# Patient Record
Sex: MALE | Race: WHITE | NOT HISPANIC OR LATINO | Employment: OTHER | URBAN - METROPOLITAN AREA
[De-identification: names, ages, dates, MRNs, and addresses within clinical notes are randomized per-mention and may not be internally consistent; named-entity substitution may affect disease eponyms.]

---

## 2017-05-19 ENCOUNTER — TELEPHONE (OUTPATIENT)
Dept: INTERNAL MEDICINE | Facility: CLINIC | Age: 62
End: 2017-05-19

## 2017-05-19 DIAGNOSIS — Z00.00 ROUTINE GENERAL MEDICAL EXAMINATION AT A HEALTH CARE FACILITY: Primary | ICD-10-CM

## 2017-05-19 NOTE — TELEPHONE ENCOUNTER
----- Message from Gisel Frey sent at 5/18/2017 12:46 PM CDT -----  Pt has labwork scheduled for 5/23 and need orders attached please

## 2017-05-23 ENCOUNTER — OFFICE VISIT (OUTPATIENT)
Dept: INTERNAL MEDICINE | Facility: CLINIC | Age: 62
End: 2017-05-23
Payer: COMMERCIAL

## 2017-05-23 ENCOUNTER — LAB VISIT (OUTPATIENT)
Dept: LAB | Facility: HOSPITAL | Age: 62
End: 2017-05-23
Attending: INTERNAL MEDICINE
Payer: COMMERCIAL

## 2017-05-23 ENCOUNTER — TELEPHONE (OUTPATIENT)
Dept: INTERNAL MEDICINE | Facility: CLINIC | Age: 62
End: 2017-05-23

## 2017-05-23 VITALS
SYSTOLIC BLOOD PRESSURE: 126 MMHG | TEMPERATURE: 98 F | BODY MASS INDEX: 28.73 KG/M2 | HEART RATE: 78 BPM | DIASTOLIC BLOOD PRESSURE: 88 MMHG | HEIGHT: 69 IN | WEIGHT: 194 LBS

## 2017-05-23 DIAGNOSIS — Z00.00 ANNUAL PHYSICAL EXAM: Primary | ICD-10-CM

## 2017-05-23 DIAGNOSIS — Z00.00 ROUTINE GENERAL MEDICAL EXAMINATION AT A HEALTH CARE FACILITY: ICD-10-CM

## 2017-05-23 DIAGNOSIS — K21.9 GASTROESOPHAGEAL REFLUX DISEASE, ESOPHAGITIS PRESENCE NOT SPECIFIED: Chronic | ICD-10-CM

## 2017-05-23 DIAGNOSIS — R07.9 CHEST PAIN, UNSPECIFIED TYPE: ICD-10-CM

## 2017-05-23 DIAGNOSIS — M54.2 CERVICAL PAIN (NECK): ICD-10-CM

## 2017-05-23 DIAGNOSIS — Z12.11 SCREEN FOR COLON CANCER: ICD-10-CM

## 2017-05-23 DIAGNOSIS — E78.5 HYPERLIPIDEMIA, UNSPECIFIED HYPERLIPIDEMIA TYPE: Chronic | ICD-10-CM

## 2017-05-23 DIAGNOSIS — Z00.00 ROUTINE GENERAL MEDICAL EXAMINATION AT A HEALTH CARE FACILITY: Primary | ICD-10-CM

## 2017-05-23 LAB
ALBUMIN SERPL BCP-MCNC: 3.9 G/DL
ALP SERPL-CCNC: 77 U/L
ALT SERPL W/O P-5'-P-CCNC: 30 U/L
ANION GAP SERPL CALC-SCNC: 9 MMOL/L
AST SERPL-CCNC: 23 U/L
BASOPHILS # BLD AUTO: 0.04 K/UL
BASOPHILS NFR BLD: 0.6 %
BILIRUB SERPL-MCNC: 0.6 MG/DL
BUN SERPL-MCNC: 15 MG/DL
CALCIUM SERPL-MCNC: 9.8 MG/DL
CHLORIDE SERPL-SCNC: 105 MMOL/L
CHOLEST/HDLC SERPL: 5.5 {RATIO}
CO2 SERPL-SCNC: 25 MMOL/L
COMPLEXED PSA SERPL-MCNC: 0.62 NG/ML
CREAT SERPL-MCNC: 1.1 MG/DL
DIFFERENTIAL METHOD: ABNORMAL
EOSINOPHIL # BLD AUTO: 0.6 K/UL
EOSINOPHIL NFR BLD: 8 %
ERYTHROCYTE [DISTWIDTH] IN BLOOD BY AUTOMATED COUNT: 12.3 %
EST. GFR  (AFRICAN AMERICAN): >60 ML/MIN/1.73 M^2
EST. GFR  (NON AFRICAN AMERICAN): >60 ML/MIN/1.73 M^2
GLUCOSE SERPL-MCNC: 106 MG/DL
HCT VFR BLD AUTO: 48.9 %
HDL/CHOLESTEROL RATIO: 18.2 %
HDLC SERPL-MCNC: 187 MG/DL
HDLC SERPL-MCNC: 34 MG/DL
HGB BLD-MCNC: 16.6 G/DL
LDLC SERPL CALC-MCNC: 120.4 MG/DL
LYMPHOCYTES # BLD AUTO: 2.9 K/UL
LYMPHOCYTES NFR BLD: 40.3 %
MCH RBC QN AUTO: 32.8 PG
MCHC RBC AUTO-ENTMCNC: 33.9 %
MCV RBC AUTO: 97 FL
MONOCYTES # BLD AUTO: 0.6 K/UL
MONOCYTES NFR BLD: 8.3 %
NEUTROPHILS # BLD AUTO: 3 K/UL
NEUTROPHILS NFR BLD: 42.8 %
NONHDLC SERPL-MCNC: 153 MG/DL
PLATELET # BLD AUTO: 263 K/UL
PMV BLD AUTO: 10.3 FL
POTASSIUM SERPL-SCNC: 5 MMOL/L
PROT SERPL-MCNC: 7.2 G/DL
RBC # BLD AUTO: 5.06 M/UL
SODIUM SERPL-SCNC: 139 MMOL/L
TRIGL SERPL-MCNC: 163 MG/DL
TSH SERPL DL<=0.005 MIU/L-ACNC: 3.25 UIU/ML
WBC # BLD AUTO: 7.12 K/UL

## 2017-05-23 PROCEDURE — 93010 ELECTROCARDIOGRAM REPORT: CPT | Mod: S$GLB,,, | Performed by: INTERNAL MEDICINE

## 2017-05-23 PROCEDURE — 84153 ASSAY OF PSA TOTAL: CPT

## 2017-05-23 PROCEDURE — 93005 ELECTROCARDIOGRAM TRACING: CPT | Mod: S$GLB,,, | Performed by: INTERNAL MEDICINE

## 2017-05-23 PROCEDURE — 84443 ASSAY THYROID STIM HORMONE: CPT

## 2017-05-23 PROCEDURE — 83036 HEMOGLOBIN GLYCOSYLATED A1C: CPT

## 2017-05-23 PROCEDURE — 80061 LIPID PANEL: CPT

## 2017-05-23 PROCEDURE — 85025 COMPLETE CBC W/AUTO DIFF WBC: CPT

## 2017-05-23 PROCEDURE — 99396 PREV VISIT EST AGE 40-64: CPT | Mod: S$GLB,,, | Performed by: INTERNAL MEDICINE

## 2017-05-23 PROCEDURE — 80053 COMPREHEN METABOLIC PANEL: CPT

## 2017-05-23 PROCEDURE — 99999 PR PBB SHADOW E&M-EST. PATIENT-LVL III: CPT | Mod: PBBFAC,,, | Performed by: INTERNAL MEDICINE

## 2017-05-23 PROCEDURE — 36415 COLL VENOUS BLD VENIPUNCTURE: CPT | Mod: PO

## 2017-05-23 RX ORDER — LOVASTATIN 40 MG/1
40 TABLET ORAL NIGHTLY
Qty: 90 TABLET | Refills: 3 | Status: SHIPPED | OUTPATIENT
Start: 2017-05-23 | End: 2018-05-04 | Stop reason: SDUPTHER

## 2017-05-23 RX ORDER — OMEPRAZOLE 40 MG/1
40 CAPSULE, DELAYED RELEASE ORAL DAILY
Qty: 90 CAPSULE | Refills: 3 | Status: SHIPPED | OUTPATIENT
Start: 2017-05-23 | End: 2018-05-24 | Stop reason: SDUPTHER

## 2017-05-23 RX ORDER — TAMSULOSIN HYDROCHLORIDE 0.4 MG/1
0.4 CAPSULE ORAL DAILY
Qty: 90 CAPSULE | Refills: 3 | Status: SHIPPED | OUTPATIENT
Start: 2017-05-23 | End: 2018-05-24 | Stop reason: SDUPTHER

## 2017-05-23 RX ORDER — NAPROXEN 500 MG/1
500 TABLET ORAL 2 TIMES DAILY
Qty: 60 TABLET | Refills: 1 | Status: SHIPPED | OUTPATIENT
Start: 2017-05-23 | End: 2019-01-08

## 2017-05-23 NOTE — PROGRESS NOTES
Subjective:       Patient ID: Casper Osborne is a 61 y.o. male.    Chief Complaint: Annual Exam    HPI     61 y.o. male here for annual exam.     Cholesterol: pending  Vaccines: Influenza - not done; Tetanus - 2015; Prevnar; Zoster - needs  Sexual Screening:   STD screening: no concern  Eye exam: done last more than 2 years ago.  Prostate: pending  Colonoscopy: never done  A1c: pending    Exercise: stopped exercising, used to ride a bike.  Has not ridden since last summer.  His arms are bothersome - he has a torn labrum in one shoulder and rotator cuff in the other shoulder.  He cannot run since he crushed his foot.  He mows 4 lawns a week.  Diet:  Combination home cooked and junk food.  Never fast food.  Drinks tea and water and diet soda.    Concerned about having metabolic syndrome.    He has had neck pain the last couple of days.  He hauls the  back and forth. He has worked on the  the last two days fixing the wheels.    Past Medical History:   Diagnosis Date    GERD (gastroesophageal reflux disease)     Hyperlipidemia      Past Surgical History:   Procedure Laterality Date    FRACTURE SURGERY      left heel at 41yo    HEEL SPUR SURGERY      left heel - fell off a ladder and had to have this reconstructed    SPINE SURGERY      c7 fx - prior to this, he was getting dizzy spells - none since    TONSILLECTOMY      VASECTOMY       Social History     Social History    Marital status:      Spouse name: Nithya    Number of children: 1    Years of education: N/A     Occupational History    Retried      Social History Main Topics    Smoking status: Never Smoker    Smokeless tobacco: Never Used    Alcohol use Yes      Comment: less than once a month    Drug use: No    Sexual activity: Yes     Partners: Female      Comment:      Other Topics Concern    Not on file     Social History Narrative    No narrative on file     Review of patient's allergies indicates:  No  Known Allergies  Mr. Osborne had no medications administered during this visit.    Review of Systems   Constitutional: Negative for chills, fever and unexpected weight change.   HENT: Negative for congestion, postnasal drip and sore throat.    Eyes: Negative for redness and visual disturbance.   Respiratory: Negative for cough and shortness of breath.    Cardiovascular: Positive for chest pain (when he mows the lawn if he overdoes it. This has been going on a couple weeks. He had assocaited SOB. No nausea. No radiation. ). Negative for palpitations.   Gastrointestinal: Negative for abdominal pain, constipation, diarrhea, nausea and vomiting.   Genitourinary: Negative for dysuria, frequency and hematuria.   Musculoskeletal: Positive for arthralgias. Negative for myalgias.   Skin: Negative for color change and rash.   Neurological: Negative for dizziness and headaches.       Objective:      Physical Exam   Constitutional: He is oriented to person, place, and time. He appears well-developed and well-nourished.   HENT:   Head: Normocephalic and atraumatic.   Mouth/Throat: No oropharyngeal exudate.   Eyes: EOM are normal. Pupils are equal, round, and reactive to light. Right eye exhibits no discharge. Left eye exhibits no discharge. No scleral icterus.   Neck: Normal range of motion. Neck supple. No tracheal deviation present. No thyromegaly present.   Cardiovascular: Normal rate, regular rhythm and normal heart sounds.  Exam reveals no gallop and no friction rub.    No murmur heard.  Pulmonary/Chest: Effort normal and breath sounds normal. No respiratory distress. He has no wheezes. He has no rales. He exhibits no tenderness.   Abdominal: Soft. Bowel sounds are normal. He exhibits no distension and no mass. There is no tenderness. There is no rebound and no guarding.   Musculoskeletal: Normal range of motion. He exhibits no edema or tenderness.   Neurological: He is alert and oriented to person, place, and time.   Skin:  Skin is warm and dry. No rash noted. No erythema. No pallor.   Psychiatric: He has a normal mood and affect. His behavior is normal.   Vitals reviewed.      Assessment:       1. Annual physical exam    2. Hyperlipidemia, unspecified hyperlipidemia type    3. Gastroesophageal reflux disease, esophagitis presence not specified    4. Chest pain, unspecified type    5. Cervical pain (neck)    6. Screen for colon cancer        Plan:       1.  Awaiting lab results.  Up-to-date on tetanus vaccine.  Recommend shingles vaccine.  Recommend flu vaccine next season.  Discussed exercise with patient.  2.  Continue Mevacor 40 mrem daily.  3.  Continue Prilosec 40 mrem daily.  Next line 4.  Check EKG and exercise stress echo.  EKG in clinic shows normal sinus rhythm.  5.  Likely from poor sleep.  Recommend NSAIDs.  6.  Colonoscopy ordered.    Patient is concerned about having metabolic syndrome.  Would like to know if he should start on metformin because of this.  Told patient was going to await lab work before making a decision.

## 2017-05-24 ENCOUNTER — TELEPHONE (OUTPATIENT)
Dept: INTERNAL MEDICINE | Facility: CLINIC | Age: 62
End: 2017-05-24

## 2017-05-24 LAB
ESTIMATED AVG GLUCOSE: 117 MG/DL
HBA1C MFR BLD HPLC: 5.7 %

## 2017-05-24 RX ORDER — METFORMIN HYDROCHLORIDE 500 MG/1
500 TABLET ORAL 2 TIMES DAILY WITH MEALS
Qty: 60 TABLET | Refills: 11 | Status: SHIPPED | OUTPATIENT
Start: 2017-05-24 | End: 2018-05-24 | Stop reason: SDUPTHER

## 2017-05-30 ENCOUNTER — CLINICAL SUPPORT (OUTPATIENT)
Dept: CARDIOLOGY | Facility: CLINIC | Age: 62
End: 2017-05-30
Payer: COMMERCIAL

## 2017-05-30 DIAGNOSIS — R07.9 CHEST PAIN, UNSPECIFIED TYPE: ICD-10-CM

## 2017-05-30 LAB
DIASTOLIC DYSFUNCTION: NO
ESTIMATED PA SYSTOLIC PRESSURE: 20.98
RETIRED EF AND QEF - SEE NOTES: 55 (ref 55–65)
TRICUSPID VALVE REGURGITATION: NORMAL

## 2017-05-30 PROCEDURE — 93320 DOPPLER ECHO COMPLETE: CPT | Mod: S$GLB,,, | Performed by: INTERNAL MEDICINE

## 2017-05-30 PROCEDURE — 93351 STRESS TTE COMPLETE: CPT | Mod: S$GLB,,, | Performed by: INTERNAL MEDICINE

## 2017-05-30 PROCEDURE — 93325 DOPPLER ECHO COLOR FLOW MAPG: CPT | Mod: S$GLB,,, | Performed by: INTERNAL MEDICINE

## 2017-10-12 RX ORDER — FLUTICASONE PROPIONATE 50 MCG
SPRAY, SUSPENSION (ML) NASAL
Qty: 16 G | Refills: 3 | Status: SHIPPED | OUTPATIENT
Start: 2017-10-12 | End: 2019-12-18 | Stop reason: SDUPTHER

## 2018-03-28 ENCOUNTER — TELEPHONE (OUTPATIENT)
Dept: INTERNAL MEDICINE | Facility: CLINIC | Age: 63
End: 2018-03-28

## 2018-03-28 DIAGNOSIS — Z12.5 PROSTATE CANCER SCREENING: ICD-10-CM

## 2018-03-28 DIAGNOSIS — Z00.00 ANNUAL PHYSICAL EXAM: Primary | ICD-10-CM

## 2018-03-28 NOTE — TELEPHONE ENCOUNTER
----- Message from Margot Mcnamara sent at 3/28/2018  2:50 PM CDT -----  Contact: Pt can be reached at [phone]  Pt is calling to request all blood labs orders to be put in for appt. Pt says will only come in one time for blood draw    Please contact pt     Thank you!

## 2018-03-28 NOTE — TELEPHONE ENCOUNTER
Please enter orders and urine if applicable for yearly annual labs and alert staff so they can be linked to appt. Thank you

## 2018-05-04 DIAGNOSIS — E78.5 HYPERLIPIDEMIA, UNSPECIFIED HYPERLIPIDEMIA TYPE: Chronic | ICD-10-CM

## 2018-05-04 RX ORDER — LOVASTATIN 40 MG/1
TABLET ORAL
Qty: 90 TABLET | Refills: 0 | Status: SHIPPED | OUTPATIENT
Start: 2018-05-04 | End: 2018-05-24 | Stop reason: SDUPTHER

## 2018-05-09 ENCOUNTER — PATIENT MESSAGE (OUTPATIENT)
Dept: INTERNAL MEDICINE | Facility: CLINIC | Age: 63
End: 2018-05-09

## 2018-05-24 ENCOUNTER — OFFICE VISIT (OUTPATIENT)
Dept: INTERNAL MEDICINE | Facility: CLINIC | Age: 63
End: 2018-05-24
Payer: COMMERCIAL

## 2018-05-24 ENCOUNTER — LAB VISIT (OUTPATIENT)
Dept: LAB | Facility: HOSPITAL | Age: 63
End: 2018-05-24
Attending: INTERNAL MEDICINE
Payer: COMMERCIAL

## 2018-05-24 VITALS
HEART RATE: 84 BPM | TEMPERATURE: 98 F | BODY MASS INDEX: 27.04 KG/M2 | HEIGHT: 71 IN | SYSTOLIC BLOOD PRESSURE: 126 MMHG | DIASTOLIC BLOOD PRESSURE: 84 MMHG | WEIGHT: 193.13 LBS

## 2018-05-24 DIAGNOSIS — M79.672 LEFT FOOT PAIN: ICD-10-CM

## 2018-05-24 DIAGNOSIS — K21.9 GASTROESOPHAGEAL REFLUX DISEASE, ESOPHAGITIS PRESENCE NOT SPECIFIED: Chronic | ICD-10-CM

## 2018-05-24 DIAGNOSIS — M47.816 LUMBAR FACET ARTHROPATHY: Chronic | ICD-10-CM

## 2018-05-24 DIAGNOSIS — E78.5 HYPERLIPIDEMIA, UNSPECIFIED HYPERLIPIDEMIA TYPE: Chronic | ICD-10-CM

## 2018-05-24 DIAGNOSIS — Z12.11 SCREEN FOR COLON CANCER: ICD-10-CM

## 2018-05-24 DIAGNOSIS — Z00.00 ANNUAL PHYSICAL EXAM: ICD-10-CM

## 2018-05-24 DIAGNOSIS — Z00.00 ANNUAL PHYSICAL EXAM: Primary | ICD-10-CM

## 2018-05-24 DIAGNOSIS — Z12.5 PROSTATE CANCER SCREENING: ICD-10-CM

## 2018-05-24 LAB
ALBUMIN SERPL BCP-MCNC: 4 G/DL
ALP SERPL-CCNC: 76 U/L
ALT SERPL W/O P-5'-P-CCNC: 25 U/L
ANION GAP SERPL CALC-SCNC: 4 MMOL/L
AST SERPL-CCNC: 24 U/L
BASOPHILS # BLD AUTO: 0.06 K/UL
BASOPHILS NFR BLD: 0.9 %
BILIRUB SERPL-MCNC: 1.5 MG/DL
BUN SERPL-MCNC: 17 MG/DL
CALCIUM SERPL-MCNC: 9.8 MG/DL
CHLORIDE SERPL-SCNC: 104 MMOL/L
CHOLEST SERPL-MCNC: 168 MG/DL
CHOLEST/HDLC SERPL: 5.3 {RATIO}
CO2 SERPL-SCNC: 31 MMOL/L
COMPLEXED PSA SERPL-MCNC: 0.62 NG/ML
CREAT SERPL-MCNC: 1.2 MG/DL
DIFFERENTIAL METHOD: ABNORMAL
EOSINOPHIL # BLD AUTO: 0.3 K/UL
EOSINOPHIL NFR BLD: 5.3 %
ERYTHROCYTE [DISTWIDTH] IN BLOOD BY AUTOMATED COUNT: 11.8 %
EST. GFR  (AFRICAN AMERICAN): >60 ML/MIN/1.73 M^2
EST. GFR  (NON AFRICAN AMERICAN): >60 ML/MIN/1.73 M^2
ESTIMATED AVG GLUCOSE: 114 MG/DL
GLUCOSE SERPL-MCNC: 101 MG/DL
HBA1C MFR BLD HPLC: 5.6 %
HCT VFR BLD AUTO: 47.4 %
HDLC SERPL-MCNC: 32 MG/DL
HDLC SERPL: 19 %
HGB BLD-MCNC: 15.6 G/DL
IMM GRANULOCYTES # BLD AUTO: 0.01 K/UL
IMM GRANULOCYTES NFR BLD AUTO: 0.2 %
LDLC SERPL CALC-MCNC: 109.8 MG/DL
LYMPHOCYTES # BLD AUTO: 2.6 K/UL
LYMPHOCYTES NFR BLD: 40.5 %
MCH RBC QN AUTO: 32.3 PG
MCHC RBC AUTO-ENTMCNC: 32.9 G/DL
MCV RBC AUTO: 98 FL
MONOCYTES # BLD AUTO: 0.6 K/UL
MONOCYTES NFR BLD: 9.6 %
NEUTROPHILS # BLD AUTO: 2.8 K/UL
NEUTROPHILS NFR BLD: 43.5 %
NONHDLC SERPL-MCNC: 136 MG/DL
NRBC BLD-RTO: 0 /100 WBC
PLATELET # BLD AUTO: 246 K/UL
PMV BLD AUTO: 10.5 FL
POTASSIUM SERPL-SCNC: 4.8 MMOL/L
PROT SERPL-MCNC: 6.9 G/DL
RBC # BLD AUTO: 4.83 M/UL
SODIUM SERPL-SCNC: 139 MMOL/L
TRIGL SERPL-MCNC: 131 MG/DL
TSH SERPL DL<=0.005 MIU/L-ACNC: 1.8 UIU/ML
WBC # BLD AUTO: 6.45 K/UL

## 2018-05-24 PROCEDURE — 80053 COMPREHEN METABOLIC PANEL: CPT

## 2018-05-24 PROCEDURE — 84153 ASSAY OF PSA TOTAL: CPT

## 2018-05-24 PROCEDURE — 99999 PR PBB SHADOW E&M-EST. PATIENT-LVL IV: CPT | Mod: PBBFAC,,, | Performed by: INTERNAL MEDICINE

## 2018-05-24 PROCEDURE — 99396 PREV VISIT EST AGE 40-64: CPT | Mod: S$GLB,,, | Performed by: INTERNAL MEDICINE

## 2018-05-24 PROCEDURE — 80061 LIPID PANEL: CPT

## 2018-05-24 PROCEDURE — 36415 COLL VENOUS BLD VENIPUNCTURE: CPT | Mod: PO

## 2018-05-24 PROCEDURE — 84443 ASSAY THYROID STIM HORMONE: CPT

## 2018-05-24 PROCEDURE — 85025 COMPLETE CBC W/AUTO DIFF WBC: CPT

## 2018-05-24 PROCEDURE — 83036 HEMOGLOBIN GLYCOSYLATED A1C: CPT

## 2018-05-24 RX ORDER — METFORMIN HYDROCHLORIDE 500 MG/1
500 TABLET ORAL 2 TIMES DAILY WITH MEALS
Qty: 180 TABLET | Refills: 3 | Status: SHIPPED | OUTPATIENT
Start: 2018-05-24 | End: 2019-01-08

## 2018-05-24 RX ORDER — LOVASTATIN 40 MG/1
40 TABLET ORAL NIGHTLY
Qty: 90 TABLET | Refills: 3 | Status: SHIPPED | OUTPATIENT
Start: 2018-05-24 | End: 2019-07-08 | Stop reason: SDUPTHER

## 2018-05-24 RX ORDER — OMEPRAZOLE 40 MG/1
40 CAPSULE, DELAYED RELEASE ORAL DAILY
Qty: 90 CAPSULE | Refills: 3 | Status: SHIPPED | OUTPATIENT
Start: 2018-05-24 | End: 2019-12-18 | Stop reason: SDUPTHER

## 2018-05-24 RX ORDER — TAMSULOSIN HYDROCHLORIDE 0.4 MG/1
0.4 CAPSULE ORAL DAILY
Qty: 90 CAPSULE | Refills: 3 | Status: SHIPPED | OUTPATIENT
Start: 2018-05-24 | End: 2019-01-08 | Stop reason: SDUPTHER

## 2018-05-24 NOTE — PROGRESS NOTES
Subjective:       Patient ID: Casper Osborne is a 62 y.o. male.    Chief Complaint: Annual Exam    HPI     62 y.o. male here for annual exam.     Cholesterol: in process  Vaccines: Influenza - not done; Tetanus - 2015; Zoster - needs  Sexual Screening:   STD screening: no concern  Eye exam:  Done last more than 2 yrs ago  Prostate: in process  Colonoscopy: never had one.    A1c: in process    Exercise: no regular exercise.  Stopped riding his bike two years ago and never got back to it.  He tries walking, but does not keep up with this either (crush injury 22 yrs ago).  He has a lot of pain in his foot.  He has not been to ortho in the last 8 yrs.    Diet:  Mostly fast food - prepared food from [employer].  Drinks coffee and diet soda.  Water mostly.    Past Medical History:   Diagnosis Date    GERD (gastroesophageal reflux disease)     Hyperlipidemia      Past Surgical History:   Procedure Laterality Date    FRACTURE SURGERY      left heel at 41yo    HEEL SPUR SURGERY      left heel - fell off a ladder and had to have this reconstructed    SPINE SURGERY      c7 fx - prior to this, he was getting dizzy spells - none since    TONSILLECTOMY      VASECTOMY       Social History     Social History    Marital status:      Spouse name: Nithya    Number of children: 1    Years of education: N/A     Occupational History    Retried      Social History Main Topics    Smoking status: Never Smoker    Smokeless tobacco: Never Used    Alcohol use Yes      Comment: less than once a month    Drug use: No    Sexual activity: Yes     Partners: Female      Comment:      Other Topics Concern    Not on file     Social History Narrative    No narrative on file     Review of patient's allergies indicates:  No Known Allergies  Mr. Osborne had no medications administered during this visit.    Review of Systems    Objective:      Physical Exam   Constitutional: He is oriented to person, place, and time. He  appears well-developed and well-nourished.   HENT:   Head: Normocephalic and atraumatic.   Mouth/Throat: No oropharyngeal exudate.   Eyes: EOM are normal. Pupils are equal, round, and reactive to light. Right eye exhibits no discharge. Left eye exhibits no discharge. No scleral icterus.   Neck: Normal range of motion. Neck supple. No tracheal deviation present. No thyromegaly present.   Cardiovascular: Normal rate, regular rhythm and normal heart sounds.  Exam reveals no gallop and no friction rub.    No murmur heard.  Pulmonary/Chest: Effort normal and breath sounds normal. No respiratory distress. He has no wheezes. He has no rales. He exhibits no tenderness.   Abdominal: Soft. Bowel sounds are normal. He exhibits no distension and no mass. There is no tenderness. There is no rebound and no guarding.   Musculoskeletal: Normal range of motion. He exhibits no edema or tenderness.   Neurological: He is alert and oriented to person, place, and time.   Skin: Skin is warm and dry. No rash noted. No erythema. No pallor.   Psychiatric: He has a normal mood and affect. His behavior is normal.   Vitals reviewed.      Assessment:       1. Annual physical exam    2. Hyperlipidemia, unspecified hyperlipidemia type    3. Gastroesophageal reflux disease, esophagitis presence not specified    4. Lumbar facet arthropathy    5. Screen for colon cancer    6. Left foot pain        Plan:       1.  Lab work pending.  Counseled on getting flu vaccine October.  Recommend shingles vaccine.  Up-to-date on tetanus vaccine.  2.  Continue Mevacor 40 mg daily.  3.  Continue Prilosec 40 mg daily.  4.  Continue naproxen as needed.  5.  Fit test ordered.  6.  Refer to Podiatry.

## 2018-05-31 ENCOUNTER — LAB VISIT (OUTPATIENT)
Dept: LAB | Facility: HOSPITAL | Age: 63
End: 2018-05-31
Attending: INTERNAL MEDICINE
Payer: COMMERCIAL

## 2018-05-31 DIAGNOSIS — Z12.11 SCREEN FOR COLON CANCER: ICD-10-CM

## 2018-05-31 PROCEDURE — 82274 ASSAY TEST FOR BLOOD FECAL: CPT

## 2018-06-01 LAB — HEMOCCULT STL QL IA: NEGATIVE

## 2018-06-14 ENCOUNTER — HOSPITAL ENCOUNTER (OUTPATIENT)
Dept: RADIOLOGY | Facility: HOSPITAL | Age: 63
Discharge: HOME | End: 2018-06-14
Attending: PODIATRIST
Payer: COMMERCIAL

## 2018-06-14 ENCOUNTER — OFFICE VISIT (OUTPATIENT)
Dept: PODIATRY | Facility: CLINIC | Age: 63
End: 2018-06-14
Payer: COMMERCIAL

## 2018-06-14 VITALS
SYSTOLIC BLOOD PRESSURE: 158 MMHG | HEART RATE: 79 BPM | HEIGHT: 71 IN | DIASTOLIC BLOOD PRESSURE: 96 MMHG | BODY MASS INDEX: 27.02 KG/M2 | WEIGHT: 193 LBS

## 2018-06-14 DIAGNOSIS — G89.29 CHRONIC PAIN OF LEFT ANKLE: ICD-10-CM

## 2018-06-14 DIAGNOSIS — M79.674 CHRONIC PAIN OF TOE OF RIGHT FOOT: ICD-10-CM

## 2018-06-14 DIAGNOSIS — M25.572 CHRONIC PAIN OF LEFT ANKLE: ICD-10-CM

## 2018-06-14 DIAGNOSIS — M79.674 GREAT TOE PAIN, RIGHT: ICD-10-CM

## 2018-06-14 DIAGNOSIS — M79.672 LEFT FOOT PAIN: ICD-10-CM

## 2018-06-14 DIAGNOSIS — M79.672 LEFT FOOT PAIN: Primary | ICD-10-CM

## 2018-06-14 DIAGNOSIS — G89.29 CHRONIC PAIN OF TOE OF RIGHT FOOT: ICD-10-CM

## 2018-06-14 PROCEDURE — 99999 PR PBB SHADOW E&M-EST. PATIENT-LVL III: CPT | Mod: PBBFAC,,, | Performed by: PODIATRIST

## 2018-06-14 PROCEDURE — 73630 X-RAY EXAM OF FOOT: CPT | Mod: 26,50,, | Performed by: RADIOLOGY

## 2018-06-14 PROCEDURE — 73610 X-RAY EXAM OF ANKLE: CPT | Mod: TC,PO,LT

## 2018-06-14 PROCEDURE — 73630 X-RAY EXAM OF FOOT: CPT | Mod: 50,TC,PO

## 2018-06-14 PROCEDURE — 99203 OFFICE O/P NEW LOW 30 MIN: CPT | Mod: S$GLB,,, | Performed by: PODIATRIST

## 2018-06-14 PROCEDURE — 3008F BODY MASS INDEX DOCD: CPT | Mod: CPTII,S$GLB,, | Performed by: PODIATRIST

## 2018-06-14 PROCEDURE — 73610 X-RAY EXAM OF ANKLE: CPT | Mod: 26,LT,, | Performed by: RADIOLOGY

## 2018-06-14 NOTE — PROGRESS NOTES
Chief Complaint   Patient presents with    Foot Pain     5/24/18 dr gary ire[place]     Ankle Pain     left            HPI:   Casper Osborne is a 62 y.o. male with complaints of :  1) left foot and ankle pain for about 22 years.  He reports history of calcaneal fracture treated by Dr. Melendez.  He is having more pain to the area recently.  Using a cane to help get up after rest, which is when pain is worst.    2) right great toe pain near the interphalangeal joint.  He states that it feels like he fractured it and he also complains of right knee pain.  He states he uses his right leg more... No recent trauma reported to the toe.         Past Medical History:   Diagnosis Date    GERD (gastroesophageal reflux disease)     Hyperlipidemia          Current Outpatient Prescriptions on File Prior to Visit   Medication Sig Dispense Refill    aspirin (ECOTRIN) 81 MG EC tablet Take 81 mg by mouth once daily.      fluticasone (FLONASE) 50 mcg/actuation nasal spray instill 1 spray into each nostril once daily 16 g 3    lovastatin (MEVACOR) 40 MG tablet Take 1 tablet (40 mg total) by mouth nightly. 90 tablet 3    metFORMIN (GLUCOPHAGE) 500 MG tablet Take 1 tablet (500 mg total) by mouth 2 (two) times daily with meals. 180 tablet 3    naproxen (NAPROSYN) 500 MG tablet Take 1 tablet (500 mg total) by mouth 2 (two) times daily. 60 tablet 1    omeprazole (PRILOSEC) 40 MG capsule Take 1 capsule (40 mg total) by mouth once daily. 90 capsule 3    tamsulosin (FLOMAX) 0.4 mg Cp24 Take 1 capsule (0.4 mg total) by mouth once daily. 90 capsule 3     No current facility-administered medications on file prior to visit.            Review of patient's allergies indicates:  No Known Allergies        Social History     Social History    Marital status:      Spouse name: Nithya    Number of children: 1    Years of education: N/A     Occupational History    Retried      Social History Main Topics    Smoking status: Never Smoker     "Smokeless tobacco: Never Used    Alcohol use Yes      Comment: less than once a month    Drug use: No    Sexual activity: Yes     Partners: Female      Comment:      Other Topics Concern    Not on file     Social History Narrative    No narrative on file             ROS:   General ROS: negative for - chills, fever or night sweats  Respiratory ROS: no cough, shortness of breath, or wheezing  Cardiovascular ROS: no chest pain or dyspnea on exertion  Musculoskeletal ROS: positive for - pain in foot - bilateral  Neurological ROS: no TIA or stroke symptoms  Dermatological ROS: negative      EXAM:     Vitals:    18 1049   BP: (!) 158/96   Pulse: 79   Weight: 87.5 kg (193 lb)   Height: 5' 11" (1.803 m)        General:  Alert, oriented, no acute distress      Bilateral  Lower extremity exam:    Vascular:   Dorsalis Pedis:  present   Posterior Tibial:  present  capillary refill time:  3 seconds  Temperature of toes cool to touch  Hair on toes:  present    none Edema to affected area.        Neurological:     sharp dull - B/L normal and light touch - B/L normal  No Tinels  No Mulders      Dermatological:   There is intact skin tone, turgor, and temperature.    Wounds: non  Erythema:  none  No suspicious lesions      Musculoskeletal:   Metatarsophalangeal, subtalar, and ankle range of motion are 5/5, decreased ROM, adequate strength  Right foot: flat foot  Left foot: flat foot          Imagin2018  CLINICAL HISTORY:  foot and ankle pain x 22 years;  Pain in left foot  FINDINGS:  Left: There is postoperative change of the calcaneus.  There is DJD and a flatfoot deformity.  No fracture dislocation bone destruction seen.  Right: There is DJD and hallux valgus deformity.  No fracture dislocation bone destruction seen.  There are spurs on the calcaneus.              ASSESSMENT/PLAN:        I counseled the patient on the patient's conditions, their implications and medical management.        Left foot " pain  -     X-Ray Ankle Complete Left; Future; Expected date: 06/14/2018    Chronic pain of left ankle  -     X-Ray Ankle Complete Left; Future; Expected date: 06/14/2018    Great toe pain, right      · Xrays reviewed with the patient.    · treatment includes injection, orthotics, pain management, vs surgical intervention.  He has tried these and will follow up with Dr. Melendez to see if there are surgical options available.  · He also requests referral to see a doctor about his right knee pain.

## 2018-06-14 NOTE — LETTER
June 14, 2018      Leon Chaparro MD  [address] [facility]  [place] [address]           [place] [facility]  [address] [facility] [address]  Phone: [phone]          Patient: Casper Osborne   MR Number: [id number]   YOB: 1955   Date of Visit: 6/14/2018       Dear Dr. Leon Chaparro:    Thank you for referring Casper Osborne to me for evaluation. Attached you will find relevant portions of my assessment and plan of care.    If you have questions, please do not hesitate to call me. I look forward to following Casper Osborne along with you.    Sincerely,    Lexii Gabriel DPM    Enclosure  CC:  No Recipients    If you would like to receive this communication electronically, please contact [email or web address] or [phone] to request more information on [facility] Link access.    For providers and/or their staff who would like to refer a patient to [facility], please contact us through our one-stop-shop provider referral line, Nader Cheng, at [phone].    If you feel you have received this communication in error or would no longer like to receive these types of communications, please e-mail [email or web address]

## 2018-07-02 ENCOUNTER — PATIENT MESSAGE (OUTPATIENT)
Dept: ORTHOPEDICS | Facility: CLINIC | Age: 63
End: 2018-07-02

## 2018-07-15 ENCOUNTER — PATIENT MESSAGE (OUTPATIENT)
Dept: ORTHOPEDICS | Facility: CLINIC | Age: 63
End: 2018-07-15

## 2018-12-15 ENCOUNTER — PATIENT MESSAGE (OUTPATIENT)
Dept: ORTHOPEDICS | Facility: CLINIC | Age: 63
End: 2018-12-15

## 2018-12-15 ENCOUNTER — PATIENT MESSAGE (OUTPATIENT)
Dept: INTERNAL MEDICINE | Facility: CLINIC | Age: 63
End: 2018-12-15

## 2018-12-15 DIAGNOSIS — S93.409A SPRAIN OF ANKLE, UNSPECIFIED LATERALITY, UNSPECIFIED LIGAMENT, INITIAL ENCOUNTER: Primary | ICD-10-CM

## 2018-12-17 ENCOUNTER — TELEPHONE (OUTPATIENT)
Dept: INTERNAL MEDICINE | Facility: CLINIC | Age: 63
End: 2018-12-17

## 2018-12-17 ENCOUNTER — TELEPHONE (OUTPATIENT)
Dept: ORTHOPEDICS | Facility: CLINIC | Age: 63
End: 2018-12-17

## 2018-12-17 DIAGNOSIS — S99.912A INJURY OF LEFT ANKLE, INITIAL ENCOUNTER: Primary | ICD-10-CM

## 2018-12-17 NOTE — TELEPHONE ENCOUNTER
----- Message from Lisa Echevarria RN sent at 12/17/2018  3:05 PM CST -----  Contact: Self/ [phone]      ----- Message -----  From: Mirna Smith  Sent: 12/17/2018   3:02 PM  To: Nora LEON Staff    Patient Returning Call from [facility]    Who Left Message for Patient: Zeynep Greenwood MA.    Communication Preference: [phone].

## 2018-12-17 NOTE — TELEPHONE ENCOUNTER
Spoke with female and pt.  Offered pt an appointment this afternoon for ankle pain.  Pt on his way to a .  Pt will call back later to schedule an appointment

## 2018-12-17 NOTE — TELEPHONE ENCOUNTER
Spoke with pt.   Scheduled with KAMILLA Donato for ankle sprain as requested.   Advised pt that xrays can be done at the time of his visit or he may go to have PCP ordered x-rays completed.

## 2018-12-17 NOTE — TELEPHONE ENCOUNTER
----- Message from Tegan Greenwood sent at 12/17/2018 11:58 AM CST -----      ----- Message -----  From: Connie Nathan  Sent: 12/17/2018   9:28 AM  To: Shankar Quintero Staff    Patient is needing an xray of his left ankle prior to his appointment with Dr Melendez if Dr Chaparro is agreeable.  Please enter orders and message me back to schedule.    Sprain of ankle, unspecified laterality, unspecified ligament, initial encounter [S92.510A]    Thanks, Prema

## 2018-12-18 NOTE — TELEPHONE ENCOUNTER
----- Message from Connie Nathan sent at 12/17/2018  9:28 AM CST -----  Patient is needing an xray of his left ankle prior to his appointment with Dr Melendez if Dr Chaparro is agreeable.  Please enter orders and message me back to schedule.    Sprain of ankle, unspecified laterality, unspecified ligament, initial encounter [E40.384A]    Thanks, Prema

## 2018-12-20 DIAGNOSIS — S99.912A INJURY OF LEFT ANKLE, INITIAL ENCOUNTER: Primary | ICD-10-CM

## 2018-12-28 ENCOUNTER — PATIENT MESSAGE (OUTPATIENT)
Dept: INTERNAL MEDICINE | Facility: CLINIC | Age: 63
End: 2018-12-28

## 2018-12-28 ENCOUNTER — PATIENT MESSAGE (OUTPATIENT)
Dept: ORTHOPEDICS | Facility: CLINIC | Age: 63
End: 2018-12-28

## 2018-12-31 ENCOUNTER — OFFICE VISIT (OUTPATIENT)
Dept: ORTHOPEDICS | Facility: CLINIC | Age: 63
End: 2018-12-31
Payer: COMMERCIAL

## 2018-12-31 ENCOUNTER — HOSPITAL ENCOUNTER (OUTPATIENT)
Dept: RADIOLOGY | Facility: HOSPITAL | Age: 63
Discharge: HOME | End: 2018-12-31
Attending: PHYSICIAN ASSISTANT
Payer: COMMERCIAL

## 2018-12-31 ENCOUNTER — TELEPHONE (OUTPATIENT)
Dept: ORTHOPEDICS | Facility: CLINIC | Age: 63
End: 2018-12-31

## 2018-12-31 VITALS — BODY MASS INDEX: 27.28 KG/M2 | HEIGHT: 71 IN | WEIGHT: 194.88 LBS

## 2018-12-31 DIAGNOSIS — R73.01 IMPAIRED FASTING BLOOD SUGAR: Primary | ICD-10-CM

## 2018-12-31 DIAGNOSIS — Z87.81 HISTORY OF FRACTURE: ICD-10-CM

## 2018-12-31 DIAGNOSIS — M25.572 LEFT ANKLE PAIN, UNSPECIFIED CHRONICITY: Primary | ICD-10-CM

## 2018-12-31 DIAGNOSIS — M19.072 ARTHRITIS OF LEFT SUBTALAR JOINT: ICD-10-CM

## 2018-12-31 DIAGNOSIS — T14.90XA TRAUMA: ICD-10-CM

## 2018-12-31 DIAGNOSIS — Z12.5 SCREENING FOR PROSTATE CANCER: ICD-10-CM

## 2018-12-31 DIAGNOSIS — E78.5 HYPERLIPIDEMIA, UNSPECIFIED HYPERLIPIDEMIA TYPE: ICD-10-CM

## 2018-12-31 DIAGNOSIS — Z00.00 ANNUAL PHYSICAL EXAM: ICD-10-CM

## 2018-12-31 PROCEDURE — 99999 PR PBB SHADOW E&M-EST. PATIENT-LVL III: CPT | Mod: PBBFAC,,, | Performed by: PHYSICIAN ASSISTANT

## 2018-12-31 PROCEDURE — 99203 OFFICE O/P NEW LOW 30 MIN: CPT | Mod: S$GLB,,, | Performed by: PHYSICIAN ASSISTANT

## 2018-12-31 PROCEDURE — 73610 X-RAY EXAM OF ANKLE: CPT | Mod: 26,LT,, | Performed by: RADIOLOGY

## 2018-12-31 PROCEDURE — 73630 XR FOOT COMPLETE 3 VIEW LEFT: ICD-10-PCS | Mod: 26,LT,, | Performed by: RADIOLOGY

## 2018-12-31 PROCEDURE — 73630 X-RAY EXAM OF FOOT: CPT | Mod: 26,LT,, | Performed by: RADIOLOGY

## 2018-12-31 PROCEDURE — 3008F BODY MASS INDEX DOCD: CPT | Mod: CPTII,S$GLB,, | Performed by: PHYSICIAN ASSISTANT

## 2018-12-31 PROCEDURE — 73610 XR ANKLE COMPLETE 3 VIEW LEFT: ICD-10-PCS | Mod: 26,LT,, | Performed by: RADIOLOGY

## 2018-12-31 PROCEDURE — 73630 X-RAY EXAM OF FOOT: CPT | Mod: TC,LT

## 2018-12-31 PROCEDURE — 73610 X-RAY EXAM OF ANKLE: CPT | Mod: TC,LT

## 2018-12-31 NOTE — TELEPHONE ENCOUNTER
----- Message from Jose G Falk sent at 12/28/2018  5:23 PM CST -----  Contact: Pt  Pt would like to be called back regarding appt that needs to be rescheduled on 12/31/2019 for 8:15am.    Pt can be reached at [phone].    Thank You.

## 2018-12-31 NOTE — PROGRESS NOTES
Subjective:      Patient ID: Casper Osborne is a 63 y.o. male.    Chief Complaint: Follow-up (left ankle)    HPI    Patient is a 63 year old male with PMHX of DM ( A1C 5.6 on 5/24/2018), who presents to clinic with chief complaint of acute on chroinc left ankle pain since twisting his ankle 12/05/2018.  Patient has been using a cane as needed since that time. His pain is increased with first getting up to walk and then will decrease as he warms up. He has tried rest ice and oral medication without complete relief. He denied any new numbness or tingling.   He does have a history of left calcaneous fracture in May of 1996 treated with ORIF by .  He stated that once he started increasing his PT his screws began to back out and his hardware was removed manny 2 stage process that same year. He stated that it will bother him on and off. He has tried injection (last done in 2004 for subtalar arthritis), orthotics and oral medication. He is ready to discuss any surgical options if needed.     Review of Systems   Constitution: Negative for chills and fever.   Cardiovascular: Negative for chest pain.   Respiratory: Negative for cough and shortness of breath.    Skin: Negative for color change, dry skin, itching, nail changes, poor wound healing and rash.   Musculoskeletal:        Left foot/ ankle  pain   Neurological: Negative for dizziness.   Psychiatric/Behavioral: Negative for altered mental status. The patient is not nervous/anxious.    All other systems reviewed and are negative.        Objective:      General    Constitutional: He is oriented to person, place, and time. He appears well-developed and well-nourished. No distress.   HENT:   Head: Atraumatic.   Eyes: Conjunctivae are normal.   Cardiovascular: Normal rate.    Pulmonary/Chest: Effort normal.   Neurological: He is alert and oriented to person, place, and time.   Psychiatric: He has a normal mood and affect. His behavior is normal.         Left  Ankle/Foot Exam     Range of Motion   Ankle Joint  Dorsiflexion: abnormal   Plantar flexion: abnormal     Subtalar Joint   Inversion: abnormal   Eversion: abnormal     Other   Sensation: normal    Comments:  Subtalar joint           RADS:   ANKLE: There is a healing calcaneal fracture.  There is the decrease of Boehler's angle flatfoot deformity DJD and spurs on the calcaneus  FOOT: There is a calcaneal fracture with decreased Boehler's angle.  This remote.  There is DJD and spurs on the calcaneus.  No acute fracture dislocation bone destruction seen  Assessment:       Encounter Diagnoses   Name Primary?    Left ankle pain, unspecified chronicity Yes    History of fracture     Arthritis of left subtalar joint           Plan:       Discussed plan with patient. At this time he would like to discuss surgical intervention at this time. He has appointment with .

## 2019-01-02 ENCOUNTER — OFFICE VISIT (OUTPATIENT)
Dept: ORTHOPEDICS | Facility: CLINIC | Age: 64
End: 2019-01-02
Attending: ORTHOPAEDIC SURGERY
Payer: COMMERCIAL

## 2019-01-02 VITALS — HEIGHT: 71 IN | WEIGHT: 188 LBS | BODY MASS INDEX: 26.32 KG/M2

## 2019-01-02 DIAGNOSIS — M77.02 MEDIAL EPICONDYLITIS OF LEFT ELBOW: ICD-10-CM

## 2019-01-02 DIAGNOSIS — M25.531 RIGHT WRIST PAIN: ICD-10-CM

## 2019-01-02 DIAGNOSIS — M25.522 LEFT ELBOW PAIN: Primary | ICD-10-CM

## 2019-01-02 PROCEDURE — 3008F PR BODY MASS INDEX (BMI) DOCUMENTED: ICD-10-PCS | Mod: CPTII,S$GLB,, | Performed by: ORTHOPAEDIC SURGERY

## 2019-01-02 PROCEDURE — 99999 PR PBB SHADOW E&M-EST. PATIENT-LVL III: ICD-10-PCS | Mod: PBBFAC,,, | Performed by: ORTHOPAEDIC SURGERY

## 2019-01-02 PROCEDURE — 99213 PR OFFICE/OUTPT VISIT, EST, LEVL III, 20-29 MIN: ICD-10-PCS | Mod: S$GLB,,, | Performed by: ORTHOPAEDIC SURGERY

## 2019-01-02 PROCEDURE — 3008F BODY MASS INDEX DOCD: CPT | Mod: CPTII,S$GLB,, | Performed by: ORTHOPAEDIC SURGERY

## 2019-01-02 PROCEDURE — 99999 PR PBB SHADOW E&M-EST. PATIENT-LVL III: CPT | Mod: PBBFAC,,, | Performed by: ORTHOPAEDIC SURGERY

## 2019-01-02 PROCEDURE — 99213 OFFICE O/P EST LOW 20 MIN: CPT | Mod: S$GLB,,, | Performed by: ORTHOPAEDIC SURGERY

## 2019-01-02 NOTE — PROGRESS NOTES
HISTORY OF PRESENT ILLNESS:  Mr. Osborne seen previously for right arm symptoms   including triggering of the right middle finger.  He is doing well with the   right arm, but recently started having increasing pain in his left elbow.  He   thinks it may have come on after he was doing some lifting.  He is not quite   sure, but he is concerned that he may have torn something in his elbow.  He does   describe feeling a little bit of a pop sensation.  Since then, he has had   medial-sided left elbow pain, which radiates down the arm and is occasionally   associated with some numbness in the forearm, but not really in the fingers.    PHYSICAL EXAMINATION:  LEFT ELBOW:  There is some tenderness over the medial epicondylar area.  No   bruising, no swelling.  Range of motion full.  No instability, but he does have   some pain on ulnar stress to the medial collateral ligament.  Tinel sign mildly   positive.  Range of motion in wrist and fingers full.  Sensation is intact in   left hand.    IMPRESSION:  1.  Medial epicondylitis, left elbow.  2.  Possible tear of ulnar collateral ligament.    PLAN:  The patient is anxious to get an MRI, so I will order this today for the   left elbow.  We can check ligament and the tendon as well.  In the meantime, I   would like to start him on anti-inflammatory medication.  Prescription and   samples of Duexis given.  Avoid heavy lifting.  Follow up after the MRI is   complete.      DARYL  dd: 01/02/2019 15:23:12 (CST)  td: 01/03/2019 05:40:35 (CST)  Doc ID   #[id number]  Job ID #[id number]    CC:

## 2019-01-04 ENCOUNTER — LAB VISIT (OUTPATIENT)
Dept: LAB | Facility: HOSPITAL | Age: 64
End: 2019-01-04
Attending: INTERNAL MEDICINE
Payer: COMMERCIAL

## 2019-01-04 DIAGNOSIS — Z00.00 ANNUAL PHYSICAL EXAM: ICD-10-CM

## 2019-01-04 DIAGNOSIS — E78.5 HYPERLIPIDEMIA, UNSPECIFIED HYPERLIPIDEMIA TYPE: ICD-10-CM

## 2019-01-04 DIAGNOSIS — Z12.5 SCREENING FOR PROSTATE CANCER: ICD-10-CM

## 2019-01-04 DIAGNOSIS — R73.01 IMPAIRED FASTING BLOOD SUGAR: ICD-10-CM

## 2019-01-04 LAB
ALBUMIN SERPL BCP-MCNC: 3.9 G/DL
ALP SERPL-CCNC: 74 U/L
ALT SERPL W/O P-5'-P-CCNC: 28 U/L
ANION GAP SERPL CALC-SCNC: 7 MMOL/L
AST SERPL-CCNC: 21 U/L
BASOPHILS # BLD AUTO: 0.05 K/UL
BASOPHILS NFR BLD: 0.7 %
BILIRUB SERPL-MCNC: 1.3 MG/DL
BUN SERPL-MCNC: 15 MG/DL
CALCIUM SERPL-MCNC: 9.5 MG/DL
CHLORIDE SERPL-SCNC: 104 MMOL/L
CHOLEST SERPL-MCNC: 193 MG/DL
CHOLEST/HDLC SERPL: 4.9 {RATIO}
CO2 SERPL-SCNC: 27 MMOL/L
COMPLEXED PSA SERPL-MCNC: 0.49 NG/ML
CREAT SERPL-MCNC: 1 MG/DL
DIFFERENTIAL METHOD: ABNORMAL
EOSINOPHIL # BLD AUTO: 0.3 K/UL
EOSINOPHIL NFR BLD: 4.1 %
ERYTHROCYTE [DISTWIDTH] IN BLOOD BY AUTOMATED COUNT: 11.8 %
EST. GFR  (AFRICAN AMERICAN): >60 ML/MIN/1.73 M^2
EST. GFR  (NON AFRICAN AMERICAN): >60 ML/MIN/1.73 M^2
ESTIMATED AVG GLUCOSE: 117 MG/DL
GLUCOSE SERPL-MCNC: 104 MG/DL
HBA1C MFR BLD HPLC: 5.7 %
HCT VFR BLD AUTO: 49 %
HDLC SERPL-MCNC: 39 MG/DL
HDLC SERPL: 20.2 %
HGB BLD-MCNC: 16.2 G/DL
IMM GRANULOCYTES # BLD AUTO: 0.02 K/UL
IMM GRANULOCYTES NFR BLD AUTO: 0.3 %
LDLC SERPL CALC-MCNC: 121.6 MG/DL
LYMPHOCYTES # BLD AUTO: 2.9 K/UL
LYMPHOCYTES NFR BLD: 38.5 %
MCH RBC QN AUTO: 32.1 PG
MCHC RBC AUTO-ENTMCNC: 33.1 G/DL
MCV RBC AUTO: 97 FL
MONOCYTES # BLD AUTO: 0.7 K/UL
MONOCYTES NFR BLD: 9.3 %
NEUTROPHILS # BLD AUTO: 3.6 K/UL
NEUTROPHILS NFR BLD: 47.1 %
NONHDLC SERPL-MCNC: 154 MG/DL
NRBC BLD-RTO: 0 /100 WBC
PLATELET # BLD AUTO: 315 K/UL
PMV BLD AUTO: 10 FL
POTASSIUM SERPL-SCNC: 4.7 MMOL/L
PROT SERPL-MCNC: 7.1 G/DL
RBC # BLD AUTO: 5.05 M/UL
SODIUM SERPL-SCNC: 138 MMOL/L
TRIGL SERPL-MCNC: 162 MG/DL
TSH SERPL DL<=0.005 MIU/L-ACNC: 2.46 UIU/ML
WBC # BLD AUTO: 7.63 K/UL

## 2019-01-04 PROCEDURE — 84153 ASSAY OF PSA TOTAL: CPT

## 2019-01-04 PROCEDURE — 83036 HEMOGLOBIN GLYCOSYLATED A1C: CPT

## 2019-01-04 PROCEDURE — 84443 ASSAY THYROID STIM HORMONE: CPT

## 2019-01-04 PROCEDURE — 36415 COLL VENOUS BLD VENIPUNCTURE: CPT | Mod: PO

## 2019-01-04 PROCEDURE — 85025 COMPLETE CBC W/AUTO DIFF WBC: CPT

## 2019-01-04 PROCEDURE — 80053 COMPREHEN METABOLIC PANEL: CPT

## 2019-01-04 PROCEDURE — 80061 LIPID PANEL: CPT

## 2019-01-05 ENCOUNTER — HOSPITAL ENCOUNTER (OUTPATIENT)
Dept: RADIOLOGY | Facility: HOSPITAL | Age: 64
Discharge: HOME | End: 2019-01-05
Attending: ORTHOPAEDIC SURGERY
Payer: COMMERCIAL

## 2019-01-05 DIAGNOSIS — M25.522 LEFT ELBOW PAIN: ICD-10-CM

## 2019-01-07 ENCOUNTER — TELEPHONE (OUTPATIENT)
Dept: INTERNAL MEDICINE | Facility: CLINIC | Age: 64
End: 2019-01-07

## 2019-01-07 ENCOUNTER — TELEPHONE (OUTPATIENT)
Dept: ORTHOPEDICS | Facility: CLINIC | Age: 64
End: 2019-01-07

## 2019-01-07 ENCOUNTER — PATIENT MESSAGE (OUTPATIENT)
Dept: ORTHOPEDICS | Facility: CLINIC | Age: 64
End: 2019-01-07

## 2019-01-07 RX ORDER — IBUPROFEN 800 MG/1
800 TABLET ORAL
Qty: 90 TABLET | Refills: 1 | Status: SHIPPED | OUTPATIENT
Start: 2019-01-07 | End: 2019-02-06

## 2019-01-07 NOTE — TELEPHONE ENCOUNTER
----- Message from Annamaria Finch sent at 1/7/2019  8:42 AM CST -----  Contact: [phone]/self  Patient called in returning your call. Please advise.

## 2019-01-07 NOTE — TELEPHONE ENCOUNTER
----- Message from Anca Phan sent at 1/7/2019 11:22 AM CST -----  Contact: Pt Mobile/Home [phone]   Patient is calling in regards to wanting to stop taking the medication for Duexis. He said that he would rather take the ibprofen 800 mg please.          Patient's pharmacy: [facility] [address] [place] [address] AT [facility]  Phone# [phone],Fax# [phone]

## 2019-01-07 NOTE — TELEPHONE ENCOUNTER
"----- Message from Cari Butler sent at 1/4/2019  2:50 PM CST -----  Contact: [phone]/self  Pt would like a callback concerning "personal". Please call and advise.  "

## 2019-01-08 ENCOUNTER — OFFICE VISIT (OUTPATIENT)
Dept: INTERNAL MEDICINE | Facility: CLINIC | Age: 64
End: 2019-01-08
Payer: COMMERCIAL

## 2019-01-08 VITALS
SYSTOLIC BLOOD PRESSURE: 120 MMHG | WEIGHT: 194 LBS | BODY MASS INDEX: 27.16 KG/M2 | RESPIRATION RATE: 18 BRPM | DIASTOLIC BLOOD PRESSURE: 82 MMHG | OXYGEN SATURATION: 70 % | TEMPERATURE: 98 F | HEIGHT: 71 IN | HEART RATE: 70 BPM

## 2019-01-08 DIAGNOSIS — J32.9 SINUSITIS, UNSPECIFIED CHRONICITY, UNSPECIFIED LOCATION: ICD-10-CM

## 2019-01-08 DIAGNOSIS — M54.50 CHRONIC MIDLINE LOW BACK PAIN WITHOUT SCIATICA: ICD-10-CM

## 2019-01-08 DIAGNOSIS — G89.29 CHRONIC MIDLINE LOW BACK PAIN WITHOUT SCIATICA: ICD-10-CM

## 2019-01-08 DIAGNOSIS — E78.5 HYPERLIPIDEMIA, UNSPECIFIED HYPERLIPIDEMIA TYPE: Primary | Chronic | ICD-10-CM

## 2019-01-08 DIAGNOSIS — N39.43 BENIGN PROSTATIC HYPERPLASIA WITH POST-VOID DRIBBLING: Chronic | ICD-10-CM

## 2019-01-08 DIAGNOSIS — R73.01 ELEVATED FASTING GLUCOSE: ICD-10-CM

## 2019-01-08 DIAGNOSIS — N40.1 BENIGN PROSTATIC HYPERPLASIA WITH POST-VOID DRIBBLING: Chronic | ICD-10-CM

## 2019-01-08 DIAGNOSIS — I65.29 CAROTID ATHEROSCLEROSIS, UNSPECIFIED LATERALITY: ICD-10-CM

## 2019-01-08 PROCEDURE — 3008F BODY MASS INDEX DOCD: CPT | Mod: CPTII,S$GLB,, | Performed by: INTERNAL MEDICINE

## 2019-01-08 PROCEDURE — 99214 OFFICE O/P EST MOD 30 MIN: CPT | Mod: S$GLB,,, | Performed by: INTERNAL MEDICINE

## 2019-01-08 PROCEDURE — 3008F PR BODY MASS INDEX (BMI) DOCUMENTED: ICD-10-PCS | Mod: CPTII,S$GLB,, | Performed by: INTERNAL MEDICINE

## 2019-01-08 PROCEDURE — 99214 PR OFFICE/OUTPT VISIT, EST, LEVL IV, 30-39 MIN: ICD-10-PCS | Mod: S$GLB,,, | Performed by: INTERNAL MEDICINE

## 2019-01-08 PROCEDURE — 99999 PR PBB SHADOW E&M-EST. PATIENT-LVL III: ICD-10-PCS | Mod: PBBFAC,,, | Performed by: INTERNAL MEDICINE

## 2019-01-08 PROCEDURE — 99999 PR PBB SHADOW E&M-EST. PATIENT-LVL III: CPT | Mod: PBBFAC,,, | Performed by: INTERNAL MEDICINE

## 2019-01-08 RX ORDER — TAMSULOSIN HYDROCHLORIDE 0.4 MG/1
0.4 CAPSULE ORAL DAILY
Qty: 90 CAPSULE | Refills: 3 | Status: SHIPPED | OUTPATIENT
Start: 2019-01-08 | End: 2019-02-11
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
## 2019-01-09 ENCOUNTER — CLINICAL SUPPORT (OUTPATIENT)
Dept: CARDIOLOGY | Facility: CLINIC | Age: 64
End: 2019-01-09
Attending: INTERNAL MEDICINE
Payer: COMMERCIAL

## 2019-01-09 DIAGNOSIS — I65.29 CAROTID ATHEROSCLEROSIS, UNSPECIFIED LATERALITY: ICD-10-CM

## 2019-01-09 LAB
LEFT ARM DIASTOLIC BLOOD PRESSURE: 80 MMHG
LEFT ARM SYSTOLIC BLOOD PRESSURE: 130 MMHG
LEFT CBA DIAS: 19 CM/S
LEFT CBA SYS: 57 CM/S
LEFT CCA DIST DIAS: 22 CM/S
LEFT CCA DIST SYS: 66 CM/S
LEFT CCA MID DIAS: 26 CM/S
LEFT CCA MID SYS: 51 CM/S
LEFT CCA PROX DIAS: 13 CM/S
LEFT CCA PROX SYS: 65 CM/S
LEFT ECA DIAS: 20 CM/S
LEFT ECA SYS: 90 CM/S
LEFT ICA DIST DIAS: 28 CM/S
LEFT ICA DIST SYS: 61 CM/S
LEFT ICA MID DIAS: 20 CM/S
LEFT ICA MID SYS: 45 CM/S
LEFT ICA PROX DIAS: 17 CM/S
LEFT ICA PROX SYS: 56 CM/S
LEFT VERTEBRAL DIAS: 14 CM/S
LEFT VERTEBRAL SYS: 48 CM/S
OHS CV CAROTID RIGHT ICA EDV HIGHEST: 26
OHS CV CAROTID ULTRASOUND LEFT ICA/CCA RATIO: 0.92
OHS CV CAROTID ULTRASOUND RIGHT ICA/CCA RATIO: 0.75
OHS CV PV CAROTID LEFT HIGHEST CCA: 66
OHS CV PV CAROTID LEFT HIGHEST ICA: 61
OHS CV PV CAROTID RIGHT HIGHEST CCA: 79
OHS CV PV CAROTID RIGHT HIGHEST ICA: 59
OHS CV US CAROTID LEFT HIGHEST EDV: 28
RIGHT ARM DIASTOLIC BLOOD PRESSURE: 80 MMHG
RIGHT ARM SYSTOLIC BLOOD PRESSURE: 130 MMHG
RIGHT CBA DIAS: 19 CM/S
RIGHT CBA SYS: 48 CM/S
RIGHT CCA DIST DIAS: 24 CM/S
RIGHT CCA DIST SYS: 65 CM/S
RIGHT CCA MID DIAS: 22 CM/S
RIGHT CCA MID SYS: 66 CM/S
RIGHT CCA PROX DIAS: 20 CM/S
RIGHT CCA PROX SYS: 79 CM/S
RIGHT ECA DIAS: 39 CM/S
RIGHT ECA SYS: 159 CM/S
RIGHT ICA DIST DIAS: 23 CM/S
RIGHT ICA DIST SYS: 59 CM/S
RIGHT ICA MID DIAS: 26 CM/S
RIGHT ICA MID SYS: 54 CM/S
RIGHT ICA PROX DIAS: 24 CM/S
RIGHT ICA PROX SYS: 54 CM/S
RIGHT VERTEBRAL DIAS: 15 CM/S
RIGHT VERTEBRAL SYS: 38 CM/S

## 2019-01-09 PROCEDURE — 93880 EXTRACRANIAL BILAT STUDY: CPT | Mod: S$GLB,,, | Performed by: INTERNAL MEDICINE

## 2019-01-09 PROCEDURE — 93880 CV US DOPPLER CAROTID (CUPID ONLY): ICD-10-PCS | Mod: S$GLB,,, | Performed by: INTERNAL MEDICINE

## 2019-01-10 ENCOUNTER — OFFICE VISIT (OUTPATIENT)
Dept: ORTHOPEDICS | Facility: CLINIC | Age: 64
End: 2019-01-10
Payer: COMMERCIAL

## 2019-01-10 VITALS
DIASTOLIC BLOOD PRESSURE: 74 MMHG | SYSTOLIC BLOOD PRESSURE: 113 MMHG | BODY MASS INDEX: 26.74 KG/M2 | HEART RATE: 98 BPM | WEIGHT: 191 LBS | HEIGHT: 71 IN

## 2019-01-10 DIAGNOSIS — S92.065S CLOSED NONDISPLACED INTRA-ARTICULAR FRACTURE OF LEFT CALCANEUS, SEQUELA: ICD-10-CM

## 2019-01-10 DIAGNOSIS — M19.072 ARTHRITIS OF LEFT FOOT: Primary | ICD-10-CM

## 2019-01-10 PROCEDURE — 3008F PR BODY MASS INDEX (BMI) DOCUMENTED: ICD-10-PCS | Mod: CPTII,S$GLB,, | Performed by: ORTHOPAEDIC SURGERY

## 2019-01-10 PROCEDURE — 3008F BODY MASS INDEX DOCD: CPT | Mod: CPTII,S$GLB,, | Performed by: ORTHOPAEDIC SURGERY

## 2019-01-10 PROCEDURE — 99999 PR PBB SHADOW E&M-EST. PATIENT-LVL III: ICD-10-PCS | Mod: PBBFAC,,, | Performed by: ORTHOPAEDIC SURGERY

## 2019-01-10 PROCEDURE — 99214 OFFICE O/P EST MOD 30 MIN: CPT | Mod: S$GLB,,, | Performed by: ORTHOPAEDIC SURGERY

## 2019-01-10 PROCEDURE — 99999 PR PBB SHADOW E&M-EST. PATIENT-LVL III: CPT | Mod: PBBFAC,,, | Performed by: ORTHOPAEDIC SURGERY

## 2019-01-10 PROCEDURE — 99214 PR OFFICE/OUTPT VISIT, EST, LEVL IV, 30-39 MIN: ICD-10-PCS | Mod: S$GLB,,, | Performed by: ORTHOPAEDIC SURGERY

## 2019-01-10 NOTE — LETTER
January 11, 2019      Leon Chaparro MD  [address] [place] [address]           [facility] - Orthopedics  [address], [address]  [facility] [address]  Phone: [phone]          Patient: Casper Osborne   MR Number: [id number]   YOB: 1955   Date of Visit: 1/10/2019       Dear Dr. Leon Chaparro:    Thank you for referring Casper Osborne to me for evaluation. Attached you will find relevant portions of my assessment and plan of care.    If you have questions, please do not hesitate to call me. I look forward to following Casper Osborne along with you.    Sincerely,    Bay Melendez MD    Enclosure  CC:  No Recipients    If you would like to receive this communication electronically, please contact [email or web address] or [phone] to request more information on [employer] Link access.    For providers and/or their staff who would like to refer a patient to [facility], please contact us through our one-stop-shop provider referral line, [facility], at [phone].    If you feel you have received this communication in error or would no longer like to receive these types of communications, please e-mail [email or web address]

## 2019-01-11 NOTE — PROGRESS NOTES
Subjective:      Patient ID: Casper Osborne is a 63 y.o. male.    Chief Complaint: Pain of the Left Ankle    Pain   Pertinent negatives include no chest pain, chills, coughing, fever or rash.       Patient is a 63 year old male with PMHX of DM ( A1C 5.6 on 5/24/2018), who presents to clinic with chief complaint of acute on chroinc left ankle pain since twisting his ankle 12/05/2018.  Patient has been using a cane as needed since that time. His pain is increased with first getting up to walk and then will decrease as he warms up. He has tried rest ice and oral medication without complete relief. He denied any new numbness or tingling.   He does have a history of left calcaneous fracture in May of 1996 treated with ORIF by .  He stated that once he started increasing his PT his screws began to back out and his hardware was removed manny 2 stage process that same year. He stated that it will bother him on and off. He has tried injection (last done in 2004 for subtalar arthritis), orthotics and oral medication. He is ready to discuss any surgical options if needed.     The patient complains of constant pain all the time that is not relieved by anti inflammatories.  He also today is complaining of pain at his right great toe.      Review of Systems   Constitution: Negative for chills and fever.   Cardiovascular: Negative for chest pain.   Respiratory: Negative for cough and shortness of breath.    Skin: Negative for color change, dry skin, itching, nail changes, poor wound healing and rash.   Musculoskeletal:        Left foot/ ankle  pain   Neurological: Negative for dizziness.   Psychiatric/Behavioral: Negative for altered mental status. The patient is not nervous/anxious.    All other systems reviewed and are negative.        Objective:      General    Constitutional: He is oriented to person, place, and time. He appears well-developed and well-nourished. No distress.   HENT:   Head: Atraumatic.   Eyes:  Conjunctivae are normal.   Cardiovascular: Normal rate.    Pulmonary/Chest: Effort normal.   Neurological: He is alert and oriented to person, place, and time.   Psychiatric: He has a normal mood and affect. His behavior is normal.         Left Ankle/Foot Exam     Range of Motion   Ankle Joint  Dorsiflexion: abnormal   Plantar flexion: abnormal     Subtalar Joint   Inversion: abnormal   Eversion: abnormal     Other   Sensation: normal    Comments:  Subtalar joint       The incisions on his left foot is well healed.  He has minimal to zero subtalar joint motion and he has significant pain to palpation and to ROM of the subtalar, calcaneocuboid and talonavicular joints.  On the right foot he hs decreased ROM of the great toe with ttp over the 1st MTP joint.        RADS:   ANKLE: There is a healing calcaneal fracture.  There is the decrease of Boehler's angle flatfoot deformity DJD and spurs on the calcaneus  FOOT: There is a calcaneal fracture with decreased Boehler's angle.  This remote.  There is DJD and spurs on the calcaneus.  No acute fracture dislocation bone destruction seen    Assessment:       Encounter Diagnoses   Name Primary?    Arthritis of left foot, hindfoot joints Yes    Closed nondisplaced intra-articular fracture of left calcaneus, sequela           Plan:         Casper Osborne is a 63 y.o. male who is s/p L calcaneus fracture ORIF by Dr. Melendez in 1996 who has significant subtalar arthritis and pain.    -Discussed with the patient extensively about the different management options both conservative and surgical options.  Patient has had continued pain and we discussed triple arthrodesis many years ago and he states that at this time he is ready for a triple arthrodesis. He also would like to pursue surgical management of his right hallux rigidus but this would be after he does his left triple.  His wife unfortunately has been diagnosed with leukemia and so he is going to take care of her for  now and he will call us with the timing of his surgery. I have explained the risks, benefits, and alternatives of the procedure in detail.  The patient voices understanding and all questions have been answered.  The patient agrees to proceed as planned.     I have personally taken the history and examined this patient and agree with the residents note as stated above.

## 2019-01-29 ENCOUNTER — PATIENT MESSAGE (OUTPATIENT)
Dept: ORTHOPEDICS | Facility: CLINIC | Age: 64
End: 2019-01-29

## 2019-01-29 ENCOUNTER — PATIENT MESSAGE (OUTPATIENT)
Dept: INTERNAL MEDICINE | Facility: CLINIC | Age: 64
End: 2019-01-29

## 2019-01-31 ENCOUNTER — TELEPHONE (OUTPATIENT)
Dept: ORTHOPEDICS | Facility: CLINIC | Age: 64
End: 2019-01-31

## 2019-01-31 ENCOUNTER — PATIENT MESSAGE (OUTPATIENT)
Dept: ORTHOPEDICS | Facility: CLINIC | Age: 64
End: 2019-01-31

## 2019-01-31 DIAGNOSIS — M25.572 LEFT ANKLE PAIN, UNSPECIFIED CHRONICITY: Primary | ICD-10-CM

## 2019-01-31 DIAGNOSIS — M19.072 ARTHRITIS OF LEFT FOOT: ICD-10-CM

## 2019-01-31 NOTE — TELEPHONE ENCOUNTER
Spoke with pt who has spoken with Dr Melendez.   Dr Melendez ordered an MRI on left ankle.  Pt is already scheduled for MRI of the elbow at [place] on 2/5 at 7 pm for Dr Fay.  Pt will try to schedule MRI ankle at the same time.  Pt will call me back if he needs to schedule MRI ankle here at [facility]

## 2019-01-31 NOTE — TELEPHONE ENCOUNTER
----- Message from Galdino Greenwood MA sent at 1/31/2019 12:10 PM CST -----  Contact: Pt  Pt called and would like a call back  from the nurse regarding scheduling his   surgery.          Pt can be reached at  [phone].      Thanks

## 2019-02-05 ENCOUNTER — HOSPITAL ENCOUNTER (OUTPATIENT)
Dept: RADIOLOGY | Facility: HOSPITAL | Age: 64
Discharge: HOME | End: 2019-02-05
Attending: ORTHOPAEDIC SURGERY
Payer: COMMERCIAL

## 2019-02-05 DIAGNOSIS — M25.572 LEFT ANKLE PAIN, UNSPECIFIED CHRONICITY: ICD-10-CM

## 2019-02-05 DIAGNOSIS — M19.072 ARTHRITIS OF LEFT FOOT: ICD-10-CM

## 2019-02-05 PROCEDURE — 73721 MRI ANKLE WITHOUT CONTRAST LEFT: ICD-10-PCS | Mod: 26,LT,, | Performed by: RADIOLOGY

## 2019-02-05 PROCEDURE — 73721 MRI JNT OF LWR EXTRE W/O DYE: CPT | Mod: TC,LT

## 2019-02-05 PROCEDURE — 73721 MRI JNT OF LWR EXTRE W/O DYE: CPT | Mod: 26,LT,, | Performed by: RADIOLOGY

## 2019-02-07 ENCOUNTER — PATIENT MESSAGE (OUTPATIENT)
Dept: ORTHOPEDICS | Facility: CLINIC | Age: 64
End: 2019-02-07

## 2019-02-08 ENCOUNTER — OFFICE VISIT (OUTPATIENT)
Dept: INTERNAL MEDICINE | Facility: CLINIC | Age: 64
End: 2019-02-08
Payer: COMMERCIAL

## 2019-02-08 VITALS
WEIGHT: 198.19 LBS | HEART RATE: 84 BPM | HEIGHT: 70 IN | TEMPERATURE: 98 F | DIASTOLIC BLOOD PRESSURE: 78 MMHG | SYSTOLIC BLOOD PRESSURE: 114 MMHG | BODY MASS INDEX: 28.37 KG/M2

## 2019-02-08 DIAGNOSIS — N48.9 PENILE ABNORMALITY: ICD-10-CM

## 2019-02-08 DIAGNOSIS — N40.1 BENIGN PROSTATIC HYPERPLASIA WITH POST-VOID DRIBBLING: Chronic | ICD-10-CM

## 2019-02-08 DIAGNOSIS — E78.5 HYPERLIPIDEMIA, UNSPECIFIED HYPERLIPIDEMIA TYPE: Primary | Chronic | ICD-10-CM

## 2019-02-08 DIAGNOSIS — M47.816 LUMBAR FACET ARTHROPATHY: Chronic | ICD-10-CM

## 2019-02-08 DIAGNOSIS — M79.672 LEFT FOOT PAIN: ICD-10-CM

## 2019-02-08 DIAGNOSIS — N39.43 BENIGN PROSTATIC HYPERPLASIA WITH POST-VOID DRIBBLING: Chronic | ICD-10-CM

## 2019-02-08 PROCEDURE — 3008F PR BODY MASS INDEX (BMI) DOCUMENTED: ICD-10-PCS | Mod: CPTII,S$GLB,, | Performed by: INTERNAL MEDICINE

## 2019-02-08 PROCEDURE — 3008F BODY MASS INDEX DOCD: CPT | Mod: CPTII,S$GLB,, | Performed by: INTERNAL MEDICINE

## 2019-02-08 PROCEDURE — 99999 PR PBB SHADOW E&M-EST. PATIENT-LVL III: CPT | Mod: PBBFAC,,, | Performed by: INTERNAL MEDICINE

## 2019-02-08 PROCEDURE — 99214 PR OFFICE/OUTPT VISIT, EST, LEVL IV, 30-39 MIN: ICD-10-PCS | Mod: S$GLB,,, | Performed by: INTERNAL MEDICINE

## 2019-02-08 PROCEDURE — 99999 PR PBB SHADOW E&M-EST. PATIENT-LVL III: ICD-10-PCS | Mod: PBBFAC,,, | Performed by: INTERNAL MEDICINE

## 2019-02-08 PROCEDURE — 99214 OFFICE O/P EST MOD 30 MIN: CPT | Mod: S$GLB,,, | Performed by: INTERNAL MEDICINE

## 2019-02-08 RX ORDER — METFORMIN HYDROCHLORIDE 500 MG/1
500 TABLET ORAL 2 TIMES DAILY WITH MEALS
COMMUNITY
End: 2019-05-10 | Stop reason: SDUPTHER
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
## 2019-02-11 ENCOUNTER — OFFICE VISIT (OUTPATIENT)
Dept: UROLOGY | Facility: CLINIC | Age: 64
End: 2019-02-11
Payer: COMMERCIAL

## 2019-02-11 ENCOUNTER — TELEPHONE (OUTPATIENT)
Dept: ORTHOPEDICS | Facility: CLINIC | Age: 64
End: 2019-02-11

## 2019-02-11 VITALS
WEIGHT: 196.19 LBS | SYSTOLIC BLOOD PRESSURE: 120 MMHG | DIASTOLIC BLOOD PRESSURE: 74 MMHG | BODY MASS INDEX: 27.47 KG/M2 | HEART RATE: 75 BPM | HEIGHT: 71 IN

## 2019-02-11 DIAGNOSIS — N39.43 BENIGN PROSTATIC HYPERPLASIA WITH POST-VOID DRIBBLING: Primary | Chronic | ICD-10-CM

## 2019-02-11 DIAGNOSIS — N40.1 BENIGN PROSTATIC HYPERPLASIA WITH POST-VOID DRIBBLING: Primary | Chronic | ICD-10-CM

## 2019-02-11 DIAGNOSIS — R21 PENILE RASH: ICD-10-CM

## 2019-02-11 PROCEDURE — 99999 PR PBB SHADOW E&M-EST. PATIENT-LVL IV: ICD-10-PCS | Mod: PBBFAC,,, | Performed by: UROLOGY

## 2019-02-11 PROCEDURE — 99244 PR OFFICE CONSULTATION,LEVEL IV: ICD-10-PCS | Mod: S$GLB,,, | Performed by: UROLOGY

## 2019-02-11 PROCEDURE — 99244 OFF/OP CNSLTJ NEW/EST MOD 40: CPT | Mod: S$GLB,,, | Performed by: UROLOGY

## 2019-02-11 PROCEDURE — 99999 PR PBB SHADOW E&M-EST. PATIENT-LVL IV: CPT | Mod: PBBFAC,,, | Performed by: UROLOGY

## 2019-02-11 RX ORDER — ALFUZOSIN HYDROCHLORIDE 10 MG/1
10 TABLET, EXTENDED RELEASE ORAL
Qty: 30 TABLET | Refills: 12 | Status: SHIPPED | OUTPATIENT
Start: 2019-02-11 | End: 2019-07-08 | Stop reason: SDUPTHER

## 2019-02-11 NOTE — TELEPHONE ENCOUNTER
----- Message from Reshma Joy sent at 2/11/2019  4:19 PM CST -----  Contact: Pt  Pt called requesting an appointment with Dr. Melendez to discuss his MRI results. Soonest available isn't until April 2019      Call back [phone]

## 2019-02-11 NOTE — PROGRESS NOTES
CC: penile rash    Casper Osborne is a 63 y.o. man who is here for the evaluation of Other (rash on penis for about 3 mths)    A new pt referred by his PCP, Dr. Leon Chaparro.  C/o flaking skin from the head of the glands for the past 4 months.  Has had a problem urination, urine drips after finishing.  Thus he has been using toilet paper stuffing to the tip of the penis.  Since then he began noticed inflammation and discoloration of the penile skin at the glands.  Then noticed flaking and scaling off the irritated area of the glands of the penis.  Since he started Flomax, his urinary symptoms have improved and he no longer uses toilet papers to the penis.  Denies flank pain, dysuira, hematuria.       No STD reported.  He is  and is sexually active.  Has had a skin bridge at the corona ever since he was circumcised.    Past Medical History:   Diagnosis Date    GERD (gastroesophageal reflux disease)     Hyperlipidemia      Past Surgical History:   Procedure Laterality Date    FRACTURE SURGERY      left heel at 41yo    HEEL SPUR SURGERY      left heel - fell off a ladder and had to have this reconstructed    SPINE SURGERY      c7 fx - prior to this, he was getting dizzy spells - none since    TONSILLECTOMY      VASECTOMY       Social History     Tobacco Use    Smoking status: Never Smoker    Smokeless tobacco: Never Used   Substance Use Topics    Alcohol use: Yes     Comment: less than once a month    Drug use: No     Family History   Problem Relation Age of Onset    Diabetes Mother     Cancer Father         melanoma cancer with mets    Heart disease Daughter         enlarged heart    Hypothyroidism Daughter     Colon polyps Neg Hx     Prostate cancer Neg Hx     Colon cancer Neg Hx     Stroke Neg Hx     Hypertension Neg Hx     Hyperlipidemia Neg Hx      Allergy:  Review of patient's allergies indicates:  No Known Allergies  Outpatient Encounter Medications as of 2/11/2019   Medication Sig  Dispense Refill    aspirin (ECOTRIN) 81 MG EC tablet Take 81 mg by mouth once daily.      fluticasone (FLONASE) 50 mcg/actuation nasal spray instill 1 spray into each nostril once daily 16 g 3    lovastatin (MEVACOR) 40 MG tablet Take 1 tablet (40 mg total) by mouth nightly. 90 tablet 3    metFORMIN (GLUCOPHAGE) 500 MG tablet Take 500 mg by mouth 2 (two) times daily with meals.      omeprazole (PRILOSEC) 40 MG capsule Take 1 capsule (40 mg total) by mouth once daily. 90 capsule 3    [DISCONTINUED] tamsulosin (FLOMAX) 0.4 mg Cap Take 1 capsule (0.4 mg total) by mouth once daily. 90 capsule 3    alfuzosin (UROXATRAL) 10 mg Tb24 Take 1 tablet (10 mg total) by mouth daily with breakfast. 30 tablet 12     No facility-administered encounter medications on file as of 2/11/2019.      Review of Systems   ROS  Physical Exam     Vitals:    02/11/19 1401   BP: 120/74   Pulse: 75     Physical Exam   Constitutional: He is oriented to person, place, and time. He appears well-developed and well-nourished. No distress.   HENT:   Head: Normocephalic and atraumatic.   Right Ear: External ear normal.   Left Ear: External ear normal.   Nose: Nose normal.   Mouth/Throat: Oropharynx is clear and moist.   Eyes: Conjunctivae are normal. Pupils are equal, round, and reactive to light.   Neck: Normal range of motion. Neck supple. No JVD present. No tracheal deviation present. No thyromegaly present.   Cardiovascular: Normal rate, regular rhythm, normal heart sounds and intact distal pulses.  Exam reveals no gallop and no friction rub.    No murmur heard.  Pulmonary/Chest: Effort normal and breath sounds normal. No respiratory distress. He has no wheezes. He exhibits no tenderness.   Abdominal: Soft. Bowel sounds are normal. He exhibits no distension and no mass. There is no tenderness. There is no rebound and no guarding.   Genitourinary: Rectum normal and penis normal. No penile tenderness.   Genitourinary Comments: Prostate 40 grams  with negative nodule or negative tenderness     Musculoskeletal: Normal range of motion. He exhibits no edema, tenderness or deformity.   Lymphadenopathy:     He has no cervical adenopathy.   Neurological: He is alert and oriented to person, place, and time.   Skin: Skin is warm and dry. He is not diaphoretic.     Psychiatric: He has a normal mood and affect. His behavior is normal. Thought content normal.     Genitalia:  Scrotum: no rash or lesion  Normal symmetric epididymis without masses  Normal vas palpated  Normal size, symmetric testicles with no masses   Normal urethral meatus with no discharge  Normal circumcised penis with dry up skin lesion, slight red, no exudates or tenderness.  There is a skin bridge from the distal shaft to the corona of the penis on the right side ( unchanged).         LABS:  Lab Results   Component Value Date    PSA 0.49 01/04/2019    PSA 0.62 05/24/2018    PSA 0.62 05/23/2017    PSA 0.71 05/12/2016    PSA 0.87 03/13/2015    PSA 0.78 03/10/2014    PSA 0.61 02/06/2013    PSA 0.48 08/19/2011    PSA 0.56 08/17/2010    PSA 0.44 04/09/2009     No results found for this or any previous visit.  Lab Results   Component Value Date    CREATININE 1.0 01/04/2019    CREATININE 1.2 05/24/2018    CREATININE 1.1 05/23/2017     No results found for this or any previous visit.  No results found for: LABURIN  Radiology:    Assessment and Plan:  Casper was seen today for other.    Diagnoses and all orders for this visit:    Benign prostatic hyperplasia with post-void dribbling  -     alfuzosin (UROXATRAL) 10 mg Tb24; Take 1 tablet (10 mg total) by mouth daily with breakfast.    Penile rash  -     Ambulatory referral to Dermatology    although flomax helped his LUTS, he c/o ejaculation disorder since he has been on it.  He can try uroxatral instead. The instruction given.  Alternatively, he may consider Rezum Therapy.  Its website and video were shown to pt.    For his penile rash, it appears dry eczema  of the glands, most likely irritated by urine.  Try Eucerin eczema cream.  Recommend to see Dermatologist if not improving.    Follow-up:  Follow-up in about 3 months (around 5/11/2019), or if symptoms worsen or fail to improve.

## 2019-02-11 NOTE — LETTER
February 11, 2019      Leon Chaparro MD  [address]  [place] [address]           [place] - Urology  [address]  [place] [address]  Phone: [phone]          Patient: aCsper Osborne   MR Number: [id number]   YOB: 1955   Date of Visit: 2/11/2019       Dear Dr. Leon Chaparro:    Thank you for referring Casper Osborne to me for evaluation. Attached you will find relevant portions of my assessment and plan of care.    If you have questions, please do not hesitate to call me. I look forward to following Casper Osborne along with you.    Sincerely,    Torey Ireland MD    Enclosure  CC:  No Recipients    If you would like to receive this communication electronically, please contact [email or web address] or [phone] to request more information on "[employer] Link access.    For providers and/or their staff who would like to refer a patient to [facility], please contact us through our one-stop-shop provider referral line, [facility], at [phone].    If you feel you have received this communication in error or would no longer like to receive these types of communications, please e-mail [email or web address]

## 2019-02-12 ENCOUNTER — HOSPITAL ENCOUNTER (OUTPATIENT)
Dept: RADIOLOGY | Facility: HOSPITAL | Age: 64
Discharge: HOME | End: 2019-02-12
Attending: ORTHOPAEDIC SURGERY
Payer: COMMERCIAL

## 2019-02-12 PROCEDURE — 73221 MRI JOINT UPR EXTREM W/O DYE: CPT | Mod: 26,LT,, | Performed by: RADIOLOGY

## 2019-02-12 PROCEDURE — 73221 MRI JOINT UPR EXTREM W/O DYE: CPT | Mod: TC,LT

## 2019-02-12 PROCEDURE — 73221 MRI ELBOW WITHOUT CONTRAST LEFT: ICD-10-PCS | Mod: 26,LT,, | Performed by: RADIOLOGY

## 2019-02-13 ENCOUNTER — TELEPHONE (OUTPATIENT)
Dept: UROLOGY | Facility: CLINIC | Age: 64
End: 2019-02-13

## 2019-02-13 ENCOUNTER — PATIENT MESSAGE (OUTPATIENT)
Dept: UROLOGY | Facility: CLINIC | Age: 64
End: 2019-02-13

## 2019-02-13 NOTE — TELEPHONE ENCOUNTER
The Eucerine is working well almost over nite. The prescription is too expensive with no discounts to alter the cost, 30 days is $33.00 and I filled it but won't again. With my insurance, I would have been shocked if it cast $33. for 90 days in compare to Flow Max cost. Can you write this in a less expensive generic brand?  I've  read that there sre other brands that duplicate this med.      Options of getting a generic uroxatral, trying a different pharmacy for a better price, or considering Rezum Therapy discussed.  He will let me know later when he follows up with me.

## 2019-02-14 ENCOUNTER — OFFICE VISIT (OUTPATIENT)
Dept: ORTHOPEDICS | Facility: CLINIC | Age: 64
End: 2019-02-14
Payer: COMMERCIAL

## 2019-02-14 VITALS — BODY MASS INDEX: 27.44 KG/M2 | HEIGHT: 71 IN | WEIGHT: 196 LBS

## 2019-02-14 DIAGNOSIS — M19.072 ARTHRITIS OF ANKLE, LEFT: ICD-10-CM

## 2019-02-14 DIAGNOSIS — M77.02 MEDIAL EPICONDYLITIS OF LEFT ELBOW: Primary | ICD-10-CM

## 2019-02-14 DIAGNOSIS — M19.072 ARTHRITIS OF LEFT FOOT: Primary | ICD-10-CM

## 2019-02-14 PROCEDURE — 99213 PR OFFICE/OUTPT VISIT, EST, LEVL III, 20-29 MIN: ICD-10-PCS | Mod: S$GLB,,, | Performed by: ORTHOPAEDIC SURGERY

## 2019-02-14 PROCEDURE — 3008F BODY MASS INDEX DOCD: CPT | Mod: CPTII,S$GLB,, | Performed by: ORTHOPAEDIC SURGERY

## 2019-02-14 PROCEDURE — 20605 PR DRAIN/INJECT INTERMEDIATE JOINT/BURSA: ICD-10-PCS | Mod: LT,S$GLB,, | Performed by: ORTHOPAEDIC SURGERY

## 2019-02-14 PROCEDURE — 99213 OFFICE O/P EST LOW 20 MIN: CPT | Mod: S$GLB,,, | Performed by: ORTHOPAEDIC SURGERY

## 2019-02-14 PROCEDURE — 99999 PR PBB SHADOW E&M-EST. PATIENT-LVL I: CPT | Mod: PBBFAC,,, | Performed by: ORTHOPAEDIC SURGERY

## 2019-02-14 PROCEDURE — 99999 PR PBB SHADOW E&M-EST. PATIENT-LVL III: ICD-10-PCS | Mod: PBBFAC,,, | Performed by: ORTHOPAEDIC SURGERY

## 2019-02-14 PROCEDURE — 99213 PR OFFICE/OUTPT VISIT, EST, LEVL III, 20-29 MIN: ICD-10-PCS | Mod: 25,S$GLB,, | Performed by: ORTHOPAEDIC SURGERY

## 2019-02-14 PROCEDURE — 99213 OFFICE O/P EST LOW 20 MIN: CPT | Mod: 25,S$GLB,, | Performed by: ORTHOPAEDIC SURGERY

## 2019-02-14 PROCEDURE — 99999 PR PBB SHADOW E&M-EST. PATIENT-LVL III: CPT | Mod: PBBFAC,,, | Performed by: ORTHOPAEDIC SURGERY

## 2019-02-14 PROCEDURE — 99999 PR PBB SHADOW E&M-EST. PATIENT-LVL I: ICD-10-PCS | Mod: PBBFAC,,, | Performed by: ORTHOPAEDIC SURGERY

## 2019-02-14 PROCEDURE — 20605 DRAIN/INJ JOINT/BURSA W/O US: CPT | Mod: LT,S$GLB,, | Performed by: ORTHOPAEDIC SURGERY

## 2019-02-14 PROCEDURE — 3008F PR BODY MASS INDEX (BMI) DOCUMENTED: ICD-10-PCS | Mod: CPTII,S$GLB,, | Performed by: ORTHOPAEDIC SURGERY

## 2019-02-14 RX ORDER — DICLOFENAC SODIUM 10 MG/G
2 GEL TOPICAL 3 TIMES DAILY
Qty: 1 TUBE | Refills: 3 | Status: SHIPPED | OUTPATIENT
Start: 2019-02-14 | End: 2020-03-09

## 2019-02-14 RX ORDER — METHYLPREDNISOLONE ACETATE 80 MG/ML
80 INJECTION, SUSPENSION INTRA-ARTICULAR; INTRALESIONAL; INTRAMUSCULAR; SOFT TISSUE
Status: COMPLETED | OUTPATIENT
Start: 2019-02-14 | End: 2019-02-14

## 2019-02-14 RX ADMIN — METHYLPREDNISOLONE ACETATE 80 MG: 80 INJECTION, SUSPENSION INTRA-ARTICULAR; INTRALESIONAL; INTRAMUSCULAR; SOFT TISSUE at 08:02

## 2019-02-14 NOTE — PROGRESS NOTES
HISTORY OF PRESENT ILLNESS:  Mr. Osborne in followup of left elbow symptoms,   recently had an MRI scan of the left elbow.  The results of the MRI show that he   does have medial epicondylitis, but no evidence of complete tear or involvement   of the ulnar collateral ligament.  I went over these findings with the patient   today and gave him a copy.    PHYSICAL EXAMINATION:  MUSCULOSKELETAL:  Today, he is a little bit tender over the medial epicondyle.    No significant swelling.  Full range of motion.  No instability.    IMPRESSION:  Medial epicondylitis, left elbow.    PLAN:  We discussed options today including injection, but he really does not   want to do that today.  Instead, we will try him on some Voltaren gel topical   and ibuprofen by mouth.  I have also recommended that he get an elbow strap from   the drugstore for use during the day, left arm.  Follow up in 1 month for   recheck.  If symptoms persist, we may need to reconsider the injection.      DHRUV/IN  dd: 02/14/2019 13:38:11 (CST)  td: 02/15/2019 11:29:00 (CST)  Doc ID   #[id number]  Job ID #[id number]    CC:

## 2019-02-14 NOTE — PROGRESS NOTES
Casper Osborne  Returns today for follow-up.  This is a 63-year-old gentleman with longstanding posttraumatic arthritis secondary to a displaced intra-articular calcaneus fracture of his left foot many years ago.  He came in more recently with some increased ankle pain after a twisting injury of his ankle in December about 2 months ago.  In addition to his long-standing hindfoot arthritis complaints he had some increased anterolateral ankle pain. Plain x-rays revealed some anterior osteophyte formation which is likely due to his previous calcaneus fracture. There has been consideration the past about doing a triple arthrodesis but this ankle pain over the last couple months is relatively new.  I ordered a MRI Tara returns today for the results.    MRI results:  The MRI left ankle does not reveal any significant soft tissue abnormalities that would explain his new anterolateral ankle pain. It does show some advanced degenerative changes of the anterolateral ankle joint along with the arthritic changes of the subtalar joint.    Examination:  Left ankle reveals no significant swelling today. He has decreased dorsiflexion of left ankle with pain compared to the right.  He has significant decreased subtalar motion right that is chronic.  He has some tenderness in the anterolateral aspect of his ankle with mild crepitus on motion. He is neurovascularly intact.    Impression:  1.  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  Left ankle arthrosis with anterolateral impingement exacerbated by recent injury    Recommendation:  This gentleman has lived with this posttraumatic arthritis of his subtalar joint for more than 20 years.  He has had a recent exacerbation of ankle pain after an injury about 2 months ago.  My hope would be that this would ultimately recovered to where he was before the recent injury.  I suggested that we could do a diagnostic and therapeutic  injection of his ankle today. I also discussed with him the possibility of doing a arthroscopic debridement of his ankle if he gets temporary relief with the injection. He agreed to the injection, so after verbal consent and sterile prep I injected the left ankle joint with 3 cc lidocaine and 80 mg of Depo-Medrol.    Follow-up in 3 months

## 2019-03-04 ENCOUNTER — PATIENT MESSAGE (OUTPATIENT)
Dept: ORTHOPEDICS | Facility: CLINIC | Age: 64
End: 2019-03-04

## 2019-03-14 ENCOUNTER — OFFICE VISIT (OUTPATIENT)
Dept: ORTHOPEDICS | Facility: CLINIC | Age: 64
End: 2019-03-14
Payer: COMMERCIAL

## 2019-03-14 VITALS — HEIGHT: 71 IN | WEIGHT: 196 LBS | BODY MASS INDEX: 27.44 KG/M2

## 2019-03-14 DIAGNOSIS — M77.02 MEDIAL EPICONDYLITIS OF LEFT ELBOW: Primary | ICD-10-CM

## 2019-03-14 PROCEDURE — 99213 OFFICE O/P EST LOW 20 MIN: CPT | Mod: 25,S$GLB,, | Performed by: ORTHOPAEDIC SURGERY

## 2019-03-14 PROCEDURE — 3008F PR BODY MASS INDEX (BMI) DOCUMENTED: ICD-10-PCS | Mod: CPTII,S$GLB,, | Performed by: ORTHOPAEDIC SURGERY

## 2019-03-14 PROCEDURE — 20551 PR INJECT TENDON ORIGIN/INSERT: ICD-10-PCS | Mod: 51,RT,S$GLB, | Performed by: ORTHOPAEDIC SURGERY

## 2019-03-14 PROCEDURE — 3008F BODY MASS INDEX DOCD: CPT | Mod: CPTII,S$GLB,, | Performed by: ORTHOPAEDIC SURGERY

## 2019-03-14 PROCEDURE — 20551 NJX 1 TENDON ORIGIN/INSJ: CPT | Mod: LT,S$GLB,, | Performed by: ORTHOPAEDIC SURGERY

## 2019-03-14 PROCEDURE — 99999 PR PBB SHADOW E&M-EST. PATIENT-LVL III: ICD-10-PCS | Mod: PBBFAC,,, | Performed by: ORTHOPAEDIC SURGERY

## 2019-03-14 PROCEDURE — 99213 PR OFFICE/OUTPT VISIT, EST, LEVL III, 20-29 MIN: ICD-10-PCS | Mod: 25,S$GLB,, | Performed by: ORTHOPAEDIC SURGERY

## 2019-03-14 PROCEDURE — 99999 PR PBB SHADOW E&M-EST. PATIENT-LVL III: CPT | Mod: PBBFAC,,, | Performed by: ORTHOPAEDIC SURGERY

## 2019-03-14 RX ORDER — TRIAMCINOLONE ACETONIDE 40 MG/ML
40 INJECTION, SUSPENSION INTRA-ARTICULAR; INTRAMUSCULAR
Status: COMPLETED | OUTPATIENT
Start: 2019-03-14 | End: 2019-03-14

## 2019-03-14 RX ADMIN — TRIAMCINOLONE ACETONIDE 40 MG: 40 INJECTION, SUSPENSION INTRA-ARTICULAR; INTRAMUSCULAR at 01:03

## 2019-03-14 NOTE — PROGRESS NOTES
HISTORY OF PRESENT ILLNESS:  Mr. Osborne in followup of left elbow symptoms   related to medial epicondylitis, having ongoing symptoms in the left elbow, but   now also in the right elbow.  Symptoms are worse with use.  No numbness or   tingling is reported.    PHYSICAL EXAMINATION:  Examination of both elbows are tender about the medial   epicondylar area.  No swelling.  Range of motion full.  No instability either   side.  There is point tenderness.    IMPRESSION:  Bilateral elbow medial epicondylitis.    PLAN:  The patient would like try injections today.  After pause for timeout, he   identified each elbow, injected medially near the medial epicondylar area of   each elbow with combination of Kenalog 20 mg, 0.5 mL Xylocaine, sterile   technique, which he tolerated well without complication.    I have also recommended that we try some stretching and strengthening exercises   and Celebrex by mouth.  Follow up in 1 to 2 months.      DARYL  dd: 03/14/2019 13:52:26 (CDT)  td: 03/15/2019 09:00:37 (CDT)  Doc ID   #[id number]  Job ID #[id number]    CC:

## 2019-05-11 RX ORDER — METFORMIN HYDROCHLORIDE 500 MG/1
500 TABLET ORAL 2 TIMES DAILY WITH MEALS
Qty: 180 TABLET | Refills: 2 | Status: SHIPPED | OUTPATIENT
Start: 2019-05-11 | End: 2019-10-29

## 2019-05-23 ENCOUNTER — OFFICE VISIT (OUTPATIENT)
Dept: ORTHOPEDICS | Facility: CLINIC | Age: 64
End: 2019-05-23
Payer: COMMERCIAL

## 2019-05-23 VITALS
DIASTOLIC BLOOD PRESSURE: 85 MMHG | HEART RATE: 75 BPM | WEIGHT: 196 LBS | BODY MASS INDEX: 27.44 KG/M2 | HEIGHT: 71 IN | SYSTOLIC BLOOD PRESSURE: 135 MMHG

## 2019-05-23 DIAGNOSIS — M19.072 ARTHRITIS OF ANKLE, LEFT: ICD-10-CM

## 2019-05-23 DIAGNOSIS — M19.072 ARTHRITIS OF LEFT FOOT: Primary | ICD-10-CM

## 2019-05-23 PROCEDURE — 99213 OFFICE O/P EST LOW 20 MIN: CPT | Mod: 25,S$GLB,, | Performed by: ORTHOPAEDIC SURGERY

## 2019-05-23 PROCEDURE — 20605 PR DRAIN/INJECT INTERMEDIATE JOINT/BURSA: ICD-10-PCS | Mod: LT,S$GLB,, | Performed by: ORTHOPAEDIC SURGERY

## 2019-05-23 PROCEDURE — 99999 PR PBB SHADOW E&M-EST. PATIENT-LVL III: CPT | Mod: PBBFAC,,, | Performed by: ORTHOPAEDIC SURGERY

## 2019-05-23 PROCEDURE — 20605 DRAIN/INJ JOINT/BURSA W/O US: CPT | Mod: LT,S$GLB,, | Performed by: ORTHOPAEDIC SURGERY

## 2019-05-23 PROCEDURE — 3008F BODY MASS INDEX DOCD: CPT | Mod: CPTII,S$GLB,, | Performed by: ORTHOPAEDIC SURGERY

## 2019-05-23 PROCEDURE — 99213 PR OFFICE/OUTPT VISIT, EST, LEVL III, 20-29 MIN: ICD-10-PCS | Mod: 25,S$GLB,, | Performed by: ORTHOPAEDIC SURGERY

## 2019-05-23 PROCEDURE — 99999 PR PBB SHADOW E&M-EST. PATIENT-LVL III: ICD-10-PCS | Mod: PBBFAC,,, | Performed by: ORTHOPAEDIC SURGERY

## 2019-05-23 PROCEDURE — 3008F PR BODY MASS INDEX (BMI) DOCUMENTED: ICD-10-PCS | Mod: CPTII,S$GLB,, | Performed by: ORTHOPAEDIC SURGERY

## 2019-05-23 RX ORDER — METHYLPREDNISOLONE ACETATE 80 MG/ML
80 INJECTION, SUSPENSION INTRA-ARTICULAR; INTRALESIONAL; INTRAMUSCULAR; SOFT TISSUE
Status: COMPLETED | OUTPATIENT
Start: 2019-05-23 | End: 2019-05-23

## 2019-05-23 RX ADMIN — METHYLPREDNISOLONE ACETATE 80 MG: 80 INJECTION, SUSPENSION INTRA-ARTICULAR; INTRALESIONAL; INTRAMUSCULAR; SOFT TISSUE at 09:05

## 2019-05-23 NOTE — PROGRESS NOTES
Casper Osborne   Returns today for follow-up of his left posttraumatic subtalar arthritis as well as more recent development of left ankle arthrosis which was exacerbated by an injury in December.  I gave him an injection in his left ankle about 3.5 months ago.  He reported he had good relief for about 2.5 months.  He reports his pain is return to the level it was before the previous injection. We had discussion also about possible arthroscopic debridement if he did not get prolonged relief from the injection.    Examination:  Left ankle reveals continued decreased dorsiflexion as well as joint line tenderness. He has essentially no subtalar motion.    Impression:  1.  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  Left ankle arthrosis with anterolateral impingement    Recommendation:  He requested another injection today.  He is considering arthroscopic debridement.  After verbal consent sterile prep I injected his left ankle with 3 cc lidocaine and 80 mg of Depo-Medrol.    Follow-up in 3 months.  If he does not get good prolonged relief he may call to schedule arthroscopic debridement.

## 2019-06-11 ENCOUNTER — PATIENT MESSAGE (OUTPATIENT)
Dept: ADMINISTRATIVE | Facility: HOSPITAL | Age: 64
End: 2019-06-11

## 2019-06-14 DIAGNOSIS — Z12.11 COLON CANCER SCREENING: ICD-10-CM

## 2019-07-07 ENCOUNTER — PATIENT MESSAGE (OUTPATIENT)
Dept: UROLOGY | Facility: CLINIC | Age: 64
End: 2019-07-07

## 2019-07-08 DIAGNOSIS — E78.5 HYPERLIPIDEMIA, UNSPECIFIED HYPERLIPIDEMIA TYPE: Chronic | ICD-10-CM

## 2019-07-08 DIAGNOSIS — N39.43 BENIGN PROSTATIC HYPERPLASIA WITH POST-VOID DRIBBLING: Chronic | ICD-10-CM

## 2019-07-08 DIAGNOSIS — N40.1 BENIGN PROSTATIC HYPERPLASIA WITH POST-VOID DRIBBLING: Chronic | ICD-10-CM

## 2019-07-08 RX ORDER — ALFUZOSIN HYDROCHLORIDE 10 MG/1
10 TABLET, EXTENDED RELEASE ORAL
Qty: 90 TABLET | Refills: 3 | Status: SHIPPED | OUTPATIENT
Start: 2019-07-08 | End: 2020-06-23

## 2019-07-08 RX ORDER — LOVASTATIN 40 MG/1
40 TABLET ORAL NIGHTLY
Qty: 90 TABLET | Refills: 3 | Status: SHIPPED | OUTPATIENT
Start: 2019-07-08 | End: 2020-02-03 | Stop reason: SDUPTHER

## 2019-08-23 ENCOUNTER — OFFICE VISIT (OUTPATIENT)
Dept: ORTHOPEDICS | Facility: CLINIC | Age: 64
End: 2019-08-23
Payer: COMMERCIAL

## 2019-08-23 VITALS — SYSTOLIC BLOOD PRESSURE: 133 MMHG | HEART RATE: 62 BPM | DIASTOLIC BLOOD PRESSURE: 84 MMHG

## 2019-08-23 DIAGNOSIS — M19.072 ARTHRITIS OF ANKLE, LEFT: Primary | ICD-10-CM

## 2019-08-23 PROCEDURE — 20605 DRAIN/INJ JOINT/BURSA W/O US: CPT | Mod: LT,S$GLB,, | Performed by: ORTHOPAEDIC SURGERY

## 2019-08-23 PROCEDURE — 99213 PR OFFICE/OUTPT VISIT, EST, LEVL III, 20-29 MIN: ICD-10-PCS | Mod: 25,S$GLB,, | Performed by: ORTHOPAEDIC SURGERY

## 2019-08-23 PROCEDURE — 99999 PR PBB SHADOW E&M-EST. PATIENT-LVL II: CPT | Mod: PBBFAC,,, | Performed by: ORTHOPAEDIC SURGERY

## 2019-08-23 PROCEDURE — 99213 OFFICE O/P EST LOW 20 MIN: CPT | Mod: 25,S$GLB,, | Performed by: ORTHOPAEDIC SURGERY

## 2019-08-23 PROCEDURE — 20605 PR DRAIN/INJECT INTERMEDIATE JOINT/BURSA: ICD-10-PCS | Mod: LT,S$GLB,, | Performed by: ORTHOPAEDIC SURGERY

## 2019-08-23 PROCEDURE — 99999 PR PBB SHADOW E&M-EST. PATIENT-LVL II: ICD-10-PCS | Mod: PBBFAC,,, | Performed by: ORTHOPAEDIC SURGERY

## 2019-08-23 RX ORDER — METHYLPREDNISOLONE ACETATE 80 MG/ML
80 INJECTION, SUSPENSION INTRA-ARTICULAR; INTRALESIONAL; INTRAMUSCULAR; SOFT TISSUE
Status: COMPLETED | OUTPATIENT
Start: 2019-08-23 | End: 2019-08-23

## 2019-08-23 RX ADMIN — METHYLPREDNISOLONE ACETATE 80 MG: 80 INJECTION, SUSPENSION INTRA-ARTICULAR; INTRALESIONAL; INTRAMUSCULAR; SOFT TISSUE at 09:08

## 2019-08-23 NOTE — PROGRESS NOTES
Casper Osborne  Returns today for follow-up.  This is a 63-year-old male has left posttraumatic subtalar arthritis due to calcaneus fracture and has developed anterior impingement and mild ankle arthritis. I have injected his ankle joint a couple of times in his had good temporary relief.  We have discussed arthroscopic debridement of his ankle joint and his dorsal talar and distal tibial osteophytes. He thinks he may ultimately proceed with the operation but he does not want to do it right now.    Examination:  The left ankle reveals decreased dorsiflexion as compared to the right side with pain at the extreme of dorsiflexion.  He has no subtalar motion. He has mild tenderness on long the anterior joint line of his ankle.    Impression:  1.  Chronic posttraumatic left subtalar arthritis                       2.  Left ankle arthritis with anterior impingement    Recommendation:  He requested another injection today so after verbal consent sterile prep I injected the left ankle with 3 cc lidocaine and 80 mg of Depo-Medrol.    Follow-up in 3 months unless he decides to call and schedule arthroscopic debridement

## 2019-08-25 ENCOUNTER — OFFICE VISIT (OUTPATIENT)
Dept: URGENT CARE | Facility: CLINIC | Age: 64
End: 2019-08-25
Payer: COMMERCIAL

## 2019-08-25 VITALS
HEART RATE: 82 BPM | RESPIRATION RATE: 14 BRPM | SYSTOLIC BLOOD PRESSURE: 129 MMHG | WEIGHT: 196 LBS | BODY MASS INDEX: 27.44 KG/M2 | TEMPERATURE: 97 F | OXYGEN SATURATION: 97 % | DIASTOLIC BLOOD PRESSURE: 88 MMHG | HEIGHT: 71 IN

## 2019-08-25 DIAGNOSIS — H65.192 ACUTE EFFUSION OF LEFT EAR: Primary | ICD-10-CM

## 2019-08-25 DIAGNOSIS — J06.9 VIRAL URI WITH COUGH: ICD-10-CM

## 2019-08-25 PROCEDURE — 96372 PR INJECTION,THERAP/PROPH/DIAG2ST, IM OR SUBCUT: ICD-10-PCS | Mod: S$GLB,,, | Performed by: EMERGENCY MEDICINE

## 2019-08-25 PROCEDURE — 3008F BODY MASS INDEX DOCD: CPT | Mod: CPTII,S$GLB,, | Performed by: NURSE PRACTITIONER

## 2019-08-25 PROCEDURE — 96372 THER/PROPH/DIAG INJ SC/IM: CPT | Mod: S$GLB,,, | Performed by: EMERGENCY MEDICINE

## 2019-08-25 PROCEDURE — 99214 PR OFFICE/OUTPT VISIT, EST, LEVL IV, 30-39 MIN: ICD-10-PCS | Mod: 25,S$GLB,, | Performed by: NURSE PRACTITIONER

## 2019-08-25 PROCEDURE — 3008F PR BODY MASS INDEX (BMI) DOCUMENTED: ICD-10-PCS | Mod: CPTII,S$GLB,, | Performed by: NURSE PRACTITIONER

## 2019-08-25 PROCEDURE — 99214 OFFICE O/P EST MOD 30 MIN: CPT | Mod: 25,S$GLB,, | Performed by: NURSE PRACTITIONER

## 2019-08-25 RX ORDER — PROMETHAZINE HYDROCHLORIDE AND DEXTROMETHORPHAN HYDROBROMIDE 6.25; 15 MG/5ML; MG/5ML
5 SYRUP ORAL EVERY 6 HOURS PRN
Qty: 180 ML | Refills: 0 | Status: SHIPPED | OUTPATIENT
Start: 2019-08-25 | End: 2019-09-04

## 2019-08-25 RX ORDER — BETAMETHASONE SODIUM PHOSPHATE AND BETAMETHASONE ACETATE 3; 3 MG/ML; MG/ML
9 INJECTION, SUSPENSION INTRA-ARTICULAR; INTRALESIONAL; INTRAMUSCULAR; SOFT TISSUE
Status: COMPLETED | OUTPATIENT
Start: 2019-08-25 | End: 2019-08-25

## 2019-08-25 RX ORDER — IBUPROFEN 800 MG/1
TABLET ORAL
Refills: 0 | COMMUNITY
Start: 2019-08-15 | End: 2020-08-26 | Stop reason: SDUPTHER

## 2019-08-25 RX ADMIN — BETAMETHASONE SODIUM PHOSPHATE AND BETAMETHASONE ACETATE 9 MG: 3; 3 INJECTION, SUSPENSION INTRA-ARTICULAR; INTRALESIONAL; INTRAMUSCULAR; SOFT TISSUE at 02:08

## 2019-08-25 NOTE — PATIENT INSTRUCTIONS
Continue to use flonase and start CLARITIN D to help dry out sinus cavity  Cough syrup may cause you to drowsy, reserve for bedtime or at home    You must understand that you've received an Urgent Care treatment only and that you may be released before all your medical problems are known or treated. You, the patient, will arrange for follow up care as instructed.  If your condition worsens we recommend that you receive another evaluation at the emergency room immediately or contact your primary medical clinics after hours call service to discuss your concerns.  Please return here or go to the Emergency Department for any concerns or worsening of condition.      Earache, No Infection (Adult)  Earaches can happen without an infection. This occurs when air and fluid build up behind the eardrum causing a feeling of fullness and discomfort and reduced hearing. This is called otitis media with effusion (OME) or serous otitis media. It means there is fluid in the middle ear. It is not the same as acute otitis media, which is typically from infection.  OME can happen when you have a cold if congestion blocks the passage that drains the middle ear. This passage is called the eustachian tube. OME may also occur with nasal allergies or after a bacterial middle ear infection.    The pain or discomfort may come and go. You may hear clicking or popping sounds when you chew or swallow. You may feel that your balance is off. Or you may hear ringing in the ear.  It often takes from several weeks up to 3 months for the fluid to clear on its own. Oral pain relievers and ear drops help if there is pain. Decongestants and antihistamines sometimes help. Antibiotics don't help since there is no infection. Your doctor may prescribe a nasal spray to help reduce swelling in the nose and eustachian tube. This can allow the ear to drain.  If your OME doesn't improve after 3 months, surgery may be used to drain the fluid and insert a small tube  in the eardrum to allow continued drainage.  Because the middle ear fluid can become infected, it is important to watch for signs of an ear infection which may develop later. These signs include increased ear pain, fever, or drainage from the ear.  Home care  The following guidelines will help you care for yourself at home:  · You may use over-the-counter medicine as directed to control pain, unless another medicine was prescribed. If you have chronic liver or kidney disease or ever had a stomach ulcer or GI bleeding, talk with your doctor before using these medicines. Aspirin should never be used in anyone under 18 years of age who is ill with a fever. It may cause severe liver damage.  · You may use over-the-counter decongestants such as phenylephrine or pseudoephedrine. But they are not always helpful. Don't use nasal spray decongestants more than 3 days. Longer use can make congestion worse. Prescription nasal sprays from your doctor don't typically have those restrictions.  · Antihistamines may help if you are also having allergy symptoms.  · You may use medicines such as guaifenesin to thin mucus and promote drainage.  Follow-up care  Follow up with your healthcare provider or as advised if you are not feeling better after 3 days.  When to seek medical advice  Call your healthcare provider right away if any of the following occur:  · Your ear pain gets worse or does not start to improve   · Fever of 100.4°F (38°C) or higher, or as directed by your healthcare provider  · Fluid or blood draining from the ear  · Headache or sinus pain  · Stiff neck  · Unusual drowsiness or confusion  Date Last Reviewed: 10/1/2016  © 9661-4642 [employer]. [address] [place] [address], [place] [address]. All rights reserved. This information is not intended as a substitute for professional medical care. Always follow your healthcare professional's instructions.

## 2019-08-25 NOTE — PROGRESS NOTES
"Subjective:       Patient ID: Casper Osborne is a 63 y.o. male.    Vitals:  height is 5' 11" (1.803 m) and weight is 88.9 kg (196 lb). His oral temperature is 97.4 °F (36.3 °C). His blood pressure is 129/88 and his pulse is 82. His respiration is 14 and oxygen saturation is 97%.     Chief Complaint: Otalgia (left ear )    Pt c/o left sided sore throat, difficulty swallowing, left ear pain , nasal congestion, and productive cough, x 2 days    Otalgia    There is pain in the left ear. The current episode started in the past 7 days. The problem occurs constantly. The problem has been gradually worsening. There has been no fever. The pain is at a severity of 4/10. The pain is mild. Associated symptoms include coughing, rhinorrhea and a sore throat. Pertinent negatives include no rash or vomiting. He has tried acetaminophen and NSAIDs for the symptoms.   Sore Throat    This is a new problem. The current episode started in the past 7 days. The problem has been gradually worsening. The pain is worse on the left side. There has been no fever. The pain is at a severity of 3/10. The pain is mild. Associated symptoms include congestion (nasal congestion ), coughing, ear pain (left ear ), swollen glands and trouble swallowing. Pertinent negatives include no shortness of breath, stridor or vomiting. He has tried NSAIDs for the symptoms.       Constitution: Negative for chills, sweating, fatigue and fever.   HENT: Positive for ear pain (left ear ), congestion (nasal congestion ), postnasal drip, sore throat and trouble swallowing. Negative for sinus pain, sinus pressure and voice change.    Neck: Negative for painful lymph nodes.   Eyes: Negative for eye redness.   Respiratory: Positive for cough and sputum production. Negative for chest tightness, bloody sputum, COPD, shortness of breath, stridor, wheezing and asthma.    Gastrointestinal: Negative for nausea and vomiting.   Musculoskeletal: Negative for muscle ache.   Skin: " Negative for rash.   Allergic/Immunologic: Negative for seasonal allergies and asthma.   Hematologic/Lymphatic: Negative for swollen lymph nodes.       Objective:      Physical Exam   Constitutional: He is oriented to person, place, and time. He appears well-developed and well-nourished. He is cooperative.  Non-toxic appearance. He does not appear ill. No distress.   HENT:   Head: Normocephalic and atraumatic.   Right Ear: Hearing, tympanic membrane, external ear and ear canal normal.   Left Ear: Hearing, external ear and ear canal normal. A middle ear effusion is present.   Nose: Nose normal. No mucosal edema, rhinorrhea or nasal deformity. No epistaxis. Right sinus exhibits no maxillary sinus tenderness and no frontal sinus tenderness. Left sinus exhibits no maxillary sinus tenderness and no frontal sinus tenderness.   Mouth/Throat: Uvula is midline and mucous membranes are normal. No trismus in the jaw. Normal dentition. No uvula swelling. Posterior oropharyngeal erythema present. Tonsils are 0 on the right. Tonsils are 0 on the left.   Tonsils surgically absent   Eyes: Conjunctivae and lids are normal. No scleral icterus.   Sclera clear bilat   Neck: Trachea normal, full passive range of motion without pain and phonation normal. Neck supple.   Cardiovascular: Normal rate, regular rhythm, normal heart sounds, intact distal pulses and normal pulses.   Pulmonary/Chest: Effort normal and breath sounds normal. No respiratory distress.   Abdominal: Soft. Normal appearance and bowel sounds are normal. He exhibits no distension. There is no tenderness.   Musculoskeletal: Normal range of motion. He exhibits no edema or deformity.   Neurological: He is alert and oriented to person, place, and time. He exhibits normal muscle tone. Coordination normal.   Skin: Skin is warm, dry and intact. He is not diaphoretic. No pallor.   Psychiatric: He has a normal mood and affect. His speech is normal and behavior is normal. Judgment  and thought content normal. Cognition and memory are normal.   Nursing note and vitals reviewed.      Assessment:       1. Acute effusion of left ear    2. Viral URI with cough        Plan:         Acute effusion of left ear  -     betamethasone acetate-betamethasone sodium phosphate injection 9 mg    Viral URI with cough  -     promethazine-dextromethorphan (PROMETHAZINE-DM) 6.25-15 mg/5 mL Syrp; Take 5 mLs by mouth every 6 (six) hours as needed (cough).  Dispense: 180 mL; Refill: 0            Patient Instructions   Continue to use flonase and start CLARITIN D to help dry out sinus cavity  Cough syrup may cause you to drowsy, reserve for bedtime or at home    You must understand that you've received an Urgent Care treatment only and that you may be released before all your medical problems are known or treated. You, the patient, will arrange for follow up care as instructed.  If your condition worsens we recommend that you receive another evaluation at the emergency room immediately or contact your primary medical clinics after hours call service to discuss your concerns.  Please return here or go to the Emergency Department for any concerns or worsening of condition.      Earache, No Infection (Adult)  Earaches can happen without an infection. This occurs when air and fluid build up behind the eardrum causing a feeling of fullness and discomfort and reduced hearing. This is called otitis media with effusion (OME) or serous otitis media. It means there is fluid in the middle ear. It is not the same as acute otitis media, which is typically from infection.  OME can happen when you have a cold if congestion blocks the passage that drains the middle ear. This passage is called the eustachian tube. OME may also occur with nasal allergies or after a bacterial middle ear infection.    The pain or discomfort may come and go. You may hear clicking or popping sounds when you chew or swallow. You may feel that your balance  is off. Or you may hear ringing in the ear.  It often takes from several weeks up to 3 months for the fluid to clear on its own. Oral pain relievers and ear drops help if there is pain. Decongestants and antihistamines sometimes help. Antibiotics don't help since there is no infection. Your doctor may prescribe a nasal spray to help reduce swelling in the nose and eustachian tube. This can allow the ear to drain.  If your OME doesn't improve after 3 months, surgery may be used to drain the fluid and insert a small tube in the eardrum to allow continued drainage.  Because the middle ear fluid can become infected, it is important to watch for signs of an ear infection which may develop later. These signs include increased ear pain, fever, or drainage from the ear.  Home care  The following guidelines will help you care for yourself at home:  · You may use over-the-counter medicine as directed to control pain, unless another medicine was prescribed. If you have chronic liver or kidney disease or ever had a stomach ulcer or GI bleeding, talk with your doctor before using these medicines. Aspirin should never be used in anyone under 18 years of age who is ill with a fever. It may cause severe liver damage.  · You may use over-the-counter decongestants such as phenylephrine or pseudoephedrine. But they are not always helpful. Don't use nasal spray decongestants more than 3 days. Longer use can make congestion worse. Prescription nasal sprays from your doctor don't typically have those restrictions.  · Antihistamines may help if you are also having allergy symptoms.  · You may use medicines such as guaifenesin to thin mucus and promote drainage.  Follow-up care  Follow up with your healthcare provider or as advised if you are not feeling better after 3 days.  When to seek medical advice  Call your healthcare provider right away if any of the following occur:  · Your ear pain gets worse or does not start to  improve   · Fever of 100.4°F (38°C) or higher, or as directed by your healthcare provider  · Fluid or blood draining from the ear  · Headache or sinus pain  · Stiff neck  · Unusual drowsiness or confusion  Date Last Reviewed: 10/1/2016  © 3101-6300 [employer]. [address] [place] [address]. All rights reserved. This information is not intended as a substitute for professional medical care. Always follow your healthcare professional's instructions.

## 2019-08-27 ENCOUNTER — PATIENT OUTREACH (OUTPATIENT)
Dept: ADMINISTRATIVE | Facility: HOSPITAL | Age: 64
End: 2019-08-27

## 2019-08-28 ENCOUNTER — PATIENT MESSAGE (OUTPATIENT)
Dept: INTERNAL MEDICINE | Facility: CLINIC | Age: 64
End: 2019-08-28

## 2019-08-28 ENCOUNTER — OFFICE VISIT (OUTPATIENT)
Dept: INTERNAL MEDICINE | Facility: CLINIC | Age: 64
End: 2019-08-28
Payer: COMMERCIAL

## 2019-08-28 VITALS
HEIGHT: 71 IN | TEMPERATURE: 98 F | HEART RATE: 72 BPM | SYSTOLIC BLOOD PRESSURE: 114 MMHG | WEIGHT: 192.25 LBS | DIASTOLIC BLOOD PRESSURE: 80 MMHG | BODY MASS INDEX: 26.91 KG/M2

## 2019-08-28 DIAGNOSIS — H66.002 ACUTE SUPPURATIVE OTITIS MEDIA OF LEFT EAR WITHOUT SPONTANEOUS RUPTURE OF TYMPANIC MEMBRANE, RECURRENCE NOT SPECIFIED: Primary | ICD-10-CM

## 2019-08-28 PROCEDURE — 3008F PR BODY MASS INDEX (BMI) DOCUMENTED: ICD-10-PCS | Mod: CPTII,S$GLB,, | Performed by: FAMILY MEDICINE

## 2019-08-28 PROCEDURE — 99214 PR OFFICE/OUTPT VISIT, EST, LEVL IV, 30-39 MIN: ICD-10-PCS | Mod: 25,S$GLB,, | Performed by: FAMILY MEDICINE

## 2019-08-28 PROCEDURE — 96372 THER/PROPH/DIAG INJ SC/IM: CPT | Mod: S$GLB,,, | Performed by: FAMILY MEDICINE

## 2019-08-28 PROCEDURE — 99999 PR PBB SHADOW E&M-EST. PATIENT-LVL III: ICD-10-PCS | Mod: PBBFAC,,, | Performed by: FAMILY MEDICINE

## 2019-08-28 PROCEDURE — 96372 PR INJECTION,THERAP/PROPH/DIAG2ST, IM OR SUBCUT: ICD-10-PCS | Mod: S$GLB,,, | Performed by: FAMILY MEDICINE

## 2019-08-28 PROCEDURE — 99214 OFFICE O/P EST MOD 30 MIN: CPT | Mod: 25,S$GLB,, | Performed by: FAMILY MEDICINE

## 2019-08-28 PROCEDURE — 99999 PR PBB SHADOW E&M-EST. PATIENT-LVL III: CPT | Mod: PBBFAC,,, | Performed by: FAMILY MEDICINE

## 2019-08-28 PROCEDURE — 3008F BODY MASS INDEX DOCD: CPT | Mod: CPTII,S$GLB,, | Performed by: FAMILY MEDICINE

## 2019-08-28 RX ORDER — CEFTRIAXONE 500 MG/1
500 INJECTION, POWDER, FOR SOLUTION INTRAMUSCULAR; INTRAVENOUS ONCE
Status: COMPLETED | OUTPATIENT
Start: 2019-08-28 | End: 2019-08-28

## 2019-08-28 RX ORDER — AMOXICILLIN AND CLAVULANATE POTASSIUM 875; 125 MG/1; MG/1
1 TABLET, FILM COATED ORAL 2 TIMES DAILY
Qty: 20 TABLET | Refills: 0 | Status: SHIPPED | OUTPATIENT
Start: 2019-08-28 | End: 2019-09-07

## 2019-08-28 RX ADMIN — CEFTRIAXONE 500 MG: 500 INJECTION, POWDER, FOR SOLUTION INTRAMUSCULAR; INTRAVENOUS at 03:08

## 2019-08-28 NOTE — PROGRESS NOTES
Subjective:       Patient ID: Casper Osborne is a 63 y.o. male.    Chief Complaint: Otalgia    HPI a 63-year-old white male returns to clinic today secondary to complaints of continued worsening nasal congestion, pain with swallowing, ear pain, sinus pressure, sore throat, and headaches for the past week.  He presented to urgent care on Sunday and was diagnosed with a viral URI.  He was given a steroid shot and told to use Claritin D and prescription cough syrup which he has been using without relief of symptoms.  Review of Systems   Constitutional: Negative for appetite change, chills, fatigue and fever.   HENT: Positive for congestion, drooling, ear pain, sinus pressure and sore throat. Negative for hearing loss, postnasal drip, rhinorrhea and tinnitus.    Eyes: Negative for redness, itching and visual disturbance.   Respiratory: Negative for cough, chest tightness, shortness of breath and stridor.    Cardiovascular: Negative for chest pain and palpitations.   Gastrointestinal: Negative for abdominal pain, constipation, diarrhea, nausea and vomiting.   Genitourinary: Negative for decreased urine volume, difficulty urinating, dysuria, frequency, hematuria and urgency.   Musculoskeletal: Negative for back pain, myalgias, neck pain and neck stiffness.   Skin: Negative for rash.   Neurological: Positive for headaches. Negative for dizziness and light-headedness.   Psychiatric/Behavioral: Negative.        Objective:      Physical Exam   Constitutional: He is oriented to person, place, and time. He appears well-developed and well-nourished. No distress.   HENT:   Head: Normocephalic and atraumatic.   Right Ear: External ear normal. A middle ear effusion is present.   Left Ear: External ear normal. Tympanic membrane is erythematous and bulging.   Nose: Mucosal edema and rhinorrhea present.   Mouth/Throat: Oropharynx is clear and moist. No oropharyngeal exudate.   Eyes: Pupils are equal, round, and reactive to light.  Conjunctivae and EOM are normal. Right eye exhibits no discharge. Left eye exhibits no discharge. No scleral icterus.   Neck: Normal range of motion. Neck supple. No JVD present. No tracheal deviation present. No thyromegaly present.   Cardiovascular: Normal rate, regular rhythm, normal heart sounds and intact distal pulses. Exam reveals no gallop and no friction rub.   No murmur heard.  Pulmonary/Chest: Effort normal and breath sounds normal. No stridor. No respiratory distress. He has no wheezes. He has no rales.   Abdominal: Soft. Bowel sounds are normal. He exhibits no distension and no mass. There is no tenderness. There is no rebound and no guarding.   Musculoskeletal: Normal range of motion. He exhibits no edema or tenderness.   Lymphadenopathy:     He has no cervical adenopathy.   Neurological: He is alert and oriented to person, place, and time.   Skin: Skin is warm and dry. No rash noted. He is not diaphoretic. No erythema. No pallor.   Psychiatric: He has a normal mood and affect. His behavior is normal. Judgment and thought content normal.   Nursing note and vitals reviewed.      Assessment:       1. Acute suppurative otitis media of left ear without spontaneous rupture of tympanic membrane, recurrence not specified        Plan:       Acute suppurative otitis media of left ear without spontaneous rupture of tympanic membrane, recurrence not specified  -     cefTRIAXone injection 500 mg  -     amoxicillin-clavulanate 875-125mg (AUGMENTIN) 875-125 mg per tablet; Take 1 tablet by mouth 2 (two) times daily. for 10 days  Dispense: 20 tablet; Refill: 0      Continue Phenergan DM cough syrup as prescribed.  Flonase nasal spray 2 sprays per nostril daily.  Continue Claritin D as needed.  Saltwater or Listerine gargle as needed for sore throat.  Tylenol and ibuprofen as needed for fever or pain.  Return to clinic as needed if symptoms persist or worsen.

## 2019-10-04 ENCOUNTER — PATIENT MESSAGE (OUTPATIENT)
Dept: SURGERY | Facility: HOSPITAL | Age: 64
End: 2019-10-04

## 2019-10-08 ENCOUNTER — DOCUMENTATION ONLY (OUTPATIENT)
Dept: INTERNAL MEDICINE | Facility: CLINIC | Age: 64
End: 2019-10-08

## 2019-10-09 ENCOUNTER — PATIENT MESSAGE (OUTPATIENT)
Dept: INTERNAL MEDICINE | Facility: CLINIC | Age: 64
End: 2019-10-09

## 2019-10-11 ENCOUNTER — LAB VISIT (OUTPATIENT)
Dept: LAB | Facility: HOSPITAL | Age: 64
End: 2019-10-11
Attending: INTERNAL MEDICINE
Payer: COMMERCIAL

## 2019-10-11 DIAGNOSIS — Z12.11 COLON CANCER SCREENING: ICD-10-CM

## 2019-10-11 PROCEDURE — 82274 ASSAY TEST FOR BLOOD FECAL: CPT

## 2019-10-17 ENCOUNTER — CLINICAL SUPPORT (OUTPATIENT)
Dept: INTERNAL MEDICINE | Facility: CLINIC | Age: 64
End: 2019-10-17
Payer: COMMERCIAL

## 2019-10-17 DIAGNOSIS — Z23 NEED FOR VACCINATION: Primary | ICD-10-CM

## 2019-10-17 PROCEDURE — 90686 FLU VACCINE (QUAD) GREATER THAN OR EQUAL TO 3YO PRESERVATIVE FREE IM: ICD-10-PCS | Mod: S$GLB,,, | Performed by: INTERNAL MEDICINE

## 2019-10-17 PROCEDURE — 90472 IMMUNIZATION ADMIN EACH ADD: CPT | Mod: S$GLB,,, | Performed by: INTERNAL MEDICINE

## 2019-10-17 PROCEDURE — 90471 IMMUNIZATION ADMIN: CPT | Mod: S$GLB,,, | Performed by: INTERNAL MEDICINE

## 2019-10-17 PROCEDURE — 90715 TDAP VACCINE 7 YRS/> IM: CPT | Mod: S$GLB,,, | Performed by: INTERNAL MEDICINE

## 2019-10-17 PROCEDURE — 90686 IIV4 VACC NO PRSV 0.5 ML IM: CPT | Mod: S$GLB,,, | Performed by: INTERNAL MEDICINE

## 2019-10-17 PROCEDURE — 90472 FLU VACCINE (QUAD) GREATER THAN OR EQUAL TO 3YO PRESERVATIVE FREE IM: ICD-10-PCS | Mod: S$GLB,,, | Performed by: INTERNAL MEDICINE

## 2019-10-17 PROCEDURE — 90471 TDAP VACCINE GREATER THAN OR EQUAL TO 7YO IM: ICD-10-PCS | Mod: S$GLB,,, | Performed by: INTERNAL MEDICINE

## 2019-10-17 PROCEDURE — 90715 TDAP VACCINE GREATER THAN OR EQUAL TO 7YO IM: ICD-10-PCS | Mod: S$GLB,,, | Performed by: INTERNAL MEDICINE

## 2019-10-22 ENCOUNTER — PATIENT MESSAGE (OUTPATIENT)
Dept: SURGERY | Facility: HOSPITAL | Age: 64
End: 2019-10-22

## 2019-10-22 DIAGNOSIS — Z12.11 SCREENING FOR COLON CANCER: Primary | ICD-10-CM

## 2019-10-22 DIAGNOSIS — Z12.11 COLON CANCER SCREENING: ICD-10-CM

## 2019-10-22 LAB — HEMOCCULT STL QL IA: NEGATIVE

## 2019-10-24 ENCOUNTER — PATIENT MESSAGE (OUTPATIENT)
Dept: INTERNAL MEDICINE | Facility: CLINIC | Age: 64
End: 2019-10-24

## 2019-10-28 NOTE — PROGRESS NOTES
Subjective:       Patient ID: Casper Osborne is a 63 y.o. male.    Chief Complaint: Blood Pressure Check (Was at Dentist, /90)    HPI    63-year-old male here for evaluation of his blood pressure.  He was at the dentist and had a blood pressure of 140/90.  This happened about a week ago and a week before this and a month before this.  He checked his BP at home, which was normal.    He has not lost weight on the metformin.    HLD - Patient is currently on Mevacor 40 mg.  His last lipid panel was   Cholesterol   Date Value Ref Range Status   01/04/2019 193 120 - 199 mg/dL Final     Comment:     The National Cholesterol Education Program (NCEP) has set the  following guidelines (reference ranges) for Cholesterol:  Optimal.....................<200 mg/dL  Borderline High.............200-239 mg/dL  High........................> or = 240 mg/dL       Triglycerides   Date Value Ref Range Status   01/04/2019 162 (H) 30 - 150 mg/dL Final     Comment:     The National Cholesterol Education Program (NCEP) has set the  following guidelines (reference values) for triglycerides:  Normal......................<150 mg/dL  Borderline High.............150-199 mg/dL  High........................200-499 mg/dL       HDL   Date Value Ref Range Status   01/04/2019 39 (L) 40 - 75 mg/dL Final     Comment:     The National Cholesterol Education Program (NCEP) has set the  following guidelines (reference values) for HDL Cholesterol:  Low...............<40 mg/dL  Optimal...........>60 mg/dL       LDL Cholesterol   Date Value Ref Range Status   01/04/2019 121.6 63.0 - 159.0 mg/dL Final     Comment:     The National Cholesterol Education Program (NCEP) has set the  following guidelines (reference values) for LDL Cholesterol:  Optimal.......................<130 mg/dL  Borderline High...............130-159 mg/dL  High..........................160-189 mg/dL  Very High.....................>190 mg/dL     .  Side effects of the medication:  none.    Review of Systems    Objective:      Physical Exam   Constitutional: He is oriented to person, place, and time. He appears well-developed and well-nourished.   HENT:   Head: Normocephalic and atraumatic.   Mouth/Throat: No oropharyngeal exudate.   Eyes: Pupils are equal, round, and reactive to light. EOM are normal. Right eye exhibits no discharge. Left eye exhibits no discharge. No scleral icterus.   Neck: Normal range of motion. Neck supple. No tracheal deviation present. No thyromegaly present.   Cardiovascular: Normal rate, regular rhythm and normal heart sounds. Exam reveals no gallop and no friction rub.   No murmur heard.  Pulmonary/Chest: Effort normal and breath sounds normal. No respiratory distress. He has no wheezes. He has no rales. He exhibits no tenderness.   Abdominal: Soft. Bowel sounds are normal. He exhibits no distension and no mass. There is no tenderness. There is no rebound and no guarding.   Musculoskeletal: Normal range of motion. He exhibits no edema or tenderness.   Neurological: He is alert and oriented to person, place, and time.   Skin: Skin is warm and dry. No rash noted. No erythema. No pallor.   Psychiatric: He has a normal mood and affect. His behavior is normal.   Vitals reviewed.      Assessment:       1. Hyperlipidemia, unspecified hyperlipidemia type    2. Elevated blood pressure reading        Plan:       1.  Continue Mevacor 40 mg daily.  2.  Patient advised to watch salt intake and to increase fresh fruits and vegetables.  Think this was an isolated increase in not something that needs to be treated.

## 2019-10-29 ENCOUNTER — OFFICE VISIT (OUTPATIENT)
Dept: INTERNAL MEDICINE | Facility: CLINIC | Age: 64
End: 2019-10-29
Payer: COMMERCIAL

## 2019-10-29 VITALS
SYSTOLIC BLOOD PRESSURE: 114 MMHG | HEIGHT: 71 IN | WEIGHT: 193.56 LBS | BODY MASS INDEX: 27.1 KG/M2 | RESPIRATION RATE: 18 BRPM | DIASTOLIC BLOOD PRESSURE: 80 MMHG | TEMPERATURE: 98 F | HEART RATE: 64 BPM

## 2019-10-29 DIAGNOSIS — E78.5 HYPERLIPIDEMIA, UNSPECIFIED HYPERLIPIDEMIA TYPE: Primary | Chronic | ICD-10-CM

## 2019-10-29 DIAGNOSIS — R03.0 ELEVATED BLOOD PRESSURE READING: ICD-10-CM

## 2019-10-29 PROCEDURE — 3008F PR BODY MASS INDEX (BMI) DOCUMENTED: ICD-10-PCS | Mod: CPTII,S$GLB,, | Performed by: INTERNAL MEDICINE

## 2019-10-29 PROCEDURE — 99213 OFFICE O/P EST LOW 20 MIN: CPT | Mod: S$GLB,,, | Performed by: INTERNAL MEDICINE

## 2019-10-29 PROCEDURE — 99999 PR PBB SHADOW E&M-EST. PATIENT-LVL III: ICD-10-PCS | Mod: PBBFAC,,, | Performed by: INTERNAL MEDICINE

## 2019-10-29 PROCEDURE — 99213 PR OFFICE/OUTPT VISIT, EST, LEVL III, 20-29 MIN: ICD-10-PCS | Mod: S$GLB,,, | Performed by: INTERNAL MEDICINE

## 2019-10-29 PROCEDURE — 99999 PR PBB SHADOW E&M-EST. PATIENT-LVL III: CPT | Mod: PBBFAC,,, | Performed by: INTERNAL MEDICINE

## 2019-10-29 PROCEDURE — 3008F BODY MASS INDEX DOCD: CPT | Mod: CPTII,S$GLB,, | Performed by: INTERNAL MEDICINE

## 2019-11-26 ENCOUNTER — OFFICE VISIT (OUTPATIENT)
Dept: ORTHOPEDICS | Facility: CLINIC | Age: 64
End: 2019-11-26
Payer: COMMERCIAL

## 2019-11-26 DIAGNOSIS — M19.072 ARTHRITIS OF ANKLE, LEFT: Primary | ICD-10-CM

## 2019-11-26 DIAGNOSIS — M19.072 ARTHRITIS OF LEFT FOOT: ICD-10-CM

## 2019-11-26 PROCEDURE — 99213 OFFICE O/P EST LOW 20 MIN: CPT | Mod: S$GLB,,, | Performed by: ORTHOPAEDIC SURGERY

## 2019-11-26 PROCEDURE — 99999 PR PBB SHADOW E&M-EST. PATIENT-LVL I: ICD-10-PCS | Mod: PBBFAC,,, | Performed by: ORTHOPAEDIC SURGERY

## 2019-11-26 PROCEDURE — 99213 PR OFFICE/OUTPT VISIT, EST, LEVL III, 20-29 MIN: ICD-10-PCS | Mod: S$GLB,,, | Performed by: ORTHOPAEDIC SURGERY

## 2019-11-26 PROCEDURE — 99999 PR PBB SHADOW E&M-EST. PATIENT-LVL I: CPT | Mod: PBBFAC,,, | Performed by: ORTHOPAEDIC SURGERY

## 2019-11-27 NOTE — PROGRESS NOTES
Casper Osborne  Returns today for follow-up.  This is a 63-year-old gentleman with posttraumatic arthritis of his left subtalar joint secondary to an intra-articular calcaneus fracture who has developed anterior impingement and mild arthritis of his ankle. He returns today consider scheduling left arthroscopy had discussed previously.  Has had a couple previous intra-articular ankle injections which have given him mixed relief.  As mentioned, he has arthritis of his subtalar joint which she has been able to live with than managed conservatively.  We have discussed fusion surgery in the past but he has elected not to proceed with fusion surgery of his subtalar arthritis.    Examination:  The left ankle reveals decreased dorsiflexion compared to the right side with pain at the extreme of dorsiflexion.  He has essentially no subtalar motion with mild discomfort on attempted motion. He is tender in the sinus tarsi areas hindfoot.  He is also tender on the anterior aspect of his ankle.    Impression:  1.  Chronic posttraumatic left ankle arthritis                       2.  Left ankle arthritis with anterior impingement    Recommendation:  We discussed the arthroscopic debridement.  He understands this will not cure any arthritic changes might give him some relief of his anterior ankle pain. He would like this to have this done and we will schedule to be done as an outpatient.

## 2019-12-02 ENCOUNTER — PATIENT MESSAGE (OUTPATIENT)
Dept: ORTHOPEDICS | Facility: CLINIC | Age: 64
End: 2019-12-02

## 2019-12-02 ENCOUNTER — TELEPHONE (OUTPATIENT)
Dept: ORTHOPEDICS | Facility: CLINIC | Age: 64
End: 2019-12-02

## 2019-12-12 DIAGNOSIS — Z00.00 ANNUAL PHYSICAL EXAM: Primary | ICD-10-CM

## 2019-12-16 ENCOUNTER — OFFICE VISIT (OUTPATIENT)
Dept: ORTHOPEDICS | Facility: CLINIC | Age: 64
End: 2019-12-16
Payer: COMMERCIAL

## 2019-12-16 VITALS
HEIGHT: 71 IN | SYSTOLIC BLOOD PRESSURE: 124 MMHG | HEART RATE: 75 BPM | TEMPERATURE: 97 F | RESPIRATION RATE: 18 BRPM | DIASTOLIC BLOOD PRESSURE: 80 MMHG | WEIGHT: 195.13 LBS | BODY MASS INDEX: 27.32 KG/M2

## 2019-12-16 DIAGNOSIS — M19.072 ARTHRITIS OF ANKLE, LEFT: Primary | ICD-10-CM

## 2019-12-16 PROCEDURE — 99999 PR PBB SHADOW E&M-EST. PATIENT-LVL III: CPT | Mod: PBBFAC,,, | Performed by: PHYSICIAN ASSISTANT

## 2019-12-16 PROCEDURE — 99499 UNLISTED E&M SERVICE: CPT | Mod: S$GLB,,, | Performed by: PHYSICIAN ASSISTANT

## 2019-12-16 PROCEDURE — 99999 PR PBB SHADOW E&M-EST. PATIENT-LVL III: ICD-10-PCS | Mod: PBBFAC,,, | Performed by: PHYSICIAN ASSISTANT

## 2019-12-16 PROCEDURE — 99499 NO LOS: ICD-10-PCS | Mod: S$GLB,,, | Performed by: PHYSICIAN ASSISTANT

## 2019-12-16 NOTE — PROGRESS NOTES
Casper is a 63 y.o. male here today for a pre-operative visit in preparation for a   ARTHROSCOPY, ANKLE, WITH DEBRIDEMENT Left    ARTHROTOMY, ANKLE Left     to be performed by  on 12/19/2019.  he was last seen and treated in the clinic on 11/26/2019.  .  There has been no significant change in their past medical or orthopedic status since the previous visit.  No fever, chills, malaise or unexplained weight change.     Allergies, medications, past medical history and past surgical history were reviewed with the patient.     Physical examination was performed.     Consents were signed.   Patient has crutches and will bring with them the day of surgery. They have been counseled on proper use of the assistive device.     Pre, ngoc, and post operative procedure and expectations were discussed.  Questions were answered. The patient has been educated and is ready to proceed with surgery.  Approximately 30 minutes was spent discussing surgical outcomes, plans, procedures, pre, ngoc, and post operative expectations and care. The risks, benefits and alternatives to surgery were discussed with the patient at great length.  These include bleeding, infection, vessel/nerve damage, pain, numbness, tingling, complex regional pain syndrome, hardware/surgical failure, need for further surgery, malunion, nonunion, DVT, PE, arthritis and death. He also understands that the risks of surgery may be greater for some patients due to health or lifestyle issues, such as a current condition or a history of heart disease, obesity, clotting disorders, recurrent infections, smoking, sedentary lifestyle, or noncompliance with medications, therapy, or followup. The degree of the increased risk is hard to estimate with any degree of precision.  Patient states an understanding and wishes to proceed with surgery.   All questions were answered.  No guarantees were implied or stated.  Informed consent was obtained  The patient will contact us  if the have any questions, concerns, and changes in their medical condition prior to surgery.

## 2019-12-17 NOTE — H&P (VIEW-ONLY)
Subjective:      Patient ID: Casper Osborne is a 63 y.o. male.    Chief Complaint: Pre-op Exam (left foot)      Casper is a 63 y.o. male here today for a pre-operative visit in preparation for a   ARTHROSCOPY, ANKLE, WITH DEBRIDEMENT Left    ARTHROTOMY, ANKLE Left     to be performed by  on 12/19/2019.  he was last seen and treated in the clinic on 11/26/2019.  .  There has been no significant change in their past medical or orthopedic status since the previous visit.  No fever, chills, malaise or unexplained weight change.    Past Medical History:   Diagnosis Date    GERD (gastroesophageal reflux disease)     Hyperlipidemia      Past Surgical History:   Procedure Laterality Date    FRACTURE SURGERY      left heel at 41yo    HEEL SPUR SURGERY      left heel - fell off a ladder and had to have this reconstructed    SPINE SURGERY      c7 fx - prior to this, he was getting dizzy spells - none since    TONSILLECTOMY      VASECTOMY       Social History     Occupational History    Occupation: Retried   Tobacco Use    Smoking status: Never Smoker    Smokeless tobacco: Never Used   Substance and Sexual Activity    Alcohol use: Yes     Frequency: 2-3 times a week     Drinks per session: 1 or 2     Binge frequency: Never     Comment: less than once a month    Drug use: No    Sexual activity: Yes     Partners: Female     Comment:       ROS  Current Outpatient Medications on File Prior to Visit   Medication Sig Dispense Refill    alfuzosin (UROXATRAL) 10 mg Tb24 Take 1 tablet (10 mg total) by mouth daily with breakfast. 90 tablet 3    aspirin (ECOTRIN) 81 MG EC tablet Take 81 mg by mouth once daily.      fluticasone (FLONASE) 50 mcg/actuation nasal spray instill 1 spray into each nostril once daily 16 g 3    ibuprofen (ADVIL,MOTRIN) 800 MG tablet TK 1 T PO TID WITH MEALS  0    lovastatin (MEVACOR) 40 MG tablet Take 1 tablet (40 mg total) by mouth nightly. 90 tablet 3    omeprazole (PRILOSEC)  "40 MG capsule Take 1 capsule (40 mg total) by mouth once daily. 90 capsule 3    diclofenac sodium (VOLTAREN) 1 % Gel Apply 2 g topically 3 (three) times daily. for 10 days 1 Tube 3     No current facility-administered medications on file prior to visit.      Review of patient's allergies indicates:  No Known Allergies      Objective:      Vitals:    12/16/19 1335   BP: 124/80   Pulse: 75   Resp: 18   Temp: 97.2 °F (36.2 °C)   TempSrc: Oral   Weight: 88.5 kg (195 lb 1.7 oz)   Height: 5' 11" (1.803 m)     Ortho Exam     Gen: WD, WN in NAD   HEENT: NC/AT   Heart: RR   Resp: unlabored breathing   Skin: intact, no lesions pertinent to the surgery site    Assessment:       1. Arthritis of ankle, left          Plan:       Surgical intervention per .       "

## 2019-12-18 ENCOUNTER — ANESTHESIA EVENT (OUTPATIENT)
Dept: SURGERY | Facility: HOSPITAL | Age: 64
End: 2019-12-18
Payer: COMMERCIAL

## 2019-12-18 ENCOUNTER — TELEPHONE (OUTPATIENT)
Dept: ORTHOPEDICS | Facility: CLINIC | Age: 64
End: 2019-12-18

## 2019-12-18 RX ORDER — FLUTICASONE PROPIONATE 50 MCG
1 SPRAY, SUSPENSION (ML) NASAL DAILY
Qty: 16 G | Refills: 3 | Status: SHIPPED | OUTPATIENT
Start: 2019-12-18 | End: 2022-04-28

## 2019-12-18 RX ORDER — OMEPRAZOLE 40 MG/1
40 CAPSULE, DELAYED RELEASE ORAL DAILY
Qty: 90 CAPSULE | Refills: 3 | Status: SHIPPED | OUTPATIENT
Start: 2019-12-18

## 2019-12-18 NOTE — TELEPHONE ENCOUNTER
Spoke to patient, gave surgery arrival time of 6:30am and reminded him not to eat or drink after midnight.

## 2019-12-19 ENCOUNTER — HOSPITAL ENCOUNTER (OUTPATIENT)
Facility: HOSPITAL | Age: 64
Discharge: HOME | End: 2019-12-19
Attending: ORTHOPAEDIC SURGERY | Admitting: ORTHOPAEDIC SURGERY
Payer: COMMERCIAL

## 2019-12-19 ENCOUNTER — ANESTHESIA (OUTPATIENT)
Dept: SURGERY | Facility: HOSPITAL | Age: 64
End: 2019-12-19
Payer: COMMERCIAL

## 2019-12-19 VITALS
BODY MASS INDEX: 27.3 KG/M2 | RESPIRATION RATE: 15 BRPM | OXYGEN SATURATION: 97 % | TEMPERATURE: 98 F | HEART RATE: 76 BPM | HEIGHT: 71 IN | SYSTOLIC BLOOD PRESSURE: 120 MMHG | DIASTOLIC BLOOD PRESSURE: 82 MMHG | WEIGHT: 195 LBS

## 2019-12-19 DIAGNOSIS — M19.072 ARTHRITIS OF ANKLE, LEFT: Primary | ICD-10-CM

## 2019-12-19 PROCEDURE — 64450 NJX AA&/STRD OTHER PN/BRANCH: CPT | Mod: 59,LT,, | Performed by: ANESTHESIOLOGY

## 2019-12-19 PROCEDURE — 25000003 PHARM REV CODE 250: Performed by: ANESTHESIOLOGY

## 2019-12-19 PROCEDURE — 27201423 OPTIME MED/SURG SUP & DEVICES STERILE SUPPLY: Performed by: ORTHOPAEDIC SURGERY

## 2019-12-19 PROCEDURE — 76942 PR U/S GUIDANCE FOR NEEDLE GUIDANCE: ICD-10-PCS | Mod: 26,,, | Performed by: ANESTHESIOLOGY

## 2019-12-19 PROCEDURE — D9220A PRA ANESTHESIA: ICD-10-PCS | Mod: CRNA,,, | Performed by: NURSE ANESTHETIST, CERTIFIED REGISTERED

## 2019-12-19 PROCEDURE — 63600175 PHARM REV CODE 636 W HCPCS: Performed by: ORTHOPAEDIC SURGERY

## 2019-12-19 PROCEDURE — 76942 ECHO GUIDE FOR BIOPSY: CPT | Mod: 26,,, | Performed by: ANESTHESIOLOGY

## 2019-12-19 PROCEDURE — D9220A PRA ANESTHESIA: Mod: CRNA,,, | Performed by: NURSE ANESTHETIST, CERTIFIED REGISTERED

## 2019-12-19 PROCEDURE — 27200750 HC INSULATED NEEDLE/ STIMUPLEX: Performed by: ANESTHESIOLOGY

## 2019-12-19 PROCEDURE — D9220A PRA ANESTHESIA: Mod: ANES,,, | Performed by: ANESTHESIOLOGY

## 2019-12-19 PROCEDURE — 64447 NJX AA&/STRD FEMORAL NRV IMG: CPT | Mod: 59,LT,, | Performed by: ANESTHESIOLOGY

## 2019-12-19 PROCEDURE — 36000708 HC OR TIME LEV III 1ST 15 MIN: Performed by: ORTHOPAEDIC SURGERY

## 2019-12-19 PROCEDURE — 63600175 PHARM REV CODE 636 W HCPCS: Performed by: ANESTHESIOLOGY

## 2019-12-19 PROCEDURE — C1729 CATH, DRAINAGE: HCPCS | Performed by: ORTHOPAEDIC SURGERY

## 2019-12-19 PROCEDURE — 29898 ANKLE ARTHROSCOPY/SURGERY: CPT | Mod: LT,,, | Performed by: ORTHOPAEDIC SURGERY

## 2019-12-19 PROCEDURE — 94761 N-INVAS EAR/PLS OXIMETRY MLT: CPT

## 2019-12-19 PROCEDURE — 63600175 PHARM REV CODE 636 W HCPCS

## 2019-12-19 PROCEDURE — 99900035 HC TECH TIME PER 15 MIN (STAT)

## 2019-12-19 PROCEDURE — 37000009 HC ANESTHESIA EA ADD 15 MINS: Performed by: ORTHOPAEDIC SURGERY

## 2019-12-19 PROCEDURE — 63600175 PHARM REV CODE 636 W HCPCS: Performed by: NURSE ANESTHETIST, CERTIFIED REGISTERED

## 2019-12-19 PROCEDURE — 37000008 HC ANESTHESIA 1ST 15 MINUTES: Performed by: ORTHOPAEDIC SURGERY

## 2019-12-19 PROCEDURE — 64450 PR NERVE BLOCK INJ, ANES/STEROID, OTHER PERIPHERAL: ICD-10-PCS | Mod: 59,LT,, | Performed by: ANESTHESIOLOGY

## 2019-12-19 PROCEDURE — 94770 HC EXHALED C02 TEST: CPT | Mod: 59

## 2019-12-19 PROCEDURE — D9220A PRA ANESTHESIA: ICD-10-PCS | Mod: ANES,,, | Performed by: ANESTHESIOLOGY

## 2019-12-19 PROCEDURE — 71000015 HC POSTOP RECOV 1ST HR: Performed by: ORTHOPAEDIC SURGERY

## 2019-12-19 PROCEDURE — 64447 NJX AA&/STRD FEMORAL NRV IMG: CPT | Performed by: ANESTHESIOLOGY

## 2019-12-19 PROCEDURE — 64447 PR NERVE BLOCK INJ, ANES/STEROID, FEMORAL, INCL IMAG GUIDANCE: ICD-10-PCS | Mod: 59,LT,, | Performed by: ANESTHESIOLOGY

## 2019-12-19 PROCEDURE — 71000033 HC RECOVERY, INTIAL HOUR: Performed by: ORTHOPAEDIC SURGERY

## 2019-12-19 PROCEDURE — 29898 PR ANKLE SCOPE,EXTENS DEBRIDEMNT: ICD-10-PCS | Mod: LT,,, | Performed by: ORTHOPAEDIC SURGERY

## 2019-12-19 PROCEDURE — 36000709 HC OR TIME LEV III EA ADD 15 MIN: Performed by: ORTHOPAEDIC SURGERY

## 2019-12-19 PROCEDURE — 25000003 PHARM REV CODE 250: Performed by: NURSE ANESTHETIST, CERTIFIED REGISTERED

## 2019-12-19 RX ORDER — HYDROCODONE BITARTRATE AND ACETAMINOPHEN 5; 325 MG/1; MG/1
1 TABLET ORAL EVERY 4 HOURS PRN
Status: CANCELLED | OUTPATIENT
Start: 2019-12-19

## 2019-12-19 RX ORDER — KETAMINE HCL IN 0.9 % NACL 50 MG/5 ML
SYRINGE (ML) INTRAVENOUS
Status: DISCONTINUED | OUTPATIENT
Start: 2019-12-19 | End: 2019-12-19

## 2019-12-19 RX ORDER — SUCCINYLCHOLINE CHLORIDE 20 MG/ML
INJECTION INTRAMUSCULAR; INTRAVENOUS
Status: DISCONTINUED | OUTPATIENT
Start: 2019-12-19 | End: 2019-12-19

## 2019-12-19 RX ORDER — HYDROCODONE BITARTRATE AND ACETAMINOPHEN 5; 325 MG/1; MG/1
1 TABLET ORAL EVERY 4 HOURS PRN
Qty: 30 TABLET | Refills: 0 | Status: SHIPPED | OUTPATIENT
Start: 2019-12-19 | End: 2020-02-03

## 2019-12-19 RX ORDER — ONDANSETRON 2 MG/ML
4 INJECTION INTRAMUSCULAR; INTRAVENOUS DAILY PRN
Status: DISCONTINUED | OUTPATIENT
Start: 2019-12-19 | End: 2019-12-19 | Stop reason: HOSPADM

## 2019-12-19 RX ORDER — SODIUM CHLORIDE 0.9 % (FLUSH) 0.9 %
10 SYRINGE (ML) INJECTION
Status: DISCONTINUED | OUTPATIENT
Start: 2019-12-19 | End: 2019-12-19 | Stop reason: HOSPADM

## 2019-12-19 RX ORDER — ONDANSETRON 4 MG/1
8 TABLET, ORALLY DISINTEGRATING ORAL EVERY 8 HOURS PRN
Status: CANCELLED | OUTPATIENT
Start: 2019-12-19

## 2019-12-19 RX ORDER — MIDAZOLAM HYDROCHLORIDE 1 MG/ML
0.5 INJECTION INTRAMUSCULAR; INTRAVENOUS
Status: DISCONTINUED | OUTPATIENT
Start: 2019-12-19 | End: 2019-12-19 | Stop reason: HOSPADM

## 2019-12-19 RX ORDER — GLYCOPYRROLATE 0.2 MG/ML
INJECTION INTRAMUSCULAR; INTRAVENOUS
Status: DISCONTINUED | OUTPATIENT
Start: 2019-12-19 | End: 2019-12-19

## 2019-12-19 RX ORDER — CEFAZOLIN SODIUM 1 G/3ML
INJECTION, POWDER, FOR SOLUTION INTRAMUSCULAR; INTRAVENOUS
Status: DISCONTINUED | OUTPATIENT
Start: 2019-12-19 | End: 2019-12-19

## 2019-12-19 RX ORDER — ONDANSETRON 2 MG/ML
INJECTION INTRAMUSCULAR; INTRAVENOUS
Status: DISCONTINUED | OUTPATIENT
Start: 2019-12-19 | End: 2019-12-19

## 2019-12-19 RX ORDER — BUPIVACAINE HYDROCHLORIDE AND EPINEPHRINE 2.5; 5 MG/ML; UG/ML
INJECTION, SOLUTION EPIDURAL; INFILTRATION; INTRACAUDAL; PERINEURAL
Status: DISCONTINUED | OUTPATIENT
Start: 2019-12-19 | End: 2019-12-19

## 2019-12-19 RX ORDER — ACETAMINOPHEN 500 MG
1000 TABLET ORAL
Status: COMPLETED | OUTPATIENT
Start: 2019-12-19 | End: 2019-12-19

## 2019-12-19 RX ORDER — CEFAZOLIN SODIUM 1 G/3ML
2 INJECTION, POWDER, FOR SOLUTION INTRAMUSCULAR; INTRAVENOUS
Status: DISCONTINUED | OUTPATIENT
Start: 2019-12-19 | End: 2019-12-19 | Stop reason: HOSPADM

## 2019-12-19 RX ORDER — CELECOXIB 200 MG/1
400 CAPSULE ORAL
Status: COMPLETED | OUTPATIENT
Start: 2019-12-19 | End: 2019-12-19

## 2019-12-19 RX ORDER — DEXAMETHASONE SODIUM PHOSPHATE 4 MG/ML
INJECTION, SOLUTION INTRA-ARTICULAR; INTRALESIONAL; INTRAMUSCULAR; INTRAVENOUS; SOFT TISSUE
Status: DISCONTINUED | OUTPATIENT
Start: 2019-12-19 | End: 2019-12-19

## 2019-12-19 RX ORDER — LIDOCAINE HYDROCHLORIDE 10 MG/ML
INJECTION, SOLUTION INTRAVENOUS
Status: DISCONTINUED | OUTPATIENT
Start: 2019-12-19 | End: 2019-12-19

## 2019-12-19 RX ORDER — EPHEDRINE SULFATE 50 MG/ML
INJECTION, SOLUTION INTRAVENOUS
Status: DISCONTINUED | OUTPATIENT
Start: 2019-12-19 | End: 2019-12-19

## 2019-12-19 RX ORDER — SODIUM CHLORIDE 9 MG/ML
INJECTION, SOLUTION INTRAVENOUS CONTINUOUS
Status: DISCONTINUED | OUTPATIENT
Start: 2019-12-19 | End: 2019-12-19 | Stop reason: HOSPADM

## 2019-12-19 RX ORDER — PROPOFOL 10 MG/ML
VIAL (ML) INTRAVENOUS
Status: DISCONTINUED | OUTPATIENT
Start: 2019-12-19 | End: 2019-12-19

## 2019-12-19 RX ORDER — FENTANYL CITRATE 50 UG/ML
INJECTION, SOLUTION INTRAMUSCULAR; INTRAVENOUS
Status: COMPLETED
Start: 2019-12-19 | End: 2019-12-19

## 2019-12-19 RX ORDER — ROPIVACAINE HYDROCHLORIDE 5 MG/ML
INJECTION, SOLUTION EPIDURAL; INFILTRATION; PERINEURAL
Status: DISCONTINUED | OUTPATIENT
Start: 2019-12-19 | End: 2019-12-19

## 2019-12-19 RX ORDER — MIDAZOLAM HYDROCHLORIDE 1 MG/ML
INJECTION INTRAMUSCULAR; INTRAVENOUS
Status: COMPLETED
Start: 2019-12-19 | End: 2019-12-19

## 2019-12-19 RX ORDER — OXYCODONE HYDROCHLORIDE 5 MG/1
5 TABLET ORAL
Status: DISCONTINUED | OUTPATIENT
Start: 2019-12-19 | End: 2019-12-19 | Stop reason: HOSPADM

## 2019-12-19 RX ORDER — FENTANYL CITRATE 50 UG/ML
25 INJECTION, SOLUTION INTRAMUSCULAR; INTRAVENOUS EVERY 5 MIN PRN
Status: DISCONTINUED | OUTPATIENT
Start: 2019-12-19 | End: 2019-12-19 | Stop reason: HOSPADM

## 2019-12-19 RX ORDER — TRIAMCINOLONE ACETONIDE 40 MG/ML
INJECTION, SUSPENSION INTRA-ARTICULAR; INTRAMUSCULAR
Status: DISCONTINUED | OUTPATIENT
Start: 2019-12-19 | End: 2019-12-19 | Stop reason: HOSPADM

## 2019-12-19 RX ORDER — FENTANYL CITRATE 50 UG/ML
INJECTION, SOLUTION INTRAMUSCULAR; INTRAVENOUS
Status: DISCONTINUED | OUTPATIENT
Start: 2019-12-19 | End: 2019-12-19

## 2019-12-19 RX ORDER — ACETAMINOPHEN 500 MG
TABLET ORAL
Status: DISCONTINUED
Start: 2019-12-19 | End: 2019-12-19 | Stop reason: HOSPADM

## 2019-12-19 RX ORDER — SODIUM CHLORIDE 9 MG/ML
INJECTION, SOLUTION INTRAVENOUS CONTINUOUS PRN
Status: DISCONTINUED | OUTPATIENT
Start: 2019-12-19 | End: 2019-12-19

## 2019-12-19 RX ORDER — MIDAZOLAM HYDROCHLORIDE 1 MG/ML
INJECTION, SOLUTION INTRAMUSCULAR; INTRAVENOUS
Status: DISCONTINUED | OUTPATIENT
Start: 2019-12-19 | End: 2019-12-19

## 2019-12-19 RX ORDER — PHENYLEPHRINE HYDROCHLORIDE 10 MG/ML
INJECTION INTRAVENOUS
Status: DISCONTINUED | OUTPATIENT
Start: 2019-12-19 | End: 2019-12-19

## 2019-12-19 RX ADMIN — PHENYLEPHRINE HYDROCHLORIDE 200 MCG: 10 INJECTION INTRAVENOUS at 09:12

## 2019-12-19 RX ADMIN — EPHEDRINE SULFATE 10 MG: 50 INJECTION INTRAVENOUS at 09:12

## 2019-12-19 RX ADMIN — MIDAZOLAM 2 MG: 1 INJECTION INTRAMUSCULAR; INTRAVENOUS at 08:12

## 2019-12-19 RX ADMIN — BUPIVACAINE HYDROCHLORIDE AND EPINEPHRINE 20 ML: 2.5; 5 INJECTION, SOLUTION EPIDURAL; INFILTRATION; INTRACAUDAL; PERINEURAL at 10:12

## 2019-12-19 RX ADMIN — GLYCOPYRROLATE 0.2 MG: 0.2 INJECTION, SOLUTION INTRAMUSCULAR; INTRAVENOUS at 08:12

## 2019-12-19 RX ADMIN — PROPOFOL 200 MG: 10 INJECTION, EMULSION INTRAVENOUS at 08:12

## 2019-12-19 RX ADMIN — MIDAZOLAM HYDROCHLORIDE 2 MG: 1 INJECTION INTRAMUSCULAR; INTRAVENOUS at 07:12

## 2019-12-19 RX ADMIN — FENTANYL CITRATE 100 MCG: 50 INJECTION, SOLUTION INTRAMUSCULAR; INTRAVENOUS at 08:12

## 2019-12-19 RX ADMIN — SODIUM CHLORIDE: 0.9 INJECTION, SOLUTION INTRAVENOUS at 07:12

## 2019-12-19 RX ADMIN — LIDOCAINE HYDROCHLORIDE 100 MG: 10 INJECTION, SOLUTION INTRAVENOUS at 08:12

## 2019-12-19 RX ADMIN — Medication 30 MG: at 08:12

## 2019-12-19 RX ADMIN — PHENYLEPHRINE HYDROCHLORIDE 200 MCG: 10 INJECTION INTRAVENOUS at 08:12

## 2019-12-19 RX ADMIN — CELECOXIB 400 MG: 200 CAPSULE ORAL at 06:12

## 2019-12-19 RX ADMIN — FENTANYL CITRATE 50 MCG: 50 INJECTION, SOLUTION INTRAMUSCULAR; INTRAVENOUS at 08:12

## 2019-12-19 RX ADMIN — DEXAMETHASONE SODIUM PHOSPHATE 8 MG: 4 INJECTION, SOLUTION INTRAMUSCULAR; INTRAVENOUS at 08:12

## 2019-12-19 RX ADMIN — SUCCINYLCHOLINE CHLORIDE 140 MG: 20 INJECTION, SOLUTION INTRAMUSCULAR; INTRAVENOUS at 08:12

## 2019-12-19 RX ADMIN — FENTANYL CITRATE 50 MCG: 50 INJECTION INTRAMUSCULAR; INTRAVENOUS at 08:12

## 2019-12-19 RX ADMIN — ONDANSETRON 4 MG: 2 INJECTION INTRAMUSCULAR; INTRAVENOUS at 08:12

## 2019-12-19 RX ADMIN — MIDAZOLAM HYDROCHLORIDE 2 MG: 1 INJECTION, SOLUTION INTRAMUSCULAR; INTRAVENOUS at 07:12

## 2019-12-19 RX ADMIN — ROPIVACAINE HYDROCHLORIDE 20 ML: 5 INJECTION, SOLUTION EPIDURAL; INFILTRATION; PERINEURAL at 10:12

## 2019-12-19 RX ADMIN — SODIUM CHLORIDE: 0.9 INJECTION, SOLUTION INTRAVENOUS at 08:12

## 2019-12-19 RX ADMIN — SODIUM CHLORIDE, SODIUM GLUCONATE, SODIUM ACETATE, POTASSIUM CHLORIDE, MAGNESIUM CHLORIDE, SODIUM PHOSPHATE, DIBASIC, AND POTASSIUM PHOSPHATE: .53; .5; .37; .037; .03; .012; .00082 INJECTION, SOLUTION INTRAVENOUS at 08:12

## 2019-12-19 RX ADMIN — CEFAZOLIN 2 G: 330 INJECTION, POWDER, FOR SOLUTION INTRAMUSCULAR; INTRAVENOUS at 08:12

## 2019-12-19 RX ADMIN — ACETAMINOPHEN 1000 MG: 500 TABLET ORAL at 06:12

## 2019-12-19 NOTE — ANESTHESIA PROCEDURE NOTES
Peripheral Block    Patient location during procedure: pre-op   Block not for primary anesthetic.  Reason for block: at surgeon's request and post-op pain management   Post-op Pain Location: Left ankle  Start time: 12/19/2019 7:30 AM  Timeout: 12/19/2019 7:30 AM   End time: 12/19/2019 7:45 AM    Staffing  Authorizing Provider: Boris Fisher MD  Performing Provider: Boris Fisher MD    Preanesthetic Checklist  Completed: patient identified, site marked, surgical consent, pre-op evaluation, timeout performed, IV checked, risks and benefits discussed and monitors and equipment checked  Peripheral Block  Patient position: left lateral decubitus  Prep: ChloraPrep  Patient monitoring: heart rate, cardiac monitor, continuous pulse ox, continuous capnometry and frequent blood pressure checks  Block type: popliteal  Laterality: left  Injection technique: single shot  Needle  Needle type: Stimuplex   Needle gauge: 21 G  Needle length: 4 in  Needle localization: anatomical landmarks and ultrasound guidance   -ultrasound image captured on disc.  Assessment  Injection assessment: negative aspiration, negative parasthesia and local visualized surrounding nerve  Paresthesia pain: none  Heart rate change: no  Slow fractionated injection: yes  Additional Notes  VSS.  [facility] RN monitoring vitals throughout procedure.  Patient tolerated procedure well.  Additives:  Decadron 1 mg, Clonidine 20 mcg, Epi 1:200,000.

## 2019-12-19 NOTE — OP NOTE
DATE OF PROCEDURE:  12/19/2019    PREOPERATIVE DIAGNOSIS:  Left ankle arthritis with anterior impingement.    POSTOPERATIVE DIAGNOSIS:  Left ankle arthritis with anterior impingement.    PROCEDURE PERFORMED:  Left ankle arthroscopy with extensive debridement.    ANESTHESIA:  General.    SURGEON:  Bay Melendez M.D.    ASSISTANT:  Dr. Conde.    COMPLICATIONS:  There were no operative or anesthetic complications.    ESTIMATED BLOOD LOSS:  Zero.    SPECIMENS:  None.    INDICATIONS FOR PROCEDURE:  This is a 63-year-old gentleman with posttraumatic   arthritis of his left subtalar joint secondary to an intra-articular calcaneus   fracture many years ago who has subsequently developed symptoms related to his   ankle.  Radiographs reveal evidence of mild arthritis with possible anterior   impingement.  It was elected to bring him to the Operating Room at this time for   left ankle arthroscopy to evaluate his joint.    PROCEDURE IN DETAIL:  After anesthesia was administered, the patient's left leg   was prepped and draped in a sterile fashion with tourniquet about the left thigh   and the leg resting on a leg santoyo underneath the knee.  The noninvasive ankle   distractor was placed on the foot and the ankle was distracted using this   device.  An anteromedial portal was established using a hypodermic needle just   medial to the anterior tibial tendon.  A #11 blade was used to make a small skin   incision.  Hemostat was used to dissect bluntly down to the joint capsule.  The   blunt trocar and cannula were then introduced into the joint.  Visualization   was adequate from the outset and unfortunately there was noted to be severe   arthritis of the anterior portion of the ankle joint as well as the distal   tibia.  There were areas of bare bone and other areas of loose friable   cartilage.  I established a lateral portal under direct visualization and   introduced a shaver to remove the loose debris and the unstable  cartilage.  The   posterior portion of the joint was better maintained, but still with soft   friable cartilage.  I switched the camera to the lateral portal to get a better   look medially and again there was severe arthritis anteriorly with bare bone and   no cartilage.  The medial gutter was debrided using the shaver.  At this point,   I introduced a quinton to reduce some of the bone on the distal tibia anteriorly.    I let the traction off of the foot and under direct visualization, there was   not noted to be any significant impingement due to the distal tibia on the   talus.  Once the debridement was completed, the joint was lavaged with remaining   fluid.  The portal incisions were closed with 3-0 nylon suture.  I injected 80   mg of Kenalog mixed with sterile saline into the joint at the end of the   procedure.  A sterile compressive dressing was placed and the patient was   returned to the Recovery Room in stable condition.      DIEGO/IN  dd: 12/19/2019 13:31:29 (CST)  td: 12/19/2019 16:12:12 (CST)  Doc ID   #[id number]  Job ID #[id number]    CC:

## 2019-12-19 NOTE — BRIEF OP NOTE
[facility]  Brief Operative Note    Surgery Date: 12/19/2019     Surgeon(s) and Role:     * Bay Melendez MD - Primary    Assisting Surgeon: None    Pre-op Diagnosis:  Arthritis of ankle, left [M19.072]    Post-op Diagnosis:  Post-Op Diagnosis Codes:     * Arthritis of ankle, left [M19.072]    Procedure(s) (LRB):  ARTHROSCOPY, ANKLE, WITH DEBRIDEMENT (Left)    Anesthesia: Choice    Description of the findings of the procedure(s): See op note    Estimated Blood Loss: * No values recorded between 12/19/2019  8:44 AM and 12/19/2019  9:59 AM *         Specimens:   Specimen (12h ago, onward)    None            Discharge Note    OUTCOME: Patient tolerated treatment/procedure well without complication and is now ready for discharge.    DISPOSITION: Home or Self Care    FINAL DIAGNOSIS:  Arthritis of ankle, left    FOLLOWUP: In clinic

## 2019-12-19 NOTE — ANESTHESIA PREPROCEDURE EVALUATION
12/19/2019  Casper Osborne is a 63 y.o., male.    Anesthesia Evaluation    I have reviewed the Patient Summary Reports.    I have reviewed the Nursing Notes.      Review of Systems  Anesthesia Hx:  No problems with previous Anesthesia    Social:  Non-Smoker, No Alcohol Use    Hematology/Oncology:  Hematology Normal   Oncology Normal     EENT/Dental:EENT/Dental Normal   Cardiovascular:  Cardiovascular Normal     Pulmonary:  Pulmonary Normal    Renal/:  Renal/ Normal     Hepatic/GI:   Hepatitis (s/p treatment), B    Musculoskeletal:  Musculoskeletal Normal    Neurological:   Peripheral Neuropathy (Rt CTS)    Endocrine:  Endocrine Normal    Dermatological:  Skin Normal    Psych:  Psychiatric Normal           Physical Exam  General:  Well nourished    Airway/Jaw/Neck:  Airway Findings: Mouth Opening: Normal Tongue: Normal  General Airway Assessment: Adult  Mallampati: III  Improves to II with phonation.  TM Distance: Normal, at least 6 cm  Jaw/Neck Findings:     Neck ROM: Normal ROM      Dental:  Dental Findings: In tact   Chest/Lungs:  Chest/Lungs Findings: Clear to auscultation, Normal Respiratory Rate     Heart/Vascular:  Heart Findings: Rate: Normal  Rhythm: Regular Rhythm  Sounds: Normal        Mental Status:  Mental Status Findings:  Cooperative, Alert and Oriented         Anesthesia Plan  Type of Anesthesia, risks & benefits discussed:  Anesthesia Type:  general  Patient's Preference:   Intra-op Monitoring Plan: standard ASA monitors  Intra-op Monitoring Plan Comments:   Post Op Pain Control Plan: multimodal analgesia, per primary service following discharge from PACU and peripheral nerve block  Post Op Pain Control Plan Comments:   Induction:   IV  Beta Blocker:  Patient is not currently on a Beta-Blocker (No further documentation required).       Informed Consent: Patient understands risks and  agrees with Anesthesia plan.  Questions answered. Anesthesia consent signed with patient.  ASA Score: 3     Day of Surgery Review of History & Physical: I have interviewed and examined the patient. I have reviewed the patient's H&P dated:            Ready For Surgery From Anesthesia Perspective.

## 2019-12-19 NOTE — TRANSFER OF CARE
"Anesthesia Transfer of Care Note    Patient: Casper Osborne    Procedure(s) Performed: Procedure(s) (LRB):  ARTHROSCOPY, ANKLE, WITH DEBRIDEMENT (Left)    Patient location: PACU    Anesthesia Type: general    Transport from OR: Transported from OR on room air with adequate spontaneous ventilation. Transported from OR on 6-10 L/min O2 by face mask with adequate spontaneous ventilation    Post pain: adequate analgesia    Post assessment: no apparent anesthetic complications and tolerated procedure well    Post vital signs: stable    Level of consciousness: awake and alert    Nausea/Vomiting: no nausea/vomiting    Complications: none    Transfer of care protocol was followed      Last vitals:   Visit Vitals  /70 (BP Location: Right arm, Patient Position: Lying)   Pulse 78   Temp 36.4 °C (97.5 °F) (Temporal)   Resp 18   Ht 5' 11" (1.803 m)   Wt 88.5 kg (195 lb)   SpO2 99%   BMI 27.20 kg/m²     "

## 2019-12-19 NOTE — ANESTHESIA POSTPROCEDURE EVALUATION
Anesthesia Post Evaluation    Patient: Casper Osborne    Procedure(s) Performed: Procedure(s) (LRB):  ARTHROSCOPY, ANKLE, WITH DEBRIDEMENT (Left)    Final Anesthesia Type: general    Patient location during evaluation: PACU  Patient participation: Yes- Able to Participate  Level of consciousness: awake and alert and oriented  Post-procedure vital signs: reviewed and stable  Pain management: adequate  Airway patency: patent    PONV status at discharge: No PONV  Anesthetic complications: no      Cardiovascular status: blood pressure returned to baseline and hemodynamically stable  Respiratory status: unassisted, spontaneous ventilation and room air  Hydration status: euvolemic  Follow-up not needed.          Vitals Value Taken Time   /82 12/19/2019 10:46 AM   Temp 36.4 °C (97.5 °F) 12/19/2019 10:02 AM   Pulse 73 12/19/2019 10:54 AM   Resp 16 12/19/2019 10:54 AM   SpO2 97 % 12/19/2019 10:54 AM   Vitals shown include unvalidated device data.      Event Time     Out of Recovery 10:31:00          Pain/Wiliam Score: Pain Rating Prior to Med Admin: 6 (12/19/2019  8:00 AM)  Wiliam Score: 10 (12/19/2019 10:51 AM)

## 2019-12-19 NOTE — ANESTHESIA PROCEDURE NOTES
Peripheral Block    Patient location during procedure: pre-op   Block not for primary anesthetic.  Reason for block: at surgeon's request and post-op pain management   Post-op Pain Location: Left Ankle  Start time: 12/19/2019 7:30 AM  Timeout: 12/19/2019 7:30 AM   End time: 12/19/2019 7:45 AM    Staffing  Authorizing Provider: Boris Fisher MD  Performing Provider: Boris Fisher MD    Preanesthetic Checklist  Completed: patient identified, site marked, surgical consent, pre-op evaluation, timeout performed, IV checked, risks and benefits discussed and monitors and equipment checked  Peripheral Block  Patient position: supine  Prep: ChloraPrep  Patient monitoring: heart rate, cardiac monitor, continuous pulse ox, continuous capnometry and frequent blood pressure checks  Block type: adductor canal  Laterality: left  Injection technique: single shot  Needle  Needle type: Stimuplex   Needle gauge: 21 G  Needle length: 4 in  Needle localization: anatomical landmarks and ultrasound guidance   -ultrasound image captured on disc.  Assessment  Injection assessment: negative aspiration, negative parasthesia and local visualized surrounding nerve  Paresthesia pain: none  Heart rate change: no  Slow fractionated injection: yes  Additional Notes  VSS.  [facility] RN monitoring vitals throughout procedure.  Patient tolerated procedure well.  Additives:  Decadron 1 mg, Clonidine 20 mcg

## 2019-12-20 ENCOUNTER — TELEPHONE (OUTPATIENT)
Dept: ORTHOPEDICS | Facility: CLINIC | Age: 64
End: 2019-12-20

## 2019-12-20 NOTE — TELEPHONE ENCOUNTER
----- Message from Zoie Ramirez sent at 12/20/2019 10:35 AM CST -----  Contact: Pt  Pt missed a call from the Dr's Office and would like a return call from the nurse at [phone].

## 2019-12-26 ENCOUNTER — PATIENT MESSAGE (OUTPATIENT)
Dept: ORTHOPEDICS | Facility: CLINIC | Age: 64
End: 2019-12-26

## 2019-12-27 ENCOUNTER — TELEPHONE (OUTPATIENT)
Dept: ANESTHESIOLOGY | Facility: HOSPITAL | Age: 64
End: 2019-12-27

## 2019-12-27 ENCOUNTER — PATIENT MESSAGE (OUTPATIENT)
Dept: ADMINISTRATIVE | Facility: OTHER | Age: 64
End: 2019-12-27

## 2019-12-27 NOTE — TELEPHONE ENCOUNTER
"I called for follow up with a patient I cared for on 12/19/19 at the [facility].  Pt expressed concern that he has not had to use any pain medications since surgery and was wondering if this is normal.  During my care I placed single shot Popliteal and Adductor canal nerve blocks for post operative pain management.  Intraop he was given general anesthesia for the surgery.  The surgery did require a tourniquet for intraoperative hemostasis.  His only concern is that he has had continued "numb sensation" since surgery from behind his leg from 3 inches below knee to the ankle.  He did admit that this has improved with a lessoning of numbness and more sensation in the last week but did have concern of whether this was normal.  Upon further questioning, he reports a normal motor function to the involved extremity.  He is able to ambulate without difficulty.  He walks on the leg and can stand on his calf with normal strength.  This indicates to me that the nerve blocks have resolved.  A sustained sensory block without motor weakness in the same nerve would be otherwise not explainable.      Overall, he is in good spirits and very positive.  I did reassure him that this issue would likely resolve without explanation of its origin.  We have arranged to call him on Monday which is 3 days from now to follow up again.  "

## 2019-12-30 NOTE — PROGRESS NOTES
12/30/2019 1500    Called and spoke with patient to follow up with post operative leg numbness. Patient reports no change in condition since last speaking to Dr. Fisher this past Friday (12/27/19). He states that the entire right side of his operative calf remains numb, left side of calf slightly numb. Numbness begins at the knee extending down to his ankle. Denies any motor weakness to operative extremity, he is ambulating without difficulty. He also denies any pain stating that he has not needed to take any of his post op pain medications since his surgery on 12/19/20. Patient states that he has spoken with Dr. Gutierres's office regarding this matter- which has been forwarded to anesthesia as patient received a popliteal and saphenous single shot nerve block on the day of surgery. Informed patient that this information will be passed on to an anesthesiologist and someone should be calling him back today to follow up. Patient's condition reported to Dr. Medina FAULKNER to contact patient.

## 2020-01-03 ENCOUNTER — OFFICE VISIT (OUTPATIENT)
Dept: ORTHOPEDICS | Facility: CLINIC | Age: 65
End: 2020-01-03
Payer: COMMERCIAL

## 2020-01-03 VITALS
SYSTOLIC BLOOD PRESSURE: 126 MMHG | HEART RATE: 70 BPM | WEIGHT: 196.19 LBS | DIASTOLIC BLOOD PRESSURE: 83 MMHG | TEMPERATURE: 98 F | RESPIRATION RATE: 18 BRPM | BODY MASS INDEX: 27.47 KG/M2 | HEIGHT: 71 IN

## 2020-01-03 DIAGNOSIS — Z98.890 POST-OPERATIVE STATE: Primary | ICD-10-CM

## 2020-01-03 DIAGNOSIS — M19.072 ARTHRITIS OF ANKLE, LEFT: ICD-10-CM

## 2020-01-03 PROCEDURE — 99024 POSTOP FOLLOW-UP VISIT: CPT | Mod: S$GLB,,, | Performed by: PHYSICIAN ASSISTANT

## 2020-01-03 PROCEDURE — 99024 PR POST-OP FOLLOW-UP VISIT: ICD-10-PCS | Mod: S$GLB,,, | Performed by: PHYSICIAN ASSISTANT

## 2020-01-03 PROCEDURE — 99999 PR PBB SHADOW E&M-EST. PATIENT-LVL IV: CPT | Mod: PBBFAC,,, | Performed by: PHYSICIAN ASSISTANT

## 2020-01-03 PROCEDURE — 99999 PR PBB SHADOW E&M-EST. PATIENT-LVL IV: ICD-10-PCS | Mod: PBBFAC,,, | Performed by: PHYSICIAN ASSISTANT

## 2020-01-06 ENCOUNTER — TELEPHONE (OUTPATIENT)
Dept: ANESTHESIOLOGY | Facility: HOSPITAL | Age: 65
End: 2020-01-06

## 2020-01-06 NOTE — TELEPHONE ENCOUNTER
I attempted to reach Mr Osborne by phone as per our discussion for follow up after our discussion last week.  I will attempt to reach him again tomorrow.  Only an answering machine today and a message was left.

## 2020-01-06 NOTE — PROGRESS NOTES
Mr. Osborne is here today for a post-operative visit after a   Left ankle arthroscopy with extensive debridement  by  on 12/19/2019.    Interval History:  he reports that he is doingok   Pain is controlled.  he is taking pain medication.    he denies fever, chills, and sweats since the time of the surgery.     Physical exam:  Dressing taken down.  Incision is clean, dry and intact.  Sutures removed without difficulty.      RADS: All pertinent images were reviewed by myself:   none done today    Assessment:  Post-op visit ( 2 weeks)    Plan:  Current care, treatment plan, precautions, activity level/ modifications, limitations, rehabilitation exercises and proposed future treatment were discussed with the patient. We discussed the need to monitor for changes in symptoms and condition and report them to the physician.  Discussed importance of compliance with all appointments and follow up examinations.   - The patient was advised to keep the incision clean and dry for the next 24 hours after which he may wash the area with antibacterial soap in the shower. Will not submerge until the incision is completely healed  -Patient was advised to monitor wound closely and multiple times daily for any problems. Call clinic immediately or report to ED for immediate medical attention for any complications including reopening of wound, drainage, purulence, redness, streaking, odor, pain out of proportion, fever, chills, etc.   - weight bearing as tolerated, range of motion as tolerated  - pain medication: no refill needed,    Pain medication refill policy provided to patient for review.   - Patient is to return to clinic in 4 weeks with      If there are any questions prior to scheduled follow up, the patient was instructed to contact the office

## 2020-01-08 ENCOUNTER — TELEPHONE (OUTPATIENT)
Dept: ANESTHESIOLOGY | Facility: HOSPITAL | Age: 65
End: 2020-01-08

## 2020-01-08 NOTE — TELEPHONE ENCOUNTER
I spoke with Mr Osborne yesterday by phone.  I was following up on his concern about residual numbness since his surgery on 12/18/19.  At that time he was scheduled for estensive ankle surgery and received preop a single shot adductor canal and single shot popliteal nerve blocks for post operative pain.  At our last conversation he expressed concern that he was still experiencing numbness to part of his lower extremity where the surgery and nerve blocks were placed.  He stated that he has benefited from the residual numbness as he has had to use very little pain medication and has not experienced any disability from this issue.  His numbness has continued to improve and after our conversation it appears now to be in a saphenous nerve distribution from medial calf to plantar arch medially.  This is different than my last conversation in which he explained numbness which would have been in a popliteal nerve distribution.  We discussed all the possible causes for this continued loss of sensation including tourniquet, nerve trauma, and local anesthetic nerve reaction.  Nonetheless, he has continued to improve and increase in return to sensation in the aforementioned areas.  Most of the literature report 60-70 % of these patients recover in 4-6 weeks completely without any intervention.  He is in good spirits and appreciated our close follow up.  He was instructed to call our department and as for me personally for any other concerns.

## 2020-01-13 ENCOUNTER — PATIENT MESSAGE (OUTPATIENT)
Dept: ORTHOPEDICS | Facility: CLINIC | Age: 65
End: 2020-01-13

## 2020-01-13 ENCOUNTER — TELEPHONE (OUTPATIENT)
Dept: ORTHOPEDICS | Facility: CLINIC | Age: 65
End: 2020-01-13

## 2020-01-13 NOTE — TELEPHONE ENCOUNTER
Left message stating his message will be forwarded to Dr. Melendez nurse and she will call in the morning.

## 2020-01-14 ENCOUNTER — TELEPHONE (OUTPATIENT)
Dept: ORTHOPEDICS | Facility: CLINIC | Age: 65
End: 2020-01-14

## 2020-01-14 ENCOUNTER — NURSE TRIAGE (OUTPATIENT)
Dept: ADMINISTRATIVE | Facility: CLINIC | Age: 65
End: 2020-01-14

## 2020-01-14 ENCOUNTER — OFFICE VISIT (OUTPATIENT)
Dept: ORTHOPEDICS | Facility: CLINIC | Age: 65
End: 2020-01-14
Payer: COMMERCIAL

## 2020-01-14 DIAGNOSIS — Z09 FOLLOW-UP EXAMINATION AFTER ORTHOPEDIC SURGERY: Primary | ICD-10-CM

## 2020-01-14 PROCEDURE — 99999 PR PBB SHADOW E&M-EST. PATIENT-LVL I: ICD-10-PCS | Mod: PBBFAC,,, | Performed by: ORTHOPAEDIC SURGERY

## 2020-01-14 PROCEDURE — 99024 PR POST-OP FOLLOW-UP VISIT: ICD-10-PCS | Mod: S$GLB,,, | Performed by: ORTHOPAEDIC SURGERY

## 2020-01-14 PROCEDURE — 99999 PR PBB SHADOW E&M-EST. PATIENT-LVL I: CPT | Mod: PBBFAC,,, | Performed by: ORTHOPAEDIC SURGERY

## 2020-01-14 PROCEDURE — 99024 POSTOP FOLLOW-UP VISIT: CPT | Mod: S$GLB,,, | Performed by: ORTHOPAEDIC SURGERY

## 2020-01-14 NOTE — TELEPHONE ENCOUNTER
----- Message from John Hagan sent at 1/14/2020  9:56 AM CST -----  Contact: pt   Attn Marium    Please call pt at [phone]    Patient is willing to come in today    Thank you

## 2020-01-14 NOTE — TELEPHONE ENCOUNTER
"    Reason for Disposition   Ankle pain is main symptom   [1] Redness of the skin AND [2] no fever    Additional Information   Negative: Followed a foot injury     Years ago   Negative: Diabetes   Negative: Followed an ankle injury   Negative: Foot pain is main symptom   Negative: Thigh or calf pain is main symptom   Negative: Thigh or calf swelling is main symptom   Negative: Entire foot is cool or blue in comparison to other side   Negative: [1] Swollen joint AND [2] fever   Negative: [1] Red area or streak AND [2] fever   Negative: Patient sounds very sick or weak to the triager   Negative: [1] SEVERE pain (e.g., excruciating, unable to walk) AND [2] not improved after 2 hours of pain medicine   Negative: [1] Thigh or calf pain AND [2] only 1 side AND [3] present > 1 hour   Negative: [1] Calf swelling AND [2] only 1 side   Negative: [1] Looks infected (spreading redness, pus) AND [2] large red area (> 2 in. or 5 cm)   Negative: Looks like a boil, infected sore, or deep ulcer    Answer Assessment - Initial Assessment Questions  1. ONSET: "When did the pain start?"       yesterday  2. LOCATION: "Where is the pain located?"       Left foot  3. PAIN: "How bad is the pain?"    (Scale 1-10; or mild, moderate, severe)    -  MILD (1-3): doesn't interfere with normal activities     -  MODERATE (4-7): interferes with normal activities (e.g., work or school) or awakens from sleep, limping     -  SEVERE (8-10): excruciating pain, unable to do any normal activities, unable to walk      moderate  4. WORK OR EXERCISE: "Has there been any recent work or exercise that involved this part of the body?"       Increase walking up stairs  5. CAUSE: "What do you think is causing the foot pain?"      Post op unsure  6. OTHER SYMPTOMS: "Do you have any other symptoms?" (e.g., leg pain, rash, fever, numbness       Leg pain, numbness,reddness  7. PREGNANCY: "Is there any chance you are pregnant?" "When was your last " "menstrual period?"      n/a    Protocols used: ST FOOT PAIN-A-AH, ST ANKLE PAIN-A-AH      "

## 2020-01-14 NOTE — PROGRESS NOTES
Casper Osborne  Returns today. He is about 4 weeks postop from his left ankle arthroscopy with extensive debridement.  His scheduled appointment is in 2 weeks but 2 nights ago he was going up and down some stairs while helping to take care of some children and had a fairly sudden onset of pain and swelling and discoloration around the anterolateral portal from his go.  The pain ultimately subsided to where he was able to walk around but yesterday morning he woke up with reddish discoloration or anterolateral aspect of his ankle and foot. Today the discoloration is more purplish and his pain is controlled but he has some hypersensitivity around the anterolateral portal. He also reports he still having some numbness from the nerve block from his surgery. He does report any fevers.  He does report any drainage.    Examination:  He walks in with a slight antalgic gait. There is indeed significant ecchymosis around the anterolateral aspect of his ankle and dorsal lateral aspect of his foot. There is no significant swelling. There is no increased warmth about his ankle or the purplish ecchymosis.  He has good distal pulse.  He has active motion of the ankle with mild discomfort.  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    Impression:  Ecchymosis left ankle, 4 weeks postop left ankle arthroscopy.    Recommendation:  I am not sure of the etiology of this contusion 4 weeks post arthroscopy.  I do not feel that this is an infection.  At this point I would monitor this observantly    I am going to make a follow-up appointment in 1 week for re-evaluation.  If his symptoms worsen again I told him a call let us know

## 2020-01-22 ENCOUNTER — OFFICE VISIT (OUTPATIENT)
Dept: ORTHOPEDICS | Facility: CLINIC | Age: 65
End: 2020-01-22
Payer: COMMERCIAL

## 2020-01-22 DIAGNOSIS — M19.072 ARTHRITIS OF LEFT FOOT: ICD-10-CM

## 2020-01-22 DIAGNOSIS — Z09 FOLLOW-UP EXAMINATION AFTER ORTHOPEDIC SURGERY: Primary | ICD-10-CM

## 2020-01-22 DIAGNOSIS — S92.065S CLOSED NONDISPLACED INTRA-ARTICULAR FRACTURE OF LEFT CALCANEUS, SEQUELA: ICD-10-CM

## 2020-01-22 DIAGNOSIS — M19.072 ARTHRITIS OF ANKLE, LEFT: ICD-10-CM

## 2020-01-22 PROCEDURE — 99999 PR PBB SHADOW E&M-EST. PATIENT-LVL II: CPT | Mod: PBBFAC,,, | Performed by: ORTHOPAEDIC SURGERY

## 2020-01-22 PROCEDURE — 99999 PR PBB SHADOW E&M-EST. PATIENT-LVL II: ICD-10-PCS | Mod: PBBFAC,,, | Performed by: ORTHOPAEDIC SURGERY

## 2020-01-22 PROCEDURE — 99024 POSTOP FOLLOW-UP VISIT: CPT | Mod: S$GLB,,, | Performed by: ORTHOPAEDIC SURGERY

## 2020-01-22 PROCEDURE — 99024 PR POST-OP FOLLOW-UP VISIT: ICD-10-PCS | Mod: S$GLB,,, | Performed by: ORTHOPAEDIC SURGERY

## 2020-01-22 NOTE — PROGRESS NOTES
Casper Osborne  Returns today for follow-up.  He is about 5 weeks postop from his left ankle arthroscopy with extensive debridement.  He came in about 8 days ago because of the sudden onset of ecchymosis on the lateral aspect of his ankle 3 weeks post surgery. I did not have a good explanation for this contusion but I did not feel it represented a type of infection.  He returns today for his regular postop check.  The ecchymosis is resolving.  His main complaint is stiffness but he does feel that his ankle feels a bit better since the surgery.    Examination:  The incisions are well healed about his left ankle.  The ecchymosis is resolving.  There is no significant swelling. He does have decreased motion of ankle and subtalar joint which is chronic due to his previous calcaneus fracture.    Impression:  5 weeks postop left ankle arthroscopy    Recommendation:  At this point I would like him to start physical therapy.  Follow-up in 6 weeks

## 2020-01-27 ENCOUNTER — PATIENT MESSAGE (OUTPATIENT)
Dept: ORTHOPEDICS | Facility: CLINIC | Age: 65
End: 2020-01-27

## 2020-01-31 ENCOUNTER — PATIENT MESSAGE (OUTPATIENT)
Dept: ORTHOPEDICS | Facility: CLINIC | Age: 65
End: 2020-01-31

## 2020-02-03 ENCOUNTER — LAB VISIT (OUTPATIENT)
Dept: LAB | Facility: HOSPITAL | Age: 65
End: 2020-02-03
Attending: INTERNAL MEDICINE
Payer: COMMERCIAL

## 2020-02-03 ENCOUNTER — OFFICE VISIT (OUTPATIENT)
Dept: INTERNAL MEDICINE | Facility: CLINIC | Age: 65
End: 2020-02-03
Payer: COMMERCIAL

## 2020-02-03 VITALS
WEIGHT: 193.31 LBS | RESPIRATION RATE: 18 BRPM | BODY MASS INDEX: 27.06 KG/M2 | SYSTOLIC BLOOD PRESSURE: 130 MMHG | TEMPERATURE: 98 F | HEIGHT: 71 IN | DIASTOLIC BLOOD PRESSURE: 70 MMHG | HEART RATE: 75 BPM

## 2020-02-03 DIAGNOSIS — M79.673 CHRONIC HEEL PAIN, UNSPECIFIED LATERALITY: Chronic | ICD-10-CM

## 2020-02-03 DIAGNOSIS — Z00.00 ANNUAL PHYSICAL EXAM: ICD-10-CM

## 2020-02-03 DIAGNOSIS — E78.5 HYPERLIPIDEMIA, UNSPECIFIED HYPERLIPIDEMIA TYPE: Chronic | ICD-10-CM

## 2020-02-03 DIAGNOSIS — Z00.00 ANNUAL PHYSICAL EXAM: Primary | ICD-10-CM

## 2020-02-03 DIAGNOSIS — G89.29 CHRONIC HEEL PAIN, UNSPECIFIED LATERALITY: Chronic | ICD-10-CM

## 2020-02-03 DIAGNOSIS — K21.9 GASTROESOPHAGEAL REFLUX DISEASE, ESOPHAGITIS PRESENCE NOT SPECIFIED: Chronic | ICD-10-CM

## 2020-02-03 LAB
ALBUMIN SERPL BCP-MCNC: 3.9 G/DL (ref 3.5–5.2)
ALP SERPL-CCNC: 77 U/L (ref 55–135)
ALT SERPL W/O P-5'-P-CCNC: 32 U/L (ref 10–44)
ANION GAP SERPL CALC-SCNC: 7 MMOL/L (ref 8–16)
AST SERPL-CCNC: 22 U/L (ref 10–40)
BASOPHILS # BLD AUTO: 0.05 K/UL (ref 0–0.2)
BASOPHILS NFR BLD: 0.8 % (ref 0–1.9)
BILIRUB SERPL-MCNC: 1 MG/DL (ref 0.1–1)
BUN SERPL-MCNC: 20 MG/DL (ref 8–23)
CALCIUM SERPL-MCNC: 9.8 MG/DL (ref 8.7–10.5)
CHLORIDE SERPL-SCNC: 107 MMOL/L (ref 95–110)
CHOLEST SERPL-MCNC: 191 MG/DL (ref 120–199)
CHOLEST/HDLC SERPL: 5.6 {RATIO} (ref 2–5)
CO2 SERPL-SCNC: 30 MMOL/L (ref 23–29)
COMPLEXED PSA SERPL-MCNC: 0.79 NG/ML (ref 0–4)
CREAT SERPL-MCNC: 1.1 MG/DL (ref 0.5–1.4)
DIFFERENTIAL METHOD: ABNORMAL
EOSINOPHIL # BLD AUTO: 0.2 K/UL (ref 0–0.5)
EOSINOPHIL NFR BLD: 3 % (ref 0–8)
ERYTHROCYTE [DISTWIDTH] IN BLOOD BY AUTOMATED COUNT: 11.9 % (ref 11.5–14.5)
EST. GFR  (AFRICAN AMERICAN): >60 ML/MIN/1.73 M^2
EST. GFR  (NON AFRICAN AMERICAN): >60 ML/MIN/1.73 M^2
ESTIMATED AVG GLUCOSE: 120 MG/DL (ref 68–131)
GLUCOSE SERPL-MCNC: 99 MG/DL (ref 70–110)
HBA1C MFR BLD HPLC: 5.8 % (ref 4–5.6)
HCT VFR BLD AUTO: 51.1 % (ref 40–54)
HDLC SERPL-MCNC: 34 MG/DL (ref 40–75)
HDLC SERPL: 17.8 % (ref 20–50)
HGB BLD-MCNC: 16.8 G/DL (ref 14–18)
IMM GRANULOCYTES # BLD AUTO: 0.01 K/UL (ref 0–0.04)
IMM GRANULOCYTES NFR BLD AUTO: 0.2 % (ref 0–0.5)
LDLC SERPL CALC-MCNC: 123 MG/DL (ref 63–159)
LYMPHOCYTES # BLD AUTO: 2.8 K/UL (ref 1–4.8)
LYMPHOCYTES NFR BLD: 44 % (ref 18–48)
MCH RBC QN AUTO: 32.2 PG (ref 27–31)
MCHC RBC AUTO-ENTMCNC: 32.9 G/DL (ref 32–36)
MCV RBC AUTO: 98 FL (ref 82–98)
MONOCYTES # BLD AUTO: 0.6 K/UL (ref 0.3–1)
MONOCYTES NFR BLD: 9.2 % (ref 4–15)
NEUTROPHILS # BLD AUTO: 2.7 K/UL (ref 1.8–7.7)
NEUTROPHILS NFR BLD: 42.8 % (ref 38–73)
NONHDLC SERPL-MCNC: 157 MG/DL
NRBC BLD-RTO: 0 /100 WBC
PLATELET # BLD AUTO: 260 K/UL (ref 150–350)
PMV BLD AUTO: 10.2 FL (ref 9.2–12.9)
POTASSIUM SERPL-SCNC: 4.5 MMOL/L (ref 3.5–5.1)
PROT SERPL-MCNC: 7 G/DL (ref 6–8.4)
RBC # BLD AUTO: 5.21 M/UL (ref 4.6–6.2)
SODIUM SERPL-SCNC: 144 MMOL/L (ref 136–145)
TRIGL SERPL-MCNC: 170 MG/DL (ref 30–150)
TSH SERPL DL<=0.005 MIU/L-ACNC: 3 UIU/ML (ref 0.4–4)
WBC # BLD AUTO: 6.3 K/UL (ref 3.9–12.7)

## 2020-02-03 PROCEDURE — 85025 COMPLETE CBC W/AUTO DIFF WBC: CPT

## 2020-02-03 PROCEDURE — 99999 PR PBB SHADOW E&M-EST. PATIENT-LVL III: ICD-10-PCS | Mod: PBBFAC,,, | Performed by: INTERNAL MEDICINE

## 2020-02-03 PROCEDURE — 36415 COLL VENOUS BLD VENIPUNCTURE: CPT | Mod: PO

## 2020-02-03 PROCEDURE — 99396 PREV VISIT EST AGE 40-64: CPT | Mod: S$GLB,,, | Performed by: INTERNAL MEDICINE

## 2020-02-03 PROCEDURE — 80061 LIPID PANEL: CPT

## 2020-02-03 PROCEDURE — 84443 ASSAY THYROID STIM HORMONE: CPT

## 2020-02-03 PROCEDURE — 99999 PR PBB SHADOW E&M-EST. PATIENT-LVL III: CPT | Mod: PBBFAC,,, | Performed by: INTERNAL MEDICINE

## 2020-02-03 PROCEDURE — 83036 HEMOGLOBIN GLYCOSYLATED A1C: CPT

## 2020-02-03 PROCEDURE — 80053 COMPREHEN METABOLIC PANEL: CPT

## 2020-02-03 PROCEDURE — 84153 ASSAY OF PSA TOTAL: CPT

## 2020-02-03 PROCEDURE — 99396 PR PREVENTIVE VISIT,EST,40-64: ICD-10-PCS | Mod: S$GLB,,, | Performed by: INTERNAL MEDICINE

## 2020-02-03 RX ORDER — LOVASTATIN 40 MG/1
40 TABLET ORAL NIGHTLY
Qty: 90 TABLET | Refills: 3 | Status: SHIPPED | OUTPATIENT
Start: 2020-02-03 | End: 2020-06-23

## 2020-02-03 NOTE — PROGRESS NOTES
Subjective:       Patient ID: Casper Osborne is a 64 y.o. male.    Chief Complaint: Annual Exam (little congestion)    HPI     64 y.o. male here for annual exam.     Cholesterol: needs  Vaccines: Influenza - done; Tetanus - 2019; Shingles - done  Sexual Screening:   STD screening: no concern  Eye exam: done last a while ago (5 or so years).  Prostate: needs  Colonoscopy: FIT test negative 2019  A1c: needs    Exercise: He needs to start riding his bike.  Had surgery on his foot in December, but this has not gone well.  He has had multiple excuses.  He only rides at night.  Dr. Melendez knows that he has issues with his foot.  He has issues with numbness of his left leg since getting a nerve block for the surgery.  He was told possibly three months before this comes back.  He did not have a lot of pain from the surgery.  Diet: Mostly home cooked.  Fast food at home (sandwiches and cereal).    He has some congestion with his sinuses dripping into his throat.  He sprayed something into his throat.  This cleared it up.  He was exposed to a sick niece.      Past Medical History:   Diagnosis Date    GERD (gastroesophageal reflux disease)     Hyperlipidemia      Past Surgical History:   Procedure Laterality Date    ARTHROSCOPY OF ANKLE WITH DEBRIDEMENT Left 12/19/2019    Procedure: ARTHROSCOPY, ANKLE, WITH DEBRIDEMENT;  Surgeon: Bay Melendez MD;  [facility];  Service: Orthopedics;  Laterality: Left;    FRACTURE SURGERY      left heel at 39yo    HEEL SPUR SURGERY      left heel - fell off a ladder and had to have this reconstructed    SPINE SURGERY      c7 fx - prior to this, he was getting dizzy spells - none since    TONSILLECTOMY      VASECTOMY       Social History     Socioeconomic History    Marital status:      Spouse name: Nithya    Number of children: 1    Years of education: Not on file    Highest education level: Not on file   Occupational History    Occupation: Retried    Social Needs    Financial resource strain: Not hard at all    Food insecurity:     Worry: Never true     Inability: Never true    Transportation needs:     Medical: No     Non-medical: No   Tobacco Use    Smoking status: Never Smoker    Smokeless tobacco: Never Used   Substance and Sexual Activity    Alcohol use: Yes     Frequency: 2-3 times a week     Drinks per session: 1 or 2     Binge frequency: Never     Comment: less than once a month    Drug use: No    Sexual activity: Yes     Partners: Female     Comment:    Lifestyle    Physical activity:     Days per week: 3 days     Minutes per session: 150+ min    Stress: To some extent   Relationships    Social connections:     Talks on phone: Once a week     Gets together: Never     Attends Spiritism service: Not on file     Active member of club or organization: No     Attends meetings of clubs or organizations: Never     Relationship status:    Other Topics Concern    Not on file   Social History Narrative    Not on file     Review of patient's allergies indicates:  No Known Allergies  Casper Osborne had no medications administered during this visit.    Review of Systems    Objective:      Physical Exam   Constitutional: He is oriented to person, place, and time. He appears well-developed and well-nourished.   HENT:   Head: Normocephalic and atraumatic.   Mouth/Throat: No oropharyngeal exudate.   Eyes: Pupils are equal, round, and reactive to light. EOM are normal. Right eye exhibits no discharge. Left eye exhibits no discharge. No scleral icterus.   Neck: Normal range of motion. Neck supple. No tracheal deviation present. No thyromegaly present.   Cardiovascular: Normal rate, regular rhythm and normal heart sounds. Exam reveals no gallop and no friction rub.   No murmur heard.  Pulmonary/Chest: Effort normal and breath sounds normal. No respiratory distress. He has no wheezes. He has no rales. He exhibits no tenderness.   Abdominal:  Soft. Bowel sounds are normal. He exhibits no distension and no mass. There is no tenderness. There is no rebound and no guarding.   Musculoskeletal: Normal range of motion. He exhibits no edema or tenderness.   Neurological: He is alert and oriented to person, place, and time.   Skin: Skin is warm and dry. No rash noted. No erythema. No pallor.   Psychiatric: He has a normal mood and affect. His behavior is normal.   Vitals reviewed.      Assessment:       1. Annual physical exam    2. Hyperlipidemia, unspecified hyperlipidemia type    3. Gastroesophageal reflux disease, esophagitis presence not specified    4. Chronic heel pain, unspecified laterality        Plan:       1.  Blood work drawn this morning.  Discussed diet and exercise with patient.  Recommend:  American Heart Association Cookbook and [employer] 2 Diet Cookbook.  Up-to-date on vaccines.  2.  Continue lovastatin 40 mg daily.  3.  Continue Prilosec 40 mg daily.  4.  Continue to follow with Orthopedics.

## 2020-02-05 ENCOUNTER — TELEPHONE (OUTPATIENT)
Dept: ORTHOPEDICS | Facility: CLINIC | Age: 65
End: 2020-02-05

## 2020-02-05 NOTE — TELEPHONE ENCOUNTER
Sent email to Chandler Galeano with [facility] asking for help to get patient scheduled for Physical Therapy. Both me and the patient have called numerous times without return call. Patient is getting upset so I am reaching out to Chandler Galeano.

## 2020-02-06 ENCOUNTER — TELEPHONE (OUTPATIENT)
Dept: ORTHOPEDICS | Facility: CLINIC | Age: 65
End: 2020-02-06

## 2020-02-06 ENCOUNTER — PATIENT MESSAGE (OUTPATIENT)
Dept: ORTHOPEDICS | Facility: CLINIC | Age: 65
End: 2020-02-06

## 2020-02-06 NOTE — TELEPHONE ENCOUNTER
Spoke to patient, verified correct phone number (h)[phone].Patient could not remember the last 4 digits of his cell phone, I think the number in EPIC is incorrect because a woman answers and patient said a woman does not answer his cell phone. Gave confirmed # to Shelly Disla and she sent email to PT confirming the correct phone number. PT responded saying the patient would be contacted today.

## 2020-02-10 ENCOUNTER — CLINICAL SUPPORT (OUTPATIENT)
Dept: REHABILITATION | Facility: HOSPITAL | Age: 65
End: 2020-02-10
Payer: COMMERCIAL

## 2020-02-10 DIAGNOSIS — R26.89 ANTALGIC GAIT: ICD-10-CM

## 2020-02-10 DIAGNOSIS — M25.672 DECREASED RANGE OF MOTION OF LEFT ANKLE: ICD-10-CM

## 2020-02-10 DIAGNOSIS — M62.81 GENERALIZED MUSCLE WEAKNESS: ICD-10-CM

## 2020-02-10 DIAGNOSIS — R26.89 BALANCE PROBLEM: ICD-10-CM

## 2020-02-10 PROCEDURE — 97110 THERAPEUTIC EXERCISES: CPT | Mod: PN

## 2020-02-10 PROCEDURE — 97161 PT EVAL LOW COMPLEX 20 MIN: CPT | Mod: PN
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
## 2020-02-11 ENCOUNTER — PATIENT MESSAGE (OUTPATIENT)
Dept: INTERNAL MEDICINE | Facility: CLINIC | Age: 65
End: 2020-02-11

## 2020-02-11 ENCOUNTER — OFFICE VISIT (OUTPATIENT)
Dept: INTERNAL MEDICINE | Facility: CLINIC | Age: 65
End: 2020-02-11
Payer: COMMERCIAL

## 2020-02-11 VITALS
SYSTOLIC BLOOD PRESSURE: 124 MMHG | WEIGHT: 193.31 LBS | BODY MASS INDEX: 27.06 KG/M2 | DIASTOLIC BLOOD PRESSURE: 78 MMHG | HEIGHT: 71 IN | HEART RATE: 91 BPM | OXYGEN SATURATION: 98 %

## 2020-02-11 DIAGNOSIS — J06.9 UPPER RESPIRATORY TRACT INFECTION, UNSPECIFIED TYPE: Primary | ICD-10-CM

## 2020-02-11 PROCEDURE — 99213 PR OFFICE/OUTPT VISIT, EST, LEVL III, 20-29 MIN: ICD-10-PCS | Mod: S$GLB,,, | Performed by: INTERNAL MEDICINE

## 2020-02-11 PROCEDURE — 99213 OFFICE O/P EST LOW 20 MIN: CPT | Mod: S$GLB,,, | Performed by: INTERNAL MEDICINE

## 2020-02-11 PROCEDURE — 99999 PR PBB SHADOW E&M-EST. PATIENT-LVL III: ICD-10-PCS | Mod: PBBFAC,,, | Performed by: INTERNAL MEDICINE

## 2020-02-11 PROCEDURE — 3008F BODY MASS INDEX DOCD: CPT | Mod: CPTII,S$GLB,, | Performed by: INTERNAL MEDICINE

## 2020-02-11 PROCEDURE — 99999 PR PBB SHADOW E&M-EST. PATIENT-LVL III: CPT | Mod: PBBFAC,,, | Performed by: INTERNAL MEDICINE

## 2020-02-11 PROCEDURE — 3008F PR BODY MASS INDEX (BMI) DOCUMENTED: ICD-10-PCS | Mod: CPTII,S$GLB,, | Performed by: INTERNAL MEDICINE

## 2020-02-11 RX ORDER — METHYLPREDNISOLONE 4 MG/1
TABLET ORAL
Qty: 1 PACKAGE | Refills: 0 | Status: SHIPPED | OUTPATIENT
Start: 2020-02-11 | End: 2020-03-09

## 2020-02-11 RX ORDER — PROMETHAZINE HYDROCHLORIDE AND CODEINE PHOSPHATE 6.25; 1 MG/5ML; MG/5ML
5 SOLUTION ORAL EVERY 4 HOURS PRN
Qty: 120 ML | Refills: 0 | Status: SHIPPED | OUTPATIENT
Start: 2020-02-11 | End: 2020-02-21

## 2020-02-11 NOTE — TELEPHONE ENCOUNTER
vm left for patient to return call to confirm he can come in tomorrow at 1120am to evaluate symptoms

## 2020-02-18 ENCOUNTER — CLINICAL SUPPORT (OUTPATIENT)
Dept: REHABILITATION | Facility: HOSPITAL | Age: 65
End: 2020-02-18
Payer: COMMERCIAL

## 2020-02-18 ENCOUNTER — PATIENT MESSAGE (OUTPATIENT)
Dept: ORTHOPEDICS | Facility: CLINIC | Age: 65
End: 2020-02-18

## 2020-02-18 DIAGNOSIS — R26.89 BALANCE PROBLEM: ICD-10-CM

## 2020-02-18 DIAGNOSIS — M25.672 DECREASED RANGE OF MOTION OF LEFT ANKLE: ICD-10-CM

## 2020-02-18 DIAGNOSIS — R26.89 ANTALGIC GAIT: ICD-10-CM

## 2020-02-18 DIAGNOSIS — M62.81 GENERALIZED MUSCLE WEAKNESS: ICD-10-CM

## 2020-02-18 PROCEDURE — 97110 THERAPEUTIC EXERCISES: CPT | Mod: PN

## 2020-02-18 PROCEDURE — 97140 MANUAL THERAPY 1/> REGIONS: CPT | Mod: PN

## 2020-02-18 NOTE — PROGRESS NOTES
"  Physical Therapy Daily Treatment Note     Name: Casper Osborne  Clinic Number: [id number]    Therapy Diagnosis:   Encounter Diagnoses   Name Primary?    Generalized muscle weakness     Balance problem     Decreased range of motion of left ankle     Antalgic gait      Physician: Bay Melendez MD    Visit Date: 2/18/2020    Physician Orders: PT Eval and Treat  Medical Diagnosis: Follow-up examination after orthopedic surgery; Arthritis of ankle, left; Arthritis of left foot; Closed nondisplaced intra-articular fracture of left calcaneus, sequela  Evaluation Date: 02/10/20  Authorization Period Expiration: 12/31/20  Plan of Care Certification Period: 02/10/20 to 04/03/20  Visit #/Visits authorized: 2/ 50 (POC total visits 2/16)     Time In: 1400  Time Out: 1500  Total Billable Time: 55 minutes    Precautions: Standard    Subjective     Pt reports: Feels about the same since the IE.  He was compliant with home exercise program.  Response to previous treatment: 1st tx session  Functional change: 1st tx session    Pain: 6/10  Location: left ankles     Objective     Casper received therapeutic exercises to develop strength, endurance, ROM and flexibility for 30 minutes including:  Recumbent Bike 5' L1  Gastroc Stretch 3x30"  Soleus Stretch 3x30"  Ankle Pumps 3x10  Ankle Circles 3x10  Ankle ABC 2x (L only)  Seated heel/toe raises 3x10  Standing marches 3x10 - OOT  Toe Yoga 2'  Reverse Toe Yoga 2'   Towel Scrunches 30x     Casper received the following manual therapy techniques: Joint mobilizations and Soft tissue Mobilization were applied to the: L Ankle for 25 minutes, including:  STM to gastroc and Soleus  Instrument assisted tools to gastroc, achilles  Joint mobs to talocrural joint grade I/II   Calcaneal torsion to help improve inversion/eversion     Home Exercises Provided and Patient Education Provided     Education provided:   - HEP    Written Home Exercises Provided: Patient instructed to cont prior " HEP.  Exercises were reviewed and Casper was able to demonstrate them prior to the end of the session.  Casper demonstrated good  understanding of the education provided.     See EMR under Patient Instructions for exercises provided prior visit.    Assessment     Pt was able to tolerate all exercises during first tx session. PT spend most of the session due to 1 on 1 availability in order to help increase pt's L ankle ROM. While using instrument assisted tools, pt felt a relief as his gastroc and soleus were very tight as DF is limited, preventing good stretching. Pt progressed with towel scrunches and toe yoga/reverse toe yoga.   Casper is progressing well towards his goals.   Pt prognosis is Good.     Pt will continue to benefit from skilled outpatient physical therapy to address the deficits listed in the problem list box on initial evaluation, provide pt/family education and to maximize pt's level of independence in the home and community environment.     Pt's spiritual, cultural and educational needs considered and pt agreeable to plan of care and goals.     Anticipated barriers to physical therapy: scar tissue limitation of inversion and eversion of L ankle     Goals:   Short Term Goals (3 Weeks):   1. Pt will be independent with HEP to supplement PT in improving functional mobility  2. Pt will improve L DF ROM to 7 degrees in short sitting to improve gait mechanics.  3. Pt will perform SLS on right foot to greater than or equal to 30 seconds to improve strength of ankle intrinsics  4. Pt will walk greater than or equal to 500 feet on level ground using standard cane without an antalgic gait and without pain, to return to community ambulation safely.      Long Term Goals (6 Weeks):   1. Pt will be independent with updated HEP to supplement PT in improving functional mobility.  2. Pt will improve FOTO score to </= 45% limited to decrease perceived limitation with mobility  2. Pt will improve impaired LE strength  to >/= 5/5 to improve strength for functional tasks.  3. Pt will improve L DF ROM with knee extended to WFL deg to improve gait mechanics.  4. Pt will perform SLS on right foot to greater than or equal to 1 minute to improve strength of ankle intrinsics  5. Pt will walk greater than or equal to 1,000 feet without AD, on level ground, without pain, to return to community ambulation safely.     Plan     Pt will continue with POC as tolerated. Pt will progress with BAPS board next session.     Blaise Angeles, PT

## 2020-02-26 ENCOUNTER — CLINICAL SUPPORT (OUTPATIENT)
Dept: REHABILITATION | Facility: HOSPITAL | Age: 65
End: 2020-02-26
Payer: COMMERCIAL

## 2020-02-26 DIAGNOSIS — M25.672 DECREASED RANGE OF MOTION OF LEFT ANKLE: ICD-10-CM

## 2020-02-26 DIAGNOSIS — M62.81 GENERALIZED MUSCLE WEAKNESS: ICD-10-CM

## 2020-02-26 DIAGNOSIS — R26.89 BALANCE PROBLEM: ICD-10-CM

## 2020-02-26 DIAGNOSIS — R26.89 ANTALGIC GAIT: ICD-10-CM

## 2020-02-26 PROCEDURE — 97110 THERAPEUTIC EXERCISES: CPT | Mod: PN

## 2020-02-26 PROCEDURE — 97140 MANUAL THERAPY 1/> REGIONS: CPT | Mod: PN

## 2020-02-26 NOTE — PROGRESS NOTES
"  Physical Therapy Daily Treatment Note     Name: Casper Osborne  Clinic Number: [id number]    Therapy Diagnosis:   Encounter Diagnoses   Name Primary?    Generalized muscle weakness     Balance problem     Decreased range of motion of left ankle     Antalgic gait      Physician: Bay Melendez MD    Visit Date: 2/26/2020    Physician Orders: PT Eval and Treat  Medical Diagnosis: Follow-up examination after orthopedic surgery; Arthritis of ankle, left; Arthritis of left foot; Closed nondisplaced intra-articular fracture of left calcaneus, sequela  Evaluation Date: 02/10/20  Authorization Period Expiration: 12/31/20  Plan of Care Certification Period: 02/10/20 to 04/03/20  Visit #/Visits authorized: 3/ 50 (POC total visits 3/16)     Time In: 1500  Time Out: 1600  Total Billable Time: 60 minutes    Precautions: Standard    Subjective     Pt reports: Still having the numbness in the distal end of his LLE  He was compliant with home exercise program.  Response to previous treatment: No residual affects  Functional change: Still has very tight gastroc/soleus on L     Pain: 6/10  Location: left ankles     Objective     Casper received therapeutic exercises to develop strength, endurance, ROM and flexibility for 40 minutes including:  Recumbent Bike 5' L1  Gastroc Stretch 3x30"  Soleus Stretch 3x30"  Ankle Pumps 3x10  Ankle Circles 3x10  Ankle ABC 2x (L only)  Seated heel/toe raises 3x10  Standing marches 3x10 - OOT  Toe Yoga 2'  Reverse Toe Yoga 2'   Towel Scrunches 30x     Casper received the following manual therapy techniques: Joint mobilizations and Soft tissue Mobilization were applied to the: L Ankle for 20 minutes, including:  STM to gastroc and Soleus  Instrument assisted tools to gastroc, achilles - not today  Joint mobs to talocrural joint grade I/II   Calcaneal torsion to help improve inversion/eversion     Home Exercises Provided and Patient Education Provided     Education provided:   - HEP    Written " Home Exercises Provided: Patient instructed to cont prior HEP.  Exercises were reviewed and Casper was able to demonstrate them prior to the end of the session.  Casper demonstrated good  understanding of the education provided.     See EMR under Patient Instructions for exercises provided prior visit.    Assessment     Pt was able to tolerate all exercises during today's tx session without any c/o increased pain or discomfort. Pt still presents with nodule on medial side of talocrural joint wear incision was originally. Pt reports it does not bother him but has made his doctor aware of it. Pt is set to see doctor on March 4th. Pt has shown minor improvements into Left ankle inversion.   Casper is progressing well towards his goals.   Pt prognosis is Good.     Pt will continue to benefit from skilled outpatient physical therapy to address the deficits listed in the problem list box on initial evaluation, provide pt/family education and to maximize pt's level of independence in the home and community environment.     Pt's spiritual, cultural and educational needs considered and pt agreeable to plan of care and goals.     Anticipated barriers to physical therapy: scar tissue limitation of inversion and eversion of L ankle     Goals:   Short Term Goals (3 Weeks):   1. Pt will be independent with HEP to supplement PT in improving functional mobility  2. Pt will improve L DF ROM to 7 degrees in short sitting to improve gait mechanics.  3. Pt will perform SLS on right foot to greater than or equal to 30 seconds to improve strength of ankle intrinsics  4. Pt will walk greater than or equal to 500 feet on level ground using standard cane without an antalgic gait and without pain, to return to community ambulation safely.      Long Term Goals (6 Weeks):   1. Pt will be independent with updated HEP to supplement PT in improving functional mobility.  2. Pt will improve FOTO score to </= 45% limited to decrease perceived  limitation with mobility  2. Pt will improve impaired LE strength to >/= 5/5 to improve strength for functional tasks.  3. Pt will improve L DF ROM with knee extended to WFL deg to improve gait mechanics.  4. Pt will perform SLS on right foot to greater than or equal to 1 minute to improve strength of ankle intrinsics  5. Pt will walk greater than or equal to 1,000 feet without AD, on level ground, without pain, to return to community ambulation safely.     Plan     Pt will continue with POC as tolerated. Pt will progress with BAPS board next session.     Blaise Angeles, PT

## 2020-02-28 ENCOUNTER — CLINICAL SUPPORT (OUTPATIENT)
Dept: REHABILITATION | Facility: HOSPITAL | Age: 65
End: 2020-02-28
Payer: COMMERCIAL

## 2020-02-28 DIAGNOSIS — R26.89 ANTALGIC GAIT: ICD-10-CM

## 2020-02-28 DIAGNOSIS — M25.672 DECREASED RANGE OF MOTION OF LEFT ANKLE: ICD-10-CM

## 2020-02-28 DIAGNOSIS — M62.81 GENERALIZED MUSCLE WEAKNESS: ICD-10-CM

## 2020-02-28 DIAGNOSIS — R26.89 BALANCE PROBLEM: ICD-10-CM

## 2020-02-28 PROCEDURE — 97140 MANUAL THERAPY 1/> REGIONS: CPT | Mod: PN

## 2020-02-28 PROCEDURE — 97110 THERAPEUTIC EXERCISES: CPT | Mod: PN

## 2020-02-28 NOTE — PROGRESS NOTES
"  Physical Therapy Daily Treatment Note     Name: Casper Osborne  Clinic Number: [id number]    Therapy Diagnosis:   Encounter Diagnoses   Name Primary?    Generalized muscle weakness     Balance problem     Decreased range of motion of left ankle     Antalgic gait      Physician: Bay Melendez MD    Visit Date: 2/28/2020    Physician Orders: PT Eval and Treat  Medical Diagnosis: Follow-up examination after orthopedic surgery; Arthritis of ankle, left; Arthritis of left foot; Closed nondisplaced intra-articular fracture of left calcaneus, sequela  Evaluation Date: 02/10/20  Authorization Period Expiration: 12/31/20  Plan of Care Certification Period: 02/10/20 to 04/03/20  Visit #/Visits authorized: 4/ 50 (POC total visits 4/16)     Time In: 1100  Time Out: 1200  Total Billable Time: 60 minutes    Precautions: Standard    Subjective     Pt reports: Having some burning on the top of his foot this morning  He was compliant with home exercise program.  Response to previous treatment: No residual affects  Functional change: Still has very tight gastroc/soleus on L     Pain: 6/10  Location: left ankles     Objective     Casper received therapeutic exercises to develop strength, endurance, ROM and flexibility for 30 minutes including:  Recumbent Bike 5' L1  Gastroc Stretch 3x30"  Soleus Stretch 3x30"  Ankle Pumps 3x10  Ankle Circles 3x10  Ankle ABC 2x (L only)  Seated heel/toe raises 3x10  Standing marches 3x10 - OOT  Toe Yoga 2'  Reverse Toe Yoga 2'   Towel Scrunches 30x     Casper received the following manual therapy techniques: Joint mobilizations and Soft tissue Mobilization were applied to the: L Ankle for 30 minutes, including:  STM to gastroc and Soleus  Instrument assisted tools to gastroc, achilles -   Joint mobs to talocrural joint grade I/II   Calcaneal torsion to help improve inversion/eversion     Home Exercises Provided and Patient Education Provided     Education provided:   - HEP    Written Home " Exercises Provided: Patient instructed to cont prior HEP.  Exercises were reviewed and Casper was able to demonstrate them prior to the end of the session.  Casper demonstrated good  understanding of the education provided.     See EMR under Patient Instructions for exercises provided prior visit.    Assessment     Pt was able to tolerate all exercises during today's tx session without any c/o increased pain or discomfort. Pt still presents with nodule on medial side of talocrural joint wear incision was originally. PT to take measurements again next session to see if any manual therapy or therapeutic exercises are making any significant difference in ROM otherwise pt will be referred back to his doctor for discussion of other possible outcomes.   Casper is progressing well towards his goals.   Pt prognosis is Good.     Pt will continue to benefit from skilled outpatient physical therapy to address the deficits listed in the problem list box on initial evaluation, provide pt/family education and to maximize pt's level of independence in the home and community environment.     Pt's spiritual, cultural and educational needs considered and pt agreeable to plan of care and goals.     Anticipated barriers to physical therapy: scar tissue limitation of inversion and eversion of L ankle     Goals:   Short Term Goals (3 Weeks):   1. Pt will be independent with HEP to supplement PT in improving functional mobility  2. Pt will improve L DF ROM to 7 degrees in short sitting to improve gait mechanics.  3. Pt will perform SLS on right foot to greater than or equal to 30 seconds to improve strength of ankle intrinsics  4. Pt will walk greater than or equal to 500 feet on level ground using standard cane without an antalgic gait and without pain, to return to community ambulation safely.      Long Term Goals (6 Weeks):   1. Pt will be independent with updated HEP to supplement PT in improving functional mobility.  2. Pt will improve  FOTO score to </= 45% limited to decrease perceived limitation with mobility  2. Pt will improve impaired LE strength to >/= 5/5 to improve strength for functional tasks.  3. Pt will improve L DF ROM with knee extended to WFL deg to improve gait mechanics.  4. Pt will perform SLS on right foot to greater than or equal to 1 minute to improve strength of ankle intrinsics  5. Pt will walk greater than or equal to 1,000 feet without AD, on level ground, without pain, to return to community ambulation safely.     Plan     Pt will continue with POC as tolerated. Pt will progress with BAPS board next session.     Blaise Angeles, PT

## 2020-03-03 ENCOUNTER — CLINICAL SUPPORT (OUTPATIENT)
Dept: REHABILITATION | Facility: HOSPITAL | Age: 65
End: 2020-03-03
Payer: COMMERCIAL

## 2020-03-03 DIAGNOSIS — R26.89 BALANCE PROBLEM: ICD-10-CM

## 2020-03-03 DIAGNOSIS — M25.672 DECREASED RANGE OF MOTION OF LEFT ANKLE: ICD-10-CM

## 2020-03-03 DIAGNOSIS — M62.81 GENERALIZED MUSCLE WEAKNESS: ICD-10-CM

## 2020-03-03 DIAGNOSIS — R26.89 ANTALGIC GAIT: ICD-10-CM

## 2020-03-03 PROCEDURE — 97110 THERAPEUTIC EXERCISES: CPT | Mod: PN

## 2020-03-03 PROCEDURE — 97140 MANUAL THERAPY 1/> REGIONS: CPT | Mod: PN

## 2020-03-03 NOTE — PROGRESS NOTES
"  Physical Therapy Daily Treatment Note     Name: Casper Osborne  Clinic Number: [id number]    Therapy Diagnosis:   Encounter Diagnoses   Name Primary?    Generalized muscle weakness     Balance problem     Decreased range of motion of left ankle     Antalgic gait      Physician: Bay Melendez MD    Visit Date: 3/3/2020    Physician Orders: PT Eval and Treat  Medical Diagnosis: Follow-up examination after orthopedic surgery; Arthritis of ankle, left; Arthritis of left foot; Closed nondisplaced intra-articular fracture of left calcaneus, sequela  Evaluation Date: 02/10/20  Authorization Period Expiration: 12/31/20  Plan of Care Certification Period: 02/10/20 to 04/03/20  Visit #/Visits authorized: 5/50 (POC total visits 5/16)      Time In: 1104  Time Out: 1203  Total Billable Time: 59 minutes    Precautions: Standard    Subjective     Pt reports: pain is about the same  He was compliant with home exercise program.  Response to previous treatment: no change  Functional change: no change    Pain: 6/10  Location: L medial malleolus and anterior aspect of talocrural joint    Objective     Casper received therapeutic exercises to develop strength, endurance, ROM and flexibility for 49 minutes including:    Recumbent bike for talocrural joint mobility: 5'     Seated:  Ankle ABC: 1x  Toe Yoga: 2x10 R  Reverse Toe Yoga: x10 R  Towel Scrunches: 4x10 R; additional 2x10 R after marble pick-ups  Horatio pick-ups: 5' R  BAPs: L2, 6 way x20 R. Verbal and tactile cues for avoidance of hip and knee movement.  Arch lift: x20 R    Standing:   Arch lift: 5"x20 R  Ankle dorsiflexion mobilization on step stool: x10 L. Yellow sports cord 2x10 L  Heel raises: 2x10 double leg  Toe raises: 3x10 double leg    Casper received the following manual therapy techniques to the L LE for 10 minutes, including:  Grade II-IV posterior talocrural mobilizations  Manual gastrocnemius stretching    Home Exercises Provided and Patient Education " Provided:    Education provided:   - Continue HEP; given black theraband to add self dorsiflexion mobilization    Written Home Exercises Provided:   Exercises were reviewed and Casper was able to demonstrate them prior to the end of the session.  Casper demonstrated good  understanding of the education provided.     See EMR under Patient Instructions for exercises provided 2/10/2020.     Assessment     Improved perceived ankle mobility by end of session; unchanged pain. Quick fatigue with BAPs, arch raise and heel raise exercises. Cues required to isolate ankle and foot mobility from hip and knee on BAPs. Intrinsic foot flexor weakness noted on towel scrunches and marble pickups.    Casper is progressing well towards his goals.   Pt prognosis is Good.   Pt will continue to benefit from skilled outpatient physical therapy to address the deficits listed in the problem list box on initial evaluation, provide pt/family education and to maximize pt's level of independence in the home and community environment.     Pt's spiritual, cultural and educational needs considered and pt agreeable to plan of care and goals.  Anticipated barriers to physical therapy: scar tissue limitation of inversion and eversion of L ankle     Short Term Goals (3 Weeks):   1. Pt will be independent with HEP to supplement PT in improving functional mobility. PROGRESSING, NOT MET 3/3/2020   2. Pt will improve L DF ROM to 7 degrees in short sitting to improve gait mechanics. PROGRESSING, NOT MET 3/3/2020   3. Pt will perform SLS on right foot to greater than or equal to 30 seconds to improve strength of ankle intrinsics. PROGRESSING, NOT MET 3/3/2020   4. Pt will walk greater than or equal to 500 feet on level ground using standard cane without an antalgic gait and without pain, to return to community ambulation safely. PROGRESSING, NOT MET 3/3/2020   Long Term Goals (6 Weeks):   1. Pt will be independent with updated HEP to supplement PT in improving  functional mobility. PROGRESSING, NOT MET 3/3/2020   2. Pt will improve FOTO score to </= 45% limited to decrease perceived limitation with mobility. PROGRESSING, NOT MET 3/3/2020   2. Pt will improve impaired LE strength to >/= 5/5 to improve strength for functional tasks. PROGRESSING, NOT MET 3/3/2020   3. Pt will improve L DF ROM with knee extended to WFL deg to improve gait mechanics. PROGRESSING, NOT MET 3/3/2020   4. Pt will perform SLS on right foot to greater than or equal to 1 minute to improve strength of ankle intrinsics. PROGRESSING, NOT MET 3/3/2020   5. Pt will walk greater than or equal to 1,000 feet without AD, on level ground, without pain, to return to community ambulation safely. PROGRESSING, NOT MET 3/3/2020     Plan     Monitor pain. Take measurements next.     Nelly Gómez, PT

## 2020-03-04 ENCOUNTER — OFFICE VISIT (OUTPATIENT)
Dept: ORTHOPEDICS | Facility: CLINIC | Age: 65
End: 2020-03-04
Payer: COMMERCIAL

## 2020-03-04 DIAGNOSIS — Z09 FOLLOW-UP EXAMINATION AFTER ORTHOPEDIC SURGERY: Primary | ICD-10-CM

## 2020-03-04 DIAGNOSIS — M19.072 ARTHRITIS OF ANKLE, LEFT: ICD-10-CM

## 2020-03-04 PROCEDURE — 99024 POSTOP FOLLOW-UP VISIT: CPT | Mod: S$GLB,,, | Performed by: ORTHOPAEDIC SURGERY

## 2020-03-04 PROCEDURE — 99024 PR POST-OP FOLLOW-UP VISIT: ICD-10-PCS | Mod: S$GLB,,, | Performed by: ORTHOPAEDIC SURGERY

## 2020-03-04 PROCEDURE — 99999 PR PBB SHADOW E&M-EST. PATIENT-LVL I: CPT | Mod: PBBFAC,,, | Performed by: ORTHOPAEDIC SURGERY

## 2020-03-04 PROCEDURE — 99999 PR PBB SHADOW E&M-EST. PATIENT-LVL I: ICD-10-PCS | Mod: PBBFAC,,, | Performed by: ORTHOPAEDIC SURGERY

## 2020-03-04 NOTE — PROGRESS NOTES
Casper Osborne  Returns today for follow-up.  He is now almost 3 months postop from his left ankle arthroscopy with extensive debridement for ankle arthritis.  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  Unfortunately, now he also has ankle arthritis.  He has been going to therapy which he states helped somewhat but when the therapist attempts to mobilize his hindfoot he has significant pain.     Examination:  His portal incisions are healed.  The anteromedial portal has a bit of a cystic type lesion underneath the incision which is nontender.  There is no erythema or sign of infection.  He continues with decreased motion of his ankle.    Impression:  3 months postop left ankle arthroscopy for left ankle arthritis, minimal relief    Recommendation:  At this point he is going to finish his physical therapy.  I wrote a note for the therapist to only concentrate on ankle motion and avoid attempted subtalar motion.    Follow-up in 6 weeks if necessary

## 2020-03-05 ENCOUNTER — CLINICAL SUPPORT (OUTPATIENT)
Dept: REHABILITATION | Facility: HOSPITAL | Age: 65
End: 2020-03-05
Payer: COMMERCIAL

## 2020-03-05 DIAGNOSIS — R26.89 ANTALGIC GAIT: ICD-10-CM

## 2020-03-05 DIAGNOSIS — R26.89 BALANCE PROBLEM: ICD-10-CM

## 2020-03-05 DIAGNOSIS — M25.672 DECREASED RANGE OF MOTION OF LEFT ANKLE: ICD-10-CM

## 2020-03-05 DIAGNOSIS — M62.81 GENERALIZED MUSCLE WEAKNESS: ICD-10-CM

## 2020-03-05 PROCEDURE — 97110 THERAPEUTIC EXERCISES: CPT | Mod: PN,CQ

## 2020-03-05 PROCEDURE — 97140 MANUAL THERAPY 1/> REGIONS: CPT | Mod: PN,CQ

## 2020-03-05 NOTE — PROGRESS NOTES
"  Physical Therapy Daily Treatment Note     Name: Casper Osborne  Clinic Number: [id number]    Therapy Diagnosis:   Encounter Diagnoses   Name Primary?    Generalized muscle weakness     Balance problem     Decreased range of motion of left ankle     Antalgic gait      Physician: Bay Melendez MD    Visit Date: 3/5/2020    Physician Orders: PT Eval and Treat  Medical Diagnosis: Follow-up examination after orthopedic surgery; Arthritis of ankle, left; Arthritis of left foot; Closed nondisplaced intra-articular fracture of left calcaneus, sequela  Evaluation Date: 02/10/20  Authorization Period Expiration: 12/31/20  Plan of Care Certification Period: 02/10/20 to 04/03/20  Visit #/Visits authorized: 6/50 (POC total visits 6/16)      Time In: 1:00 PM   Time Out: 2:00 PM   Total Billable Time: 55 minutes 2MT, 2TE    Precautions: Standard    Subjective     Pt reports: no reports of increased pain   He was compliant with home exercise program.  Response to previous treatment: no change  Functional change: no change    Pain: 6/10  Location: L medial malleolus and anterior aspect of talocrural joint    Objective     Capser received therapeutic exercises to develop strength, endurance, ROM and flexibility for 30 minutes including:    Recumbent bike for talocrural joint mobility: 5'     Seated:  Ankle ABC: 1x  Toe Yoga: 2x10 R  Reverse Toe Yoga: x10 R  Towel Scrunches: 4x10 R; additional 2x10 R after marble pick-ups  [place] pick-ups: 5' R  BAPs: L2, 6 way x20 R. Verbal and tactile cues for avoidance of hip and knee movement.  Arch lift: x20 R    Standing: NOT PERFORMED  Arch lift: 5"x20 R  Ankle dorsiflexion mobilization on step stool: x10 L. Yellow sports cord 2x10 L  Heel raises: 2x10 double leg  Toe raises: 3x10 double leg    Casper received the following manual therapy techniques to the L LE for 25 minutes, including:  -IASTM ([place] ) medial/ lateral malleolus, plantar surface.  -Grade II-III posterior " talo-tibial mobilizations  -Manual gastrocnemius stretching  -STM to plantar  -STM Interosseus mm  -Gentle Calcaneal rocking  Home Exercises Provided and Patient Education Provided:    Education provided:   - Continue HEP; given black theraband to add self dorsiflexion mobilization    Written Home Exercises Provided:   Exercises were reviewed and Casper was able to demonstrate them prior to the end of the session.  Casper demonstrated good  understanding of the education provided.     See EMR under Patient Instructions for exercises provided 2/10/2020.     Assessment     Pt completed session with no reports of increased pain in left foot/ ankle. He was able to tolerate IASTM with no adverse reactions.   Casper is progressing well towards his goals.   Pt prognosis is Good.   Pt will continue to benefit from skilled outpatient physical therapy to address the deficits listed in the problem list box on initial evaluation, provide pt/family education and to maximize pt's level of independence in the home and community environment.     Pt's spiritual, cultural and educational needs considered and pt agreeable to plan of care and goals.  Anticipated barriers to physical therapy: scar tissue limitation of inversion and eversion of L ankle     Short Term Goals (3 Weeks):   1. Pt will be independent with HEP to supplement PT in improving functional mobility. PROGRESSING, NOT MET 3/5/2020   2. Pt will improve L DF ROM to 7 degrees in short sitting to improve gait mechanics. PROGRESSING, NOT MET 3/5/2020   3. Pt will perform SLS on right foot to greater than or equal to 30 seconds to improve strength of ankle intrinsics. PROGRESSING, NOT MET 3/5/2020   4. Pt will walk greater than or equal to 500 feet on level ground using standard cane without an antalgic gait and without pain, to return to community ambulation safely. PROGRESSING, NOT MET 3/5/2020   Long Term Goals (6 Weeks):   1. Pt will be independent with updated HEP to  supplement PT in improving functional mobility. PROGRESSING, NOT MET 3/5/2020   2. Pt will improve FOTO score to </= 45% limited to decrease perceived limitation with mobility. PROGRESSING, NOT MET 3/5/2020   2. Pt will improve impaired LE strength to >/= 5/5 to improve strength for functional tasks. PROGRESSING, NOT MET 3/5/2020   3. Pt will improve L DF ROM with knee extended to WFL deg to improve gait mechanics. PROGRESSING, NOT MET 3/5/2020   4. Pt will perform SLS on right foot to greater than or equal to 1 minute to improve strength of ankle intrinsics. PROGRESSING, NOT MET 3/5/2020   5. Pt will walk greater than or equal to 1,000 feet without AD, on level ground, without pain, to return to community ambulation safely. PROGRESSING, NOT MET 3/5/2020     Plan   Assess R ankle ROM next  Cont to advance PT as per POC  Kevin Billings, PTA

## 2020-03-09 ENCOUNTER — OFFICE VISIT (OUTPATIENT)
Dept: FAMILY MEDICINE | Facility: CLINIC | Age: 65
End: 2020-03-09
Payer: COMMERCIAL

## 2020-03-09 VITALS
SYSTOLIC BLOOD PRESSURE: 128 MMHG | DIASTOLIC BLOOD PRESSURE: 86 MMHG | HEIGHT: 71 IN | WEIGHT: 196.44 LBS | HEART RATE: 69 BPM | OXYGEN SATURATION: 98 % | BODY MASS INDEX: 27.5 KG/M2 | TEMPERATURE: 98 F

## 2020-03-09 DIAGNOSIS — B34.9 VIRAL ILLNESS: ICD-10-CM

## 2020-03-09 DIAGNOSIS — R05.9 COUGH: ICD-10-CM

## 2020-03-09 PROCEDURE — 3008F PR BODY MASS INDEX (BMI) DOCUMENTED: ICD-10-PCS | Mod: CPTII,S$GLB,, | Performed by: NURSE PRACTITIONER

## 2020-03-09 PROCEDURE — 99999 PR PBB SHADOW E&M-EST. PATIENT-LVL III: ICD-10-PCS | Mod: PBBFAC,,, | Performed by: NURSE PRACTITIONER

## 2020-03-09 PROCEDURE — 99212 PR OFFICE/OUTPT VISIT, EST, LEVL II, 10-19 MIN: ICD-10-PCS | Mod: S$GLB,,, | Performed by: NURSE PRACTITIONER

## 2020-03-09 PROCEDURE — 99212 OFFICE O/P EST SF 10 MIN: CPT | Mod: S$GLB,,, | Performed by: NURSE PRACTITIONER

## 2020-03-09 PROCEDURE — 99999 PR PBB SHADOW E&M-EST. PATIENT-LVL III: CPT | Mod: PBBFAC,,, | Performed by: NURSE PRACTITIONER

## 2020-03-09 PROCEDURE — 3008F BODY MASS INDEX DOCD: CPT | Mod: CPTII,S$GLB,, | Performed by: NURSE PRACTITIONER

## 2020-03-09 RX ORDER — AMOXICILLIN AND CLAVULANATE POTASSIUM 875; 125 MG/1; MG/1
1 TABLET, FILM COATED ORAL EVERY 12 HOURS
Qty: 14 TABLET | Refills: 0 | Status: SHIPPED | OUTPATIENT
Start: 2020-03-09 | End: 2020-03-16

## 2020-03-09 RX ORDER — IPRATROPIUM BROMIDE 21 UG/1
2 SPRAY, METERED NASAL 3 TIMES DAILY
Qty: 30 ML | Refills: 0 | Status: SHIPPED | OUTPATIENT
Start: 2020-03-09 | End: 2020-06-23

## 2020-03-09 NOTE — PROGRESS NOTES
Subjective:       Patient ID: Casper Osborne is a 64 y.o. male.    Chief Complaint: Sinusitis and Cough (x 4 weeks)    63yo male with history as stated on problem list who presents to after hours clinic with complaints of sinus congestion and cough for the past several weeks. He reports his symptoms started on SuperBowl Sunday. He reports his granddaughter was sick and approximately 3 days later started with symptoms of sore throat. He was tested for the flu and does not think he was positive. At that time, he was seen by his PCP and was given Flonase and Medrol Dose Ezra with no major relief. Today, he complains of cough with productive sputum, wheezing, rhinorrhea, ear congestion, sneezing but denies fever, chills, sinus pressure/congestion. He does report sinus congestion/pressure 2 days ago that spontaneously resolved. He took OTC medications with Mucinex with no major relief. He denies any chest pain, ANN/SOB, orthopnea and PND.     Review of Systems   HENT: Positive for congestion, postnasal drip, rhinorrhea, sinus pressure, sinus pain and sore throat.    Respiratory: Positive for cough and wheezing. Negative for chest tightness and shortness of breath.    Cardiovascular: Negative for chest pain, palpitations and leg swelling.   Gastrointestinal: Negative for abdominal distention, diarrhea, nausea and vomiting.   Musculoskeletal: Negative for back pain.   Neurological: Negative for dizziness.       Objective:      Physical Exam   Constitutional: He is oriented to person, place, and time. He appears well-developed and well-nourished. No distress.   Cardiovascular: Normal rate and regular rhythm. Exam reveals no friction rub.   No murmur heard.  Pulmonary/Chest: Effort normal and breath sounds normal. No stridor. No respiratory distress.   Abdominal: Soft. Bowel sounds are normal. He exhibits no distension. There is no tenderness.   Neurological: He is alert and oriented to person, place, and time.   Skin: Skin  is warm and dry.       Assessment:       1. Cough    2. Viral illness        Plan:         - Augmentin DS 1 tablet po BID x 7 days  - Ipratropium 2 sprays to each nostril TID  - Loratidine/Zyrtec daily prn  - Tylenol/NSAIDs prn

## 2020-03-10 ENCOUNTER — CLINICAL SUPPORT (OUTPATIENT)
Dept: REHABILITATION | Facility: HOSPITAL | Age: 65
End: 2020-03-10
Payer: COMMERCIAL

## 2020-03-10 DIAGNOSIS — R26.89 ANTALGIC GAIT: ICD-10-CM

## 2020-03-10 DIAGNOSIS — M62.81 GENERALIZED MUSCLE WEAKNESS: ICD-10-CM

## 2020-03-10 DIAGNOSIS — R26.89 BALANCE PROBLEM: ICD-10-CM

## 2020-03-10 DIAGNOSIS — M25.672 DECREASED RANGE OF MOTION OF LEFT ANKLE: ICD-10-CM

## 2020-03-10 PROCEDURE — 97140 MANUAL THERAPY 1/> REGIONS: CPT | Mod: PN

## 2020-03-10 PROCEDURE — 97110 THERAPEUTIC EXERCISES: CPT | Mod: PN

## 2020-03-10 NOTE — PROGRESS NOTES
"  Physical Therapy Daily Treatment Note     Name: Casper Osboren  Clinic Number: [id number]    Therapy Diagnosis:   Encounter Diagnoses   Name Primary?    Generalized muscle weakness     Balance problem     Decreased range of motion of left ankle     Antalgic gait      Physician: Bay Melendez MD    Visit Date: 3/10/2020    Physician Orders: PT Eval and Treat  Medical Diagnosis: Follow-up examination after orthopedic surgery; Arthritis of ankle, left; Arthritis of left foot; Closed nondisplaced intra-articular fracture of left calcaneus, sequela  Evaluation Date: 02/10/2020  Authorization Period Expiration: 12/31/2020  Plan of Care Certification Period: 02/10/20 to 04/03/2020  Visit #/Visits authorized: 7/50 (POC total visits 7/16)      Time In: 1202  Time Out: 1303  Total Billable Time: 61 minutes     Precautions: Standard    Subjective     Pt reports: visit with doctor last week; doctor gave patient a list of what not to be doing with ankle/foot pertaining to therapy. Patient reports subtalar exercises including inversion and eversion were included in addition to arch lift exercise. Patient reports pain after sessions however none during; patient thinks pain is related to these exercises.   He was compliant with home exercise program.  Response to previous treatment: no change  Functional change: no change    Pain: 6/10  Location: L medial malleolus and anterior aspect of talocrural joint    Objective     Casper received therapeutic exercises to develop strength, endurance, ROM and flexibility for 47 minutes including:    Seated:  Towel Scrunches: 3x20 R   [place] pick-ups: 5' R  BAPs plantar and dorsiflexion: Level 3, 5' R    Standing:   Heel raises: 3x10 double leg  Toe raises: 3x10 double leg  Lateral step to holds: x10 L  Single leg stance: 4-5"x5 L. Attempts <4" not counted. Verbal cues to fix gaze on single object ahead.  Lateral step downs: Next  Hip hike: 2x15 B  3-way hip: Next    Casper received the " following manual therapy techniques to the L LE for 14 minutes, including:  Grade III-IV anterior<>posterior talocrural mobilizations     Home Exercises Provided and Patient Education Provided:    Education provided:   - Continue HEP  - Education on purpose of arch lift exercise on intrinsic foot flexor strength; differentiation between this and subtalar mobility exercises.    Written Home Exercises Provided: Not today.  Exercises were reviewed and Casper was able to demonstrate them prior to the end of the session.  Casper demonstrated good  understanding of the education provided.     See EMR under Patient Instructions for exercises provided 2/10/2020.     Assessment     Talocrural joint mobility limited. Increased ROM of heel raises today vs 3/3/2020. Focusing more on hip strengthening and intrinsic ankle strengthening via balance. Poor single leg balance; fear a contributing factor.     Casper is progressing well towards his goals.   Pt prognosis is Good.   Pt will continue to benefit from skilled outpatient physical therapy to address the deficits listed in the problem list box on initial evaluation, provide pt/family education and to maximize pt's level of independence in the home and community environment.     Pt's spiritual, cultural and educational needs considered and pt agreeable to plan of care and goals.  Anticipated barriers to physical therapy: scar tissue limitation of inversion and eversion of L ankle     Short Term Goals (3 Weeks):   1. Pt will be independent with HEP to supplement PT in improving functional mobility. PROGRESSING, NOT MET 3/10/2020   2. Pt will improve L DF ROM to 7 degrees in short sitting to improve gait mechanics. PROGRESSING, NOT MET 3/10/2020   3. Pt will perform SLS on right foot to greater than or equal to 30 seconds to improve strength of ankle intrinsics. PROGRESSING, NOT MET 3/10/2020   4. Pt will walk greater than or equal to 500 feet on level ground using standard cane  without an antalgic gait and without pain, to return to community ambulation safely. PROGRESSING, NOT MET 3/10/2020   Long Term Goals (6 Weeks):   1. Pt will be independent with updated HEP to supplement PT in improving functional mobility. PROGRESSING, NOT MET 3/10/2020   2. Pt will improve FOTO score to </= 45% limited to decrease perceived limitation with mobility. PROGRESSING, NOT MET 3/10/2020   2. Pt will improve impaired LE strength to >/= 5/5 to improve strength for functional tasks. PROGRESSING, NOT MET 3/10/2020   3. Pt will improve L DF ROM with knee extended to WFL deg to improve gait mechanics. PROGRESSING, NOT MET 3/10/2020   4. Pt will perform SLS on right foot to greater than or equal to 1 minute to improve strength of ankle intrinsics. PROGRESSING, NOT MET 3/10/2020   5. Pt will walk greater than or equal to 1,000 feet without AD, on level ground, without pain, to return to community ambulation safely. PROGRESSING, NOT MET 3/10/2020     Plan     Increase single leg stance exercises/activities. Measure dorsiflexion AROM next.    Nelly Gómez, PT

## 2020-03-12 ENCOUNTER — CLINICAL SUPPORT (OUTPATIENT)
Dept: REHABILITATION | Facility: HOSPITAL | Age: 65
End: 2020-03-12
Payer: COMMERCIAL

## 2020-03-12 DIAGNOSIS — M25.672 DECREASED RANGE OF MOTION OF LEFT ANKLE: ICD-10-CM

## 2020-03-12 DIAGNOSIS — M62.81 GENERALIZED MUSCLE WEAKNESS: ICD-10-CM

## 2020-03-12 DIAGNOSIS — R26.89 BALANCE PROBLEM: ICD-10-CM

## 2020-03-12 DIAGNOSIS — R26.89 ANTALGIC GAIT: ICD-10-CM

## 2020-03-12 PROCEDURE — 97110 THERAPEUTIC EXERCISES: CPT | Mod: PN

## 2020-03-12 NOTE — PROGRESS NOTES
"  Physical Therapy Daily Treatment Note     Name: Casper Osborne  Clinic Number: [id number]    Therapy Diagnosis:   Encounter Diagnoses   Name Primary?    Generalized muscle weakness     Balance problem     Decreased range of motion of left ankle     Antalgic gait      Physician: Bay Melendez MD    Visit Date: 3/12/2020    Physician Orders: PT Eval and Treat  Medical Diagnosis: Follow-up examination after orthopedic surgery; Arthritis of ankle, left; Arthritis of left foot; Closed nondisplaced intra-articular fracture of left calcaneus, sequela  Evaluation Date: 02/10/2020  Authorization Period Expiration: 12/31/2020  Plan of Care Certification Period: 02/10/20 to 04/03/2020  Visit #/Visits authorized: 8/50 (POC total visits 8/16)      Time In: 1300  Time Out: 1400  Total Billable Time: 55 minutes     Precautions: Standard    Subjective     Pt reports: Pt woke up this morning and he had a lot of sharp pain in his L ankle and he took 800mg of ibuprofen   He was compliant with home exercise program.  Response to previous treatment: no change  Functional change: no change    Pain: 6/10  Location: L medial malleolus and anterior aspect of talocrural joint    Objective     Casper received therapeutic exercises to develop strength, endurance, ROM and flexibility for 55 minutes including:    Seated:  Towel Scrunches: 3x20 R   [place] pick-ups: 5' R  BAPs plantar and dorsiflexion: Level 3, 5' R    Standing:   Heel raises: 3x10 double leg  Toe raises: 3x10 double leg  Lateral step to holds: x10 L  Single leg stance: 4-5"x5 L. Attempts <4" not counted. Verbal cues to fix gaze on single object ahead.  Lateral step downs: Next  Hip hike: 2x15 B  3-way hip: 2x10, B    Casper received the following manual therapy techniques to the L LE for 0 minutes, including:  Grade III-IV anterior<>posterior talocrural mobilizations     Home Exercises Provided and Patient Education Provided:    Education provided:   - Continue HEP  - " Education on purpose of arch lift exercise on intrinsic foot flexor strength; differentiation between this and subtalar mobility exercises.    Written Home Exercises Provided: Not today.  Exercises were reviewed and Casper was able to demonstrate them prior to the end of the session.  Casper demonstrated good  understanding of the education provided.     See EMR under Patient Instructions for exercises provided 2/10/2020.     Assessment     Pt was able to tolerate all exercises during today's session without any c/o increased pain or discomfort. PT and pt discussed taking the referral from his doctor to consult with another surgeon who performs total ankle replacements. Pt verbalized understanding and benefits of speaking with the doctor as his pain has not decreased since beginning therapy, although visually range of motion for PF and DF has increased.     Casper is progressing well towards his goals.   Pt prognosis is Good.   Pt will continue to benefit from skilled outpatient physical therapy to address the deficits listed in the problem list box on initial evaluation, provide pt/family education and to maximize pt's level of independence in the home and community environment.     Pt's spiritual, cultural and educational needs considered and pt agreeable to plan of care and goals.  Anticipated barriers to physical therapy: scar tissue limitation of inversion and eversion of L ankle     Short Term Goals (3 Weeks):   1. Pt will be independent with HEP to supplement PT in improving functional mobility. PROGRESSING, NOT MET 3/12/2020   2. Pt will improve L DF ROM to 7 degrees in short sitting to improve gait mechanics. PROGRESSING, NOT MET 3/12/2020   3. Pt will perform SLS on right foot to greater than or equal to 30 seconds to improve strength of ankle intrinsics. PROGRESSING, NOT MET 3/12/2020   4. Pt will walk greater than or equal to 500 feet on level ground using standard cane without an antalgic gait and without  pain, to return to community ambulation safely. PROGRESSING, NOT MET 3/12/2020   Long Term Goals (6 Weeks):   1. Pt will be independent with updated HEP to supplement PT in improving functional mobility. PROGRESSING, NOT MET 3/12/2020   2. Pt will improve FOTO score to </= 45% limited to decrease perceived limitation with mobility. PROGRESSING, NOT MET 3/12/2020   2. Pt will improve impaired LE strength to >/= 5/5 to improve strength for functional tasks. PROGRESSING, NOT MET 3/12/2020   3. Pt will improve L DF ROM with knee extended to WFL deg to improve gait mechanics. PROGRESSING, NOT MET 3/12/2020   4. Pt will perform SLS on right foot to greater than or equal to 1 minute to improve strength of ankle intrinsics. PROGRESSING, NOT MET 3/12/2020   5. Pt will walk greater than or equal to 1,000 feet without AD, on level ground, without pain, to return to community ambulation safely. PROGRESSING, NOT MET 3/12/2020     Plan     Increase single leg stance exercises/activities. Measure dorsiflexion AROM next.    Blaise Angeles, PT

## 2020-03-16 ENCOUNTER — CLINICAL SUPPORT (OUTPATIENT)
Dept: REHABILITATION | Facility: HOSPITAL | Age: 65
End: 2020-03-16
Payer: COMMERCIAL

## 2020-03-16 DIAGNOSIS — R26.89 BALANCE PROBLEM: ICD-10-CM

## 2020-03-16 DIAGNOSIS — R26.89 ANTALGIC GAIT: ICD-10-CM

## 2020-03-16 DIAGNOSIS — M62.81 GENERALIZED MUSCLE WEAKNESS: ICD-10-CM

## 2020-03-16 DIAGNOSIS — M25.672 DECREASED RANGE OF MOTION OF LEFT ANKLE: ICD-10-CM

## 2020-03-16 PROCEDURE — 97110 THERAPEUTIC EXERCISES: CPT | Mod: PN

## 2020-03-16 NOTE — PROGRESS NOTES
"  Physical Therapy Daily Treatment Note     Name: Casper Osborne  Clinic Number: [id number]    Therapy Diagnosis:   Encounter Diagnoses   Name Primary?    Generalized muscle weakness     Balance problem     Decreased range of motion of left ankle     Antalgic gait      Physician: Bay Melendez MD    Visit Date: 3/16/2020    Physician Orders: PT Eval and Treat  Medical Diagnosis: Follow-up examination after orthopedic surgery; Arthritis of ankle, left; Arthritis of left foot; Closed nondisplaced intra-articular fracture of left calcaneus, sequela  Evaluation Date: 02/10/2020  Authorization Period Expiration: 12/31/2020  Plan of Care Certification Period: 02/10/20 to 04/03/2020  Visit #/Visits authorized: 9/50 (POC total visits 9/16)      Time In: 1200  Time Out: 1300  Total Billable Time: 55 minutes     Precautions: Standard    Subjective     Pt reports: L ankle has been flared up since the middle of last week and has not gone away. It is worse at night while laying in bed.   He was compliant with home exercise program.  Response to previous treatment: no change  Functional change: no change    Pain: 6/10  Location: L medial malleolus and anterior aspect of talocrural joint    Objective     Casper received therapeutic exercises to develop strength, endurance, ROM and flexibility for 55 minutes including:    Seated:  Towel Scrunches: 3x20 R   [place] pick-ups: 5' R  BAPs plantar and dorsiflexion: Level 3, 5' R    Standing:   Heel raises: 3x10 double leg  Toe raises: 3x10 double leg  Lateral step to holds: x10 L  Single leg stance: 4-5"x5 L. Attempts <4" not counted. Verbal cues to fix gaze on single object ahead.  Lateral step downs: Next  Hip hike: 2x15 B - OOT  3-way hip: 2x10, B - OOT    Casper received the following manual therapy techniques to the L LE for 0 minutes, including:  Grade III-IV anterior<>posterior talocrural mobilizations     Home Exercises Provided and Patient Education Provided:    Education " provided:   - Continue HEP  - Education on purpose of arch lift exercise on intrinsic foot flexor strength; differentiation between this and subtalar mobility exercises.    Written Home Exercises Provided: Not today.  Exercises were reviewed and Casper was able to demonstrate them prior to the end of the session.  Casper demonstrated good  understanding of the education provided.     See EMR under Patient Instructions for exercises provided 2/10/2020.     Assessment     Pt gets very involved in his stories and needs to mod verbal cuing to redirect towards exercise at hand. Pt able to tolerate all exercises with mod difficulty and B UE (2 fingers) use for SLS. Pt also needed min verbal and tactile cuing for BAPS board proper form.     Casper is progressing well towards his goals.   Pt prognosis is Good.   Pt will continue to benefit from skilled outpatient physical therapy to address the deficits listed in the problem list box on initial evaluation, provide pt/family education and to maximize pt's level of independence in the home and community environment.     Pt's spiritual, cultural and educational needs considered and pt agreeable to plan of care and goals.  Anticipated barriers to physical therapy: scar tissue limitation of inversion and eversion of L ankle     Short Term Goals (3 Weeks):   1. Pt will be independent with HEP to supplement PT in improving functional mobility. PROGRESSING, NOT MET 3/16/2020   2. Pt will improve L DF ROM to 7 degrees in short sitting to improve gait mechanics. PROGRESSING, NOT MET 3/16/2020   3. Pt will perform SLS on right foot to greater than or equal to 30 seconds to improve strength of ankle intrinsics. PROGRESSING, NOT MET 3/16/2020   4. Pt will walk greater than or equal to 500 feet on level ground using standard cane without an antalgic gait and without pain, to return to community ambulation safely. PROGRESSING, NOT MET 3/16/2020   Long Term Goals (6 Weeks):   1. Pt will be  independent with updated HEP to supplement PT in improving functional mobility. PROGRESSING, NOT MET 3/16/2020   2. Pt will improve FOTO score to </= 45% limited to decrease perceived limitation with mobility. PROGRESSING, NOT MET 3/16/2020   2. Pt will improve impaired LE strength to >/= 5/5 to improve strength for functional tasks. PROGRESSING, NOT MET 3/16/2020   3. Pt will improve L DF ROM with knee extended to WFL deg to improve gait mechanics. PROGRESSING, NOT MET 3/16/2020   4. Pt will perform SLS on right foot to greater than or equal to 1 minute to improve strength of ankle intrinsics. PROGRESSING, NOT MET 3/16/2020   5. Pt will walk greater than or equal to 1,000 feet without AD, on level ground, without pain, to return to community ambulation safely. PROGRESSING, NOT MET 3/16/2020     Plan     Increase single leg stance exercises/activities. Measure dorsiflexion AROM next.    Blaise Angeles, PT

## 2020-03-18 ENCOUNTER — TELEPHONE (OUTPATIENT)
Dept: ORTHOPEDICS | Facility: CLINIC | Age: 65
End: 2020-03-18

## 2020-03-18 NOTE — TELEPHONE ENCOUNTER
Spoke to patient, states he feels like he could do his PT at home. I told him  would probably agree but will confirm with him and send him an email. Also, he wanted to know about his upcoming appointment. I told him I would call him with any changes. Patient verbalized understanding.

## 2020-03-18 NOTE — TELEPHONE ENCOUNTER
----- Message from Kelin Victor sent at 3/18/2020 11:59 AM CDT -----  Contact: pt  Patient would like to receive a call back regarding his upcoming appointment. Patient states that his physical therapy is going south and he would like to receive some advice. Please contact the pt to advise.      Contact info

## 2020-03-19 ENCOUNTER — TELEPHONE (OUTPATIENT)
Dept: REHABILITATION | Facility: HOSPITAL | Age: 65
End: 2020-03-19

## 2020-03-19 ENCOUNTER — PATIENT MESSAGE (OUTPATIENT)
Dept: ORTHOPEDICS | Facility: CLINIC | Age: 65
End: 2020-03-19

## 2020-03-19 NOTE — TELEPHONE ENCOUNTER
Postponed Appointments    Patient: Casper Osborne  Date: 3/19/2020  Diagnosis: No diagnosis found.  MRN: [id number]    Spoke with patient due to therapy following updates regarding COVID-19 closely and taking every precaution to ensure the safety of our patients, staff and community.  In an abundance of caution and in an effort to help reduce risk and limit community spread, we have decided to temporarily postpone appointments for patients who may be at increased risk to attend in-person therapy or where therapy is not critically needed at this time. Plan of care and home exercise program were reviewed and patient has what they need to continue therapy at home. All patient questions were answered. Also stated to patient that we are exploring virtual methods of providing care and will be in touch over the next few weeks. Patient verbalized understanding to all.      Blaise Angeles, PT, DPT  3/19/2020

## 2020-04-01 ENCOUNTER — TELEPHONE (OUTPATIENT)
Dept: REHABILITATION | Facility: HOSPITAL | Age: 65
End: 2020-04-01

## 2020-04-01 NOTE — TELEPHONE ENCOUNTER
Postponed Appointments    Patient: Casper Osborne  Date: 4/1/2020  Diagnosis: No diagnosis found.  MRN: [id number]    Spoke with patient due to therapy following updates regarding COVID-19 closely and taking every precaution to ensure the safety of our patients, staff and community.  In an abundance of caution and in an effort to help reduce risk and limit community spread, we have decided to temporarily postpone appointments for the remainder of April. Plan of care and home exercise program were reviewed and patient has what they need to continue therapy at home. All patient questions were answered. Patient not interested in telehealth. Patient verbalized understanding to all.        4/1/2020  Blaise Angeles, PT

## 2020-04-13 ENCOUNTER — TELEPHONE (OUTPATIENT)
Dept: ORTHOPEDICS | Facility: CLINIC | Age: 65
End: 2020-04-13

## 2020-04-13 NOTE — TELEPHONE ENCOUNTER
Called pt in regards to having phone f/u or r/s in 6 weeks.Pt stated  told him he might have a cyst and if it is still there by his appt on 4/15 then he would possibly drain it and also his leg is still numb.  Informed pt I will let  know, pt verbalized understanding.

## 2020-04-14 ENCOUNTER — TELEPHONE (OUTPATIENT)
Dept: ORTHOPEDICS | Facility: CLINIC | Age: 65
End: 2020-04-14

## 2020-04-14 NOTE — TELEPHONE ENCOUNTER
----- Message from Bay Melendez MD sent at 4/14/2020 10:01 AM CDT -----  Regarding: Follow-up appointment  Cancel appointment tomorrow and reschedule in 6 weeks

## 2020-05-07 ENCOUNTER — TELEPHONE (OUTPATIENT)
Dept: REHABILITATION | Facility: HOSPITAL | Age: 65
End: 2020-05-07

## 2020-05-07 NOTE — TELEPHONE ENCOUNTER
Resume Appointments    Patient: Casper Osborne  Date: 5/7/2020  MRN: [id number]    Called pt on behalf of treating therapist.  Spoke with patient following updates regarding COVID-19.  Offered resuming in person therapy visits.  Pt declined at this time. Pt reported he needs to follow up with his MD first.     5/7/2020  Elida Mendenhall, NILDA-SLP

## 2020-06-02 ENCOUNTER — OFFICE VISIT (OUTPATIENT)
Dept: ORTHOPEDICS | Facility: CLINIC | Age: 65
End: 2020-06-02
Payer: COMMERCIAL

## 2020-06-02 DIAGNOSIS — M19.072 ARTHRITIS OF LEFT SUBTALAR JOINT: Primary | ICD-10-CM

## 2020-06-02 DIAGNOSIS — M19.072 ARTHRITIS OF ANKLE, LEFT: ICD-10-CM

## 2020-06-02 PROCEDURE — 20605 PR DRAIN/INJECT INTERMEDIATE JOINT/BURSA: ICD-10-PCS | Mod: LT,S$GLB,, | Performed by: ORTHOPAEDIC SURGERY

## 2020-06-02 PROCEDURE — 99999 PR PBB SHADOW E&M-EST. PATIENT-LVL II: ICD-10-PCS | Mod: PBBFAC,,, | Performed by: ORTHOPAEDIC SURGERY

## 2020-06-02 PROCEDURE — 20605 DRAIN/INJ JOINT/BURSA W/O US: CPT | Mod: LT,S$GLB,, | Performed by: ORTHOPAEDIC SURGERY

## 2020-06-02 PROCEDURE — 99999 PR PBB SHADOW E&M-EST. PATIENT-LVL II: CPT | Mod: PBBFAC,,, | Performed by: ORTHOPAEDIC SURGERY

## 2020-06-02 PROCEDURE — 99213 OFFICE O/P EST LOW 20 MIN: CPT | Mod: 25,S$GLB,, | Performed by: ORTHOPAEDIC SURGERY

## 2020-06-02 PROCEDURE — 99213 PR OFFICE/OUTPT VISIT, EST, LEVL III, 20-29 MIN: ICD-10-PCS | Mod: 25,S$GLB,, | Performed by: ORTHOPAEDIC SURGERY

## 2020-06-02 RX ORDER — METHYLPREDNISOLONE ACETATE 80 MG/ML
80 INJECTION, SUSPENSION INTRA-ARTICULAR; INTRALESIONAL; INTRAMUSCULAR; SOFT TISSUE
Status: COMPLETED | OUTPATIENT
Start: 2020-06-02 | End: 2020-06-02

## 2020-06-02 RX ADMIN — METHYLPREDNISOLONE ACETATE 80 MG: 80 INJECTION, SUSPENSION INTRA-ARTICULAR; INTRALESIONAL; INTRAMUSCULAR; SOFT TISSUE at 02:06

## 2020-06-02 NOTE — PROGRESS NOTES
Casper Osborne  Returns today for follow-up.  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  His left ankle arthritis has been giving him the most problems recently.  He is 6 months postop from a left ankle arthroscopy and extensive debridement which revealed eburnated bone in the ankle joint itself.  He reports that his pain is back to what it was prior to the arthroscopy.  He would like to try another injection at this point.      Examination:  The left ankle portal incisions are healed.  There appears to be a small cystic lesion underneath the medial portal but there is no evidence of any drainage.  He has no subtalar motion on the left and decreased ankle motion on the left with moderate pain.  He is neurovascularly intact.    Impression:  1.  Posttraumatic arthritis left subtalar joint                       2.  Left ankle arthritis    Recommendation:  Unfortunately has not had prolonged relief from his last arthroscopic debridement.  Further surgical options would include fusion or ankle replacement.  He states his pain is not bad enough to consider these operations.  He would like to try another injection.    After verbal consent and sterile prep I injected the left ankle with 3 cc lidocaine and 80 mg of Depo-Medrol.    Follow-up in 3 months if necessary

## 2020-07-17 ENCOUNTER — OFFICE VISIT (OUTPATIENT)
Dept: INTERNAL MEDICINE | Facility: CLINIC | Age: 65
End: 2020-07-17
Payer: COMMERCIAL

## 2020-07-17 VITALS
BODY MASS INDEX: 27.69 KG/M2 | HEART RATE: 60 BPM | HEIGHT: 71 IN | SYSTOLIC BLOOD PRESSURE: 140 MMHG | WEIGHT: 197.75 LBS | DIASTOLIC BLOOD PRESSURE: 88 MMHG | TEMPERATURE: 98 F

## 2020-07-17 DIAGNOSIS — K12.0 CANKER SORE: ICD-10-CM

## 2020-07-17 DIAGNOSIS — J01.90 ACUTE NON-RECURRENT SINUSITIS, UNSPECIFIED LOCATION: Primary | ICD-10-CM

## 2020-07-17 PROCEDURE — 99214 PR OFFICE/OUTPT VISIT, EST, LEVL IV, 30-39 MIN: ICD-10-PCS | Mod: S$GLB,,, | Performed by: INTERNAL MEDICINE

## 2020-07-17 PROCEDURE — 3008F BODY MASS INDEX DOCD: CPT | Mod: CPTII,S$GLB,, | Performed by: INTERNAL MEDICINE

## 2020-07-17 PROCEDURE — 99999 PR PBB SHADOW E&M-EST. PATIENT-LVL III: ICD-10-PCS | Mod: PBBFAC,,, | Performed by: INTERNAL MEDICINE

## 2020-07-17 PROCEDURE — 3008F PR BODY MASS INDEX (BMI) DOCUMENTED: ICD-10-PCS | Mod: CPTII,S$GLB,, | Performed by: INTERNAL MEDICINE

## 2020-07-17 PROCEDURE — 99999 PR PBB SHADOW E&M-EST. PATIENT-LVL III: CPT | Mod: PBBFAC,,, | Performed by: INTERNAL MEDICINE

## 2020-07-17 PROCEDURE — 99214 OFFICE O/P EST MOD 30 MIN: CPT | Mod: S$GLB,,, | Performed by: INTERNAL MEDICINE

## 2020-07-17 RX ORDER — AMOXICILLIN AND CLAVULANATE POTASSIUM 875; 125 MG/1; MG/1
1 TABLET, FILM COATED ORAL 2 TIMES DAILY
Qty: 20 TABLET | Refills: 0 | Status: SHIPPED | OUTPATIENT
Start: 2020-07-17 | End: 2020-07-27

## 2020-07-17 NOTE — PROGRESS NOTES
Subjective:       Patient ID: Casper Osborne is a 64 y.o. male.    Chief Complaint: Otalgia, Cough (mucus ), and Adenopathy (Pain under tongue )    HPI   Pt here for evaluation of 1 wk of slowly improving sinus congestion, right sided ear pain, post nasal drip, right sided tongue pain(had a crown put in on Monday) and a mild cough which is has almost resolved. No fever/chills, wheezing/SOB. Some relief with OTC cough medication.   Review of Systems   Constitutional: Negative for activity change, appetite change, chills, diaphoresis, fatigue, fever and unexpected weight change.   HENT: Positive for ear pain and sinus pressure/congestion. Negative for ear discharge, postnasal drip, rhinorrhea, sneezing, sore throat, trouble swallowing and voice change.    Respiratory: Positive for cough. Negative for shortness of breath and wheezing.    Cardiovascular: Negative for chest pain, palpitations and leg swelling.   Gastrointestinal: Negative for abdominal pain, blood in stool, constipation, diarrhea, nausea and vomiting.   Genitourinary: Negative for dysuria.   Musculoskeletal: Negative for arthralgias and myalgias.   Integumentary:  Negative for rash and wound.   Allergic/Immunologic: Negative for environmental allergies and food allergies.   Hematological: Negative for adenopathy. Does not bruise/bleed easily.         Objective:      Physical Exam  Constitutional:       General: He is not in acute distress.     Appearance: He is well-developed. He is not diaphoretic.   HENT:      Head: Normocephalic and atraumatic.      Right Ear: External ear normal.      Left Ear: External ear normal.      Nose: Nose normal.      Mouth/Throat:      Pharynx: No oropharyngeal exudate.     Eyes:      General: No scleral icterus.        Right eye: No discharge.         Left eye: No discharge.      Conjunctiva/sclera: Conjunctivae normal.      Pupils: Pupils are equal, round, and reactive to light.   Neck:      Musculoskeletal: Normal range  of motion and neck supple.      Vascular: No JVD.   Cardiovascular:      Rate and Rhythm: Normal rate and regular rhythm.      Heart sounds: Normal heart sounds. No murmur.   Pulmonary:      Effort: Pulmonary effort is normal. No respiratory distress.      Breath sounds: Normal breath sounds. No wheezing or rales.   Lymphadenopathy:      Cervical: No cervical adenopathy.   Skin:     General: Skin is warm and dry.      Coloration: Skin is not pale.      Findings: No rash.   Neurological:      Mental Status: He is alert and oriented to person, place, and time.         Assessment:       1. Acute non-recurrent sinusitis, unspecified location    2. Canker sore        Plan:    1. Rx Augmentin BID x 10 days    2. Pt advised to call if the sore does not resolve over the next 2 wks for ENT evaluation

## 2020-08-26 ENCOUNTER — OFFICE VISIT (OUTPATIENT)
Dept: ORTHOPEDICS | Facility: CLINIC | Age: 65
End: 2020-08-26
Payer: COMMERCIAL

## 2020-08-26 DIAGNOSIS — M19.072 ARTHRITIS OF LEFT SUBTALAR JOINT: Primary | ICD-10-CM

## 2020-08-26 DIAGNOSIS — M25.572 PAIN OF JOINT OF LEFT ANKLE AND FOOT: ICD-10-CM

## 2020-08-26 DIAGNOSIS — M19.072 ARTHRITIS OF ANKLE, LEFT: ICD-10-CM

## 2020-08-26 PROCEDURE — 99999 PR PBB SHADOW E&M-EST. PATIENT-LVL II: CPT | Mod: PBBFAC,,, | Performed by: ORTHOPAEDIC SURGERY

## 2020-08-26 PROCEDURE — 99213 PR OFFICE/OUTPT VISIT, EST, LEVL III, 20-29 MIN: ICD-10-PCS | Mod: S$GLB,,, | Performed by: ORTHOPAEDIC SURGERY

## 2020-08-26 PROCEDURE — 99213 OFFICE O/P EST LOW 20 MIN: CPT | Mod: S$GLB,,, | Performed by: ORTHOPAEDIC SURGERY

## 2020-08-26 PROCEDURE — 99999 PR PBB SHADOW E&M-EST. PATIENT-LVL II: ICD-10-PCS | Mod: PBBFAC,,, | Performed by: ORTHOPAEDIC SURGERY

## 2020-08-26 RX ORDER — IBUPROFEN 800 MG/1
800 TABLET ORAL 3 TIMES DAILY PRN
Qty: 90 TABLET | Refills: 0 | Status: SHIPPED | OUTPATIENT
Start: 2020-08-26 | End: 2020-12-23

## 2020-08-26 NOTE — PROGRESS NOTES
Casper sOborne  Returns today for follow-up.  This is a 64-year-old male who sustained a severe left subtalar joint in the 1990s which required open reduction internal fixation.  He subsequently had hardware removed and has had chronic posttraumatic subtalar arthritis.  He essentially has no motion of his subtalar joint.  Over the years he has developed symptoms in his ankle and he is currently about 9 months postop from a left ankle arthroscopy which has relieved some of his anterior ankle pain but he continues with lateral sided hindfoot pain.  I performed a steroid injection of his left ankle on the last visit but he states he only had about 1 week of relief.  He states that the majority of his pain is laterally about his hindfoot.    Examination:  Clinically he does not have any gross left subtalar motion.  He has decreased I ankle motion but without pain.  He is tender laterally at what would be the sinus tarsi area of his hindfoot.  He has some mild tenderness over the peroneal tendons posteriorly.  He is neurovascular intact.      Impression:   1.  Posttraumatic arthritis left subtalar joint  2.  Left ankle arthritis    Recommendation: While he was noted to have some degenerative changes of his left ankle at the time of his last arthroscopy, I believe the majority of his pain is probably coming from his subtalar joint.  I would like to obtain a CT scan to evaluate the subtalar joint.  We have had some discussion about possible ankle replacement versus ankle fusion surgery.  I think he would be a candidate for ankle replacement especially with a subtalar fusion.  I may refer him to Dr. Brennan for evaluation and opinion.

## 2020-09-11 ENCOUNTER — HOSPITAL ENCOUNTER (OUTPATIENT)
Dept: RADIOLOGY | Facility: HOSPITAL | Age: 65
Discharge: HOME | End: 2020-09-11
Attending: ORTHOPAEDIC SURGERY
Payer: COMMERCIAL

## 2020-09-11 DIAGNOSIS — M25.572 PAIN OF JOINT OF LEFT ANKLE AND FOOT: ICD-10-CM

## 2020-09-11 PROCEDURE — 73700 CT LOWER EXTREMITY W/O DYE: CPT | Mod: 26,LT,, | Performed by: RADIOLOGY

## 2020-09-11 PROCEDURE — 73700 CT ANKLE (INCLUDING HINDFOOT) WITHOUT CONTRAST LEFT: ICD-10-PCS | Mod: 26,LT,, | Performed by: RADIOLOGY

## 2020-09-11 PROCEDURE — 73700 CT LOWER EXTREMITY W/O DYE: CPT | Mod: TC,LT

## 2020-09-22 ENCOUNTER — PATIENT MESSAGE (OUTPATIENT)
Dept: ORTHOPEDICS | Facility: CLINIC | Age: 65
End: 2020-09-22

## 2020-10-02 DIAGNOSIS — E78.5 HYPERLIPIDEMIA, UNSPECIFIED HYPERLIPIDEMIA TYPE: Chronic | ICD-10-CM

## 2020-10-02 RX ORDER — LOVASTATIN 40 MG/1
TABLET ORAL
Qty: 90 TABLET | Refills: 1 | Status: SHIPPED | OUTPATIENT
Start: 2020-10-02 | End: 2021-04-03

## 2020-10-26 ENCOUNTER — PATIENT MESSAGE (OUTPATIENT)
Dept: ORTHOPEDICS | Facility: CLINIC | Age: 65
End: 2020-10-26

## 2020-10-27 ENCOUNTER — TELEPHONE (OUTPATIENT)
Dept: ORTHOPEDICS | Facility: CLINIC | Age: 65
End: 2020-10-27

## 2020-10-30 ENCOUNTER — PATIENT MESSAGE (OUTPATIENT)
Dept: ADMINISTRATIVE | Facility: HOSPITAL | Age: 65
End: 2020-10-30

## 2020-10-30 DIAGNOSIS — Z12.11 COLON CANCER SCREENING: ICD-10-CM

## 2020-11-10 ENCOUNTER — PATIENT MESSAGE (OUTPATIENT)
Dept: ADMINISTRATIVE | Facility: HOSPITAL | Age: 65
End: 2020-11-10

## 2020-11-11 ENCOUNTER — PATIENT OUTREACH (OUTPATIENT)
Dept: ADMINISTRATIVE | Facility: HOSPITAL | Age: 65
End: 2020-11-11

## 2020-11-17 ENCOUNTER — LAB VISIT (OUTPATIENT)
Dept: LAB | Facility: HOSPITAL | Age: 65
End: 2020-11-17
Attending: INTERNAL MEDICINE
Payer: COMMERCIAL

## 2020-11-17 DIAGNOSIS — Z12.11 COLON CANCER SCREENING: ICD-10-CM

## 2020-11-17 PROCEDURE — 82274 ASSAY TEST FOR BLOOD FECAL: CPT

## 2020-11-25 LAB — HEMOCCULT STL QL IA: NEGATIVE

## 2020-11-30 ENCOUNTER — PATIENT MESSAGE (OUTPATIENT)
Dept: INTERNAL MEDICINE | Facility: CLINIC | Age: 65
End: 2020-11-30

## 2020-11-30 DIAGNOSIS — N28.1 RENAL CYST: Primary | ICD-10-CM

## 2020-12-01 ENCOUNTER — PATIENT MESSAGE (OUTPATIENT)
Dept: INTERNAL MEDICINE | Facility: CLINIC | Age: 65
End: 2020-12-01

## 2020-12-01 DIAGNOSIS — M51.26 LUMBAR HERNIATED DISC: Primary | ICD-10-CM

## 2020-12-02 ENCOUNTER — PATIENT MESSAGE (OUTPATIENT)
Dept: INTERNAL MEDICINE | Facility: CLINIC | Age: 65
End: 2020-12-02

## 2020-12-02 NOTE — TELEPHONE ENCOUNTER
Please arrange neurosurgery appointment.  Please put in the appointment that patient has had a recent MRI.

## 2020-12-03 ENCOUNTER — PATIENT MESSAGE (OUTPATIENT)
Dept: INTERNAL MEDICINE | Facility: CLINIC | Age: 65
End: 2020-12-03

## 2020-12-03 ENCOUNTER — TELEPHONE (OUTPATIENT)
Dept: INTERNAL MEDICINE | Facility: CLINIC | Age: 65
End: 2020-12-03

## 2020-12-03 ENCOUNTER — HOSPITAL ENCOUNTER (OUTPATIENT)
Dept: RADIOLOGY | Facility: HOSPITAL | Age: 65
Discharge: HOME | End: 2020-12-03
Attending: INTERNAL MEDICINE
Payer: MEDICARE

## 2020-12-03 DIAGNOSIS — N28.1 RENAL CYST: ICD-10-CM

## 2020-12-03 DIAGNOSIS — N28.1 COMPLEX RENAL CYST: Primary | ICD-10-CM

## 2020-12-03 PROCEDURE — 76770 US EXAM ABDO BACK WALL COMP: CPT | Mod: TC

## 2020-12-03 PROCEDURE — 76770 US RETROPERITONEAL COMPLETE: ICD-10-PCS | Mod: 26,,, | Performed by: RADIOLOGY

## 2020-12-03 PROCEDURE — 76770 US EXAM ABDO BACK WALL COMP: CPT | Mod: 26,,, | Performed by: RADIOLOGY

## 2020-12-03 NOTE — TELEPHONE ENCOUNTER
You have a complex cyst of the right kidney.  This needs to be evaluated further by a nephrologist.  I will put the order in for you.

## 2020-12-04 ENCOUNTER — TELEPHONE (OUTPATIENT)
Dept: NEUROSURGERY | Facility: CLINIC | Age: 65
End: 2020-12-04

## 2020-12-04 DIAGNOSIS — M51.26 LUMBAR HERNIATED DISC: Primary | ICD-10-CM

## 2020-12-05 ENCOUNTER — PATIENT MESSAGE (OUTPATIENT)
Dept: NEPHROLOGY | Facility: CLINIC | Age: 65
End: 2020-12-05

## 2020-12-07 ENCOUNTER — TELEPHONE (OUTPATIENT)
Dept: NEPHROLOGY | Facility: CLINIC | Age: 65
End: 2020-12-07

## 2020-12-07 ENCOUNTER — PATIENT MESSAGE (OUTPATIENT)
Dept: NEUROSURGERY | Facility: CLINIC | Age: 65
End: 2020-12-07

## 2020-12-07 ENCOUNTER — LAB VISIT (OUTPATIENT)
Dept: LAB | Facility: HOSPITAL | Age: 65
End: 2020-12-07
Attending: INTERNAL MEDICINE
Payer: COMMERCIAL

## 2020-12-07 ENCOUNTER — PATIENT MESSAGE (OUTPATIENT)
Dept: INTERNAL MEDICINE | Facility: CLINIC | Age: 65
End: 2020-12-07

## 2020-12-07 ENCOUNTER — HOSPITAL ENCOUNTER (OUTPATIENT)
Dept: RADIOLOGY | Facility: HOSPITAL | Age: 65
Discharge: HOME | End: 2020-12-07
Attending: NEUROLOGICAL SURGERY
Payer: MEDICARE

## 2020-12-07 ENCOUNTER — OFFICE VISIT (OUTPATIENT)
Dept: NEUROSURGERY | Facility: CLINIC | Age: 65
End: 2020-12-07
Payer: MEDICARE

## 2020-12-07 VITALS
HEIGHT: 71 IN | HEART RATE: 66 BPM | DIASTOLIC BLOOD PRESSURE: 82 MMHG | SYSTOLIC BLOOD PRESSURE: 134 MMHG | BODY MASS INDEX: 27.58 KG/M2

## 2020-12-07 DIAGNOSIS — N28.1 RENAL CYST: Primary | ICD-10-CM

## 2020-12-07 DIAGNOSIS — N28.1 COMPLEX RENAL CYST: Primary | ICD-10-CM

## 2020-12-07 DIAGNOSIS — M51.36 DEGENERATIVE LUMBAR DISC: Primary | ICD-10-CM

## 2020-12-07 DIAGNOSIS — M51.26 LUMBAR HERNIATED DISC: ICD-10-CM

## 2020-12-07 DIAGNOSIS — N28.1 RENAL CYST: ICD-10-CM

## 2020-12-07 LAB
ANION GAP SERPL CALC-SCNC: 11 MMOL/L (ref 8–16)
BUN SERPL-MCNC: 16 MG/DL (ref 8–23)
CALCIUM SERPL-MCNC: 9.2 MG/DL (ref 8.7–10.5)
CHLORIDE SERPL-SCNC: 105 MMOL/L (ref 95–110)
CO2 SERPL-SCNC: 26 MMOL/L (ref 23–29)
CREAT SERPL-MCNC: 1.1 MG/DL (ref 0.5–1.4)
EST. GFR  (AFRICAN AMERICAN): >60 ML/MIN/1.73 M^2
EST. GFR  (NON AFRICAN AMERICAN): >60 ML/MIN/1.73 M^2
GLUCOSE SERPL-MCNC: 89 MG/DL (ref 70–110)
POTASSIUM SERPL-SCNC: 4 MMOL/L (ref 3.5–5.1)
SODIUM SERPL-SCNC: 142 MMOL/L (ref 136–145)

## 2020-12-07 PROCEDURE — 72120 XR LUMBAR SPINE AP AND LAT WITH FLEX/EXT: ICD-10-PCS | Mod: 26,,, | Performed by: RADIOLOGY

## 2020-12-07 PROCEDURE — 99999 PR PBB SHADOW E&M-EST. PATIENT-LVL III: ICD-10-PCS | Mod: PBBFAC,,, | Performed by: NEUROLOGICAL SURGERY

## 2020-12-07 PROCEDURE — 80048 BASIC METABOLIC PNL TOTAL CA: CPT

## 2020-12-07 PROCEDURE — 36415 COLL VENOUS BLD VENIPUNCTURE: CPT | Mod: PO

## 2020-12-07 PROCEDURE — 72100 X-RAY EXAM L-S SPINE 2/3 VWS: CPT | Mod: TC,PN

## 2020-12-07 PROCEDURE — 72120 X-RAY BEND ONLY L-S SPINE: CPT | Mod: 26,,, | Performed by: RADIOLOGY

## 2020-12-07 PROCEDURE — 72100 X-RAY EXAM L-S SPINE 2/3 VWS: CPT | Mod: 26,,, | Performed by: RADIOLOGY

## 2020-12-07 PROCEDURE — 99203 PR OFFICE/OUTPT VISIT, NEW, LEVL III, 30-44 MIN: ICD-10-PCS | Mod: S$GLB,,, | Performed by: NEUROLOGICAL SURGERY

## 2020-12-07 PROCEDURE — 99203 OFFICE O/P NEW LOW 30 MIN: CPT | Mod: S$GLB,,, | Performed by: NEUROLOGICAL SURGERY

## 2020-12-07 PROCEDURE — 99999 PR PBB SHADOW E&M-EST. PATIENT-LVL III: CPT | Mod: PBBFAC,,, | Performed by: NEUROLOGICAL SURGERY

## 2020-12-07 PROCEDURE — 72100 XR LUMBAR SPINE AP AND LAT WITH FLEX/EXT: ICD-10-PCS | Mod: 26,,, | Performed by: RADIOLOGY

## 2020-12-07 NOTE — LETTER
December 7, 2020      Leon Chaparro MD  [address] [facility]  [place] [address]           [employer] - Neurosurgery  [address]  [facility] [address]  Phone: [phone]          Patient: Casper Osborne   MR Number: [id number]   YOB: 1955   Date of Visit: 12/7/2020       Dear Dr. Leon Chaparro:    Thank you for referring Casper sOborne to me for evaluation. Attached you will find relevant portions of my assessment and plan of care.    If you have questions, please do not hesitate to call me. I look forward to following Casper Osborne along with you.    Sincerely,    Misael Gage MD    Enclosure  CC:  No Recipients    If you would like to receive this communication electronically, please contact [email or web address] or [phone] to request more information on [employer] Link access.    For providers and/or their staff who would like to refer a patient to [facility], please contact us through our one-stop-shop provider referral line, [facility], at [phone].    If you feel you have received this communication in error or would no longer like to receive these types of communications, please e-mail [email or web address]

## 2020-12-07 NOTE — PROGRESS NOTES
NEUROSURGICAL OUTPATIENT CONSULTATION NOTE    DATE OF SERVICE:  12/07/2020    ATTENDING PHYSICIAN:  Misael Gage MD    CONSULT REQUESTED BY:  Leon Chaparro     REASON FOR CONSULT:  Low back and left leg pain    HISTORY OF PRESENT ILLNESS:  This is a very pleasant 64 y.o. male who has low back and left leg pain.  The back pain has been chronic, and the left leg pain is involving the foot only and is related to a left heel injury in 1996 from falling off a ladder.  His current worsening of his symptoms is related to a peripheral nerve block that was performed prior to and ankle scope recently.  He reports that after his block he had anesthesia distal to the knee on the left side infecting all dermatomes below the knee.  He additionally had an EMG done at an outside facility which prompted evaluation by MRI scan.  He is referred for surgical evaluation of his MRI.    HPI     Low Back Pain Scale  R Low Back-Pain Score: 7  R Low Back-Pain Intensity: Pain killers give very little relief from pain  Personal Care : It is painful to look after myself and I am slow and careful.  Lifting: Pain prevents me from lifting heavy weights off the floor, but I can manage if they are conveniently positioned. (i.e. on a table)  Walking: Pain prevents me walking more than 1/4 mile.  Sitting: I can sit only in my favorite chair as long as I like.   Low Back-Standing: I cannot stand for longer than 30 mins without increasing pain   Low Back-Sleeping: Because of pain my normal nights sleep is reduced by less than three quarters  Social Life: Pain has no significant effect on my social life apart from limiting my energetic interests. (e.g. dancing, etc.).   Low Back-Traveling: I have extra pain while traveling which compels me to seek alternate forms of travel   Low Back-Changing Degree of Pain: My pain is gradually worsening         PAST MEDICAL HISTORY:  Active Ambulatory Problems     Diagnosis Date Noted    Hyperlipidemia 02/06/2013     Lumbar facet arthropathy 04/07/2014    Heel pain, chronic 04/07/2014    Right tennis elbow 10/23/2014    Wrist pain 10/23/2014    Labral tear of shoulder 11/06/2014    GERD (gastroesophageal reflux disease)     Right rotator cuff tear 05/05/2016    Pain of right hand 08/15/2016    Medial epicondylitis of left elbow 01/02/2019    Benign prostatic hyperplasia with post-void dribbling 01/08/2019    Penile rash 02/11/2019    Arthritis of ankle, left 12/19/2019    Generalized muscle weakness 02/10/2020    Balance problem 02/10/2020    Decreased range of motion of left ankle 02/10/2020    Antalgic gait 02/10/2020    Cough 03/09/2020    Viral illness 03/09/2020    Complex renal cyst 12/03/2020     Resolved Ambulatory Problems     Diagnosis Date Noted    Right shoulder pain 02/06/2013    Left shoulder pain 10/27/2015     No Additional Past Medical History       PAST SURGICAL HISTORY:  Past Surgical History:   Procedure Laterality Date    ARTHROSCOPY OF ANKLE WITH DEBRIDEMENT Left 12/19/2019    Procedure: ARTHROSCOPY, ANKLE, WITH DEBRIDEMENT;  Surgeon: Bay Melendez MD;  Location: [facility];  Service: Orthopedics;  Laterality: Left;    FRACTURE SURGERY      left heel at 41yo    HEEL SPUR SURGERY      left heel - fell off a ladder and had to have this reconstructed    SPINE SURGERY      c7 fx - prior to this, he was getting dizzy spells - none since    TONSILLECTOMY      VASECTOMY         SOCIAL HISTORY:   Social History     Socioeconomic History    Marital status:      Spouse name: Nithya    Number of children: 1    Years of education: Not on file    Highest education level: Not on file   Occupational History    Occupation: Retried   Social Needs    Financial resource strain: Not hard at all    Food insecurity     Worry: Never true     Inability: Never true    Transportation needs     Medical: No     Non-medical: No   Tobacco Use    Smoking status: Never Smoker    Smokeless  tobacco: Never Used   Substance and Sexual Activity    Alcohol use: Yes     Frequency: 2-3 times a week     Drinks per session: 1 or 2     Binge frequency: Never     Comment: less than once a month    Drug use: No    Sexual activity: Yes     Partners: Female     Comment:    Lifestyle    Physical activity     Days per week: 3 days     Minutes per session: 150+ min    Stress: To some extent   Relationships    Social connections     Talks on phone: Once a week     Gets together: Never     Attends [employer] service: Not on file     Active member of club or organization: No     Attends meetings of clubs or organizations: Never     Relationship status:    Other Topics Concern    Not on file   Social History Narrative    Not on file       FAMILY HISTORY:  Family History   Problem Relation Age of Onset    Diabetes Mother     Cancer Father         melanoma cancer with mets    Heart disease Daughter         enlarged heart    Hypothyroidism Daughter     Colon polyps Neg Hx     Prostate cancer Neg Hx     Colon cancer Neg Hx     Stroke Neg Hx     Hypertension Neg Hx     Hyperlipidemia Neg Hx        CURRENTS MEDICATIONS:  Current Outpatient Medications on File Prior to Visit   Medication Sig Dispense Refill    alfuzosin (UROXATRAL) 10 mg Tb24 TAKE 1 TABLET (10 MG TOTAL) BY MOUTH DAILY WITH BREAKFAST.  90 tablet 3    aspirin (ECOTRIN) 81 MG EC tablet Take 81 mg by mouth once daily.      fluticasone propionate (FLONASE) 50 mcg/actuation nasal spray 1 spray (50 mcg total) by Each Nostril route once daily. 16 g 3    ibuprofen (ADVIL,MOTRIN) 800 MG tablet Take 1 tablet (800 mg total) by mouth 3 (three) times daily as needed for Pain. 90 tablet 0    ipratropium (ATROVENT) 0.03 % nasal spray PLACE TWO SPRAYS IN EACH NOSTRIL THREE TIMES DAILY  30 mL 0    lovastatin (MEVACOR) 40 MG tablet TAKE ONE TABLET BY MOUTH AT BEDTIME  90 tablet 1    omeprazole (PRILOSEC) 40 MG capsule Take 1 capsule (40 mg  total) by mouth once daily. 90 capsule 3     No current facility-administered medications on file prior to visit.        ALLERGIES:  Review of patient's allergies indicates:  No Known Allergies    REVIEW OF SYSTEMS:  ROS    OBJECTIVE:    PHYSICAL EXAMINATION:   Vitals:    12/07/20 1349   BP: 134/82   Pulse: 66           Neurologic Exam     Mental Status   Oriented to person, place, and time.   Attention: normal.   Level of consciousness: alert    Motor Exam   Muscle bulk: normal    Strength   Strength 5/5 throughout.     Sensory Exam   Decreased perception to light touch modality distal to the left knee including L4-5 and S1 distributions.     Gait, Coordination, and Reflexes     Gait  Gait: normal    Reflexes   Right patellar: 1+  Left patellar: 1+  Right achilles: 1+  Left achilles: 1+  Right plantar: equivocal  Left plantar: equivocal  Right ankle clonus: absent  Left ankle clonus: absent        DIAGNOSTIC DATA:  I personally interpreted the following imaging:   MRI lumbar spine showed degenerative disc changes at L4-5 and L5-S1.  There is some reactive marrow changes adjacent to this as well.  There is a small disc bulge at L4-5 on the right side narrowing the subarticular space.  I do not see any compression of the spinal nerve roots affecting the left side.  X-rays show no listhesis or fracture.    ASSESMENT:  This is a 64 y.o. male with     Problem List Items Addressed This Visit     None      Visit Diagnoses     Degenerative lumbar disc    -  Primary    Relevant Orders    Procedure Order to Pain Management    Lumbar herniated disc              PLAN:  I do not identify a source for the left ankle pain on the MRI scan.  He has 2 level degenerative disc changes at L4-5 and L5-S1 which may be a source of his low back pain symptoms.  We discussed multiple modalities of conservative therapy including physical therapy and lumbar epidural steroid injections.  He is open to trying lumbar epidural steroid injections.   His report from his MRI scan was not available for review today, and I invited him to send this and if he wants me to review this as well.      Misael Gage MD, FAANS

## 2020-12-08 ENCOUNTER — OFFICE VISIT (OUTPATIENT)
Dept: NEPHROLOGY | Facility: CLINIC | Age: 65
End: 2020-12-08
Payer: MEDICARE

## 2020-12-08 ENCOUNTER — PATIENT MESSAGE (OUTPATIENT)
Dept: INTERNAL MEDICINE | Facility: CLINIC | Age: 65
End: 2020-12-08

## 2020-12-08 VITALS
HEIGHT: 71 IN | SYSTOLIC BLOOD PRESSURE: 130 MMHG | DIASTOLIC BLOOD PRESSURE: 88 MMHG | BODY MASS INDEX: 27.1 KG/M2 | WEIGHT: 193.56 LBS | HEART RATE: 65 BPM | OXYGEN SATURATION: 99 %

## 2020-12-08 DIAGNOSIS — N28.1 COMPLEX RENAL CYST: ICD-10-CM

## 2020-12-08 PROCEDURE — 99499 UNLISTED E&M SERVICE: CPT | Mod: S$GLB,,, | Performed by: INTERNAL MEDICINE

## 2020-12-08 PROCEDURE — 3008F BODY MASS INDEX DOCD: CPT | Mod: CPTII,S$GLB,, | Performed by: INTERNAL MEDICINE

## 2020-12-08 PROCEDURE — 99499 NO LOS: ICD-10-PCS | Mod: S$GLB,,, | Performed by: INTERNAL MEDICINE

## 2020-12-08 PROCEDURE — 99999 PR PBB SHADOW E&M-EST. PATIENT-LVL III: ICD-10-PCS | Mod: PBBFAC,,, | Performed by: INTERNAL MEDICINE

## 2020-12-08 PROCEDURE — 3008F PR BODY MASS INDEX (BMI) DOCUMENTED: ICD-10-PCS | Mod: CPTII,S$GLB,, | Performed by: INTERNAL MEDICINE

## 2020-12-08 PROCEDURE — 99999 PR PBB SHADOW E&M-EST. PATIENT-LVL III: CPT | Mod: PBBFAC,,, | Performed by: INTERNAL MEDICINE

## 2020-12-08 RX ORDER — GABAPENTIN 100 MG/1
100 CAPSULE ORAL 3 TIMES DAILY
COMMUNITY
End: 2020-12-10 | Stop reason: SDUPTHER

## 2020-12-08 NOTE — LETTER
December 8, 2020      Leon Chaparro MD  [address] [facility]  [place] [address]           [facility] - Nephrology 5th Fl  [address]  [facility] [address]  Phone: [phone]  Fax: [phone]          Patient: Casper Osborne   MR Number: [id number]   YOB: 1955   Date of Visit: 12/8/2020       Dear Dr. Leon Chaparro:    Thank you for referring Casper Osborne to me for evaluation. Attached you will find relevant portions of my assessment and plan of care.    If you have questions, please do not hesitate to call me. I look forward to following Casper Osborne along with you.    Sincerely,    Mi Frank MD    Enclosure  CC:  No Recipients    If you would like to receive this communication electronically, please contact [email or web address] or [phone] to request more information on web care [employer] Link access.    For providers and/or their staff who would like to refer a patient to [facility], please contact us through our one-stop-shop provider referral line, [facility], at [phone].    If you feel you have received this communication in error or would no longer like to receive these types of communications, please e-mail [email or web address]

## 2020-12-09 ENCOUNTER — TELEPHONE (OUTPATIENT)
Dept: PAIN MEDICINE | Facility: CLINIC | Age: 65
End: 2020-12-09

## 2020-12-09 ENCOUNTER — PATIENT MESSAGE (OUTPATIENT)
Dept: INTERNAL MEDICINE | Facility: CLINIC | Age: 65
End: 2020-12-09

## 2020-12-09 NOTE — TELEPHONE ENCOUNTER
"Pt's wife states she will have pt call back to schedule this patient for LESI @ L4-5 with IV sedation.      (Also, please make him a f/u with me 2 weeks post-injection and put "consider Intracept procedure for discogenic pain" in the visit notes with Dr. aCli.)  "

## 2020-12-10 ENCOUNTER — OFFICE VISIT (OUTPATIENT)
Dept: INTERNAL MEDICINE | Facility: CLINIC | Age: 65
End: 2020-12-10
Payer: MEDICARE

## 2020-12-10 ENCOUNTER — OFFICE VISIT (OUTPATIENT)
Dept: UROLOGY | Facility: CLINIC | Age: 65
End: 2020-12-10
Payer: MEDICARE

## 2020-12-10 ENCOUNTER — TELEPHONE (OUTPATIENT)
Dept: PAIN MEDICINE | Facility: CLINIC | Age: 65
End: 2020-12-10

## 2020-12-10 VITALS
HEIGHT: 71 IN | SYSTOLIC BLOOD PRESSURE: 133 MMHG | DIASTOLIC BLOOD PRESSURE: 76 MMHG | WEIGHT: 193.56 LBS | HEART RATE: 75 BPM | BODY MASS INDEX: 27.1 KG/M2

## 2020-12-10 VITALS
HEART RATE: 67 BPM | SYSTOLIC BLOOD PRESSURE: 134 MMHG | HEIGHT: 71 IN | WEIGHT: 193.31 LBS | RESPIRATION RATE: 18 BRPM | TEMPERATURE: 98 F | BODY MASS INDEX: 27.06 KG/M2 | OXYGEN SATURATION: 97 % | DIASTOLIC BLOOD PRESSURE: 80 MMHG

## 2020-12-10 DIAGNOSIS — M54.16 LUMBAR RADICULOPATHY: Primary | ICD-10-CM

## 2020-12-10 DIAGNOSIS — N28.1 COMPLEX RENAL CYST: ICD-10-CM

## 2020-12-10 DIAGNOSIS — M79.89 SWELLING OF LEFT LOWER EXTREMITY: ICD-10-CM

## 2020-12-10 PROCEDURE — 99999 PR PBB SHADOW E&M-EST. PATIENT-LVL III: CPT | Mod: PBBFAC,,, | Performed by: UROLOGY

## 2020-12-10 PROCEDURE — 99214 PR OFFICE/OUTPT VISIT, EST, LEVL IV, 30-39 MIN: ICD-10-PCS | Mod: S$GLB,,, | Performed by: UROLOGY

## 2020-12-10 PROCEDURE — 99999 PR PBB SHADOW E&M-EST. PATIENT-LVL III: ICD-10-PCS | Mod: PBBFAC,,, | Performed by: UROLOGY

## 2020-12-10 PROCEDURE — 1126F AMNT PAIN NOTED NONE PRSNT: CPT | Mod: S$GLB,,, | Performed by: UROLOGY

## 2020-12-10 PROCEDURE — 99214 OFFICE O/P EST MOD 30 MIN: CPT | Mod: S$GLB,,, | Performed by: UROLOGY

## 2020-12-10 PROCEDURE — 99214 PR OFFICE/OUTPT VISIT, EST, LEVL IV, 30-39 MIN: ICD-10-PCS | Mod: S$GLB,,, | Performed by: INTERNAL MEDICINE

## 2020-12-10 PROCEDURE — 99214 OFFICE O/P EST MOD 30 MIN: CPT | Mod: S$GLB,,, | Performed by: INTERNAL MEDICINE

## 2020-12-10 PROCEDURE — 1126F PR PAIN SEVERITY QUANTIFIED, NO PAIN PRESENT: ICD-10-PCS | Mod: S$GLB,,, | Performed by: INTERNAL MEDICINE

## 2020-12-10 PROCEDURE — 99999 PR PBB SHADOW E&M-EST. PATIENT-LVL IV: ICD-10-PCS | Mod: PBBFAC,,, | Performed by: INTERNAL MEDICINE

## 2020-12-10 PROCEDURE — 1126F PR PAIN SEVERITY QUANTIFIED, NO PAIN PRESENT: ICD-10-PCS | Mod: S$GLB,,, | Performed by: UROLOGY

## 2020-12-10 PROCEDURE — 1126F AMNT PAIN NOTED NONE PRSNT: CPT | Mod: S$GLB,,, | Performed by: INTERNAL MEDICINE

## 2020-12-10 PROCEDURE — 99999 PR PBB SHADOW E&M-EST. PATIENT-LVL IV: CPT | Mod: PBBFAC,,, | Performed by: INTERNAL MEDICINE

## 2020-12-10 RX ORDER — GABAPENTIN 300 MG/1
300 CAPSULE ORAL NIGHTLY
Qty: 30 CAPSULE | Refills: 3 | Status: SHIPPED | OUTPATIENT
Start: 2020-12-10 | End: 2021-01-11

## 2020-12-10 NOTE — TELEPHONE ENCOUNTER
Voicemail box was full and could not leave message. Tried contacting other number in chart and left voice message.

## 2020-12-10 NOTE — TELEPHONE ENCOUNTER
----- Message from Jaz Hernandez MA sent at 12/10/2020  9:01 AM CST -----  Contact: [phone] @ Patient    ----- Message -----  From: Alecia Feldman  Sent: 12/10/2020   8:16 AM CST  To: Viraj Rehman [facility] Staff    Patient is returning a phone call.  Who left a message for the patient: José Miguel Plaza LPN   Does patient know what this is regarding:  schedule procedure  Comments:

## 2020-12-10 NOTE — LETTER
December 10, 2020      Leon Chaparro MD  [address] [facility]  [place] [address]           [facility] - Urology Atrium 4th Fl  [address]  [place] [address]  Phone: [phone]          Patient: Casper Osborne   MR Number: [id number]   YOB: 1955   Date of Visit: 12/10/2020       Dear Dr. Leon Chaparro:    Thank you for referring Casper Osborne to me for evaluation. Attached you will find relevant portions of my assessment and plan of care.    If you have questions, please do not hesitate to call me. I look forward to following Casper Osborne along with you.    Sincerely,    Philip Moon MD    Enclosure  CC:  No Recipients    If you would like to receive this communication electronically, please contact [email or web address] or [phone] to request more information on [employer] Link access.    For providers and/or their staff who would like to refer a patient to [facility], please contact us through our one-stop-shop provider referral line, [facility], at [phone].    If you feel you have received this communication in error or would no longer like to receive these types of communications, please e-mail [email or web address]

## 2020-12-10 NOTE — PROGRESS NOTES
[facility] Department of Urology      Renal Mass Note    12/10/2020    Referred by:  Leon Chaparro MD    HPI: Casper Osborne is a very pleasant 64 y.o. male who is a new patient to our department referred for evaluation of a right renal mass. He feels very frustrated this morning. He was initially referred to nephrology, who let him know at the time of his visit that they were not the appropriate service to evaluate the renal mass.     He initially had an MRI to evaluate lower leg pain, though to be of neurologic origin. I do not have that report, unfortunately, and only a few images are loaded into the system for review of these outside films. However, I suspect that a suspicious renal mass was noted that prompted a renal U/S. I was able to review the renal U/S images and report in detail today. This demonstrates simple cysts but also a right lower pole renal cyst with what may be a thickened wall, or (suggested on report) could be adjacent vessels. Regardless, this does not appear strictly simple.     He has no history of hypertension, diabetes or known renal disease. His recent creatinine is 1.1.     A review of 10+ systems was conducted with pertinent positive and negative findings documented in HPI with all other systems reviewed and negative.    Past medical, family, surgical and social history reviewed as documented in chart with pertinent positive medical, family, surgical and social history detailed in HPI.    Exam Findings:    Const: no acute distress, conversant and alert  Eyes: anicteric, extraocular muscles intact  ENMT: normocephalic, Nl oral membranes  Cardio: no cyanosis, nl cap refill  Pulm: no tachypnea; no resp distress  Abd: soft, no tenderness  Musc: no laceration, no tenderness  Neuro: alert; oriented x 3  Skin: warm, dry; no petichiae  Psych: no anxiety; normal speech     Assessment/Plan:    Right Renal Cyst (new, addt'l workup): While I did let him know that renal pathology is not an area that I  frequently treat, I also spent quite a lot of time reviewing the nature of renal cysts and what factors make them more or less suspicious for malignancy. We discussed the finding on his recent renal U/S and my suggestion that a CT with contrast may be useful, particularly to evaluate if any aspect of this complex cyst enhances. I discussed surgical management, at his request, to a more limited degree given my experience. He seemed appreciative of having a definitive plan to move forward. I would like for him to meet with Dr. De Jesus on return visit to review the results of the CT and discuss surgical management if there is a component to this worrisome for malignancy.             Review of prior external note(s): outside records reviewed today and summarized in HPI   Review of test results: BMP   Tests Ordered: CT abdomen with contrast   Radiology independently reviewed: renal U/S (date December 2020) MRI (date November 2020)   Discussion with external physician/health care professional: No discussion with external provider

## 2020-12-10 NOTE — PROGRESS NOTES
Subjective:       Patient ID: Casper Osborne is a 64 y.o. male.    Chief Complaint: leg Numbness (Had MRI last year still have concern  -Left Leg )    HPI     This is a 64-year-old male here to discuss concerns.  He reports that his left leg is numb from his knee to his instep.  He had a nerve block and MRI in December in 2019.  He was told that this would get better.  He went to [facility] to see a neurologist for a second opinion.  About 2 hours after Dr. Soto called him, he was told that he would get a scan and a neurosurgery appointment for his lower back.  He got a call from [employer] that stopped neurosurgery at [facility].  He was told by Dr. Gage to see a pain management physician.  He was told that he should see pain management.  He has pain in his lower back.  He had an EMG with Dr. Soto - she found that the left leg has leg has less feeling than the right one, but is in the normal range.  He has no sensation of pain going down his leg at all.  Dr. Soto thinks this is related to his sciatic nerve.  He has tried ibuprofen 800 mg as needed.  This has not helped.  He has taken gabapentin 300 mg in the am for this.    His ankle has been swollen for the last 4 days.  He reports That he did not hurt his foot.  It swells intermittently since 1996.  He still sees Dr. Melendez.  He broke his foot falling off a ladder.    Review of Systems      Objective:      Physical Exam  Vitals signs reviewed.   Constitutional:       Appearance: He is well-developed.   HENT:      Head: Normocephalic and atraumatic.      Mouth/Throat:      Pharynx: No oropharyngeal exudate.   Eyes:      General: No scleral icterus.        Right eye: No discharge.         Left eye: No discharge.      Pupils: Pupils are equal, round, and reactive to light.   Neck:      Musculoskeletal: Normal range of motion and neck supple.      Thyroid: No thyromegaly.      Trachea: No tracheal deviation.   Cardiovascular:      Rate and Rhythm: Normal rate and  regular rhythm.      Heart sounds: Normal heart sounds. No murmur. No friction rub. No gallop.    Pulmonary:      Effort: Pulmonary effort is normal. No respiratory distress.      Breath sounds: Normal breath sounds. No wheezing or rales.   Chest:      Chest wall: No tenderness.   Abdominal:      General: Bowel sounds are normal. There is no distension.      Palpations: Abdomen is soft. There is no mass.      Tenderness: There is no abdominal tenderness. There is no guarding or rebound.   Musculoskeletal: Normal range of motion.         General: No tenderness.   Skin:     General: Skin is warm and dry.      Coloration: Skin is not pale.      Findings: No erythema or rash.   Neurological:      Mental Status: He is alert and oriented to person, place, and time.   Psychiatric:         Behavior: Behavior normal.         Assessment:       1. Lumbar radiculopathy    2. Swelling of left lower extremity        Plan:       1.  Refer to pain management.  Continue gabapentin 300 mg nightly.  If no improvement in pain 3 Monday, increase to 300 mg b.i.d..  2.  Think this is related to patient's surgery from 1996.  This is not a departure from his usual swelling.

## 2020-12-14 ENCOUNTER — HOSPITAL ENCOUNTER (OUTPATIENT)
Dept: RADIOLOGY | Facility: HOSPITAL | Age: 65
Discharge: HOME | End: 2020-12-14
Attending: UROLOGY
Payer: MEDICARE

## 2020-12-14 DIAGNOSIS — N28.1 COMPLEX RENAL CYST: ICD-10-CM

## 2020-12-14 PROCEDURE — 74178 CT ABD&PLV WO CNTR FLWD CNTR: CPT | Mod: TC

## 2020-12-14 PROCEDURE — 74178 CT ABD&PLV WO CNTR FLWD CNTR: CPT | Mod: 26,,, | Performed by: RADIOLOGY

## 2020-12-14 PROCEDURE — 25500020 PHARM REV CODE 255: Performed by: UROLOGY

## 2020-12-14 PROCEDURE — 74178 CT ABDOMEN PELVIS W WO CONTRAST: ICD-10-PCS | Mod: 26,,, | Performed by: RADIOLOGY

## 2020-12-14 RX ADMIN — IOHEXOL 100 ML: 350 INJECTION, SOLUTION INTRAVENOUS at 08:12

## 2020-12-15 ENCOUNTER — PATIENT MESSAGE (OUTPATIENT)
Dept: UROLOGY | Facility: CLINIC | Age: 65
End: 2020-12-15

## 2020-12-15 ENCOUNTER — PATIENT MESSAGE (OUTPATIENT)
Dept: INTERNAL MEDICINE | Facility: CLINIC | Age: 65
End: 2020-12-15

## 2020-12-15 NOTE — PROGRESS NOTES
[facility] New Patient Evaluation    Referred by: Dr Leon Chaparro  Reason for referral: Back     CC:   Chief Complaint   Patient presents with    Low-back Pain     Last 3 PDI Scores 4/7/2014   Pain Disability Index (PDI) 30       HPI:   Casper Osborne is a 64 y.o. male with history of diffuse musculoskeletal pain due to various injuries and degenerative conditions who presents today complaining of nonradiating chronic low back pain.  He was evaluated by 2 separate neurosurgeons, 1 here at [facility] and the other at an outside facility, and both recommended against surgical intervention.  He reports dealing with this pain since 1998, though it has worsened over the past year.  In addition to low back pain, he also reports chronic left foot, ankle pain related to a crush injury in 1996.  He will pain is especially bothersome when he is at rest, but improves with standing or walking.  He has tried ibuprofen and gabapentin for the low back without improvement.    Location: low back   Onset: 1998  Current Pain Score: 7/10  Daily Pain of Range: 6-8/10  Quality: Grabbing, Deep and Sharp  Radiation: left leg  Worsened by: nothing in particular  Improved by: medications    Previous Therapies:  PT/OT: Previously  HEP: Yes  Interventions: Denies back injections  Surgery: Denies back surgery  Medications:   - NSAIDS:   - MSK Relaxants:   - TCAs:   - SNRIs:   - Topicals:   - Anticonvulsants:  - Opioids:     Current Pain Medications:  1. Gabapentin  2. Ibuprofen  3. Tylenol     Review of Systems:  Review of Systems   Constitutional: Negative for chills and fever.   HENT: Negative for nosebleeds.    Eyes: Negative for blurred vision and pain.   Respiratory: Negative for hemoptysis.    Cardiovascular: Negative for chest pain and palpitations.   Gastrointestinal: Negative for heartburn, nausea and vomiting.   Genitourinary: Negative for dysuria and hematuria.   Musculoskeletal: Negative for myalgias.   Skin: Negative for  rash.   Neurological: Negative for seizures and loss of consciousness.   Endo/Heme/Allergies: Does not bruise/bleed easily.   Psychiatric/Behavioral: Negative for hallucinations.       History:    Current Outpatient Medications:     alfuzosin (UROXATRAL) 10 mg Tb24, TAKE 1 TABLET (10 MG TOTAL) BY MOUTH DAILY WITH BREAKFAST. , Disp: 90 tablet, Rfl: 3    aspirin (ECOTRIN) 81 MG EC tablet, Take 81 mg by mouth once daily., Disp: , Rfl:     fluticasone propionate (FLONASE) 50 mcg/actuation nasal spray, 1 spray (50 mcg total) by Each Nostril route once daily. (Patient not taking: Reported on 12/10/2020), Disp: 16 g, Rfl: 3    gabapentin (NEURONTIN) 300 MG capsule, Take 1 capsule (300 mg total) by mouth every evening. Unknown route/dosage, Disp: 30 capsule, Rfl: 3    ibuprofen (ADVIL,MOTRIN) 800 MG tablet, Take 1 tablet (800 mg total) by mouth 3 (three) times daily as needed for Pain., Disp: 90 tablet, Rfl: 0    ipratropium (ATROVENT) 0.03 % nasal spray, PLACE TWO SPRAYS IN EACH NOSTRIL THREE TIMES DAILY , Disp: 30 mL, Rfl: 0    lovastatin (MEVACOR) 40 MG tablet, TAKE ONE TABLET BY MOUTH AT BEDTIME , Disp: 90 tablet, Rfl: 1    omeprazole (PRILOSEC) 40 MG capsule, Take 1 capsule (40 mg total) by mouth once daily., Disp: 90 capsule, Rfl: 3  No current facility-administered medications for this visit.     Past Medical History:   Diagnosis Date    GERD (gastroesophageal reflux disease)     Hyperlipidemia        Past Surgical History:   Procedure Laterality Date    ARTHROSCOPY OF ANKLE WITH DEBRIDEMENT Left 12/19/2019    Procedure: ARTHROSCOPY, ANKLE, WITH DEBRIDEMENT;  Surgeon: Bay Melendez MD;  [facility];  Service: Orthopedics;  Laterality: Left;    FRACTURE SURGERY      left heel at 41yo    HEEL SPUR SURGERY      left heel - fell off a ladder and had to have this reconstructed    SPINE SURGERY      c7 fx - prior to this, he was getting dizzy spells - none since    TONSILLECTOMY      VASECTOMY          Family History   Problem Relation Age of Onset    Diabetes Mother     Cancer Father         melanoma cancer with mets    Heart disease Daughter         enlarged heart    Hypothyroidism Daughter     Colon polyps Neg Hx     Prostate cancer Neg Hx     Colon cancer Neg Hx     Stroke Neg Hx     Hypertension Neg Hx     Hyperlipidemia Neg Hx        Social History     Socioeconomic History    Marital status:      Spouse name: Nithya    Number of children: 1    Years of education: Not on file    Highest education level: Not on file   Occupational History    Occupation: Retried   Social Needs    Financial resource strain: Not hard at all    Food insecurity     Worry: Never true     Inability: Never true    Transportation needs     Medical: No     Non-medical: No   Tobacco Use    Smoking status: Never Smoker    Smokeless tobacco: Never Used   Substance and Sexual Activity    Alcohol use: Yes     Frequency: 2-3 times a week     Drinks per session: 1 or 2     Binge frequency: Never     Comment: less than once a month    Drug use: No    Sexual activity: Yes     Partners: Female     Comment:    Lifestyle    Physical activity     Days per week: 3 days     Minutes per session: 150+ min    Stress: To some extent   Relationships    Social connections     Talks on phone: Once a week     Gets together: Never     Attends Confucianist service: Not on file     Active member of club or organization: No     Attends meetings of clubs or organizations: Never     Relationship status:    Other Topics Concern    Not on file   Social History Narrative    Not on file       Review of patient's allergies indicates:  No Known Allergies    Physical Exam:  There were no vitals filed for this visit.  General    Nursing note and vitals reviewed.  Constitutional: He is oriented to person, place, and time. He appears well-developed and well-nourished. No distress.   HENT:   Head: Normocephalic and atraumatic.    Nose: Nose normal.   Eyes: Conjunctivae and EOM are normal. Pupils are equal, round, and reactive to light. Right eye exhibits no discharge. Left eye exhibits no discharge. No scleral icterus.   Neck: No JVD present.   Cardiovascular: Intact distal pulses.    Pulmonary/Chest: Effort normal. No respiratory distress.   Abdominal: He exhibits no distension.   Neurological: He is alert and oriented to person, place, and time. Coordination normal.   Psychiatric: He has a normal mood and affect. His behavior is normal. Judgment and thought content normal.     General Musculoskeletal Exam   Gait: normal     Back (L-Spine & T-Spine) / Neck (C-Spine) Exam     Tenderness Right paramedian tenderness of the Lower L-Spine. Left paramedian tenderness of the Lower L-Spine.     Back (L-Spine & T-Spine) Range of Motion   Back extension: facet loading is positive and exacerabtes/reproduces the patient's typical low back pain    Back flexion: pain present with disc loading.     Spinal Sensation   Right Side Sensation  L-Spine Level: normal  Left Side Sensation  L-Spine Level: normal    Other He has no scoliosis .      Muscle Strength   Right Lower Extremity   Hip Flexion: 5/5   Hip Extensors: 5/5  Quadriceps:  5/5   Hamstrin/5   Gastrocsoleus:  5/5   Left Lower Extremity   Hip Flexion: 5/5   Hip Extensors: 5/5  Quadriceps:  5/5   Hamstrin/5   Gastrocsoleus:  5/5     Reflexes     Left Side  Achilles:  2+  Quadriceps:  2+    Right Side   Achilles:  2+  Quadriceps:  2+      Imaging:  External MRI lumbar spine 2020  My review of the images is significant for substantial degenerative disc disease at L4-5 and L5-S1 with associated vertebral endplate changes and Modic changes in L4, L5, and S1.  While there is neural foraminal narrowing, there does not appear to be substantial effacement of the exiting nerve roots.  There is no appreciable central canal stenosis either.      X-Ray Lumbar Spine Ap Lateral w/Flex Ext  12/07/2020  Narrative & Impression  EXAMINATION:  XR LUMBAR SPINE AP AND LAT WITH FLEX/EXT  CLINICAL HISTORY:  Other intervertebral disc displacement, lumbar region  TECHNIQUE:  AP and lateral views as well as lateral flexion and extension images are performed through the lumbar spine.  COMPARISON:  April 7, 2014  FINDINGS:  Less than 5 degree thoracolumbar dextrocurvature.  No acute fractures, preserved vertebral body heights and pedicles.  Scattered vertebral body anterolateral end plate osteophytes.  Mild to moderate disc narrowing L4-L5 level, the degree of disc narrowing appearing mildly progressed to earlier exam.  Moderate to severe disc narrowing at L5-S1 level, the degree of disc narrowing appearing similar to earlier exam.  No spondylolysis or spondylolisthesis.  Abdominal aortic wall calcification.  Intact right and left SI joints and visualized hip joint spaces.  Impression:  No acute fracture, degenerative spondylosis changes as described.      Labs:  BMP  Lab Results   Component Value Date     12/07/2020    K 4.0 12/07/2020     12/07/2020    CO2 26 12/07/2020    BUN 16 12/07/2020    CREATININE 1.1 12/07/2020    CALCIUM 9.2 12/07/2020    ANIONGAP 11 12/07/2020    ESTGFRAFRICA >60.0 12/07/2020    EGFRNONAA >60.0 12/07/2020     Lab Results   Component Value Date    ALT 32 02/03/2020    AST 22 02/03/2020    ALKPHOS 77 02/03/2020    BILITOT 1.0 02/03/2020       Assessment:  Problem List Items Addressed This Visit     DDD (degenerative disc disease), lumbar - Primary    Chronic midline low back pain without sciatica    Lumbar discogenic pain syndrome          12/16/20 - Casper BROWN Dylon is a 64 y.o. male with progressive, nonradiating chronic low back pain exacerbated by both lumbar extension and flexion.  MRI of the lumbar spine shows substantial degenerative disc disease at L4-5 and L5-S1 with Modic changes in the vertebra at L4, L5, and S1.  I believe he is experiencing discogenic and vertebra  Adri pain based on examination and imaging.  I believe he would be an excellent candidate for basivertebral nerve RFA via with Intercept procedure; however, in the meantime I have recommended a trial of lumbar epidural steroid injections for palliation.  He has failed over-the-counter medications and states that desire to avoid opioid medications.  I did not detect any symptoms strongly correlated with facet arthropathy, which is why I have not recommended a trial of diagnostic medial branch blocks at this time though I may consider them in the future as part of the workup prior to the Intercept procedure.    : Reviewed and consistent with medication use as prescribed.    Treatment Plan:   Procedures:    - s/f LESI   - start auth process for Intercept procedure  PT/OT/HEP: Patient completed without relief  Medications: No changes recommended at this time.  Labs: reviewed and medications are appropriately dosed for current hepatorenal function.  Imaging: No additional recommended at this time.    Follow Up: RTC 1 week for LESI then f/u in clinic    Ori Cali Jr, MD  Interventional Pain Medicine / Anesthesiology    Disclaimer: This note was partly generated using dictation software which may occasionally result in transcription errors.

## 2020-12-16 ENCOUNTER — OFFICE VISIT (OUTPATIENT)
Dept: PAIN MEDICINE | Facility: CLINIC | Age: 65
End: 2020-12-16
Payer: MEDICARE

## 2020-12-16 VITALS — SYSTOLIC BLOOD PRESSURE: 144 MMHG | DIASTOLIC BLOOD PRESSURE: 96 MMHG | HEART RATE: 67 BPM

## 2020-12-16 DIAGNOSIS — M54.50 CHRONIC MIDLINE LOW BACK PAIN WITHOUT SCIATICA: ICD-10-CM

## 2020-12-16 DIAGNOSIS — M51.36 DDD (DEGENERATIVE DISC DISEASE), LUMBAR: Primary | ICD-10-CM

## 2020-12-16 DIAGNOSIS — M51.26 LUMBAR DISCOGENIC PAIN SYNDROME: ICD-10-CM

## 2020-12-16 DIAGNOSIS — G89.29 CHRONIC MIDLINE LOW BACK PAIN WITHOUT SCIATICA: ICD-10-CM

## 2020-12-16 PROBLEM — M51.360 LUMBAR DISCOGENIC PAIN SYNDROME: Status: ACTIVE | Noted: 2020-12-16

## 2020-12-16 PROBLEM — M51.369 DDD (DEGENERATIVE DISC DISEASE), LUMBAR: Status: ACTIVE | Noted: 2020-12-16

## 2020-12-16 PROCEDURE — 99204 PR OFFICE/OUTPT VISIT, NEW, LEVL IV, 45-59 MIN: ICD-10-PCS | Mod: S$PBB,,, | Performed by: PAIN MEDICINE

## 2020-12-16 PROCEDURE — 99999 PR PBB SHADOW E&M-EST. PATIENT-LVL III: CPT | Mod: PBBFAC,,, | Performed by: PAIN MEDICINE

## 2020-12-16 PROCEDURE — 99999 PR PBB SHADOW E&M-EST. PATIENT-LVL III: ICD-10-PCS | Mod: PBBFAC,,, | Performed by: PAIN MEDICINE

## 2020-12-16 PROCEDURE — 99213 OFFICE O/P EST LOW 20 MIN: CPT | Mod: PBBFAC,PO | Performed by: PAIN MEDICINE

## 2020-12-16 PROCEDURE — 99204 OFFICE O/P NEW MOD 45 MIN: CPT | Mod: S$PBB,,, | Performed by: PAIN MEDICINE

## 2020-12-16 NOTE — LETTER
December 16, 2020      Leon Chaparro MD  [address]  [place] [address]           [facility]  [address]  [place] [address]  Phone: [phone]          Patient: Casper Osborne   MR Number: [id number]   YOB: 1955   Date of Visit: 12/16/2020       Dear Dr. Leon Chaparro:    Thank you for referring Casper Osborne to me for evaluation. Attached you will find relevant portions of my assessment and plan of care.    If you have questions, please do not hesitate to call me. I look forward to following Casper Osborne along with you.    Sincerely,    Ori Cali Jr., MD    Enclosure  CC:  No Recipients    If you would like to receive this communication electronically, please contact [email or web address] or [phone] to request more information on [facility] access.    For providers and/or their staff who would like to refer a patient to [facility], please contact us through our one-stop-shop provider referral line, Nader Cheng, at [phone].    If you feel you have received this communication in error or would no longer like to receive these types of communications, please e-mail [email or web address]

## 2020-12-17 ENCOUNTER — TELEPHONE (OUTPATIENT)
Dept: PAIN MEDICINE | Facility: CLINIC | Age: 65
End: 2020-12-17

## 2020-12-17 DIAGNOSIS — M54.16 LUMBAR RADICULOPATHY: Primary | ICD-10-CM

## 2020-12-17 NOTE — TELEPHONE ENCOUNTER
Pt scheduled for 12/24/20 at 1115 am for LESI. Pt aware to check in at registration desk on the first floor of the hospital for 1015 am. Pt denied taking blood thinners and is not diabetic. ASA listed in chart. Pt states he is not taking it. Pt denied having a pacemaker/ICD.

## 2020-12-18 ENCOUNTER — PATIENT MESSAGE (OUTPATIENT)
Dept: PAIN MEDICINE | Facility: HOSPITAL | Age: 65
End: 2020-12-18

## 2020-12-23 ENCOUNTER — TELEPHONE (OUTPATIENT)
Dept: PAIN MEDICINE | Facility: CLINIC | Age: 65
End: 2020-12-23

## 2020-12-23 ENCOUNTER — PATIENT MESSAGE (OUTPATIENT)
Dept: INTERNAL MEDICINE | Facility: CLINIC | Age: 65
End: 2020-12-23

## 2020-12-23 DIAGNOSIS — M54.16 LUMBAR RADICULOPATHY: Primary | ICD-10-CM

## 2020-12-23 NOTE — TELEPHONE ENCOUNTER
Contacted pt to move procedure time up due cancellations. Pt states he did not want to come in any earlier. Pt aware that due to cancellations that we have to move all procedures up on the schedule. Pt requested to cancel procedure. Offered pt next Tuesday in the afternoon for procedure. Pt declined and hung up the phone while I was still talking.

## 2020-12-24 PROBLEM — G89.29 CHRONIC PAIN: Status: ACTIVE | Noted: 2020-12-24

## 2020-12-29 ENCOUNTER — PATIENT MESSAGE (OUTPATIENT)
Dept: UROLOGY | Facility: CLINIC | Age: 65
End: 2020-12-29

## 2020-12-29 ENCOUNTER — PATIENT MESSAGE (OUTPATIENT)
Dept: PAIN MEDICINE | Facility: HOSPITAL | Age: 65
End: 2020-12-29

## 2020-12-29 ENCOUNTER — PATIENT OUTREACH (OUTPATIENT)
Dept: ADMINISTRATIVE | Facility: OTHER | Age: 65
End: 2020-12-29

## 2020-12-29 NOTE — PROGRESS NOTES
LINKS immunization registry not responding  Care Everywhere updated  Health Maintenance updated  Chart reviewed for overdue Proactive [facility] (KAREN) health maintenance testing (CRS, Breast Ca, Diabetic Eye Exam)   Orders entered:N/A

## 2021-01-04 ENCOUNTER — OFFICE VISIT (OUTPATIENT)
Dept: UROLOGY | Facility: CLINIC | Age: 66
End: 2021-01-04
Payer: MEDICARE

## 2021-01-04 ENCOUNTER — TELEPHONE (OUTPATIENT)
Dept: INTERNAL MEDICINE | Facility: CLINIC | Age: 66
End: 2021-01-04

## 2021-01-04 VITALS
WEIGHT: 194.25 LBS | DIASTOLIC BLOOD PRESSURE: 98 MMHG | RESPIRATION RATE: 20 BRPM | HEART RATE: 66 BPM | TEMPERATURE: 98 F | HEIGHT: 71 IN | SYSTOLIC BLOOD PRESSURE: 159 MMHG | BODY MASS INDEX: 27.19 KG/M2

## 2021-01-04 DIAGNOSIS — N28.1 COMPLEX RENAL CYST: Primary | ICD-10-CM

## 2021-01-04 DIAGNOSIS — N39.43 BENIGN PROSTATIC HYPERPLASIA WITH POST-VOID DRIBBLING: ICD-10-CM

## 2021-01-04 DIAGNOSIS — N40.1 BENIGN PROSTATIC HYPERPLASIA WITH POST-VOID DRIBBLING: ICD-10-CM

## 2021-01-04 PROCEDURE — 99999 PR PBB SHADOW E&M-EST. PATIENT-LVL III: ICD-10-PCS | Mod: PBBFAC,,, | Performed by: UROLOGY

## 2021-01-04 PROCEDURE — 99213 OFFICE O/P EST LOW 20 MIN: CPT | Mod: PBBFAC | Performed by: UROLOGY

## 2021-01-04 PROCEDURE — 99214 OFFICE O/P EST MOD 30 MIN: CPT | Mod: S$PBB,,, | Performed by: UROLOGY

## 2021-01-04 PROCEDURE — 99214 PR OFFICE/OUTPT VISIT, EST, LEVL IV, 30-39 MIN: ICD-10-PCS | Mod: S$PBB,,, | Performed by: UROLOGY

## 2021-01-04 PROCEDURE — 99999 PR PBB SHADOW E&M-EST. PATIENT-LVL III: CPT | Mod: PBBFAC,,, | Performed by: UROLOGY

## 2021-01-06 ENCOUNTER — PATIENT MESSAGE (OUTPATIENT)
Dept: INTERNAL MEDICINE | Facility: CLINIC | Age: 66
End: 2021-01-06

## 2021-01-06 ENCOUNTER — PATIENT MESSAGE (OUTPATIENT)
Dept: UROLOGY | Facility: CLINIC | Age: 66
End: 2021-01-06

## 2021-01-06 DIAGNOSIS — M47.816 LUMBAR FACET ARTHROPATHY: Primary | Chronic | ICD-10-CM

## 2021-01-07 ENCOUNTER — TELEPHONE (OUTPATIENT)
Dept: UROLOGY | Facility: CLINIC | Age: 66
End: 2021-01-07

## 2021-01-07 ENCOUNTER — PATIENT MESSAGE (OUTPATIENT)
Dept: UROLOGY | Facility: CLINIC | Age: 66
End: 2021-01-07

## 2021-01-08 ENCOUNTER — TELEPHONE (OUTPATIENT)
Dept: PAIN MEDICINE | Facility: CLINIC | Age: 66
End: 2021-01-08

## 2021-01-11 ENCOUNTER — TELEPHONE (OUTPATIENT)
Dept: ORTHOPEDICS | Facility: CLINIC | Age: 66
End: 2021-01-11

## 2021-01-11 ENCOUNTER — OFFICE VISIT (OUTPATIENT)
Dept: PAIN MEDICINE | Facility: CLINIC | Age: 66
End: 2021-01-11
Attending: INTERNAL MEDICINE
Payer: MEDICARE

## 2021-01-11 VITALS
WEIGHT: 199.06 LBS | HEIGHT: 71 IN | SYSTOLIC BLOOD PRESSURE: 143 MMHG | HEART RATE: 60 BPM | BODY MASS INDEX: 27.87 KG/M2 | DIASTOLIC BLOOD PRESSURE: 89 MMHG | OXYGEN SATURATION: 100 % | RESPIRATION RATE: 18 BRPM

## 2021-01-11 DIAGNOSIS — M47.816 LUMBAR FACET ARTHROPATHY: Chronic | ICD-10-CM

## 2021-01-11 PROCEDURE — 99214 OFFICE O/P EST MOD 30 MIN: CPT | Mod: PBBFAC | Performed by: NURSE PRACTITIONER

## 2021-01-11 PROCEDURE — 99999 PR PBB SHADOW E&M-EST. PATIENT-LVL IV: ICD-10-PCS | Mod: PBBFAC,,, | Performed by: NURSE PRACTITIONER

## 2021-01-11 PROCEDURE — 99213 PR OFFICE/OUTPT VISIT, EST, LEVL III, 20-29 MIN: ICD-10-PCS | Mod: S$PBB,,, | Performed by: NURSE PRACTITIONER

## 2021-01-11 PROCEDURE — 99213 OFFICE O/P EST LOW 20 MIN: CPT | Mod: S$PBB,,, | Performed by: NURSE PRACTITIONER

## 2021-01-11 PROCEDURE — 99999 PR PBB SHADOW E&M-EST. PATIENT-LVL IV: CPT | Mod: PBBFAC,,, | Performed by: NURSE PRACTITIONER

## 2021-01-11 RX ORDER — GABAPENTIN 300 MG/1
300 CAPSULE ORAL 3 TIMES DAILY
Qty: 90 CAPSULE | Refills: 1 | Status: SHIPPED | OUTPATIENT
Start: 2021-01-11 | End: 2021-03-18 | Stop reason: SDUPTHER

## 2021-01-15 ENCOUNTER — OFFICE VISIT (OUTPATIENT)
Dept: UROLOGY | Facility: CLINIC | Age: 66
End: 2021-01-15
Payer: MEDICARE

## 2021-01-15 ENCOUNTER — PATIENT MESSAGE (OUTPATIENT)
Dept: ORTHOPEDICS | Facility: CLINIC | Age: 66
End: 2021-01-15

## 2021-01-15 VITALS
BODY MASS INDEX: 26.79 KG/M2 | SYSTOLIC BLOOD PRESSURE: 148 MMHG | HEART RATE: 65 BPM | DIASTOLIC BLOOD PRESSURE: 81 MMHG | HEIGHT: 71 IN | WEIGHT: 191.38 LBS

## 2021-01-15 DIAGNOSIS — R35.0 URINARY FREQUENCY: Primary | ICD-10-CM

## 2021-01-15 PROCEDURE — 99213 OFFICE O/P EST LOW 20 MIN: CPT | Mod: S$PBB,,, | Performed by: UROLOGY

## 2021-01-15 PROCEDURE — 99213 OFFICE O/P EST LOW 20 MIN: CPT | Mod: PBBFAC | Performed by: UROLOGY

## 2021-01-15 PROCEDURE — 99213 PR OFFICE/OUTPT VISIT, EST, LEVL III, 20-29 MIN: ICD-10-PCS | Mod: S$PBB,,, | Performed by: UROLOGY

## 2021-01-15 PROCEDURE — 99999 PR PBB SHADOW E&M-EST. PATIENT-LVL III: CPT | Mod: PBBFAC,,, | Performed by: UROLOGY

## 2021-01-15 PROCEDURE — 99999 PR PBB SHADOW E&M-EST. PATIENT-LVL III: ICD-10-PCS | Mod: PBBFAC,,, | Performed by: UROLOGY

## 2021-01-15 RX ORDER — SOLIFENACIN SUCCINATE 5 MG/1
5 TABLET, FILM COATED ORAL DAILY
Qty: 30 TABLET | Refills: 11 | Status: SHIPPED | OUTPATIENT
Start: 2021-01-15 | End: 2021-11-29

## 2021-01-20 ENCOUNTER — OFFICE VISIT (OUTPATIENT)
Dept: ORTHOPEDICS | Facility: CLINIC | Age: 66
End: 2021-01-20
Payer: MEDICARE

## 2021-01-20 DIAGNOSIS — M19.072 ARTHRITIS OF LEFT SUBTALAR JOINT: Primary | ICD-10-CM

## 2021-01-20 DIAGNOSIS — M25.572 PAIN OF JOINT OF LEFT ANKLE AND FOOT: ICD-10-CM

## 2021-01-20 DIAGNOSIS — S92.063S: ICD-10-CM

## 2021-01-20 PROCEDURE — 20605 DRAIN/INJ JOINT/BURSA W/O US: CPT | Mod: PBBFAC | Performed by: ORTHOPAEDIC SURGERY

## 2021-01-20 PROCEDURE — 99999 PR PBB SHADOW E&M-EST. PATIENT-LVL I: CPT | Mod: PBBFAC,,, | Performed by: ORTHOPAEDIC SURGERY

## 2021-01-20 PROCEDURE — 99211 OFF/OP EST MAY X REQ PHY/QHP: CPT | Mod: PBBFAC,25 | Performed by: ORTHOPAEDIC SURGERY

## 2021-01-20 PROCEDURE — 99213 OFFICE O/P EST LOW 20 MIN: CPT | Mod: 25,S$PBB,, | Performed by: ORTHOPAEDIC SURGERY

## 2021-01-20 PROCEDURE — 20605 PR DRAIN/INJECT INTERMEDIATE JOINT/BURSA: ICD-10-PCS | Mod: S$PBB,LT,, | Performed by: ORTHOPAEDIC SURGERY

## 2021-01-20 PROCEDURE — 99999 PR PBB SHADOW E&M-EST. PATIENT-LVL I: ICD-10-PCS | Mod: PBBFAC,,, | Performed by: ORTHOPAEDIC SURGERY

## 2021-01-20 PROCEDURE — 20605 DRAIN/INJ JOINT/BURSA W/O US: CPT | Mod: S$PBB,LT,, | Performed by: ORTHOPAEDIC SURGERY

## 2021-01-20 PROCEDURE — 99213 PR OFFICE/OUTPT VISIT, EST, LEVL III, 20-29 MIN: ICD-10-PCS | Mod: 25,S$PBB,, | Performed by: ORTHOPAEDIC SURGERY

## 2021-01-20 RX ORDER — METHYLPREDNISOLONE ACETATE 80 MG/ML
80 INJECTION, SUSPENSION INTRA-ARTICULAR; INTRALESIONAL; INTRAMUSCULAR; SOFT TISSUE
Status: COMPLETED | OUTPATIENT
Start: 2021-01-20 | End: 2021-01-20

## 2021-01-20 RX ADMIN — METHYLPREDNISOLONE ACETATE 80 MG: 80 INJECTION, SUSPENSION INTRA-ARTICULAR; INTRALESIONAL; INTRAMUSCULAR; SOFT TISSUE at 03:01

## 2021-01-23 ENCOUNTER — PATIENT MESSAGE (OUTPATIENT)
Dept: ORTHOPEDICS | Facility: CLINIC | Age: 66
End: 2021-01-23

## 2021-01-28 ENCOUNTER — PATIENT MESSAGE (OUTPATIENT)
Dept: ORTHOPEDICS | Facility: CLINIC | Age: 66
End: 2021-01-28

## 2021-01-29 ENCOUNTER — HOSPITAL ENCOUNTER (OUTPATIENT)
Facility: OTHER | Age: 66
Discharge: HOME | End: 2021-01-29
Attending: ANESTHESIOLOGY | Admitting: ANESTHESIOLOGY
Payer: MEDICARE

## 2021-01-29 VITALS
HEART RATE: 63 BPM | WEIGHT: 195 LBS | SYSTOLIC BLOOD PRESSURE: 119 MMHG | OXYGEN SATURATION: 96 % | BODY MASS INDEX: 27.3 KG/M2 | RESPIRATION RATE: 16 BRPM | TEMPERATURE: 99 F | DIASTOLIC BLOOD PRESSURE: 80 MMHG | HEIGHT: 71 IN

## 2021-01-29 DIAGNOSIS — M54.16 LUMBAR RADICULOPATHY: ICD-10-CM

## 2021-01-29 DIAGNOSIS — G89.29 CHRONIC PAIN: ICD-10-CM

## 2021-01-29 DIAGNOSIS — M51.26 LUMBAR DISCOGENIC PAIN SYNDROME: Primary | ICD-10-CM

## 2021-01-29 PROCEDURE — 62323 NJX INTERLAMINAR LMBR/SAC: CPT | Performed by: ANESTHESIOLOGY

## 2021-01-29 PROCEDURE — 25000003 PHARM REV CODE 250: Performed by: ANESTHESIOLOGY

## 2021-01-29 PROCEDURE — 62323 NJX INTERLAMINAR LMBR/SAC: CPT | Mod: ,,, | Performed by: ANESTHESIOLOGY

## 2021-01-29 PROCEDURE — 62323 PR INJ LUMBAR/SACRAL, W/IMAGING GUIDANCE: ICD-10-PCS | Mod: ,,, | Performed by: ANESTHESIOLOGY

## 2021-01-29 PROCEDURE — 63600175 PHARM REV CODE 636 W HCPCS: Performed by: ANESTHESIOLOGY

## 2021-01-29 PROCEDURE — 25500020 PHARM REV CODE 255: Performed by: ANESTHESIOLOGY

## 2021-01-29 RX ORDER — SODIUM CHLORIDE 9 MG/ML
INJECTION, SOLUTION INTRAVENOUS CONTINUOUS
Status: DISCONTINUED | OUTPATIENT
Start: 2021-01-29 | End: 2021-01-29 | Stop reason: HOSPADM

## 2021-01-29 RX ORDER — FENTANYL CITRATE 50 UG/ML
INJECTION, SOLUTION INTRAMUSCULAR; INTRAVENOUS
Status: DISCONTINUED | OUTPATIENT
Start: 2021-01-29 | End: 2021-01-29 | Stop reason: HOSPADM

## 2021-01-29 RX ORDER — LIDOCAINE HYDROCHLORIDE 10 MG/ML
INJECTION INFILTRATION; PERINEURAL
Status: DISCONTINUED | OUTPATIENT
Start: 2021-01-29 | End: 2021-01-29 | Stop reason: HOSPADM

## 2021-01-29 RX ORDER — LIDOCAINE HYDROCHLORIDE 5 MG/ML
INJECTION, SOLUTION INFILTRATION; INTRAVENOUS
Status: DISCONTINUED | OUTPATIENT
Start: 2021-01-29 | End: 2021-01-29 | Stop reason: HOSPADM

## 2021-01-29 RX ORDER — DEXAMETHASONE SODIUM PHOSPHATE 4 MG/ML
INJECTION, SOLUTION INTRA-ARTICULAR; INTRALESIONAL; INTRAMUSCULAR; INTRAVENOUS; SOFT TISSUE
Status: DISCONTINUED | OUTPATIENT
Start: 2021-01-29 | End: 2021-01-29 | Stop reason: HOSPADM

## 2021-01-29 RX ORDER — MIDAZOLAM HYDROCHLORIDE 1 MG/ML
INJECTION INTRAMUSCULAR; INTRAVENOUS
Status: DISCONTINUED | OUTPATIENT
Start: 2021-01-29 | End: 2021-01-29 | Stop reason: HOSPADM

## 2021-01-29 RX ADMIN — SODIUM CHLORIDE: 0.9 INJECTION, SOLUTION INTRAVENOUS at 01:01

## 2021-02-01 ENCOUNTER — OFFICE VISIT (OUTPATIENT)
Dept: ORTHOPEDICS | Facility: CLINIC | Age: 66
End: 2021-02-01
Payer: MEDICARE

## 2021-02-01 DIAGNOSIS — M19.072 ARTHRITIS OF LEFT SUBTALAR JOINT: Primary | ICD-10-CM

## 2021-02-01 PROCEDURE — 99999 PR PBB SHADOW E&M-EST. PATIENT-LVL I: ICD-10-PCS | Mod: PBBFAC,,, | Performed by: ORTHOPAEDIC SURGERY

## 2021-02-01 PROCEDURE — 99211 OFF/OP EST MAY X REQ PHY/QHP: CPT | Mod: PBBFAC | Performed by: ORTHOPAEDIC SURGERY

## 2021-02-01 PROCEDURE — 99212 PR OFFICE/OUTPT VISIT, EST, LEVL II, 10-19 MIN: ICD-10-PCS | Mod: S$PBB,,, | Performed by: ORTHOPAEDIC SURGERY

## 2021-02-01 PROCEDURE — 99999 PR PBB SHADOW E&M-EST. PATIENT-LVL I: CPT | Mod: PBBFAC,,, | Performed by: ORTHOPAEDIC SURGERY

## 2021-02-01 PROCEDURE — 99212 OFFICE O/P EST SF 10 MIN: CPT | Mod: S$PBB,,, | Performed by: ORTHOPAEDIC SURGERY

## 2021-02-01 RX ORDER — METHYLPREDNISOLONE 4 MG/1
TABLET ORAL
Qty: 1 PACKAGE | Refills: 0 | Status: SHIPPED | OUTPATIENT
Start: 2021-02-01 | End: 2021-12-23

## 2021-02-03 ENCOUNTER — PATIENT OUTREACH (OUTPATIENT)
Dept: ADMINISTRATIVE | Facility: OTHER | Age: 66
End: 2021-02-03

## 2021-02-03 DIAGNOSIS — M25.572 PAIN OF JOINT OF LEFT ANKLE AND FOOT: Primary | ICD-10-CM

## 2021-02-04 ENCOUNTER — HOSPITAL ENCOUNTER (OUTPATIENT)
Dept: RADIOLOGY | Facility: HOSPITAL | Age: 66
Discharge: HOME | End: 2021-02-04
Attending: ORTHOPAEDIC SURGERY
Payer: MEDICARE

## 2021-02-04 ENCOUNTER — OFFICE VISIT (OUTPATIENT)
Dept: ORTHOPEDICS | Facility: CLINIC | Age: 66
End: 2021-02-04
Payer: MEDICARE

## 2021-02-04 DIAGNOSIS — M19.072 ARTHRITIS OF LEFT SUBTALAR JOINT: ICD-10-CM

## 2021-02-04 DIAGNOSIS — M25.572 PAIN OF JOINT OF LEFT ANKLE AND FOOT: ICD-10-CM

## 2021-02-04 DIAGNOSIS — S92.063S: Primary | ICD-10-CM

## 2021-02-04 DIAGNOSIS — M19.072 ARTHRITIS OF ANKLE, LEFT: ICD-10-CM

## 2021-02-04 PROCEDURE — 73650 X-RAY EXAM OF HEEL: CPT | Mod: 26,LT,, | Performed by: RADIOLOGY

## 2021-02-04 PROCEDURE — 99214 PR OFFICE/OUTPT VISIT, EST, LEVL IV, 30-39 MIN: ICD-10-PCS | Mod: S$PBB,,, | Performed by: ORTHOPAEDIC SURGERY

## 2021-02-04 PROCEDURE — 99214 OFFICE O/P EST MOD 30 MIN: CPT | Mod: S$PBB,,, | Performed by: ORTHOPAEDIC SURGERY

## 2021-02-04 PROCEDURE — 73650 XR CALCANEUS 2 VIEW LEFT: ICD-10-PCS | Mod: 26,LT,, | Performed by: RADIOLOGY

## 2021-02-04 PROCEDURE — 73650 X-RAY EXAM OF HEEL: CPT | Mod: TC,LT

## 2021-02-04 PROCEDURE — 99999 PR PBB SHADOW E&M-EST. PATIENT-LVL III: CPT | Mod: PBBFAC,,, | Performed by: ORTHOPAEDIC SURGERY

## 2021-02-04 PROCEDURE — 99999 PR PBB SHADOW E&M-EST. PATIENT-LVL III: ICD-10-PCS | Mod: PBBFAC,,, | Performed by: ORTHOPAEDIC SURGERY

## 2021-02-04 PROCEDURE — 99213 OFFICE O/P EST LOW 20 MIN: CPT | Mod: PBBFAC,25 | Performed by: ORTHOPAEDIC SURGERY

## 2021-02-06 ENCOUNTER — PATIENT MESSAGE (OUTPATIENT)
Dept: INTERNAL MEDICINE | Facility: CLINIC | Age: 66
End: 2021-02-06

## 2021-02-15 ENCOUNTER — PATIENT MESSAGE (OUTPATIENT)
Dept: INTERNAL MEDICINE | Facility: CLINIC | Age: 66
End: 2021-02-15

## 2021-02-15 ENCOUNTER — IMMUNIZATION (OUTPATIENT)
Dept: INTERNAL MEDICINE | Facility: CLINIC | Age: 66
End: 2021-02-15
Payer: MEDICARE

## 2021-02-15 DIAGNOSIS — Z23 NEED FOR VACCINATION: Primary | ICD-10-CM

## 2021-02-15 PROCEDURE — 91300 COVID-19, MRNA, LNP-S, PF, 30 MCG/0.3 ML DOSE VACCINE: CPT | Mod: PBBFAC

## 2021-02-17 ENCOUNTER — OFFICE VISIT (OUTPATIENT)
Dept: PAIN MEDICINE | Facility: CLINIC | Age: 66
End: 2021-02-17
Payer: MEDICARE

## 2021-02-17 VITALS
DIASTOLIC BLOOD PRESSURE: 78 MMHG | HEART RATE: 72 BPM | RESPIRATION RATE: 18 BRPM | WEIGHT: 192 LBS | SYSTOLIC BLOOD PRESSURE: 131 MMHG | OXYGEN SATURATION: 100 % | HEIGHT: 71 IN | BODY MASS INDEX: 26.88 KG/M2

## 2021-02-17 DIAGNOSIS — M51.36 DDD (DEGENERATIVE DISC DISEASE), LUMBAR: Primary | ICD-10-CM

## 2021-02-17 DIAGNOSIS — M54.89 VERTEBROGENIC PAIN: ICD-10-CM

## 2021-02-17 DIAGNOSIS — G89.4 CHRONIC PAIN DISORDER: ICD-10-CM

## 2021-02-17 PROCEDURE — 99213 OFFICE O/P EST LOW 20 MIN: CPT | Mod: S$PBB,,, | Performed by: ANESTHESIOLOGY

## 2021-02-17 PROCEDURE — 99999 PR PBB SHADOW E&M-EST. PATIENT-LVL IV: CPT | Mod: PBBFAC,,, | Performed by: ANESTHESIOLOGY

## 2021-02-17 PROCEDURE — 99999 PR PBB SHADOW E&M-EST. PATIENT-LVL IV: ICD-10-PCS | Mod: PBBFAC,,, | Performed by: ANESTHESIOLOGY

## 2021-02-17 PROCEDURE — 99214 OFFICE O/P EST MOD 30 MIN: CPT | Mod: PBBFAC | Performed by: ANESTHESIOLOGY

## 2021-02-17 PROCEDURE — 99213 PR OFFICE/OUTPT VISIT, EST, LEVL III, 20-29 MIN: ICD-10-PCS | Mod: S$PBB,,, | Performed by: ANESTHESIOLOGY

## 2021-02-23 ENCOUNTER — PATIENT MESSAGE (OUTPATIENT)
Dept: PAIN MEDICINE | Facility: CLINIC | Age: 66
End: 2021-02-23

## 2021-02-28 ENCOUNTER — PATIENT MESSAGE (OUTPATIENT)
Dept: PAIN MEDICINE | Facility: CLINIC | Age: 66
End: 2021-02-28

## 2021-03-05 ENCOUNTER — PATIENT MESSAGE (OUTPATIENT)
Dept: ADMINISTRATIVE | Facility: HOSPITAL | Age: 66
End: 2021-03-05

## 2021-03-08 ENCOUNTER — IMMUNIZATION (OUTPATIENT)
Dept: INTERNAL MEDICINE | Facility: CLINIC | Age: 66
End: 2021-03-08
Payer: MEDICARE

## 2021-03-08 DIAGNOSIS — Z23 NEED FOR VACCINATION: Primary | ICD-10-CM

## 2021-03-08 PROCEDURE — 91300 COVID-19, MRNA, LNP-S, PF, 30 MCG/0.3 ML DOSE VACCINE: CPT | Mod: PBBFAC | Performed by: INTERNAL MEDICINE

## 2021-03-08 PROCEDURE — 0002A COVID-19, MRNA, LNP-S, PF, 30 MCG/0.3 ML DOSE VACCINE: CPT | Mod: PBBFAC | Performed by: INTERNAL MEDICINE

## 2021-03-18 ENCOUNTER — PATIENT MESSAGE (OUTPATIENT)
Dept: ORTHOPEDICS | Facility: CLINIC | Age: 66
End: 2021-03-18

## 2021-03-30 ENCOUNTER — PATIENT MESSAGE (OUTPATIENT)
Dept: PAIN MEDICINE | Facility: CLINIC | Age: 66
End: 2021-03-30

## 2021-03-30 ENCOUNTER — TELEPHONE (OUTPATIENT)
Dept: ADMINISTRATIVE | Facility: OTHER | Age: 66
End: 2021-03-30

## 2021-03-31 ENCOUNTER — TELEPHONE (OUTPATIENT)
Dept: ADMINISTRATIVE | Facility: OTHER | Age: 66
End: 2021-03-31

## 2021-04-02 DIAGNOSIS — E78.5 HYPERLIPIDEMIA, UNSPECIFIED HYPERLIPIDEMIA TYPE: Chronic | ICD-10-CM

## 2021-04-03 RX ORDER — LOVASTATIN 40 MG/1
TABLET ORAL
Qty: 90 TABLET | Refills: 2 | Status: SHIPPED | OUTPATIENT
Start: 2021-04-03 | End: 2021-12-29

## 2021-04-13 ENCOUNTER — PATIENT MESSAGE (OUTPATIENT)
Dept: PAIN MEDICINE | Facility: CLINIC | Age: 66
End: 2021-04-13

## 2021-04-22 ENCOUNTER — TELEPHONE (OUTPATIENT)
Dept: ADMINISTRATIVE | Facility: OTHER | Age: 66
End: 2021-04-22

## 2021-07-12 ENCOUNTER — TELEPHONE (OUTPATIENT)
Dept: PAIN MEDICINE | Facility: CLINIC | Age: 66
End: 2021-07-12

## 2021-07-13 ENCOUNTER — TELEPHONE (OUTPATIENT)
Dept: ADMINISTRATIVE | Facility: OTHER | Age: 66
End: 2021-07-13

## 2021-10-06 ENCOUNTER — IMMUNIZATION (OUTPATIENT)
Dept: INTERNAL MEDICINE | Facility: CLINIC | Age: 66
End: 2021-10-06
Payer: MEDICARE

## 2021-10-06 DIAGNOSIS — Z23 NEED FOR VACCINATION: Primary | ICD-10-CM

## 2021-10-06 PROCEDURE — 0003A COVID-19, MRNA, LNP-S, PF, 30 MCG/0.3 ML DOSE VACCINE: CPT | Mod: CV19,PBBFAC | Performed by: INTERNAL MEDICINE

## 2021-10-06 PROCEDURE — 91300 COVID-19, MRNA, LNP-S, PF, 30 MCG/0.3 ML DOSE VACCINE: CPT | Mod: PBBFAC

## 2021-10-14 ENCOUNTER — TELEPHONE (OUTPATIENT)
Dept: ADMINISTRATIVE | Facility: OTHER | Age: 66
End: 2021-10-14

## 2021-11-29 ENCOUNTER — PATIENT OUTREACH (OUTPATIENT)
Dept: ADMINISTRATIVE | Facility: HOSPITAL | Age: 66
End: 2021-11-29
Payer: MEDICARE

## 2021-11-29 ENCOUNTER — PATIENT MESSAGE (OUTPATIENT)
Dept: ADMINISTRATIVE | Facility: HOSPITAL | Age: 66
End: 2021-11-29
Payer: MEDICARE

## 2021-11-29 DIAGNOSIS — Z12.11 COLON CANCER SCREENING: Primary | ICD-10-CM

## 2021-12-17 ENCOUNTER — LAB VISIT (OUTPATIENT)
Dept: LAB | Facility: HOSPITAL | Age: 66
End: 2021-12-17
Attending: INTERNAL MEDICINE
Payer: MEDICARE

## 2021-12-17 DIAGNOSIS — Z12.11 COLON CANCER SCREENING: ICD-10-CM

## 2021-12-17 PROCEDURE — 82274 ASSAY TEST FOR BLOOD FECAL: CPT | Performed by: INTERNAL MEDICINE

## 2021-12-20 ENCOUNTER — HOSPITAL ENCOUNTER (EMERGENCY)
Facility: HOSPITAL | Age: 66
Discharge: HOME | End: 2021-12-20
Attending: EMERGENCY MEDICINE
Payer: MEDICARE

## 2021-12-20 VITALS
TEMPERATURE: 99 F | BODY MASS INDEX: 26.5 KG/M2 | DIASTOLIC BLOOD PRESSURE: 83 MMHG | RESPIRATION RATE: 18 BRPM | OXYGEN SATURATION: 100 % | HEART RATE: 77 BPM | WEIGHT: 190 LBS | SYSTOLIC BLOOD PRESSURE: 165 MMHG

## 2021-12-20 DIAGNOSIS — S50.01XA CONTUSION OF RIGHT ELBOW, INITIAL ENCOUNTER: ICD-10-CM
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
## 2021-12-20 DIAGNOSIS — M25.561 ACUTE PAIN OF RIGHT KNEE: ICD-10-CM

## 2021-12-20 PROCEDURE — 25000003 PHARM REV CODE 250: Performed by: PHYSICIAN ASSISTANT

## 2021-12-20 PROCEDURE — 29505 APPLICATION LONG LEG SPLINT: CPT | Mod: RT

## 2021-12-20 PROCEDURE — 99283 EMERGENCY DEPT VISIT LOW MDM: CPT | Mod: 25

## 2021-12-20 RX ORDER — IBUPROFEN 600 MG/1
600 TABLET ORAL EVERY 6 HOURS PRN
Qty: 30 TABLET | Refills: 0 | Status: SHIPPED | OUTPATIENT
Start: 2021-12-20 | End: 2022-04-28

## 2021-12-20 RX ORDER — IBUPROFEN 600 MG/1
600 TABLET ORAL
Status: COMPLETED | OUTPATIENT
Start: 2021-12-20 | End: 2021-12-20

## 2021-12-20 RX ADMIN — IBUPROFEN 600 MG: 600 TABLET, FILM COATED ORAL at 07:12

## 2021-12-21 ENCOUNTER — PATIENT OUTREACH (OUTPATIENT)
Dept: ADMINISTRATIVE | Facility: OTHER | Age: 66
End: 2021-12-21
Payer: MEDICARE

## 2021-12-22 ENCOUNTER — OFFICE VISIT (OUTPATIENT)
Dept: ORTHOPEDICS | Facility: CLINIC | Age: 66
End: 2021-12-22
Payer: MEDICARE

## 2021-12-22 ENCOUNTER — HOSPITAL ENCOUNTER (OUTPATIENT)
Dept: RADIOLOGY | Facility: HOSPITAL | Age: 66
Discharge: HOME | End: 2021-12-22
Attending: PHYSICIAN ASSISTANT
Payer: MEDICARE

## 2021-12-22 VITALS
HEART RATE: 75 BPM | HEIGHT: 71 IN | SYSTOLIC BLOOD PRESSURE: 119 MMHG | RESPIRATION RATE: 18 BRPM | BODY MASS INDEX: 26.61 KG/M2 | DIASTOLIC BLOOD PRESSURE: 71 MMHG | WEIGHT: 190.06 LBS

## 2021-12-22 DIAGNOSIS — M25.561 ACUTE PAIN OF RIGHT KNEE: ICD-10-CM
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
## 2021-12-22 PROCEDURE — 73721 MRI KNEE WITHOUT CONTRAST RIGHT: ICD-10-PCS | Mod: 26,RT,, | Performed by: RADIOLOGY

## 2021-12-22 PROCEDURE — 99999 PR PBB SHADOW E&M-EST. PATIENT-LVL III: ICD-10-PCS | Mod: PBBFAC,,, | Performed by: PHYSICIAN ASSISTANT

## 2021-12-22 PROCEDURE — 73721 MRI JNT OF LWR EXTRE W/O DYE: CPT | Mod: TC,RT

## 2021-12-22 PROCEDURE — 99999 PR PBB SHADOW E&M-EST. PATIENT-LVL III: CPT | Mod: PBBFAC,,, | Performed by: PHYSICIAN ASSISTANT

## 2021-12-22 PROCEDURE — 99213 PR OFFICE/OUTPT VISIT, EST, LEVL III, 20-29 MIN: ICD-10-PCS | Mod: S$PBB,,, | Performed by: PHYSICIAN ASSISTANT

## 2021-12-22 PROCEDURE — 99213 OFFICE O/P EST LOW 20 MIN: CPT | Mod: S$PBB,,, | Performed by: PHYSICIAN ASSISTANT

## 2021-12-22 PROCEDURE — 73721 MRI JNT OF LWR EXTRE W/O DYE: CPT | Mod: 26,RT,, | Performed by: RADIOLOGY

## 2021-12-22 PROCEDURE — 99213 OFFICE O/P EST LOW 20 MIN: CPT | Mod: PBBFAC,25 | Performed by: PHYSICIAN ASSISTANT

## 2021-12-23 ENCOUNTER — TELEPHONE (OUTPATIENT)
Dept: ORTHOPEDICS | Facility: CLINIC | Age: 66
End: 2021-12-23
Payer: MEDICARE

## 2021-12-23 ENCOUNTER — PATIENT MESSAGE (OUTPATIENT)
Dept: ORTHOPEDICS | Facility: CLINIC | Age: 66
End: 2021-12-23
Payer: MEDICARE

## 2021-12-23 ENCOUNTER — TELEPHONE (OUTPATIENT)
Dept: SPORTS MEDICINE | Facility: CLINIC | Age: 66
End: 2021-12-23
Payer: MEDICARE

## 2021-12-23 LAB — HEMOCCULT STL QL IA: NEGATIVE

## 2021-12-29 DIAGNOSIS — E78.5 HYPERLIPIDEMIA, UNSPECIFIED HYPERLIPIDEMIA TYPE: Chronic | ICD-10-CM

## 2021-12-29 RX ORDER — LOVASTATIN 40 MG/1
40 TABLET ORAL NIGHTLY
Qty: 90 TABLET | Refills: 0 | Status: SHIPPED | OUTPATIENT
Start: 2021-12-29 | End: 2022-06-14

## 2021-12-30 ENCOUNTER — HOSPITAL ENCOUNTER (OUTPATIENT)
Dept: RADIOLOGY | Facility: HOSPITAL | Age: 66
Discharge: HOME | End: 2021-12-30
Attending: STUDENT IN AN ORGANIZED HEALTH CARE EDUCATION/TRAINING PROGRAM
Payer: MEDICARE

## 2021-12-30 ENCOUNTER — OFFICE VISIT (OUTPATIENT)
Dept: SPORTS MEDICINE | Facility: CLINIC | Age: 66
End: 2021-12-30
Payer: MEDICARE

## 2021-12-30 VITALS
HEIGHT: 71 IN | BODY MASS INDEX: 26.6 KG/M2 | WEIGHT: 190 LBS | DIASTOLIC BLOOD PRESSURE: 75 MMHG | SYSTOLIC BLOOD PRESSURE: 135 MMHG | HEART RATE: 64 BPM

## 2021-12-30 DIAGNOSIS — S83.521A RUPTURE OF POSTERIOR CRUCIATE LIGAMENT OF RIGHT KNEE, INITIAL ENCOUNTER: ICD-10-CM

## 2021-12-30 DIAGNOSIS — S83.511A RUPTURE OF ANTERIOR CRUCIATE LIGAMENT OF RIGHT KNEE, INITIAL ENCOUNTER: Primary | ICD-10-CM

## 2021-12-30 DIAGNOSIS — M25.569 ACUTE KNEE PAIN, UNSPECIFIED LATERALITY: ICD-10-CM

## 2021-12-30 DIAGNOSIS — S83.411A TEAR OF MEDIAL COLLATERAL LIGAMENT OF RIGHT KNEE, INITIAL ENCOUNTER: ICD-10-CM

## 2021-12-30 PROCEDURE — 73564 X-RAY EXAM KNEE 4 OR MORE: CPT | Mod: TC,50

## 2021-12-30 PROCEDURE — 99214 OFFICE O/P EST MOD 30 MIN: CPT | Mod: S$PBB,,, | Performed by: STUDENT IN AN ORGANIZED HEALTH CARE EDUCATION/TRAINING PROGRAM

## 2021-12-30 PROCEDURE — 99999 PR PBB SHADOW E&M-EST. PATIENT-LVL IV: CPT | Mod: PBBFAC,,, | Performed by: STUDENT IN AN ORGANIZED HEALTH CARE EDUCATION/TRAINING PROGRAM

## 2021-12-30 PROCEDURE — 99214 PR OFFICE/OUTPT VISIT, EST, LEVL IV, 30-39 MIN: ICD-10-PCS | Mod: S$PBB,,, | Performed by: STUDENT IN AN ORGANIZED HEALTH CARE EDUCATION/TRAINING PROGRAM

## 2021-12-30 PROCEDURE — 73564 X-RAY EXAM KNEE 4 OR MORE: CPT | Mod: 26,50,, | Performed by: RADIOLOGY

## 2021-12-30 PROCEDURE — 99214 OFFICE O/P EST MOD 30 MIN: CPT | Mod: PBBFAC | Performed by: STUDENT IN AN ORGANIZED HEALTH CARE EDUCATION/TRAINING PROGRAM

## 2021-12-30 PROCEDURE — 99999 PR PBB SHADOW E&M-EST. PATIENT-LVL IV: ICD-10-PCS | Mod: PBBFAC,,, | Performed by: STUDENT IN AN ORGANIZED HEALTH CARE EDUCATION/TRAINING PROGRAM

## 2021-12-30 PROCEDURE — 73564 XR KNEE ORTHO BILAT WITH FLEXION: ICD-10-PCS | Mod: 26,50,, | Performed by: RADIOLOGY

## 2022-01-05 ENCOUNTER — PATIENT MESSAGE (OUTPATIENT)
Dept: UROLOGY | Facility: CLINIC | Age: 67
End: 2022-01-05
Payer: MEDICARE

## 2022-01-06 ENCOUNTER — PATIENT MESSAGE (OUTPATIENT)
Dept: ORTHOPEDICS | Facility: CLINIC | Age: 67
End: 2022-01-06
Payer: MEDICARE

## 2022-01-06 ENCOUNTER — HOSPITAL ENCOUNTER (OUTPATIENT)
Dept: RADIOLOGY | Facility: HOSPITAL | Age: 67
Discharge: HOME | End: 2022-01-06
Attending: PHYSICIAN ASSISTANT
Payer: MEDICARE

## 2022-01-06 DIAGNOSIS — M25.551 RIGHT HIP PAIN: ICD-10-CM

## 2022-01-06 DIAGNOSIS — M25.551 RIGHT HIP PAIN: Primary | ICD-10-CM

## 2022-01-06 PROCEDURE — 73502 XR HIP WITH PELVIS WHEN PERFORMED, 2 OR 3  VIEWS RIGHT: ICD-10-PCS | Mod: 26,RT,, | Performed by: RADIOLOGY

## 2022-01-06 PROCEDURE — 73502 X-RAY EXAM HIP UNI 2-3 VIEWS: CPT | Mod: 26,RT,, | Performed by: RADIOLOGY

## 2022-01-06 PROCEDURE — 73502 X-RAY EXAM HIP UNI 2-3 VIEWS: CPT | Mod: TC,PO,RT

## 2022-01-07 ENCOUNTER — PATIENT MESSAGE (OUTPATIENT)
Dept: ORTHOPEDICS | Facility: CLINIC | Age: 67
End: 2022-01-07
Payer: MEDICARE

## 2022-01-11 ENCOUNTER — CLINICAL SUPPORT (OUTPATIENT)
Dept: REHABILITATION | Facility: HOSPITAL | Age: 67
End: 2022-01-11
Attending: STUDENT IN AN ORGANIZED HEALTH CARE EDUCATION/TRAINING PROGRAM
Payer: MEDICARE

## 2022-01-11 ENCOUNTER — LAB VISIT (OUTPATIENT)
Dept: PRIMARY CARE CLINIC | Facility: CLINIC | Age: 67
End: 2022-01-11
Payer: MEDICARE

## 2022-01-11 DIAGNOSIS — M25.661 DECREASED RANGE OF MOTION OF RIGHT KNEE: ICD-10-CM

## 2022-01-11 DIAGNOSIS — S83.521A RUPTURE OF POSTERIOR CRUCIATE LIGAMENT OF RIGHT KNEE, INITIAL ENCOUNTER: ICD-10-CM

## 2022-01-11 DIAGNOSIS — S83.511A RUPTURE OF ANTERIOR CRUCIATE LIGAMENT OF RIGHT KNEE, INITIAL ENCOUNTER: ICD-10-CM

## 2022-01-11 DIAGNOSIS — Z20.822 CONTACT WITH AND (SUSPECTED) EXPOSURE TO COVID-19: ICD-10-CM

## 2022-01-11 DIAGNOSIS — S83.411A TEAR OF MEDIAL COLLATERAL LIGAMENT OF RIGHT KNEE, INITIAL ENCOUNTER: ICD-10-CM

## 2022-01-11 LAB
CTP QC/QA: YES
SARS-COV-2 AG RESP QL IA.RAPID: POSITIVE

## 2022-01-11 PROCEDURE — 97110 THERAPEUTIC EXERCISES: CPT

## 2022-01-11 PROCEDURE — 97161 PT EVAL LOW COMPLEX 20 MIN: CPT

## 2022-01-11 PROCEDURE — 87811 SARS-COV-2 COVID19 W/OPTIC: CPT
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
## 2022-01-13 DIAGNOSIS — U07.1 COVID-19 VIRUS DETECTED: ICD-10-CM

## 2022-01-13 PROBLEM — M25.661 DECREASED RANGE OF MOTION OF RIGHT KNEE: Status: ACTIVE | Noted: 2022-01-13

## 2022-01-18 ENCOUNTER — CLINICAL SUPPORT (OUTPATIENT)
Dept: REHABILITATION | Facility: HOSPITAL | Age: 67
End: 2022-01-18
Payer: MEDICARE

## 2022-01-18 ENCOUNTER — PATIENT MESSAGE (OUTPATIENT)
Dept: ADMINISTRATIVE | Facility: HOSPITAL | Age: 67
End: 2022-01-18
Payer: MEDICARE

## 2022-01-18 DIAGNOSIS — M25.661 DECREASED RANGE OF MOTION OF RIGHT KNEE: ICD-10-CM

## 2022-01-18 PROCEDURE — 97110 THERAPEUTIC EXERCISES: CPT

## 2022-01-18 NOTE — PROGRESS NOTES
"  Physical Therapy Treatment Note  [address]     Name: Casper Osborne  Clinic Number: [id number]    Therapy Diagnosis:   Encounter Diagnosis   Name Primary?    Decreased range of motion of right knee      Physician: Wiliam Guerin MD    Visit Date: 1/18/2022    Physician Orders: PT Eval and Treat   Medical Diagnosis from Referral: Tear of medial collateral ligament of right knee, initial encounter   Rupture of posterior cruciate ligament of right knee, initial encounter   Rupture of anterior cruciate ligament of right knee, initial encounter   Evaluation Date: 1/11/2022  Authorization Period Expiration: 01/13/2022 01/13/2023   Plan of Care Expiration: 3/11/22  Visit # / Visits authorized: 1/ 1    Time In: 1100  Time Out: 1155  Total Billable Time: 55minutes    Precautions: Standard, fall risk     Subjective     Pt reports: he had COVID last week.  He was compliant with home exercise program.  Response to previous treatment: tolerated well  Functional change: improved ROM    Pain: 5/10  Location: R knee    Objective     Casper received therapeutic exercises to develop strength, endurance, ROM, flexibility, posture and core stabilization for  55 minutes including:     SAQ 3x10  Ankle propped Quad Sets 20x10"  Heel slides w/ strap 30x5"  SLR 5x5  Mini squats on wall 3x10 in brace  B Standing hip abd 3x10   B Standing hip ext 3x10  B standing march 3x10  B heel raise 3x10   Stairs 4 trials using B HR (cueing for "up w/ good, down w/ bad") in brace      Home Exercises Provided and Patient Education Provided     Education provided:   - continue wearing brace as instructed    Written Home Exercises Provided: Patient instructed to cont prior HEP.  Exercises were reviewed and Casper was able to demonstrate them prior to the end of the session.  Casper demonstrated good  understanding of the education provided.     See EMR under Patient Instructions for exercises provided from Initial Evaluation.    Assessment     Pt " tolerated session well, had mild discomfort when shifting weight to affected side in standing ex.   Casper is progressing  towards his goals.   Pt prognosis is good    Pt will continue to benefit from skilled outpatient physical therapy to address the deficits listed in the problem list box on initial evaluation, provide pt/family education and to maximize pt's level of independence in the home and community environment.     Pt's spiritual, cultural and educational needs considered and pt agreeable to plan of care and goals.     Anticipated barriers to physical therapy: none identified    Goals:   Short Term Goals: 2 weeks   1. Independent with HEP  2. Increase pain-free knee flexion ROM to 135 degrees   3. Pain-free Gait      Long Term Goals: 4 weeks   1. Increase pain-free R knee flexion ROM to 135 degrees   2. Increase pain-free R knee extension ROM to at least 0 degrees  3. Gait pain-free  4. Squat Pain-free   5. Stairs Pain-free    Plan     Continue with PT TEO Sandoval, PT, DPT

## 2022-01-20 ENCOUNTER — CLINICAL SUPPORT (OUTPATIENT)
Dept: REHABILITATION | Facility: HOSPITAL | Age: 67
End: 2022-01-20
Payer: MEDICARE

## 2022-01-20 DIAGNOSIS — M25.661 DECREASED RANGE OF MOTION OF RIGHT KNEE: ICD-10-CM

## 2022-01-20 PROCEDURE — 97110 THERAPEUTIC EXERCISES: CPT | Mod: CQ

## 2022-01-20 NOTE — PROGRESS NOTES
"  Physical Therapy Treatment Note  [address]     Name: Casper Osborne  Clinic Number: [id number]    Therapy Diagnosis:   Encounter Diagnosis   Name Primary?    Decreased range of motion of right knee      Physician: Wiliam Guerin MD    Visit Date: 1/20/2022    Physician Orders: PT Eval and Treat   Medical Diagnosis from Referral: Tear of medial collateral ligament of right knee, initial encounter   Rupture of posterior cruciate ligament of right knee, initial encounter   Rupture of anterior cruciate ligament of right knee, initial encounter   Evaluation Date: 1/11/2022  Authorization Period Expiration: 01/13/2022 01/13/2023   Plan of Care Expiration: 3/11/22  Visit # / Visits authorized: 1/ 1    Time In: 1055  Time Out: 1200  Total Billable Time: 55minutes    Precautions: Standard, fall risk     Subjective     Pt reports: feel like I am wasting time with therapy, I ready for surgery  He was compliant with home exercise program.  Response to previous treatment: tolerated well  Functional change: cutting grass     Pain: 0/10  Location: R knee    Objective     Casper received therapeutic exercises to develop strength, endurance, ROM, flexibility, posture and core stabilization for  55 minutes including:   Retro gait TM incline 2' for 10'   SB bridges 3x10  Heel digs 3x10/3"  SAQ 3x10  Ankle propped Quad Sets 3x10/3"  Heel slides PC 3x10/3"  SLR 5x5  B Standing hip abd 3x10   B Standing hip ext 3x10  B standing march 3x10  B heel raise 3x10   Stairs 4 trials using B HR (cueing for "up w/ good, down w/ bad") in brace    RICE R knee for 10' for decreased circulation, inflammation, and tissue healing    Home Exercises Provided and Patient Education Provided     Education provided:   - continue wearing brace as instructed    Written Home Exercises Provided: Patient instructed to cont prior HEP.  Exercises were reviewed and Casper was able to demonstrate them prior to the end of the session.  Casper demonstrated good "  understanding of the education provided.     See EMR under Patient Instructions for exercises provided from Initial Evaluation.    Assessment     Pt tolerated session well, had mild discomfort when shifting weight to affected side in standing ex.   Casper is progressing  towards his goals.   Pt prognosis is good    Pt will continue to benefit from skilled outpatient physical therapy to address the deficits listed in the problem list box on initial evaluation, provide pt/family education and to maximize pt's level of independence in the home and community environment.     Pt's spiritual, cultural and educational needs considered and pt agreeable to plan of care and goals.     Anticipated barriers to physical therapy: none identified    Goals:   Short Term Goals: 2 weeks   1. Independent with HEP  2. Increase pain-free knee flexion ROM to 135 degrees   3. Pain-free Gait      Long Term Goals: 4 weeks   1. Increase pain-free R knee flexion ROM to 135 degrees   2. Increase pain-free R knee extension ROM to at least 0 degrees  3. Gait pain-free  4. Squat Pain-free   5. Stairs Pain-free    Plan     Continue with PT POC    Nabeel Jeffers, PTA, STS

## 2022-01-24 ENCOUNTER — PATIENT OUTREACH (OUTPATIENT)
Dept: ADMINISTRATIVE | Facility: OTHER | Age: 67
End: 2022-01-24
Payer: MEDICARE

## 2022-01-24 NOTE — PROGRESS NOTES
LINKS immunization registry updated  Care Everywhere updated  Health Maintenance updated  Chart reviewed for overdue Proactive [facility] Encounters (KAREN) health maintenance testing (CRS, Breast Ca, Diabetic Eye Exam)   Orders entered:N/A

## 2022-01-25 ENCOUNTER — CLINICAL SUPPORT (OUTPATIENT)
Dept: REHABILITATION | Facility: HOSPITAL | Age: 67
End: 2022-01-25
Payer: MEDICARE

## 2022-01-25 DIAGNOSIS — M25.661 DECREASED RANGE OF MOTION OF RIGHT KNEE: ICD-10-CM

## 2022-01-25 PROCEDURE — 97110 THERAPEUTIC EXERCISES: CPT

## 2022-01-25 NOTE — PROGRESS NOTES
"  Physical Therapy Treatment Note  [address]     Name: Casper Osborne  Clinic Number: [id number]    Therapy Diagnosis:   Encounter Diagnosis   Name Primary?    Decreased range of motion of right knee      Physician: Wiliam Guerin MD    Visit Date: 1/25/2022    Physician Orders: PT Eval and Treat   Medical Diagnosis from Referral: Tear of medial collateral ligament of right knee, initial encounter   Rupture of posterior cruciate ligament of right knee, initial encounter   Rupture of anterior cruciate ligament of right knee, initial encounter   Evaluation Date: 1/11/2022  Authorization Period Expiration: 01/13/2022 01/13/2023   Plan of Care Expiration: 3/11/22  Visit # / Visits authorized: 2/20    Time In: 1106  Time Out: 1200  Total Billable Time: 54 minutes    Precautions: Standard, fall risk     Subjective     Pt reports: my aptmt is this Thursday with the doctor. Hopefully we are able to schedule surgery.   He was compliant with home exercise program.  Response to previous treatment: tolerated well  Functional change: cutting grass     Pain: 0/10  Location: R knee    Objective     R knee ROM: -4 - 0 - 130    Casper received therapeutic exercises to develop strength, endurance, ROM, flexibility, posture and core stabilization for  54 minutes including:   SLR 3x10  S/L Abd 3x10  Standing Hip Abd 3x10  Mod standing SLR 3x10  Hip Ext 3x10  SAQ 10"x20  Heel slides PC 3x10/3"  B standing march 3x10  B heel raise 3x10   Wall squats in brace 3x10  LAQ 3x10x3"  Prone HS curl 3x10      Home Exercises Provided and Patient Education Provided     Education provided:   - continue wearing brace as instructed    Written Home Exercises Provided: Patient instructed to cont prior HEP.  Exercises were reviewed and Casper was able to demonstrate them prior to the end of the session.  Casper demonstrated good  understanding of the education provided.     See EMR under Patient Instructions for exercises provided from Initial " Evaluation.    Assessment     Pt improved R knee ROM since initial eval. He has f/u aptmt this Thursday.   Casper is progressing  towards his goals.   Pt prognosis is good    Pt will continue to benefit from skilled outpatient physical therapy to address the deficits listed in the problem list box on initial evaluation, provide pt/family education and to maximize pt's level of independence in the home and community environment.     Pt's spiritual, cultural and educational needs considered and pt agreeable to plan of care and goals.     Anticipated barriers to physical therapy: none identified    Goals:   Short Term Goals: 2 weeks   1. Independent with HEP  2. Increase pain-free knee flexion ROM to 135 degrees   3. Pain-free Gait      Long Term Goals: 4 weeks   1. Increase pain-free R knee flexion ROM to 135 degrees   2. Increase pain-free R knee extension ROM to at least 0 degrees  3. Gait pain-free  4. Squat Pain-free   5. Stairs Pain-free    Plan     Continue with PT TOE Sandoval, PT, DPT

## 2022-01-27 ENCOUNTER — OFFICE VISIT (OUTPATIENT)
Dept: UROLOGY | Facility: CLINIC | Age: 67
End: 2022-01-27
Payer: MEDICARE

## 2022-01-27 ENCOUNTER — OFFICE VISIT (OUTPATIENT)
Dept: SPORTS MEDICINE | Facility: CLINIC | Age: 67
End: 2022-01-27
Payer: MEDICARE

## 2022-01-27 ENCOUNTER — PATIENT MESSAGE (OUTPATIENT)
Dept: SPORTS MEDICINE | Facility: CLINIC | Age: 67
End: 2022-01-27

## 2022-01-27 ENCOUNTER — PATIENT MESSAGE (OUTPATIENT)
Dept: INTERNAL MEDICINE | Facility: CLINIC | Age: 67
End: 2022-01-27
Payer: MEDICARE

## 2022-01-27 VITALS
HEART RATE: 74 BPM | BODY MASS INDEX: 26.7 KG/M2 | WEIGHT: 190.69 LBS | SYSTOLIC BLOOD PRESSURE: 143 MMHG | HEIGHT: 71 IN | DIASTOLIC BLOOD PRESSURE: 87 MMHG

## 2022-01-27 VITALS
SYSTOLIC BLOOD PRESSURE: 134 MMHG | HEIGHT: 71 IN | WEIGHT: 190 LBS | BODY MASS INDEX: 26.6 KG/M2 | DIASTOLIC BLOOD PRESSURE: 76 MMHG | HEART RATE: 83 BPM

## 2022-01-27 DIAGNOSIS — S83.511A RUPTURE OF ANTERIOR CRUCIATE LIGAMENT OF RIGHT KNEE, INITIAL ENCOUNTER: Primary | ICD-10-CM

## 2022-01-27 DIAGNOSIS — N32.81 OAB (OVERACTIVE BLADDER): Primary | ICD-10-CM

## 2022-01-27 DIAGNOSIS — S83.241A TEAR OF MEDIAL MENISCUS OF RIGHT KNEE, CURRENT, UNSPECIFIED TEAR TYPE, INITIAL ENCOUNTER: ICD-10-CM

## 2022-01-27 DIAGNOSIS — S83.521A RUPTURE OF POSTERIOR CRUCIATE LIGAMENT OF RIGHT KNEE, INITIAL ENCOUNTER: ICD-10-CM

## 2022-01-27 DIAGNOSIS — Z01.818 PRE-OP TESTING: ICD-10-CM

## 2022-01-27 DIAGNOSIS — S83.411A TEAR OF MEDIAL COLLATERAL LIGAMENT OF RIGHT KNEE, INITIAL ENCOUNTER: ICD-10-CM

## 2022-01-27 PROCEDURE — 99999 PR PBB SHADOW E&M-EST. PATIENT-LVL III: CPT | Mod: PBBFAC,,, | Performed by: UROLOGY

## 2022-01-27 PROCEDURE — 99999 PR PBB SHADOW E&M-EST. PATIENT-LVL III: ICD-10-PCS | Mod: PBBFAC,,, | Performed by: UROLOGY

## 2022-01-27 PROCEDURE — 99213 PR OFFICE/OUTPT VISIT, EST, LEVL III, 20-29 MIN: ICD-10-PCS | Mod: S$GLB,,, | Performed by: UROLOGY

## 2022-01-27 PROCEDURE — 99213 OFFICE O/P EST LOW 20 MIN: CPT | Mod: PBBFAC | Performed by: UROLOGY

## 2022-01-27 PROCEDURE — 99215 OFFICE O/P EST HI 40 MIN: CPT | Mod: PBBFAC,27 | Performed by: STUDENT IN AN ORGANIZED HEALTH CARE EDUCATION/TRAINING PROGRAM

## 2022-01-27 PROCEDURE — 99213 OFFICE O/P EST LOW 20 MIN: CPT | Mod: S$GLB,,, | Performed by: UROLOGY

## 2022-01-27 PROCEDURE — 99214 PR OFFICE/OUTPT VISIT, EST, LEVL IV, 30-39 MIN: ICD-10-PCS | Mod: S$PBB,,, | Performed by: STUDENT IN AN ORGANIZED HEALTH CARE EDUCATION/TRAINING PROGRAM

## 2022-01-27 PROCEDURE — 99999 PR PBB SHADOW E&M-EST. PATIENT-LVL V: ICD-10-PCS | Mod: PBBFAC,,, | Performed by: STUDENT IN AN ORGANIZED HEALTH CARE EDUCATION/TRAINING PROGRAM

## 2022-01-27 PROCEDURE — 99999 PR PBB SHADOW E&M-EST. PATIENT-LVL V: CPT | Mod: PBBFAC,,, | Performed by: STUDENT IN AN ORGANIZED HEALTH CARE EDUCATION/TRAINING PROGRAM

## 2022-01-27 PROCEDURE — 99214 OFFICE O/P EST MOD 30 MIN: CPT | Mod: S$PBB,,, | Performed by: STUDENT IN AN ORGANIZED HEALTH CARE EDUCATION/TRAINING PROGRAM

## 2022-01-27 NOTE — PROGRESS NOTES
Subjective:          Chief Complaint: Casper Osborne is a 66 y.o. male who had concerns including Pain of the Right Knee.    Casper Osborne is a 66 y.o. male returns today for follow-up for his right knee.  He has a known ACL tear, grade 2 PCL tear, and grade 2 proximal MCL tear as well as a medial meniscus tear.  At his last visit he was placed in a T scope brace with range of motion from 0-90 well ambulating.  Additionally, he has been in physical therapy.  He does note he has had significant improvement in his range of motion and strength of his leg, however he still notes significant instability.  He has tried ambulating without the brace at home, mainly when he is just going to the bathroom in the middle the night.  He says he is very hesitant as knee feels very wobbly with every step that he takes.  He rates his pain currently as 8/10 at its worst and is globally located around the knee, but is worse on the anterior aspect.  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  He is wondering about surgical intervention.      Past Medical History:   Diagnosis Date    GERD (gastroesophageal reflux disease)     Hyperlipidemia        Current Outpatient Medications on File Prior to Visit   Medication Sig Dispense Refill    alfuzosin (UROXATRAL) 10 mg Tb24 TAKE ONE TABLET BY MOUTH ONCE DAILY WITH BREAKFAST 90 tablet 3    aspirin (ECOTRIN) 81 MG EC tablet Take 81 mg by mouth once daily.      fluticasone propionate (FLONASE) 50 mcg/actuation nasal spray 1 spray (50 mcg total) by Each Nostril route once daily. 16 g 3    ibuprofen (ADVIL,MOTRIN) 800 MG tablet TAKE ONE(1) TABLET BY MOUTH EVERY EIGHT(8) HOURS AS NEEDED FOR PAIN 90 tablet 2    ipratropium (ATROVENT) 0.03 %  nasal spray PLACE TWO SPRAYS IN EACH NOSTRIL THREE TIMES DAILY  30 mL 0    lovastatin (MEVACOR) 40 MG tablet TAKE ONE TABLET BY MOUTH AT BEDTIME 90 tablet 0    lovastatin (MEVACOR) 40 MG tablet Take 1 tablet (40 mg total) by mouth nightly. 90 tablet 0    omeprazole (PRILOSEC) 40 MG capsule Take 1 capsule (40 mg total) by mouth once daily. 90 capsule 3    solifenacin (VESICARE) 5 MG tablet TAKE ONE TABLET BY MOUTH ONCE DAILY. 30 tablet 11    gabapentin (NEURONTIN) 300 MG capsule Take 1 capsule (300 mg total) by mouth 3 (three) times daily. 90 capsule 1    ibuprofen (ADVIL,MOTRIN) 600 MG tablet Take 1 tablet (600 mg total) by mouth every 6 (six) hours as needed. 30 tablet 0     Current Facility-Administered Medications on File Prior to Visit   Medication Dose Route Frequency Provider Last Rate Last Admin    alprazolam ODT dissolvable tablet 0.5 mg  0.5 mg Oral Once PRN Ori Cali Jr., MD           Past Surgical History:   Procedure Laterality Date    ARTHROSCOPY OF ANKLE WITH DEBRIDEMENT Left 12/19/2019    Procedure: ARTHROSCOPY, ANKLE, WITH DEBRIDEMENT;  Surgeon: Bay Melendez MD;  Location: [facility];  Service: Orthopedics;  Laterality: Left;    EPIDURAL STEROID INJECTION N/A 1/29/2021    Procedure: INJECTION, STEROID, EPIDURAL L4/5;  Surgeon: Alvarado Reaves MD;  Location: [facility] PAIN MGT;  Service: Pain Management;  Laterality: N/A;    EPIDURAL STEROID INJECTION INTO LUMBAR SPINE N/A 12/24/2020    Procedure: Injection-steroid-epidural-lumbar--L4-5;  Surgeon: Ori Cali Jr., MD;  Location: [facility] PAIN [facility]GT;  Service: Pain Management;  Laterality: N/A;    FRACTURE SURGERY      left heel at 39yo    HEEL SPUR SURGERY      left heel - fell off a ladder and had to have this reconstructed    SPINE SURGERY      c7 fx - prior to this, he was getting dizzy spells - none since    TONSILLECTOMY      VASECTOMY         Family History   Problem Relation Age of Onset    Diabetes Mother     Cancer  Father         melanoma cancer with mets    Heart disease Daughter         enlarged heart    Hypothyroidism Daughter     Colon polyps Neg Hx     Prostate cancer Neg Hx     Colon cancer Neg Hx     Stroke Neg Hx     Hypertension Neg Hx     Hyperlipidemia Neg Hx        Social History     Socioeconomic History    Marital status:      Spouse name: Nithya    Number of children: 1   Occupational History    Occupation: Retried   Tobacco Use    Smoking status: Never Smoker    Smokeless tobacco: Never Used   Substance and Sexual Activity    Alcohol use: Yes     Comment: less than once a month    Drug use: No    Sexual activity: Yes     Partners: Female     Comment:          Review of Systems   Constitutional: Negative.   HENT: Negative.    Eyes: Negative.    Cardiovascular: Negative.    Respiratory: Negative.    Endocrine: Negative.    Hematologic/Lymphatic: Negative.    Skin: Negative.    Musculoskeletal: Positive for joint pain (right knee) and muscle weakness. Negative for arthritis, back pain, falls, gout, joint swelling, muscle cramps, myalgias, neck pain and stiffness.   Neurological: Negative.    Psychiatric/Behavioral: Negative.    Allergic/Immunologic: Negative.        Pain Related Questions  Over the past 3 days, what was your average pain during activity? (I.e. running, jogging, walking, climbing stairs, getting dressed, ect.): 7  Over the past 3 days, what was your highest pain level?: 7  Over the past 3 days, what was your lowest pain level? : 4    Other  Was the patient's HEIGHT measured or patient reported?: Patient Reported  Was the patient's WEIGHT measured or patient reported?: Measured      Objective:        General: Casper is well-developed, well-nourished, appears stated age, in no acute distress, alert and oriented to time, place and person.     General    Nursing note and vitals reviewed.  Constitutional: He is oriented to person, place, and time. He appears well-developed and  well-nourished. No distress.   HENT:   Head: Normocephalic and atraumatic.   Nose: Nose normal.   Eyes: EOM are normal.   Cardiovascular: Normal rate and intact distal pulses.    Pulmonary/Chest: Effort normal. No respiratory distress.   Neurological: He is alert and oriented to person, place, and time.   Psychiatric: He has a normal mood and affect. His behavior is normal. Judgment and thought content normal.     General Musculoskeletal Exam   Gait: abnormal and antalgic       Right Knee Exam     Inspection   Erythema: absent  Scars: absent  Swelling: present  Effusion: present  Deformity: present (Quadriceps atrophy)  Bruising: absent    Tenderness   The patient is tender to palpation of the medial joint line and lateral joint line.    Range of Motion   Extension: 0   Flexion: 130     Tests   Meniscus   Eddie:  Medial - positive Lateral - negative  Ligament Examination   Lachman: abnormal - grade II  PCL-Posterior Drawer: abnormal   - grade I  MCL - Valgus: abnormal - grade I  LCL - Varus: normal  Posterolateral Corner: stable  Patella   Patellar apprehension: negative  Passive Patellar Tilt: neutral  Patellar Tracking: normal  Patellar Grind: negative    Other   Sensation: normal    Left Knee Exam   Left knee exam is normal.    Inspection   Erythema: absent  Scars: absent  Swelling: absent  Effusion: absent  Deformity: absent  Bruising: absent    Tenderness   The patient is experiencing no tenderness.     Range of Motion   Extension: -5   Flexion: 140     Tests   Meniscus   Eddie:  Medial - negative Lateral - negative  Stability Lachman: normal (-1 to 2mm) PCL-Posterior Drawer: normal (0 to 2mm)  MCL - Valgus: normal (0 to 2mm)  LCL - Varus: normal (0 to 2mm)  Posterolateral Corner: stable  Patella   Patellar apprehension: negative  Passive Patellar Tilt: neutral  Patellar Tracking: normal    Other   Sensation: normal    Muscle Strength   Right Lower Extremity   Hip Abduction: 5/5   Quadriceps:  4/5    Hamstrin/5   Left Lower Extremity   Quadriceps:  5/5   Hamstrin/5     Vascular Exam     Right Pulses  Dorsalis Pedis:      2+  Posterior Tibial:      2+        Left Pulses  Dorsalis Pedis:      2+  Posterior Tibial:      2+        Edema  Right Lower Leg: absent  Left Lower Leg: absent    Imaging:  X-rays of the bilateral knee from 2021 personally reviewed by me on that day.  These include weight-bearing AP, PA flexion, lateral, and Merchant views.  There is marginal osteophytes along the notch, however these are very small.  There is no subchondral sclerosis.  There is no joint space loss in all 3 compartments bilaterally.    MRI of the right knee from 2021 personally reviewed by me on 2021.  There is a complete ACL tear.  Grade 2 PCL tear in the proximal half near the midportion.  There is a high grade 2, possible grade 3, MCL tear off the femoral origin.  There are medial and lateral meniscus tears, the medial is a the posterior horn with a radial and horizontal component.  The medial root is intact.  Laterally, the meniscus has a radial tear in the posterior horn.  The root is not well visualized.  There is no extrusion of either the medial or lateral meniscus.  Overall, the cartilage looks very good in all 3 compartments with no significant or full-thickness loss.          Assessment:     Casper STEPHANIE Dylon is a 66 y.o. male with right ACL tear, PCL tear, MCL tear, and medial and lateral meniscus tears as detailed above.  Encounter Diagnoses   Name Primary?    Pre-op testing     Rupture of anterior cruciate ligament of right knee, initial encounter Yes    Rupture of posterior cruciate ligament of right knee, initial encounter     Tear of medial collateral ligament of right knee, initial encounter     Tear of medial meniscus of right knee, current, unspecified tear type, initial encounter           Plan:       The diagnosis was again discussed with Casper and all of his questions  were answered.  Again, his images were reviewed with him.  We discussed continuation of physical therapy and non operative management.  After this discussion, he states that he continues to have significant instability while mobilizing and he does not feel like he could perform his activities of daily living nor his work with this instability.  He says any time he mobilizes without the T scope brace, he feels his knee is very wobbly and then he is going to fall over.  We then discussed surgical intervention.  I explained that any surgical intervention we have a goal just to stabilize his knee.  I stated my plan would be ACL reconstruction.  Regarding the PCL, we will leave this alone as it is an incomplete tear and clinically the posterior drawer feels improved since his last visit.  For the MCL, I would evaluate him under anesthesia, however like to PCL this is also improved since his last visit.  If he has significant opening under fluoroscopic exam under anesthesia, we will proceed with MCL repair versus reconstruction.  If he does not, we will leave this alone.  Regarding the ACL, we discussed graft options including allograft and autograft of hamstring tendons, quadriceps tendon, and bone patellar tendon bone.  I explained that given his age, allograft would be a very good choice and provide him with adequate stability.  He was adamantly opposed using allograft tissue and requested autograft.  The risks and benefits of both were discussed, as well as the specific risks and benefits of each autograft option.  After this discussion, he strongly desired to proceed with quadriceps autograft.  Regarding the menisci, we will proceed with partial meniscectomies and LEs the tear is at the root, in which case it would be repaired.  The difference in rehabilitation protocols were discussed and his questions were answered.  We will plan on surgery on 03/09/2022.  We will use aspirin for DVT prophylaxis.  He will obtain  clearance from his primary care physician, Dr. Chaparro.    The risks, benefits and alternatives to surgery were discussed with the patient at great length.  These include bleeding, infection, vessel/nerve damage, pain, numbness, tingling, complex regional pain syndrome, hardware/surgical failure, need for further surgery, persistent instability, graft failure, repair failure, DVT, PE, arthritis and death.  Patient states an understanding and wishes to proceed with surgery.   All questions were answered.  No guarantees were implied or stated.     All of their questions were answered.  They will call the clinic with any questions or concerns in the interim.    Should the patient's symptoms worsen, persist, or fail to improve they should return for reevaluation and I would be happy to see them back anytime.        Wiliam Guerin M.D.     Please be aware that this note has been generated with the assistance of [employer] voice-to-text.  Please excuse any spelling or grammatical errors.    Thank you for choosing Dr. Wiliam Guerin for your sports medicine care. It is our goal to provide you with exceptional care that will help keep you healthy, active, and get you back in the game.     If you felt that you received exemplary care today, please consider leaving feedback for Dr. Guerin on [employer] at [email or web address].    Please do not hesitate to reach out to us via email, phone, or MyChart with any questions, concerns, or feedback.

## 2022-01-27 NOTE — PROGRESS NOTES
[facility]        Urinary Frequency and Urgency     1/27/22     Referred by:  Leon Chaparro MD     HPI: Casper Osborne is a very pleasant 65 y.o. male who was initially seen for evaluation of a right renal mass. He has followed up with Dr. De Jesus for this.      He also complains of urinary frequency, urgency and voiding difficulty. I have reviewed his 3-day voiding diary. His symptoms initially improved with uroxatral but have gradually worsened. We have discussed the possibility of ongoing obstruction vs OAB. He has symptoms suggestive of both.      He has no history of hypertension, diabetes or known renal disease. His recent creatinine is 1.1. Last PSA 2020 0.79.    Interval History 1/27/22: Patient returned to clinic with OAB and obstructive symptoms. Has been on VESIcare for 1 year and stated that he showed improvement with the medication but it started to taper off. Medication is not covered by insurance now. He also takes Alfazosin. Primary complaints are nocturia x3 and significant straining during urination. No dyuria or nocturia.      A review of 10+ systems was conducted with pertinent positive and negative findings documented in HPI with all other systems reviewed and negative.     Past medical, family, surgical and social history reviewed as documented in chart with pertinent positive medical, family, surgical and social history detailed in HPI.     Exam Findings:     Const: no acute distress, conversant and alert  Eyes: anicteric, extraocular muscles intact  ENMT: normocephalic, Nl oral membranes  Cardio: no cyanosis, nl cap refill  Pulm: no tachypnea; no resp distress  Abd: soft, no tenderness  Musc: no laceration, no tenderness  Neuro: alert; oriented x 3  Skin: warm, dry; no petichiae  Psych: no anxiety; normal speech      Assessment/Plan:     Urinary Frequency and Urgency: He had some improvement with an anti-muscarinic, but effect tapered off and not covered by insurance. Majority  of symptoms are of obstructive nature. However, will start him on Myrbitriq given some prior efficacy on OAB medication.   Patient would like to return to meed with Dr. Ireland for obstructive symptoms to discuss his interest in [employer].

## 2022-01-28 ENCOUNTER — CLINICAL SUPPORT (OUTPATIENT)
Dept: REHABILITATION | Facility: HOSPITAL | Age: 67
End: 2022-01-28
Payer: MEDICARE

## 2022-01-28 DIAGNOSIS — M25.661 DECREASED RANGE OF MOTION OF RIGHT KNEE: ICD-10-CM

## 2022-01-28 PROCEDURE — 97110 THERAPEUTIC EXERCISES: CPT | Mod: CQ

## 2022-01-28 NOTE — PROGRESS NOTES
"  Physical Therapy Treatment Note  [place] 1st Floor     Name: Casper Osborne  Clinic Number: [id number]    Therapy Diagnosis:   Encounter Diagnosis   Name Primary?    Decreased range of motion of right knee      Physician: Wiliam Guerin MD    Visit Date: 1/28/2022    Physician Orders: PT Eval and Treat   Medical Diagnosis from Referral: Tear of medial collateral ligament of right knee, initial encounter   Rupture of posterior cruciate ligament of right knee, initial encounter   Rupture of anterior cruciate ligament of right knee, initial encounter   Evaluation Date: 1/11/2022  Authorization Period Expiration: 01/13/2022 01/13/2023   Plan of Care Expiration: 3/11/22  Visit # / Visits authorized: 4/20    Time In: 1050  Time Out: 1150  Total Billable Time: 50 minutes    Precautions: Standard, fall risk     Subjective     Pt reports: no pain in R knee when arrived for tx.   He was compliant with home exercise program.  Response to previous treatment: tolerated well  Functional change: brace rocco when arrived     Pain: 0/10  Location: R knee    Objective     R knee ROM: -4 - 0 - 130    Casper received therapeutic exercises to develop strength, endurance, ROM, flexibility, posture and core stabilization for 50 minutes including:     Stationary bike lv 3 for 10' for increased ROM, circulation, and mm strength/endurance  B heel digs 3x10  R SB knee flexion (quick) 2x30   SLR 3x10/3'   S/L Abd 3x10/3"  Bridges 3x10  B HSS w/strap 3x30" ea   B SAQ 3x10/3"  Mod standing SLR 3x10  Standing mini squats 3x10    Not today   Hip Ext 3x10  SAQ 10"x20  Heel slides PC 3x10/3"  B standing march 3x10  B heel raise 3x10   Wall squats in brace 3x10  LAQ 3x10x3"  Prone HS curl 3x10    RICE for 10' to R knee for decreased pain, edema and tissue healing      Home Exercises Provided and Patient Education Provided   Compliance with HEP       Education provided:   - continue wearing brace as instructed    Written Home Exercises Provided: " Patient instructed to cont prior HEP.  Exercises were reviewed and Casper was able to demonstrate them prior to the end of the session.  Casper demonstrated good  understanding of the education provided.     See EMR under Patient Instructions for exercises provided from Initial Evaluation.    Assessment   Pt tolerating tx well, no increased pain during tx but mm fatigue noted. VC/TC for correcting form/technique with therex. Continue with strengthening prior to scheduled Sx in March.   Casper is progressing  towards his goals.   Pt prognosis is good    Pt will continue to benefit from skilled outpatient physical therapy to address the deficits listed in the problem list box on initial evaluation, provide pt/family education and to maximize pt's level of independence in the home and community environment.     Pt's spiritual, cultural and educational needs considered and pt agreeable to plan of care and goals.     Anticipated barriers to physical therapy: none identified    Goals:   Short Term Goals: 2 weeks   1. Independent with HEP  2. Increase pain-free knee flexion ROM to 135 degrees   3. Pain-free Gait      Long Term Goals: 4 weeks   1. Increase pain-free R knee flexion ROM to 135 degrees   2. Increase pain-free R knee extension ROM to at least 0 degrees  3. Gait pain-free  4. Squat Pain-free   5. Stairs Pain-free    Plan     Continue with PT POC    Nabeel Jeffers, PTA, STS

## 2022-02-01 ENCOUNTER — CLINICAL SUPPORT (OUTPATIENT)
Dept: REHABILITATION | Facility: HOSPITAL | Age: 67
End: 2022-02-01
Payer: MEDICARE

## 2022-02-01 DIAGNOSIS — M25.661 DECREASED RANGE OF MOTION OF RIGHT KNEE: ICD-10-CM

## 2022-02-01 PROCEDURE — 97110 THERAPEUTIC EXERCISES: CPT

## 2022-02-01 NOTE — PROGRESS NOTES
"  Physical Therapy Treatment Note  [address]     Name: Casper Osborne  Clinic Number: [id number]    Therapy Diagnosis:   Encounter Diagnosis   Name Primary?    Decreased range of motion of right knee      Physician: Wiliam Guerin MD    Visit Date: 2/1/2022    Physician Orders: PT Eval and Treat   Medical Diagnosis from Referral: Tear of medial collateral ligament of right knee, initial encounter   Rupture of posterior cruciate ligament of right knee, initial encounter   Rupture of anterior cruciate ligament of right knee, initial encounter   Evaluation Date: 1/11/2022  Authorization Period Expiration: 01/13/2022 01/13/2023   Plan of Care Expiration: 3/11/22  Visit # / Visits authorized: 4/20    Time In: 1100  Time Out: 1200  Total Billable Time: 60 minutes    Precautions: Standard, fall risk     Subjective     Pt reports: surgery is March 9th.  Mild swelling   He was compliant with home exercise program.  Response to previous treatment: tolerated well  Functional change: brace rocco when arrived     Pain: 0/10  Location: R knee    Objective     R knee ROM: -4 - 0 - 130    Casper received therapeutic exercises to develop strength, endurance, ROM, flexibility, posture and core stabilization for 60 minutes including:     Stationary bike lv 3 for 15' for increased ROM, circulation, and mm strength/endurance  B heel digs 3x10  R SB knee flexion (quick) 2x30   SLR 3x10/3'   S/L Abd 3x10/3"  Bridges 3x10  B HSS w/strap 3x30" ea   B SAQ 3x10/3"  Mod standing SLR 3x10  Standing mini squats 3x10  Hip Abd/Ext 3x10      Not today:  SAQ 10"x20  Heel slides PC 3x10/3"  B standing march 3x10  B heel raise 3x10   Wall squats in brace 3x10  LAQ 3x10x3"  Prone HS curl 3x10    RICE for 10' to R knee for decreased pain, edema and tissue healing      Home Exercises Provided and Patient Education Provided   Compliance with HEP       Education provided:   - continue wearing brace as instructed    Written Home Exercises Provided: " Patient instructed to cont prior HEP.  Exercises were reviewed and Casper was able to demonstrate them prior to the end of the session.  Casper demonstrated good  understanding of the education provided.     See EMR under Patient Instructions for exercises provided from Initial Evaluation.    Assessment   Pt did well today. Quad tone improving.   Casper is progressing  towards his goals.   Pt prognosis is good    Pt will continue to benefit from skilled outpatient physical therapy to address the deficits listed in the problem list box on initial evaluation, provide pt/family education and to maximize pt's level of independence in the home and community environment.     Pt's spiritual, cultural and educational needs considered and pt agreeable to plan of care and goals.     Anticipated barriers to physical therapy: none identified    Goals:   Short Term Goals: 2 weeks   1. Independent with HEP  2. Increase pain-free knee flexion ROM to 135 degrees   3. Pain-free Gait      Long Term Goals: 4 weeks   1. Increase pain-free R knee flexion ROM to 135 degrees   2. Increase pain-free R knee extension ROM to at least 0 degrees  3. Gait pain-free  4. Squat Pain-free   5. Stairs Pain-free    Plan     Continue with PT POC    Imelda Sandoval, PT, STS

## 2022-02-08 ENCOUNTER — CLINICAL SUPPORT (OUTPATIENT)
Dept: REHABILITATION | Facility: HOSPITAL | Age: 67
End: 2022-02-08
Payer: MEDICARE

## 2022-02-08 DIAGNOSIS — M25.661 DECREASED RANGE OF MOTION OF RIGHT KNEE: ICD-10-CM

## 2022-02-08 PROCEDURE — 97110 THERAPEUTIC EXERCISES: CPT | Mod: PN

## 2022-02-10 ENCOUNTER — CLINICAL SUPPORT (OUTPATIENT)
Dept: REHABILITATION | Facility: HOSPITAL | Age: 67
End: 2022-02-10
Payer: MEDICARE

## 2022-02-10 DIAGNOSIS — M25.661 DECREASED RANGE OF MOTION OF RIGHT KNEE: ICD-10-CM

## 2022-02-10 PROCEDURE — 97112 NEUROMUSCULAR REEDUCATION: CPT | Mod: PN | Performed by: PHYSICAL THERAPIST

## 2022-02-10 PROCEDURE — 97530 THERAPEUTIC ACTIVITIES: CPT | Mod: PN | Performed by: PHYSICAL THERAPIST

## 2022-02-10 PROCEDURE — 97110 THERAPEUTIC EXERCISES: CPT | Mod: PN | Performed by: PHYSICAL THERAPIST

## 2022-02-10 NOTE — PROGRESS NOTES
"  Physical Therapy Treatment Note  [place] 1st Floor     Name: Casper Osborne  Clinic Number: [id number]    Therapy Diagnosis:   Encounter Diagnosis   Name Primary?    Decreased range of motion of right knee      Physician: Wiliam Guerin MD    Visit Date: 2/10/2022    Physician Orders: PT Eval and Treat   Medical Diagnosis from Referral: Tear of medial collateral ligament of right knee, initial encounter   Rupture of posterior cruciate ligament of right knee, initial encounter   Rupture of anterior cruciate ligament of right knee, initial encounter   Evaluation Date: 1/11/2022  Authorization Period Expiration: 01/13/2022 01/13/2023   Plan of Care Expiration: 3/11/22  Visit # / Visits authorized: 6/20    Time In: 1:00 pm  Time Out: 2:00 pm  Total Billable Time: 54 minutes (TEx2, TA, NMR)    Precautions: Standard, fall risk     Subjective     Pt reports: surgery is March 9th. "I need to get this knee back to 100%"   He was compliant with home exercise program.  Response to previous treatment: tolerated well  Functional change: brace rocco when arrived     Pain: 2/10  Location: R knee    Objective     R knee ROM: -4 - 0 - 130    2/10/22: SLS max: 29 sec    Casper received therapeutic exercises to develop strength, endurance, ROM, flexibility, posture and core stabilization for 29 minutes including:     Nustep lv 3 for 5' for increased ROM, circulation, and mm strength/endurance  knee extnesion machine: 35# DL 2x10 locked at setting 8 to prevent full knee ext  HS machine 4.5 plates DL.  Cybex Hip abduction:  3 plates 3x10 each      neuromuscular re-education activities to improve: Balance and Proprioception for 13 minutes. The following activities were included:  SLS: 5x10"  C sweeps: 10x  R SLS with L high knee hip flexion: 3x10 no UE  Foam high knee marches: 1 min      therapeutic activities to improve functional performance for 12  minutes, including:  Leg press:  3x10 DL at 6 plates and SL 3.5 plates 3x5  Step ups: " "6" No UE; 2x10 R        Home Exercises Provided and Patient Education Provided   Compliance with HEP       Education provided:   - continue wearing brace as instructed    Written Home Exercises Provided: Patient instructed to cont prior HEP.  Exercises were reviewed and Casper was able to demonstrate them prior to the end of the session.  Casper demonstrated good  understanding of the education provided.     See EMR under Patient Instructions for exercises provided from Initial Evaluation.    Assessment   Today's visit concentrated on higher level strengthening and proprioception/balance w/ mild anterior knee discomfort that quickly resolved. R SLS max was 29 sec. Machines were blocked to avoid end range knee extension stress.     Casper is progressing  towards his goals.   Pt prognosis is good    Pt will continue to benefit from skilled outpatient physical therapy to address the deficits listed in the problem list box on initial evaluation, provide pt/family education and to maximize pt's level of independence in the home and community environment.     Pt's spiritual, cultural and educational needs considered and pt agreeable to plan of care and goals.     Anticipated barriers to physical therapy: none identified    Short Term Goals: 2 weeks   1. Independent with HEP  MET  2. Increase pain-free knee flexion ROM to 135 degrees MET  3. Pain-free Gait MET     Long Term Goals: 4 weeks   1. Increase pain-free R knee flexion ROM to 135 degrees MET  2. Increase pain-free R knee extension ROM to at least 0 degrees MET  3. Gait pain-free MET  4. Squat Pain-free progressing towards; not met  5. Stairs Pain-free progressing towards; not met    Plan     Continue with PT TEO Valle, PT            "

## 2022-02-10 NOTE — PROGRESS NOTES
"  Physical Therapy Treatment Note       Name: Casper Osborne  Clinic Number: [id number]    Therapy Diagnosis:   Encounter Diagnosis   Name Primary?    Decreased range of motion of right knee      Physician: Wiliam Guerin MD    Visit Date: 2022    Physician Orders: PT Eval and Treat   Medical Diagnosis from Referral: Tear of medial collateral ligament of right knee, initial encounter   Rupture of posterior cruciate ligament of right knee, initial encounter   Rupture of anterior cruciate ligament of right knee, initial encounter   Evaluation Date: 2022  Authorization Period Expiration: 2022   Plan of Care Expiration: 3/11/22  Visit # / Visits authorized:     Time In: 1100  Time Out: 1155  Total Billable Time: 55 minutes (4 TE))  Precautions: Standard, fall risk     Subjective     Pt reports: surgery is .  He is transferring to the [facility] to finish out his pre-hab as he live in [place].   He was compliant with home exercise program.  Response to previous treatment: tolerated well  Functional change: brace rocco when arrived     Pain: 0/10  Location: R knee    Objective     R knee ROM: -4 - 0 - 130  Wearing Bregg brace  Ligamentous testin+ Posterior drawer, 1+ anterior drawer, Valgus stress test 1+, Varus test normal    Casper received therapeutic exercises to develop strength, endurance, ROM, flexibility, posture and core stabilization for 55 minutes including assessment:     Stationary bike lv 3 for 5' for increased ROM, circulation, and mm strength/endurance  Mat:   SLR 3x10/3'     S/L Abd 3x10/3"    Bridges 3x10    Machines: Knee extension (20 degree extension stop) DL 3x10 at 40#    Leg press DL 6 plates 3x10 and SL 3x5 at 3 plates    HS curl (20 degree extension stop) DL 4 plates 3x10      Home Exercises Provided and Patient Education Provided   Education provided:   - continue wearing brace as instructed    Written Home Exercises Provided: Patient instructed to " cont prior HEP.  Exercises were reviewed and Casper was able to demonstrate them prior to the end of the session.  Casper demonstrated good  understanding of the education provided.     See EMR under Patient Instructions for exercises provided from Initial Evaluation.    Assessment   Mr Osborne is 67 y/o here for pre-hab ACL reconstruction.  Advanced strengthening program to machines today.  Added neuromuscular control work next visit including single-balance work.        Casper is progressing  towards his goals.   Pt prognosis is good    Pt will continue to benefit from skilled outpatient physical therapy to address the deficits listed in the problem list box on initial evaluation, provide pt/family education and to maximize pt's level of independence in the home and community environment.     Pt's spiritual, cultural and educational needs considered and pt agreeable to plan of care and goals.     Anticipated barriers to physical therapy: none identified    Goals:   Short Term Goals: 2 weeks   1. Independent with HEP  MET  2. Increase pain-free knee flexion ROM to 135 degrees MET  3. Pain-free Gait MET     Long Term Goals: 4 weeks   1. Increase pain-free R knee flexion ROM to 135 degrees MET  2. Increase pain-free R knee extension ROM to at least 0 degrees MET  3. Gait pain-free MET  4. Squat Pain-free progressing towards; not met  5. Stairs Pain-free progressing towards; not met    Plan     Continue with PT POC    Thomas Casarez, PT, DPT, OCS

## 2022-02-15 ENCOUNTER — CLINICAL SUPPORT (OUTPATIENT)
Dept: REHABILITATION | Facility: HOSPITAL | Age: 67
End: 2022-02-15
Payer: MEDICARE

## 2022-02-15 DIAGNOSIS — M25.661 DECREASED RANGE OF MOTION OF RIGHT KNEE: ICD-10-CM

## 2022-02-15 PROCEDURE — 97110 THERAPEUTIC EXERCISES: CPT | Mod: PN

## 2022-02-15 PROCEDURE — 97530 THERAPEUTIC ACTIVITIES: CPT | Mod: PN

## 2022-02-15 PROCEDURE — 97112 NEUROMUSCULAR REEDUCATION: CPT | Mod: PN

## 2022-02-15 NOTE — PROGRESS NOTES
"  Physical Therapy Treatment Note       Name: Casper Osborne  Clinic Number: [id number]    Therapy Diagnosis:   Encounter Diagnosis   Name Primary?    Decreased range of motion of right knee      Physician: Wiliam Guerin MD    Visit Date: 2/15/2022    Physician Orders: PT Eval and Treat   Medical Diagnosis from Referral: Tear of medial collateral ligament of right knee, initial encounter   Rupture of posterior cruciate ligament of right knee, initial encounter   Rupture of anterior cruciate ligament of right knee, initial encounter   Evaluation Date: 1/11/2022  Authorization Period Expiration: 01/13/2022 01/13/2023   Plan of Care Expiration: 3/11/22  Visit # / Visits authorized: 7/20  FOTO: 7/10    Time In: 1100  Time Out: 1200  Total Billable Time: 55 minutes (2 TE, 1 NM, 1 TA)  Precautions: Standard, fall risk     Subjective     Pt reports: no new changes; occasional intermittent swelling R knee.   He was compliant with home exercise program.  Response to previous treatment: tolerated well  Functional change: brace rocco when arrived     Pain: 2/10  Location: R knee    Objective     Casper received therapeutic exercises to develop strength, endurance, ROM, flexibility, posture and core stabilization for 30 minutes including assessment:   Stationary bike lv 3 for 5' for increased ROM, circulation, and mm strength/endurance  Mat:   SLR 3x10/3'     S/L Abd 3x10/3"    Bridges 3x10    Machines: Knee extension (20 degree extension stop) DL 3x10 at 15#    Leg press DL 7 plates 3x10 and SL 4x5 at 4 plates    HS curl (20 degree extension stop) DL 4 plates 3x10      Neuromuscular re-education activities to improve: Balance and Proprioception for 15 minutes. The following activities were included:  SLS: 5x10"  C sweeps: 10x  R SLS with L high knee hip flexion: 3x10 no UE  Foam high knee marches: 1 min      therapeutic activities to improve functional performance for 10 minutes, including:  Step ups: 6" No UE; 2x10 R --> " forward and lateral        Home Exercises Provided and Patient Education Provided   - Compliance with HEP   - continue wearing brace as instructed    Written Home Exercises Provided: Patient instructed to cont prior HEP.  Exercises were reviewed and Casper was able to demonstrate them prior to the end of the session.  Casper demonstrated good  understanding of the education provided.     See EMR under Patient Instructions for exercises provided from Initial Evaluation.    Assessment   Increased resistance on machines today.  Threshold of load tolerance reached today for strength training.  May need to decrease intensity according     Casper is progressing  towards his goals.   Pt prognosis is good    Pt will continue to benefit from skilled outpatient physical therapy to address the deficits listed in the problem list box on initial evaluation, provide pt/family education and to maximize pt's level of independence in the home and community environment.   Pt's spiritual, cultural and educational needs considered and pt agreeable to plan of care and goals.     Anticipated barriers to physical therapy: none identified    Short Term Goals: 2 weeks   1. Independent with HEP  MET  2. Increase pain-free knee flexion ROM to 135 degrees MET  3. Pain-free Gait MET     Long Term Goals: 4 weeks   1. Increase pain-free R knee flexion ROM to 135 degrees MET  2. Increase pain-free R knee extension ROM to at least 0 degrees MET  3. Gait pain-free MET  4. Squat Pain-free progressing towards; not met  5. Stairs Pain-free progressing towards; not met    Plan     Continue with PT POC    Thomas Casarez, PT, DPT, OCS

## 2022-02-16 ENCOUNTER — PATIENT MESSAGE (OUTPATIENT)
Dept: SURGERY | Facility: HOSPITAL | Age: 67
End: 2022-02-16
Payer: MEDICARE

## 2022-02-16 ENCOUNTER — TELEPHONE (OUTPATIENT)
Dept: SPORTS MEDICINE | Facility: CLINIC | Age: 67
End: 2022-02-16
Payer: MEDICARE

## 2022-02-16 NOTE — TELEPHONE ENCOUNTER
Spoke with patient in response to message left regarding questions about surgery. Patient had questions regarding concerns about recovery process and possibility of continuing with conservative treatment routes only. It was explained that his surgery is an elective procedure. Recovery timeframe was discussed and patient was told to consider his options and let us know if he would like to proceed or postpone. He agreed and understood stating he would let us know. All questions answered. Patient instructed to call with any further questions or concerns. He understood.      Devante Giraldo MS, [facility]  Sports Medicine Assistant  [facility]

## 2022-02-17 ENCOUNTER — CLINICAL SUPPORT (OUTPATIENT)
Dept: REHABILITATION | Facility: HOSPITAL | Age: 67
End: 2022-02-17
Payer: MEDICARE

## 2022-02-17 DIAGNOSIS — M25.661 DECREASED RANGE OF MOTION OF RIGHT KNEE: ICD-10-CM

## 2022-02-17 PROCEDURE — 97530 THERAPEUTIC ACTIVITIES: CPT | Mod: PN,CQ

## 2022-02-17 PROCEDURE — 97110 THERAPEUTIC EXERCISES: CPT | Mod: PN,CQ

## 2022-02-17 NOTE — PROGRESS NOTES
"  Physical Therapy Treatment Note       Name: Casper Osborne  Clinic Number: [id number]    Therapy Diagnosis:   Encounter Diagnosis   Name Primary?    Decreased range of motion of right knee      Physician: Wiliam Guerin MD    Visit Date: 2/17/2022    Physician Orders: PT Eval and Treat   Medical Diagnosis from Referral: Tear of medial collateral ligament of right knee, initial encounter   Rupture of posterior cruciate ligament of right knee, initial encounter   Rupture of anterior cruciate ligament of right knee, initial encounter   Evaluation Date: 1/11/2022  Authorization Period Expiration: 01/13/2022 01/13/2023   Plan of Care Expiration: 3/11/22  Visit # / Visits authorized: 8/20    FOTO: 8/10    Time In: 12:00 pm  Time Out: 1:00 pm  Total Billable Time: 60 minutes (3 TE, 1 TA)  Precautions: Standard, fall risk     Subjective     Pt reports: his knee is hurting a little more because he didn't take anything for pain last night.  He was compliant with home exercise program.  Response to previous treatment: tolerated well  Functional change: brace rocco when arrived     Pain: 4/10  Location: R knee    Objective     Casper received therapeutic exercises to develop strength, endurance, ROM, flexibility, posture and core stabilization for 52 minutes including assessment:     Stationary bike lv 3 for 5' for increased ROM, circulation, and mm strength/endurance  Mat:   SLR 3x10/3'     S/L Abd 3x10/3"    Bridges 3x10    Machines: Knee extension (20 degree extension stop) DL 3x10 at 10#    Leg press DL 6 plates 3x10 and SL 4x5 at 3 plates    HS curl (20 degree extension stop) DL 4 plates 3x10    Neuromuscular re-education activities to improve: Balance and Proprioception for 00 minutes. The following activities were included:  SLS: 5x10"  C sweeps: 10x  R SLS with L high knee hip flexion: 3x10 no UE  Foam high knee marches: 1 min    therapeutic activities to improve functional performance for 8 minutes, including:  Step " "ups: 6" No UE; 2x10 R --> forward and lateral    Home Exercises Provided and Patient Education Provided   - Compliance with HEP   - continue wearing brace as instructed    Written Home Exercises Provided: Patient instructed to cont prior HEP.  Exercises were reviewed and Casper was able to demonstrate them prior to the end of the session.  Casper demonstrated good  understanding of the education provided.     See EMR under Patient Instructions for exercises provided from Initial Evaluation.    Assessment     Casper is a 66 y.o. male referred to outpatient Physical Therapy with a medical diagnosis of Rupture of anterior cruciate ligament of right knee, initial encounter, Rupture of posterior cruciate ligament of right knee, initial encounter.  Consulted with Thomas Wren, PT, with suggestions from previous note and decreased resistance exercises.  Patient did not complete all of his exercises due to required occasional short rest breaks due to R knee "feeling funny, not quite right", but was able to continue on with no problems.       Casper is progressing  towards his goals.   Pt prognosis is good    Pt will continue to benefit from skilled outpatient physical therapy to address the deficits listed in the problem list box on initial evaluation, provide pt/family education and to maximize pt's level of independence in the home and community environment.   Pt's spiritual, cultural and educational needs considered and pt agreeable to plan of care and goals.     Anticipated barriers to physical therapy: none identified    Short Term Goals: 2 weeks   1. Independent with HEP  MET  2. Increase pain-free knee flexion ROM to 135 degrees MET  3. Pain-free Gait MET     Long Term Goals: 4 weeks   1. Increase pain-free R knee flexion ROM to 135 degrees MET  2. Increase pain-free R knee extension ROM to at least 0 degrees MET  3. Gait pain-free MET  4. Squat Pain-free progressing towards; not met  5. Stairs Pain-free progressing " towards; not met    Plan     Continue with PT POC    Jaylen Castaneda, PTA,

## 2022-02-22 ENCOUNTER — CLINICAL SUPPORT (OUTPATIENT)
Dept: REHABILITATION | Facility: HOSPITAL | Age: 67
End: 2022-02-22
Payer: MEDICARE

## 2022-02-22 DIAGNOSIS — M25.661 DECREASED RANGE OF MOTION OF RIGHT KNEE: Primary | ICD-10-CM

## 2022-02-22 PROCEDURE — 97110 THERAPEUTIC EXERCISES: CPT | Mod: PN,CQ

## 2022-02-22 PROCEDURE — 97530 THERAPEUTIC ACTIVITIES: CPT | Mod: PN,CQ

## 2022-02-22 PROCEDURE — 97112 NEUROMUSCULAR REEDUCATION: CPT | Mod: PN,CQ

## 2022-02-22 NOTE — PROGRESS NOTES
"  Physical Therapy Treatment Note       Name: Casper Osborne  Clinic Number: [id number]    Therapy Diagnosis:   Encounter Diagnosis   Name Primary?    Decreased range of motion of right knee Yes     Physician: Wiliam Guerin MD    Visit Date: 2/22/2022    Physician Orders: PT Eval and Treat   Medical Diagnosis from Referral: Tear of medial collateral ligament of right knee, initial encounter   Rupture of posterior cruciate ligament of right knee, initial encounter   Rupture of anterior cruciate ligament of right knee, initial encounter   Evaluation Date: 1/11/2022  Authorization Period Expiration: 01/13/2022 01/13/2023   Plan of Care Expiration: 3/11/22  Visit # / Visits authorized: 8/20    FOTO: 8/10    Time In: 11:00 am  Time Out: 11:53 pm  Total Billable Time: 53 minutes (2 TE, 1 NMR, 1 TA)  Precautions: Standard, fall risk     Subjective     Pt reports: his knee pain is about the same. Patient reports his knee feels achy and stiff as well today.  He was compliant with home exercise program.  Response to previous treatment: no problems.  Functional change: ongoing.    Pain: 4/10  Location: R knee    Objective     Brace rocco when arrived    Casper received therapeutic exercises to develop strength, endurance, ROM, flexibility, posture and core stabilization for 35 minutes including assessment:     Stationary bike lv 3 for 5' for increased ROM, circulation, and mm strength/endurance  Mat:   SLR 3x10/3'     S/L Abd 3x10/3"    Bridges 3x10    Machines: Knee extension (20 degree extension stop) DL 3x10 at 10#    Leg press DL 6 plates 3x10 and SL 4x5 at 3 plates    HS curl (20 degree extension stop) DL 4 plates 3x10    Neuromuscular re-education activities to improve: Balance and Proprioception for 12 minutes. The following activities were included:  SLS: 5x10"  C sweeps: 10x  R SLS with L high knee hip flexion: 3x10 no UE  Foam high knee marches: 1 min    therapeutic activities to improve functional performance for 8 " "minutes, including:  Step ups: 6" No UE; 2x10 R --> forward and lateral    Home Exercises Provided and Patient Education Provided   - Compliance with HEP   - continue wearing brace as instructed    Written Home Exercises Provided: Patient instructed to cont prior HEP.  Exercises were reviewed and Casper was able to demonstrate them prior to the end of the session.  Casper demonstrated good  understanding of the education provided.     See EMR under Patient Instructions for exercises provided from Initial Evaluation.    Assessment     Casper is a 66 y.o. male referred to outpatient Physical Therapy with a medical diagnosis of Rupture of anterior cruciate ligament of right knee, initial encounter, Rupture of posterior cruciate ligament of right knee, initial encounter.  Patient completed his exercises with 1 short rest break.  Patient completed all of his exercises today with no increase in symptoms prior to leaving the clinic.        Casper is progressing  towards his goals.   Pt prognosis is good    Pt will continue to benefit from skilled outpatient physical therapy to address the deficits listed in the problem list box on initial evaluation, provide pt/family education and to maximize pt's level of independence in the home and community environment.   Pt's spiritual, cultural and educational needs considered and pt agreeable to plan of care and goals.     Anticipated barriers to physical therapy: none identified    Short Term Goals: 2 weeks   1. Independent with HEP  MET  2. Increase pain-free knee flexion ROM to 135 degrees MET  3. Pain-free Gait MET     Long Term Goals: 4 weeks   1. Increase pain-free R knee flexion ROM to 135 degrees MET  2. Increase pain-free R knee extension ROM to at least 0 degrees MET  3. Gait pain-free MET  4. Squat Pain-free progressing towards; not met  5. Stairs Pain-free progressing towards; not met    Plan     Continue with PT POC    Jaylen Castaneda, PTA,       "

## 2022-02-23 ENCOUNTER — TELEPHONE (OUTPATIENT)
Dept: SPORTS MEDICINE | Facility: CLINIC | Age: 67
End: 2022-02-23
Payer: MEDICARE

## 2022-02-23 ENCOUNTER — PATIENT MESSAGE (OUTPATIENT)
Dept: INTERNAL MEDICINE | Facility: CLINIC | Age: 67
End: 2022-02-23
Payer: MEDICARE

## 2022-02-23 ENCOUNTER — PATIENT MESSAGE (OUTPATIENT)
Dept: SURGERY | Facility: HOSPITAL | Age: 67
End: 2022-02-23
Payer: MEDICARE

## 2022-02-23 ENCOUNTER — PATIENT MESSAGE (OUTPATIENT)
Dept: PREADMISSION TESTING | Facility: HOSPITAL | Age: 67
End: 2022-02-23
Payer: MEDICARE

## 2022-02-23 NOTE — TELEPHONE ENCOUNTER
spoke with patient in response to message left wanting a call back to discuss upcoming surgery. Patient stated that he had decided not to proceed at this time with his scheduled surgery on 3/9/22. He went on to say that he would like to postpone to a later date, and that he would reach out when he is ready to discuss potential rescheduling. All questions answered, patient stated comfort and his decision was confirmed.      Devante Giraldo MS, [facility]  Sports Medicine Assistant  [facility]

## 2022-02-24 ENCOUNTER — CLINICAL SUPPORT (OUTPATIENT)
Dept: REHABILITATION | Facility: HOSPITAL | Age: 67
End: 2022-02-24
Payer: MEDICARE

## 2022-02-24 DIAGNOSIS — M25.661 DECREASED RANGE OF MOTION OF RIGHT KNEE: Primary | ICD-10-CM

## 2022-02-24 PROCEDURE — 97110 THERAPEUTIC EXERCISES: CPT | Mod: PN

## 2022-02-24 PROCEDURE — 97112 NEUROMUSCULAR REEDUCATION: CPT | Mod: PN

## 2022-02-25 NOTE — PROGRESS NOTES
"  Physical Therapy Treatment Note       Name: Casper Osborne  Clinic Number: [id number]    Therapy Diagnosis:   Encounter Diagnosis   Name Primary?    Decreased range of motion of right knee Yes     Physician: Wiliam Guerin MD    Visit Date: 2/24/2022    Physician Orders: PT Eval and Treat   Medical Diagnosis from Referral: Tear of medial collateral ligament of right knee, initial encounter   Rupture of posterior cruciate ligament of right knee, initial encounter   Rupture of anterior cruciate ligament of right knee, initial encounter   Evaluation Date: 1/11/2022  Authorization Period Expiration: 01/13/2022 01/13/2023   Plan of Care Expiration: 3/11/22  Visit # / Visits authorized: 9/20    FOTO: 9/10    Time In: 11:00 am  Time Out: 11:50  Total Billable Time: 30 minutes (1 TE, 1 NM)  Precautions: Standard, fall risk     Subjective     Pt reports: increased knee pain based on having to get on/off the floor to repair/work on a broken TV at home.   He was compliant with home exercise program.  Response to previous treatment: no problems.  Functional change: ongoing.    Pain: 7/10  Location: R knee    Objective     Brace rocco when arrived    Casper received therapeutic exercises to develop strength, endurance, ROM, flexibility, posture and core stabilization for 15 minutes with PT 1:1 and 15 minutes under supervision including assessment:     Stationary bike lv 3 for 5' for increased ROM, circulation, and mm strength/endurance  Mat:   SLR 3x10/3'     S/L Abd 3x10/3"    Bridges 3x10    Machines: Knee extension (20 degree extension stop) DL 3x10 at 10# (not today)    Leg press DL 6 plates 3x10 and SL 4x5 at 3 plates    HS curl (20 degree extension stop) DL 4 plates 3x10    Neuromuscular re-education activities to improve: Balance and Proprioception for 10 minutes. The following activities were included:  SLS: 5x10" (not today)  C sweeps: 10x  R SLS with L high knee hip flexion: 3x10 no UE  Foam high knee marches: 1 " "min    therapeutic activities to improve functional performance for 0 minutes, including:  Step ups: 6" No UE; 2x10 R --> forward and lateral    Home Exercises Provided and Patient Education Provided   - Compliance with HEP   - continue wearing brace as instructed    Written Home Exercises Provided: Patient instructed to cont prior HEP.  Exercises were reviewed and Casper was able to demonstrate them prior to the end of the session.  Casper demonstrated good  understanding of the education provided.     See EMR under Patient Instructions for exercises provided from Initial Evaluation.    Assessment     Casper is a 66 y.o. male referred to outpatient Physical Therapy with a medical diagnosis of Rupture of anterior cruciate ligament of right knee, initial encounter, Rupture of posterior cruciate ligament of right knee, initial encounter.  Voices increased right knee pain upon arrival today due to kneeling on/off the floor several times to fix a television.   Also voices that he has postponed his surgery until after the summer.     Casper is progressing  towards his goals.   Pt prognosis is good    Pt will continue to benefit from skilled outpatient physical therapy to address the deficits listed in the problem list box on initial evaluation, provide pt/family education and to maximize pt's level of independence in the home and community environment.   Pt's spiritual, cultural and educational needs considered and pt agreeable to plan of care and goals.     Anticipated barriers to physical therapy: none identified    Short Term Goals: 2 weeks   1. Independent with HEP  MET  2. Increase pain-free knee flexion ROM to 135 degrees MET  3. Pain-free Gait MET     Long Term Goals: 4 weeks   1. Increase pain-free R knee flexion ROM to 135 degrees MET  2. Increase pain-free R knee extension ROM to at least 0 degrees MET  3. Gait pain-free MET  4. Squat Pain-free progressing towards; not met  5. Stairs Pain-free progressing towards; " not met    Plan     DC planning conversations including goals, expectations, and time frames.     Thomas Casarez, PT, DPT, OCS

## 2022-03-03 ENCOUNTER — CLINICAL SUPPORT (OUTPATIENT)
Dept: REHABILITATION | Facility: HOSPITAL | Age: 67
End: 2022-03-03
Payer: MEDICARE

## 2022-03-03 DIAGNOSIS — M25.661 DECREASED RANGE OF MOTION OF RIGHT KNEE: Primary | ICD-10-CM

## 2022-03-03 PROCEDURE — 97110 THERAPEUTIC EXERCISES: CPT | Mod: PN

## 2022-03-03 PROCEDURE — 97112 NEUROMUSCULAR REEDUCATION: CPT | Mod: PN

## 2022-03-04 ENCOUNTER — TELEPHONE (OUTPATIENT)
Dept: SPORTS MEDICINE | Facility: CLINIC | Age: 67
End: 2022-03-04
Payer: MEDICARE

## 2022-03-04 NOTE — PROGRESS NOTES
"  Physical Therapy Treatment Note       Name: Casper Osborne  Clinic Number: [id number]    Therapy Diagnosis:   Encounter Diagnosis   Name Primary?    Decreased range of motion of right knee Yes     Physician: Wiliam Guerin MD    Visit Date: 3/3/2022    Physician Orders: PT Eval and Treat   Medical Diagnosis from Referral: Tear of medial collateral ligament of right knee, initial encounter   Rupture of posterior cruciate ligament of right knee, initial encounter   Rupture of anterior cruciate ligament of right knee, initial encounter   Evaluation Date: 1/11/2022  Authorization Period Expiration: 01/13/2022 01/13/2023   Plan of Care Expiration: 3/11/22  Visit # / Visits authorized: 10/20    FOTO: 10/10 --> never took an initial FOTO    Time In: 11:00  Time Out: 11:55  Total Billable Time: 55 minutes (3 TE, 1 NM)  Precautions: Standard, fall risk     Subjective     Pt reports: posterior knee pain and catching sensation during initial weightbearing.   He was compliant with home exercise program.  Response to previous treatment: no problems.  Functional change: ongoing.    Pain: 5/10  Location: R knee    Objective     Brace rocco when arrived    Casper received therapeutic exercises to develop strength, endurance, ROM, flexibility, posture and core stabilization for 45 minutes including assessment:  Stationary bike lv 3 for 5' for increased ROM, circulation, and mm strength/endurance  Mat:   SLR 3x10/3'     S/L Abd 3x10/3"    Bridges 3x10    Machines: Knee extension (20 degree extension stop) DL 3x10 at 10#     Leg press DL 6 plates 3x10 and SL 4x5 at 3 plates    HS curl (20 degree extension stop) DL 4 plates 3x10    Neuromuscular re-education activities to improve: Balance and Proprioception for 10 minutes. The following activities were included:  - deadlift (modified) 30# kettlebell 2x10    therapeutic activities to improve functional performance for 0 minutes, including:  Step ups: 6" No UE; 2x10 R --> forward and " lateral    Home Exercises Provided and Patient Education Provided   - Compliance with HEP   - continue wearing brace as instructed    Written Home Exercises Provided: Patient instructed to cont prior HEP.  Exercises were reviewed and Casper was able to demonstrate them prior to the end of the session.  Casper demonstrated good  understanding of the education provided.     See EMR under Patient Instructions for exercises provided from Initial Evaluation.    Assessment     Casper is a 66 y.o. male referred to outpatient Physical Therapy with a medical diagnosis of Rupture of anterior cruciate ligament of right knee, initial encounter, Rupture of posterior cruciate ligament of right knee, initial encounter.  He has postponed his surgery until after the summer - potentially the fall season.  Continues to voiced mechanical symptoms right knee; hyperfocus on these symptoms and needs consistent re-direction.  Strength RLE improving. Fear avoidance with gait and community ambulation tasks. Normal range of motion, mild joint effusion, increased knee laxity.  Discussed with patient today about PT moving forward especially in light of postponing of his surgery.  Plan for 2-4 more visits with heavy education on home exercise program including gym routine and then plan to discharge with plans to return to PT 1-2 months prior to surgery.     Casper is progressing  towards his goals.   Pt prognosis is good    Pt will continue to benefit from skilled outpatient physical therapy to address the deficits listed in the problem list box on initial evaluation, provide pt/family education and to maximize pt's level of independence in the home and community environment.   Pt's spiritual, cultural and educational needs considered and pt agreeable to plan of care and goals.     Anticipated barriers to physical therapy: none identified    Short Term Goals: 2 weeks   1. Independent with HEP  MET  2. Increase pain-free knee flexion ROM  to 135 degrees MET  3. Pain-free Gait MET     Long Term Goals: 4 weeks   1. Increase pain-free R knee flexion ROM to 135 degrees MET  2. Increase pain-free R knee extension ROM to at least 0 degrees MET  3. Gait pain-free MET  4. Squat Pain-free progressing towards; not met  5. Stairs Pain-free progressing towards; not met    Plan     DC planning conversations including goals, expectations, and time frames.     Thomas Casarez, PT, DPT, OCS

## 2022-03-04 NOTE — TELEPHONE ENCOUNTER
Spoke with pt, made an appt for 3/8/22 to see Dr. Guerin for his knee pain. He says he has been going to therapy but the pain is not getting any better. Was suppose to have surgery on 3/9 that he postponed.

## 2022-03-06 ENCOUNTER — PATIENT OUTREACH (OUTPATIENT)
Dept: ADMINISTRATIVE | Facility: OTHER | Age: 67
End: 2022-03-06
Payer: MEDICARE

## 2022-03-06 NOTE — PROGRESS NOTES
Care Everywhere: updated  Immunization: updated  Health Maintenance: updated  Media Review:   Legacy Review:   DIS:  Order placed:   Upcoming appts:  EFAX:  Task Tickets:Scheduling ticket to schedule annual pcp visit sent to patient's portal on 1.18.2022  Referrals:

## 2022-03-07 ENCOUNTER — OFFICE VISIT (OUTPATIENT)
Dept: UROLOGY | Facility: CLINIC | Age: 67
End: 2022-03-07
Payer: MEDICARE

## 2022-03-07 VITALS
SYSTOLIC BLOOD PRESSURE: 127 MMHG | HEIGHT: 71 IN | BODY MASS INDEX: 27.16 KG/M2 | WEIGHT: 194 LBS | HEART RATE: 68 BPM | DIASTOLIC BLOOD PRESSURE: 80 MMHG

## 2022-03-07 DIAGNOSIS — N32.81 OAB (OVERACTIVE BLADDER): ICD-10-CM

## 2022-03-07 DIAGNOSIS — N40.1 BPH WITH URINARY OBSTRUCTION: Primary | ICD-10-CM

## 2022-03-07 DIAGNOSIS — N13.8 BPH WITH URINARY OBSTRUCTION: Primary | ICD-10-CM

## 2022-03-07 PROCEDURE — 99999 PR PBB SHADOW E&M-EST. PATIENT-LVL III: CPT | Mod: PBBFAC,,, | Performed by: UROLOGY

## 2022-03-07 PROCEDURE — 99214 PR OFFICE/OUTPT VISIT, EST, LEVL IV, 30-39 MIN: ICD-10-PCS | Mod: S$GLB,,, | Performed by: UROLOGY

## 2022-03-07 PROCEDURE — 99214 OFFICE O/P EST MOD 30 MIN: CPT | Mod: S$GLB,,, | Performed by: UROLOGY

## 2022-03-07 PROCEDURE — 99999 PR PBB SHADOW E&M-EST. PATIENT-LVL III: ICD-10-PCS | Mod: PBBFAC,,, | Performed by: UROLOGY

## 2022-03-07 RX ORDER — DOXYCYCLINE HYCLATE 100 MG
100 TABLET ORAL ONCE
Status: CANCELLED | OUTPATIENT
Start: 2022-03-07 | End: 2022-03-07

## 2022-03-07 RX ORDER — LIDOCAINE HYDROCHLORIDE 20 MG/ML
JELLY TOPICAL ONCE
Status: CANCELLED | OUTPATIENT
Start: 2022-03-07 | End: 2022-03-07

## 2022-03-07 NOTE — PROGRESS NOTES
CC: BPH with obstruction, OAB    Casper Osborne is a 66 y.o. man who is here for the evaluation of LUTS and OAB.  A new pt referred by his PCP, Leon Chaparro MD     Patient had incidental finding of renal cysts on an MRI of the spine done outside of [facility].  MRI of the spine done at [facility] apparently showed a couple of compressed vertebras.  Patient has had a renal US and CT scan to further evaluate his renal cysts.  In 2020 CT revealed 4 hypodensities in the right kidney all measuring about 15 mm.  They measure water density postcontrast consistent with simple cysts.  No convincing enhancing component. Hypodensity in the liver most likely a small cyst.    Patient saw Dr. Moon for the evaluation of OAB and obstructive symptoms. Has been on VESIcare for 1 year and stated that he showed improvement with the medication but it started to taper off. Medication is not covered by insurance now. He also takes Alfazosin. Primary complaints are nocturia x3 and significant straining during urination. No dyuria or nocturia.   He had some improvement with an anti-muscarinic, but effect tapered off and not covered by insurance. Majority of symptoms are of obstructive nature. However, will start him on Myrbitriq given some prior efficacy on OAB medication.   Patient would like to return to meed with Dr. Ireland for obstructive symptoms to discuss his interest in Rezum.     I saw him last back in 2019. He received uroxatral, which helped his LUTS.  Then he noticed that his urinary symptoms have gotten worse in terms of frequency and rugency.    C/o straining to urinate, weak urine flow, frequency and urgency, nocturia 2 x.  Frequency is almost every 2 hours.  He tried Myrbetriq but was able to continue it due to his insurance coverage.  He is currently on Vesicare 5 mg and noticed improved OAB and nocturia symptoms.    Past Medical History:   Diagnosis Date    GERD (gastroesophageal reflux disease)     Hyperlipidemia       Past Surgical History:   Procedure Laterality Date    ARTHROSCOPY OF ANKLE WITH DEBRIDEMENT Left 12/19/2019    Procedure: ARTHROSCOPY, ANKLE, WITH DEBRIDEMENT;  Surgeon: Bay Melendez MD;  Location: [facility] OR;  Service: Orthopedics;  Laterality: Left;    EPIDURAL STEROID INJECTION N/A 1/29/2021    Procedure: INJECTION, STEROID, EPIDURAL L4/5;  Surgeon: Alvarado Reaves MD;  Location: [facility] PAIN MGT;  Service: Pain Management;  Laterality: N/A;    EPIDURAL STEROID INJECTION INTO LUMBAR SPINE N/A 12/24/2020    Procedure: Injection-steroid-epidural-lumbar--L4-5;  Surgeon: Ori Cali Jr., MD;  Location: [facility] PAIN MGT;  Service: Pain Management;  Laterality: N/A;    FRACTURE SURGERY      left heel at 39yo    HEEL SPUR SURGERY      left heel - fell off a ladder and had to have this reconstructed    SPINE SURGERY      c7 fx - prior to this, he was getting dizzy spells - none since    TONSILLECTOMY      VASECTOMY       Social History     Tobacco Use    Smoking status: Never Smoker    Smokeless tobacco: Never Used   Substance Use Topics    Alcohol use: Yes     Comment: less than once a month    Drug use: No     Family History   Problem Relation Age of Onset    Diabetes Mother     Cancer Father         melanoma cancer with mets    Heart disease Daughter         enlarged heart    Hypothyroidism Daughter     Colon polyps Neg Hx     Prostate cancer Neg Hx     Colon cancer Neg Hx     Stroke Neg Hx     Hypertension Neg Hx     Hyperlipidemia Neg Hx      Allergy:  Review of patient's allergies indicates:  No Known Allergies  Outpatient Encounter Medications as of 3/7/2022   Medication Sig Dispense Refill    alfuzosin (UROXATRAL) 10 mg Tb24 TAKE ONE TABLET BY MOUTH ONCE DAILY WITH BREAKFAST 90 tablet 3    aspirin (ECOTRIN) 81 MG EC tablet Take 81 mg by mouth once daily.      fluticasone propionate (FLONASE) 50 mcg/actuation nasal spray 1 spray (50 mcg total) by Each Nostril route once daily. 16  g 3    ibuprofen (ADVIL,MOTRIN) 800 MG tablet TAKE ONE(1) TABLET BY MOUTH EVERY EIGHT(8) HOURS AS NEEDED FOR PAIN 90 tablet 2    ipratropium (ATROVENT) 0.03 % nasal spray PLACE TWO SPRAYS IN EACH NOSTRIL THREE TIMES DAILY  30 mL 0    lovastatin (MEVACOR) 40 MG tablet TAKE ONE TABLET BY MOUTH AT BEDTIME 90 tablet 0    omeprazole (PRILOSEC) 40 MG capsule Take 1 capsule (40 mg total) by mouth once daily. 90 capsule 3    gabapentin (NEURONTIN) 300 MG capsule Take 1 capsule (300 mg total) by mouth 3 (three) times daily. 90 capsule 1    ibuprofen (ADVIL,MOTRIN) 600 MG tablet Take 1 tablet (600 mg total) by mouth every 6 (six) hours as needed. 30 tablet 0    lovastatin (MEVACOR) 40 MG tablet Take 1 tablet (40 mg total) by mouth nightly. (Patient not taking: Reported on 3/7/2022) 90 tablet 0    mirabegron (MYRBETRIQ) 50 mg Tb24 Take 1 tablet (50 mg total) by mouth once daily. (Patient not taking: Reported on 3/7/2022) 30 tablet 11    solifenacin (VESICARE) 5 MG tablet TAKE ONE TABLET BY MOUTH ONCE DAILY. (Patient not taking: Reported on 3/7/2022) 30 tablet 11     Facility-Administered Encounter Medications as of 3/7/2022   Medication Dose Route Frequency Provider Last Rate Last Admin    alprazolam ODT dissolvable tablet 0.5 mg  0.5 mg Oral Once PRN Ori Cali Jr., MD         Review of Systems   ROS  Physical Exam     Vitals:    03/07/22 0919   BP: 127/80   Pulse: 68     Physical Exam  Constitutional:       General: He is not in acute distress.     Appearance: He is well-developed. He is not diaphoretic.   HENT:      Head: Normocephalic and atraumatic.      Right Ear: External ear normal.      Left Ear: External ear normal.      Nose: Nose normal.   Eyes:      Conjunctiva/sclera: Conjunctivae normal.      Pupils: Pupils are equal, round, and reactive to light.   Neck:      Thyroid: No thyromegaly.      Vascular: No JVD.      Trachea: No tracheal deviation.   Cardiovascular:      Rate and Rhythm: Normal rate  and regular rhythm.      Heart sounds: Normal heart sounds. No murmur heard.    No friction rub. No gallop.   Pulmonary:      Effort: Pulmonary effort is normal. No respiratory distress.      Breath sounds: Normal breath sounds. No wheezing.   Chest:      Chest wall: No tenderness.   Abdominal:      General: Bowel sounds are normal. There is no distension.      Palpations: Abdomen is soft. There is no mass.      Tenderness: There is no abdominal tenderness. There is no guarding or rebound.   Genitourinary:     Penis: Normal. No tenderness.       Prostate: Normal.      Rectum: Normal.   Musculoskeletal:         General: No tenderness or deformity. Normal range of motion.      Cervical back: Normal range of motion and neck supple.   Lymphadenopathy:      Cervical: No cervical adenopathy.   Skin:     General: Skin is warm and dry.   Neurological:      Mental Status: He is alert and oriented to person, place, and time.   Psychiatric:         Behavior: Behavior normal.         Thought Content: Thought content normal.     he wears right knee braces.  Numbness below the left knee but has severe left ankle pain.            LABS:  Lab Results   Component Value Date    PSA 0.79 02/03/2020    PSA 0.49 01/04/2019    PSA 0.62 05/24/2018    PSA 0.62 05/23/2017    PSA 0.71 05/12/2016    PSA 0.87 03/13/2015    PSA 0.78 03/10/2014    PSA 0.61 02/06/2013    PSA 0.48 08/19/2011    PSA 0.56 08/17/2010     No results found for this or any previous visit.  Lab Results   Component Value Date    CREATININE 1.1 12/07/2020    CREATININE 1.1 02/03/2020    CREATININE 1.0 01/04/2019     No results found for this or any previous visit.  No results found for: LABURIN  Hemoglobin A1C   Date Value Ref Range Status   02/03/2020 5.8 (H) 4.0 - 5.6 % Final     Comment:     ADA Screening Guidelines:  5.7-6.4%  Consistent with prediabetes  >or=6.5%  Consistent with diabetes  High levels of fetal hemoglobin interfere with the HbA1C  assay. Heterozygous  hemoglobin variants (HbS, HgC, etc)do  not significantly interfere with this assay.   However, presence of multiple variants may affect accuracy.     01/04/2019 5.7 (H) 4.0 - 5.6 % Final     Comment:     ADA Screening Guidelines:  5.7-6.4%  Consistent with prediabetes  >or=6.5%  Consistent with diabetes  High levels of fetal hemoglobin interfere with the HbA1C  assay. Heterozygous hemoglobin variants (HbS, HgC, etc)do  not significantly interfere with this assay.   However, presence of multiple variants may affect accuracy.         Radiology:    Assessment and Plan:  Diagnoses and all orders for this visit:    BPH with urinary obstruction  -     Basic Metabolic Panel; Future  -     Prostate Specific Antigen, Diagnostic; Future  -     Simple Urodynamics w/ Cysto; Future    OAB (overactive bladder)  -     Urine Culture High Risk; Standing  -     Simple Urodynamics w/ Cysto; Future  -     Urine Culture High Risk    Other orders  The following orders have not been finalized:  -     LIDOcaine HCl 2% urojet  -     doxycycline tablet 100 mg    hold off vesicare 5 mg 3 days before SUDS cysto.  He is not on Uroxatral at this time.  Due to the hx of having some back problem, I think it may be better to evaluate him with suds cysto first before we plan on Rezum Therapy.  Gave him a brochure of the treatment of enlarged prostate ( medication, minimally invasive therapy and TURP).  Answered all his questions.    Follow-up:  Follow up for Urodynamic Study (SUDS) cysto.

## 2022-03-08 ENCOUNTER — OFFICE VISIT (OUTPATIENT)
Dept: SPORTS MEDICINE | Facility: CLINIC | Age: 67
End: 2022-03-08
Payer: MEDICARE

## 2022-03-08 VITALS
WEIGHT: 193.31 LBS | SYSTOLIC BLOOD PRESSURE: 137 MMHG | HEART RATE: 66 BPM | BODY MASS INDEX: 27.06 KG/M2 | DIASTOLIC BLOOD PRESSURE: 85 MMHG | HEIGHT: 71 IN

## 2022-03-08 DIAGNOSIS — S83.411A TEAR OF MEDIAL COLLATERAL LIGAMENT OF RIGHT KNEE, INITIAL ENCOUNTER: ICD-10-CM

## 2022-03-08 DIAGNOSIS — S83.241A TEAR OF MEDIAL MENISCUS OF RIGHT KNEE, CURRENT, UNSPECIFIED TEAR TYPE, INITIAL ENCOUNTER: ICD-10-CM

## 2022-03-08 DIAGNOSIS — S83.511A RUPTURE OF ANTERIOR CRUCIATE LIGAMENT OF RIGHT KNEE, INITIAL ENCOUNTER: Primary | ICD-10-CM

## 2022-03-08 DIAGNOSIS — S83.521A RUPTURE OF POSTERIOR CRUCIATE LIGAMENT OF RIGHT KNEE, INITIAL ENCOUNTER: ICD-10-CM

## 2022-03-08 PROCEDURE — 99214 OFFICE O/P EST MOD 30 MIN: CPT | Mod: S$GLB,,, | Performed by: STUDENT IN AN ORGANIZED HEALTH CARE EDUCATION/TRAINING PROGRAM

## 2022-03-08 PROCEDURE — 99999 PR PBB SHADOW E&M-EST. PATIENT-LVL III: CPT | Mod: PBBFAC,,, | Performed by: STUDENT IN AN ORGANIZED HEALTH CARE EDUCATION/TRAINING PROGRAM

## 2022-03-08 PROCEDURE — 99214 PR OFFICE/OUTPT VISIT, EST, LEVL IV, 30-39 MIN: ICD-10-PCS | Mod: S$GLB,,, | Performed by: STUDENT IN AN ORGANIZED HEALTH CARE EDUCATION/TRAINING PROGRAM

## 2022-03-08 PROCEDURE — 99999 PR PBB SHADOW E&M-EST. PATIENT-LVL III: ICD-10-PCS | Mod: PBBFAC,,, | Performed by: STUDENT IN AN ORGANIZED HEALTH CARE EDUCATION/TRAINING PROGRAM

## 2022-03-08 NOTE — PROGRESS NOTES
Subjective:          Chief Complaint: Casper Osborne is a 66 y.o. male who had concerns including Pain of the Right Knee.    Casper Osborne is a 66 y.o. male returns today for follow-up for his right knee.  He has a known ACL tear, grade 2 PCL tear, and grade 2 proximal MCL tear as well as a medial meniscus tear.  At the last visit we discussed proceeding with ACL reconstruction.  Since then he has had great improvement with physical therapy and has since canceled the surgery.  He comes in today for re-evaluation of the right knee.  Yesterday, 03/07/2022, was walking down a ramp at his house when he slipped with a buckling type episodes of the right knee.  He denies hearing or feeling any pops from within the knee.  He had immediate pain to the anterior medial aspect of the knee.  This has subsided somewhat since.  He has been wearing his T scope brace since his injury to help.  He says his knee felt unstable initially but this is improving.  He a has continued to be in physical therapy since last visit working on quadriceps strengthening and range of motion.  This has helped tremendously.  He has been able to do most of his activities he needs to for his work as well as ADLs with very minimal limitations.    Pain  Pertinent negatives include no joint swelling, myalgias or neck pain.     Past Medical History:   Diagnosis Date    GERD (gastroesophageal reflux disease)     Hyperlipidemia        Current Outpatient Medications on File Prior to Visit   Medication Sig Dispense Refill    aspirin (ECOTRIN) 81 MG EC tablet Take 81 mg by mouth once daily.      fluticasone propionate (FLONASE) 50 mcg/actuation nasal spray 1 spray (50 mcg total) by Each Nostril route once daily. 16 g 3    ibuprofen (ADVIL,MOTRIN) 800 MG tablet TAKE ONE(1) TABLET BY MOUTH EVERY EIGHT(8) HOURS AS NEEDED FOR PAIN 90 tablet 2    ipratropium (ATROVENT) 0.03 % nasal spray PLACE TWO SPRAYS IN EACH NOSTRIL THREE TIMES DAILY  30 mL 0     lovastatin (MEVACOR) 40 MG tablet TAKE ONE TABLET BY MOUTH AT BEDTIME 90 tablet 0    omeprazole (PRILOSEC) 40 MG capsule Take 1 capsule (40 mg total) by mouth once daily. 90 capsule 3    gabapentin (NEURONTIN) 300 MG capsule Take 1 capsule (300 mg total) by mouth 3 (three) times daily. 90 capsule 1    ibuprofen (ADVIL,MOTRIN) 600 MG tablet Take 1 tablet (600 mg total) by mouth every 6 (six) hours as needed. 30 tablet 0    lovastatin (MEVACOR) 40 MG tablet Take 1 tablet (40 mg total) by mouth nightly. (Patient not taking: No sig reported) 90 tablet 0    solifenacin (VESICARE) 5 MG tablet TAKE ONE TABLET BY MOUTH ONCE DAILY. (Patient not taking: No sig reported) 30 tablet 11     Current Facility-Administered Medications on File Prior to Visit   Medication Dose Route Frequency Provider Last Rate Last Admin    alprazolam ODT dissolvable tablet 0.5 mg  0.5 mg Oral Once PRN Ori Cali Jr., MD           Past Surgical History:   Procedure Laterality Date    ARTHROSCOPY OF ANKLE WITH DEBRIDEMENT Left 12/19/2019    Procedure: ARTHROSCOPY, ANKLE, WITH DEBRIDEMENT;  Surgeon: Bay Melendez MD;  Location: [facility] OR;  Service: Orthopedics;  Laterality: Left;    EPIDURAL STEROID INJECTION N/A 1/29/2021    Procedure: INJECTION, STEROID, EPIDURAL L4/5;  Surgeon: Alvarado Reaves MD;  Location: [facility] [place] [facility]IN MGT;  Service: Pain Management;  Laterality: N/A;    EPIDURAL STEROID INJECTION INTO LUMBAR SPINE N/A 12/24/2020    Procedure: Injection-steroid-epidural-lumbar--L4-5;  Surgeon: Ori Cali Jr., MD;  Location: [facility] PAIN MGT;  Service: Pain Management;  Laterality: N/A;    FRACTURE SURGERY      left heel at 41yo    HEEL SPUR SURGERY      left heel - fell off a ladder and had to have this reconstructed    SPINE SURGERY      c7 fx - prior to this, he was getting dizzy spells - none since    TONSILLECTOMY      VASECTOMY         Family History   Problem Relation Age of Onset    Diabetes Mother     Cancer  Father         melanoma cancer with mets    Heart disease Daughter         enlarged heart    Hypothyroidism Daughter     Colon polyps Neg Hx     Prostate cancer Neg Hx     Colon cancer Neg Hx     Stroke Neg Hx     Hypertension Neg Hx     Hyperlipidemia Neg Hx        Social History     Socioeconomic History    Marital status:      Spouse name: Nithya    Number of children: 1   Occupational History    Occupation: Retried   Tobacco Use    Smoking status: Never Smoker    Smokeless tobacco: Never Used   Substance and Sexual Activity    Alcohol use: Yes     Comment: less than once a month    Drug use: No    Sexual activity: Yes     Partners: Female     Comment:          Review of Systems   Constitutional: Negative.   HENT: Negative.    Eyes: Negative.    Cardiovascular: Negative.    Respiratory: Negative.    Endocrine: Negative.    Hematologic/Lymphatic: Negative.    Skin: Negative.    Musculoskeletal: Positive for joint pain (right knee) and muscle weakness. Negative for arthritis, back pain, falls, gout, joint swelling, muscle cramps, myalgias, neck pain and stiffness.   Neurological: Negative.    Psychiatric/Behavioral: Negative.    Allergic/Immunologic: Negative.                    Objective:        General: Casper is well-developed, well-nourished, appears stated age, in no acute distress, alert and oriented to time, place and person.     General    Nursing note and vitals reviewed.  Constitutional: He is oriented to person, place, and time. He appears well-developed and well-nourished. No distress.   HENT:   Head: Normocephalic and atraumatic.   Nose: Nose normal.   Eyes: EOM are normal.   Cardiovascular: Normal rate and intact distal pulses.    Pulmonary/Chest: Effort normal. No respiratory distress.   Neurological: He is alert and oriented to person, place, and time.   Psychiatric: He has a normal mood and affect. His behavior is normal. Judgment and thought content normal.     General  Musculoskeletal Exam   Gait: abnormal and antalgic       Right Knee Exam     Inspection   Erythema: absent  Scars: absent  Swelling: present  Effusion: present  Deformity: present (mild Quadriceps atrophy)  Bruising: absent    Tenderness   The patient is tender to palpation of the medial joint line.    Range of Motion   Extension: 0   Flexion: 130     Tests   Meniscus   Eddie:  Medial - positive Lateral - negative  Ligament Examination   Lachman: abnormal - grade II  PCL-Posterior Drawer: abnormal   - grade I  MCL - Valgus: normal (0 to 2mm)  LCL - Varus: normal  Posterolateral Corner: stable  Patella   Patellar apprehension: negative  Passive Patellar Tilt: neutral  Patellar Tracking: normal  Patellar Grind: negative    Other   Sensation: normal    Left Knee Exam   Left knee exam is normal.    Inspection   Erythema: absent  Scars: absent  Swelling: absent  Effusion: absent  Deformity: absent  Bruising: absent    Tenderness   The patient is experiencing no tenderness.     Range of Motion   Extension: -5   Flexion: 140     Tests   Meniscus   Eddie:  Medial - negative Lateral - negative  Stability Lachman: normal (-1 to 2mm) PCL-Posterior Drawer: normal (0 to 2mm)  MCL - Valgus: normal (0 to 2mm)  LCL - Varus: normal (0 to 2mm)  Posterolateral Corner: stable  Patella   Patellar apprehension: negative  Passive Patellar Tilt: neutral  Patellar Tracking: normal    Other   Sensation: normal    Muscle Strength   Right Lower Extremity   Hip Abduction: 5/5   Quadriceps:  4/5   Hamstrin/5   Left Lower Extremity   Quadriceps:  5/5   Hamstrin/5     Vascular Exam     Right Pulses  Dorsalis Pedis:      2+  Posterior Tibial:      2+        Left Pulses  Dorsalis Pedis:      2+  Posterior Tibial:      2+        Edema  Right Lower Leg: absent  Left Lower Leg: absent    Imaging:  X-rays of the bilateral knee from 2021 personally reviewed by me on that day.  These include weight-bearing AP, PA flexion, lateral,  and Merchant views.  There is marginal osteophytes along the notch, however these are very small.  There is no subchondral sclerosis.  There is no joint space loss in all 3 compartments bilaterally.    MRI of the right knee from 12/22/2021 personally reviewed by me on 12/30/2021.  There is a complete ACL tear.  Grade 2 PCL tear in the proximal half near the midportion.  There is a high grade 2, possible grade 3, MCL tear off the femoral origin.  There are medial and lateral meniscus tears, the medial is a the posterior horn with a radial and horizontal component.  The medial root is intact.  Laterally, the meniscus has a radial tear in the posterior horn.  The root is not well visualized.  There is no extrusion of either the medial or lateral meniscus.  Overall, the cartilage looks very good in all 3 compartments with no significant or full-thickness loss.          Assessment:     Casper Osborne is a 66 y.o. male with right ACL tear, PCL tear, MCL tear, and medial and lateral meniscus tears as detailed above.  He had a reaggravation of his pain after fall yesterday.  Encounter Diagnoses   Name Primary?    Rupture of anterior cruciate ligament of right knee, initial encounter Yes    Rupture of posterior cruciate ligament of right knee, initial encounter     Tear of medial collateral ligament of right knee, initial encounter     Tear of medial meniscus of right knee, current, unspecified tear type, initial encounter           Plan:       Upon assessment of his knee, his ACL is still a grade 2. His PCL has minimal laxity at, grade 1.  His MCL feels very stable equal to the contralateral knee.  He has no lateral instability on exam.  I think he likely does had a buckling episode and could have perhaps cause a bony contusion.  His symptoms have already been improving.  We will continue physical therapy and he can transition to a home exercise program.  We will issue him a short runner brace to make mobilization  easier.  He will return to clinic in 2-3 months for re-evaluation.  At that time, we will check his stability and see how he was doing.  We will discuss further nonoperative management versus pursuing operative intervention at that time.    All of their questions were answered.  They will call the clinic with any questions or concerns in the interim.    Should the patient's symptoms worsen, persist, or fail to improve they should return for reevaluation and I would be happy to see them back anytime.        Wiliam Guerin M.D.     Please be aware that this note has been generated with the assistance of [employer] voice-to-text.  Please excuse any spelling or grammatical errors.    Thank you for choosing Dr. Wiliam Guerin for your sports medicine care. It is our goal to provide you with exceptional care that will help keep you healthy, active, and get you back in the game.     If you felt that you received exemplary care today, please consider leaving feedback for Dr. Guerin on [email or web address] at [email or web address].    Please do not hesitate to reach out to us via email, phone, or MyChart with any questions, concerns, or feedback.

## 2022-03-11 ENCOUNTER — CLINICAL SUPPORT (OUTPATIENT)
Dept: REHABILITATION | Facility: HOSPITAL | Age: 67
End: 2022-03-11
Payer: MEDICARE

## 2022-03-11 DIAGNOSIS — M25.661 DECREASED RANGE OF MOTION OF RIGHT KNEE: Primary | ICD-10-CM

## 2022-03-11 PROCEDURE — 97110 THERAPEUTIC EXERCISES: CPT | Mod: PN

## 2022-03-11 PROCEDURE — 97530 THERAPEUTIC ACTIVITIES: CPT | Mod: PN

## 2022-03-11 NOTE — PROGRESS NOTES
"  Physical Therapy Treatment Note       Name: Casper Osborne  Clinic Number: [id number]    Therapy Diagnosis:   Encounter Diagnosis   Name Primary?    Decreased range of motion of right knee Yes     Physician: Wiliam Guerin MD    Visit Date: 3/11/2022    Physician Orders: PT Eval and Treat   Medical Diagnosis from Referral: Tear of medial collateral ligament of right knee, initial encounter   Rupture of posterior cruciate ligament of right knee, initial encounter   Rupture of anterior cruciate ligament of right knee, initial encounter   Evaluation Date: 1/11/2022  Authorization Period Expiration: 01/13/2022 01/13/2023   Plan of Care Expiration: 3/11/22  Visit # / Visits authorized: 12/20    FOTO: 10/10 --> never took an initial FOTO, get one next visit     Time In: 11:00  Time Out: 11:55  Total Billable Time: 55 minutes (3 TE, 1 TA)  Precautions: Standard, fall risk     Subjective     Pt reports: that he fell while walking down a ramp at home last week. Was in increased pain for several days after the fall but is feeling better now   He was compliant with home exercise program.  Response to previous treatment: no adverse reactions   Functional change: ongoing    Pain: 6/10  Location: R knee    Objective     Brace rocco when arrived    Casper received therapeutic exercises to develop strength, endurance, ROM, flexibility, posture and core stabilization for 45 minutes including assessment:  Stationary bike lv 3 for 5' for increased ROM, circulation, and mm strength/endurance  Mat:   SLR 3x10/3'     S/L Abd 3x10/3"    Bridges 3x10    Sidelying adduction 2x10    Hip adduction ball squeeze 10x5"     Machines: Knee extension (20 degree extension stop) DL 3x10 at 10#     Leg press DL 6 plates 3x10 and SL 4x5 at 3 plates    HS curl (20 degree extension stop) DL 4 plates 3x10    Neuromuscular re-education activities to improve: Balance and Proprioception for 00 minutes. The following activities were included:  - deadlift " "(modified) 30# luis alfredo 2x10    therapeutic activities to improve functional performance for 10 minutes, including:  Step ups: 6" No UE; 2x10 R --> forward and lateral  Questions and answers regarding plan of care, nature of injury, purpose of therapy     Home Exercises Provided and Patient Education Provided   - Compliance with HEP   - continue wearing brace as instructed    Written Home Exercises Provided: Patient instructed to cont prior HEP.  Exercises were reviewed and Casper was able to demonstrate them prior to the end of the session.  Casper demonstrated good  understanding of the education provided.     See EMR under Patient Instructions for exercises provided from Initial Evaluation.    Assessment     Casper is a 66 y.o. male referred to outpatient Physical Therapy with a medical diagnosis of Rupture of anterior cruciate ligament of right knee, initial encounter, Rupture of posterior cruciate ligament of right knee, initial encounter. Pt expresses concern regarding mechanical symptoms of knee, voicing feelings of shifting and knee "bending the wrong way" with certain activities. He also expresses concern regarding dr's apparent wariness to perform ACL surgery on him, as pt states he is certain that he wants the surgery. Pt was agreeable to continue with strengthening, and reported minimal knee discomfort with performance of exercises. Plan remains to continue therapy for the next week or so to develop good home exercise program and continue with strengthening at home/gym.     Casper is progressing  towards his goals.   Pt prognosis is good    Pt will continue to benefit from skilled outpatient physical therapy to address the deficits listed in the problem list box on initial evaluation, provide pt/family education and to maximize pt's level of independence in the home and community environment.   Pt's spiritual, cultural and educational needs considered and pt agreeable to plan of care and goals.   "   Anticipated barriers to physical therapy: none identified    Short Term Goals: 2 weeks   1. Independent with HEP  MET  2. Increase pain-free knee flexion ROM to 135 degrees MET  3. Pain-free Gait MET     Long Term Goals: 4 weeks   1. Increase pain-free R knee flexion ROM to 135 degrees MET  2. Increase pain-free R knee extension ROM to at least 0 degrees MET  3. Gait pain-free MET  4. Squat Pain-free progressing towards; not met  5. Stairs Pain-free progressing towards; not met    Plan     DC planning conversations including goals, expectations, and time frames.     Brain Welsh, PT, DPT

## 2022-03-14 ENCOUNTER — PATIENT MESSAGE (OUTPATIENT)
Dept: UROLOGY | Facility: CLINIC | Age: 67
End: 2022-03-14
Payer: MEDICARE

## 2022-03-16 ENCOUNTER — CLINICAL SUPPORT (OUTPATIENT)
Dept: REHABILITATION | Facility: HOSPITAL | Age: 67
End: 2022-03-16
Payer: MEDICARE

## 2022-03-16 ENCOUNTER — PATIENT MESSAGE (OUTPATIENT)
Dept: ADMINISTRATIVE | Facility: HOSPITAL | Age: 67
End: 2022-03-16
Payer: MEDICARE

## 2022-03-16 DIAGNOSIS — M25.661 DECREASED RANGE OF MOTION OF RIGHT KNEE: Primary | ICD-10-CM

## 2022-03-16 PROCEDURE — 97110 THERAPEUTIC EXERCISES: CPT | Mod: PN

## 2022-03-16 PROCEDURE — 97112 NEUROMUSCULAR REEDUCATION: CPT | Mod: PN

## 2022-03-16 NOTE — PLAN OF CARE
Outpatient Therapy Updated Plan of Care     Visit Date: 3/16/2022  Name: Casper Osborne  Clinic Number: [id number]    Therapy Diagnosis:   Encounter Diagnosis   Name Primary?    Decreased range of motion of right knee Yes     Physician: Wiliam Guerin MD    Physician Orders: PT Eval and Treat   Medical Diagnosis from Referral: Tear of medial collateral ligament of right knee, initial encounter   Rupture of posterior cruciate ligament of right knee, initial encounter   Rupture of anterior cruciate ligament of right knee, initial encounter   Evaluation Date: 1/11/2022    Total Visits Received: 13  Cancelled Visits: none  No Show Visits: none    Current Certification Period:  01/11/2022 to 03/11/2022  Precautions:  Knee instability, Fall risk  Visits from Evaluation Date:  12  Functional Level Prior to Evaluation:  No prior history of right knee pain or instability.     Subjective     Update:   Pt reports: continual low grade pain in his knee but this is manageable.  More worried about the instability and noises his knee is making.  He voices that he is very careful how he walks and how he steps as he is fearful his knee is going to 'give out'.   He was compliant with home exercise program.  Response to previous treatment: no adverse reactions   Functional change: has swapped to a Playmaker-type hinged knee brace as opposed to the Bregg Brace.       Pain: 4/10  Location: R knee    Objective     Update:   Left knee inspection today:  · Mild effusion --> improved since last week  · Full passive range of motion right knee  · Increased varus laxity at 0 degrees and 30 degrees  · Straight leg raise without lag today  · Gait: wide-based pattern with lack of terminal knee extension during stance phase.        Assessment     Update:   Casper is a 66 y.o. male referred to outpatient Physical Therapy with a medical diagnosis of Rupture of anterior cruciate ligament of right knee, initial encounter, Rupture of posterior  cruciate ligament of right knee, initial encounter. Mr Shirley' knee was less irritable today. Only mild effusion.  Normal range of motion. He continues to voices episodes of instability/feeling of his knee 'about to give out' and mechanical symptoms.  This is resulting in fear with community and household ambulation and standing tasks. 'Non-coper' category at this time; hopes of moving patient into an 'adapter' or 'coper' category.  Discussed plan moving forward with PT including need for neuromuscular component to his treatment as his knee irritability continues to improve.  Recommend gym membership for transition to following DC from physical therapy. Plan to treat patient 2 more weeks with working on transition to home program.  Patient okay to return to PT in several months for pre-hab if he elects to go forward with his knee surgery.      Casper is progressing  towards his goals.   Pt prognosis is Good-to-Fair    Previous Short Term Goals Status:     Short Term Goals: 2 weeks   1. Independent with HEP  MET  2. Increase pain-free knee flexion ROM to 135 degrees MET  3. Pain-free Gait muscle energy technique    New Short Term Goals Status:   none  Long Term Goal Status:   continue per initial plan of care.  Long Term Goals: 4 weeks   1. Increase pain-free R knee flexion ROM to 135 degrees MET  2. Increase pain-free R knee extension ROM to at least 0 degrees MET  3. Gait pain-free MET  4. Squat Pain-free progressing towards; not met  5. Stairs Pain-free progressing towards; not met  Reasons for Recertification of Therapy:   Update POC    Plan     Updated Certification Period: 3/16/2022 to 04/8/2022  Recommended Treatment Plan: 2 times per week for 3 weeks: Gait Training, Manual Therapy, Neuromuscular Re-ed, Orthotic Management and Training, Patient Education, Therapeutic Activities and Therapeutic Exercise  Other Recommendations: none    Thomas Casarez, PT DPT, OCS  3/16/2022      I CERTIFY THE NEED FOR THESE  SERVICES FURNISHED UNDER THIS PLAN OF TREATMENT AND WHILE UNDER MY CARE    Physician's comments:        Physician's Signature: ___________________________________________________

## 2022-03-16 NOTE — PROGRESS NOTES
Physical Therapy Treatment Note       Name: Casper Osborne  Clinic Number: [id number]    Therapy Diagnosis:   Encounter Diagnosis   Name Primary?    Decreased range of motion of right knee Yes     Physician: Wiliam Guerin MD    Visit Date: 3/16/2022    Physician Orders: PT Eval and Treat   Medical Diagnosis from Referral: Tear of medial collateral ligament of right knee, initial encounter   Rupture of posterior cruciate ligament of right knee, initial encounter   Rupture of anterior cruciate ligament of right knee, initial encounter   Evaluation Date: 1/11/2022  Authorization Period Expiration: 01/13/2023   Plan of Care Expiration: 3/11/22  Visit # / Visits authorized: 13/20    FOTO: never took an initial FOTO    Time In: 11:00  Time Out: 11:55  Total Billable Time: 55 minutes (2 TE, 2 NM)  Precautions: Standard, fall risk     Subjective     Pt reports: continual low grade pain in his knee but this is manageable.  More worried about the instability and noises his knee is making.  He voices that he is very careful how he walks and how he steps as he is fearful his knee is going to 'give out'.   He was compliant with home exercise program.  Response to previous treatment: no adverse reactions   Functional change: has swapped to a Playmaker-type hinged knee brace as opposed to the Bregg Brace.      Pain: 4/10  Location: R knee    Objective     Left knee inspection today:  · Mild effusion --> improved since last week  · Full passive range of motion right knee  · Increased varus laxity at 0 degrees  · Straight leg raise without lag today    Casper received therapeutic exercises to develop strength, endurance, ROM, flexibility, posture and core stabilization for 20 minutes including assessment:  Stationary bike lv 3 for 5' for increased ROM, circulation, and mm strength/endurance  Mat:   SLR 3x10     Machines: Leg press DL 6 plates 3x10 and SL 4x5 at 3 plates        Neuromuscular re-education activities to improve:  "Balance and Proprioception for 35 minutes. The following activities were included:  - deadlift (modified) 30# kettlebell 3x10  - single-leg balance on foam pad 5 rounds x 15"  - Bosu mini-squats 3x10  - single-leg (upper extremity supported) Argentine dead-lift 2x10/each    therapeutic activities to improve functional performance for 0 minutes, including:  Step ups: 6" No UE; 2x10 R --> forward and lateral  Questions and answers regarding plan of care, nature of injury, purpose of therapy     Home Exercises Provided and Patient Education Provided   - gym membership  - need for lower extremity balance training  - PT goals and dc planning.     Written Home Exercises Provided: Patient instructed to cont prior HEP.  Exercises were reviewed and Casper was able to demonstrate them prior to the end of the session.  Casper demonstrated good  understanding of the education provided.     See EMR under Patient Instructions for exercises provided from Initial Evaluation.    Assessment     Casper is a 66 y.o. male referred to outpatient Physical Therapy with a medical diagnosis of Rupture of anterior cruciate ligament of right knee, initial encounter, Rupture of posterior cruciate ligament of right knee, initial encounter. Mr Shirley' knee was less irritable today. Only mild effusion.  Normal range of motion. He continues to voices episodes of instability/feeling of his knee 'about to give out' and mechanical symptoms.  This is resulting in fear with community and household ambulation and standing tasks. 'Non-coper' category at this time; hopes of moving patient into an 'adapter' or 'coper' category.  Discussed plan moving forward with PT including need for neuromuscular component to his treatment as his knee irritability continues to improved.  Recommend gym membership for transition to following DC from physical therapy. Plan to treat patient 2 more weeks with working on transition to home program.  Patient okay to return to PT in " several months for pre-hab if he elects to go forward with his knee surgery.     Casper is progressing  towards his goals.   Pt prognosis is good    Pt will continue to benefit from skilled outpatient physical therapy to address the deficits listed in the problem list box on initial evaluation, provide pt/family education and to maximize pt's level of independence in the home and community environment.   Pt's spiritual, cultural and educational needs considered and pt agreeable to plan of care and goals.     Anticipated barriers to physical therapy: none identified    Short Term Goals: 2 weeks   1. Independent with HEP  MET  2. Increase pain-free knee flexion ROM to 135 degrees MET  3. Pain-free Gait MET     Long Term Goals: 4 weeks   1. Increase pain-free R knee flexion ROM to 135 degrees MET  2. Increase pain-free R knee extension ROM to at least 0 degrees MET  3. Gait pain-free MET  4. Squat Pain-free progressing towards; not met  5. Stairs Pain-free progressing towards; not met    Plan     Update plan of care.     Thomas Casarez, HERBER, OCS

## 2022-03-17 ENCOUNTER — OFFICE VISIT (OUTPATIENT)
Dept: UROLOGY | Facility: CLINIC | Age: 67
End: 2022-03-17
Payer: MEDICARE

## 2022-03-17 DIAGNOSIS — N39.43 BENIGN PROSTATIC HYPERPLASIA WITH POST-VOID DRIBBLING: Primary | Chronic | ICD-10-CM

## 2022-03-17 DIAGNOSIS — N40.1 BENIGN PROSTATIC HYPERPLASIA WITH POST-VOID DRIBBLING: Primary | Chronic | ICD-10-CM

## 2022-03-17 PROCEDURE — 99213 PR OFFICE/OUTPT VISIT, EST, LEVL III, 20-29 MIN: ICD-10-PCS | Mod: 95,,, | Performed by: UROLOGY

## 2022-03-17 PROCEDURE — 99213 OFFICE O/P EST LOW 20 MIN: CPT | Mod: 95,,, | Performed by: UROLOGY

## 2022-03-17 NOTE — PROGRESS NOTES
The patient location is: home  The chief complaint leading to consultation is: questions regarding urodynamic study and evaluation of his LUTS    Visit type: audio only    15 minutes of total time spent on the encounter, which includes face to face time and non-face to face time preparing to see the patient (eg, review of tests), Obtaining and/or reviewing separately obtained history, Documenting clinical information in the electronic or other health record, Independently interpreting results (not separately reported) and communicating results to the patient/family/caregiver, or Care coordination (not separately reported).         Each patient to whom he or she provides medical services by telemedicine is:  (1) informed of the relationship between the physician and patient and the respective role of any other health care provider with respect to management of the patient; and (2) notified that he or she may decline to receive medical services by telemedicine and may withdraw from such care at any time.    Notes:     CC: doing a virtual visit to discuss the evaluation of BPH with obstruction, OAB    Casper Osborne is a 66 y.o. man who is here for the evaluation of LUTS and OAB.  A new pt referred by his PCP, Leon Chaparro MD     Patient had incidental finding of renal cysts on an MRI of the spine done outside of [facility].  MRI of the spine done at [facility] apparently showed a couple of compressed vertebras.  Patient has had a renal US and CT scan to further evaluate his renal cysts.  In 2020 CT revealed 4 hypodensities in the right kidney all measuring about 15 mm.  They measure water density postcontrast consistent with simple cysts.  No convincing enhancing component. Hypodensity in the liver most likely a small cyst.    Patient saw Dr. Moon for the evaluation of OAB and obstructive symptoms. Has been on VESIcare for 1 year and stated that he showed improvement with the medication but it started to taper  off. Medication is not covered by insurance now. He also takes Alfazosin. Primary complaints are nocturia x3 and significant straining during urination. No dyuria or nocturia.   He had some improvement with an anti-muscarinic, but effect tapered off and not covered by insurance. Majority of symptoms are of obstructive nature. However, will start him on Myrbitriq given some prior efficacy on OAB medication.   Patient would like to return to meed with Dr. Ireland for obstructive symptoms to discuss his interest in Rezum.     I saw him last back in 2019. He received uroxatral, which helped his LUTS.  Then he noticed that his urinary symptoms have gotten worse in terms of frequency and rugency.    C/o straining to urinate, weak urine flow, frequency and urgency, nocturia 2 x.  Frequency is almost every 2 hours.  He tried Myrbetriq but was able to continue it due to his insurance coverage.  He is currently on Vesicare 5 mg and noticed improved OAB and nocturia symptoms.    Past Medical History:   Diagnosis Date    GERD (gastroesophageal reflux disease)     Hyperlipidemia      Past Surgical History:   Procedure Laterality Date    ARTHROSCOPY OF ANKLE WITH DEBRIDEMENT Left 12/19/2019    Procedure: ARTHROSCOPY, ANKLE, WITH DEBRIDEMENT;  Surgeon: Bay Melendez MD;  Location[facility];  Service: Orthopedics;  Laterality: Left;    EPIDURAL STEROID INJECTION N/A 1/29/2021    Procedure: INJECTION, STEROID, EPIDURAL L4/5;  Surgeon: Alvarado Reaves MD;  Location: [facility] PAIN MGT;  Service: Pain Management;  Laterality: N/A;    EPIDURAL STEROID INJECTION INTO LUMBAR SPINE N/A 12/24/2020    Procedure: Injection-steroid-epidural-lumbar--L4-5;  Surgeon: Ori Cali Jr., MD;  Location: [facility] PAIN MGT;  Service: Pain Management;  Laterality: N/A;    FRACTURE SURGERY      left heel at 41yo    HEEL SPUR SURGERY      left heel - fell off a ladder and had to have this reconstructed    SPINE SURGERY      c7 fx - prior to this,  he was getting dizzy spells - none since    TONSILLECTOMY      VASECTOMY       Social History     Tobacco Use    Smoking status: Never Smoker    Smokeless tobacco: Never Used   Substance Use Topics    Alcohol use: Yes     Comment: less than once a month    Drug use: No     Family History   Problem Relation Age of Onset    Diabetes Mother     Cancer Father         melanoma cancer with mets    Heart disease Daughter         enlarged heart    Hypothyroidism Daughter     Colon polyps Neg Hx     Prostate cancer Neg Hx     Colon cancer Neg Hx     Stroke Neg Hx     Hypertension Neg Hx     Hyperlipidemia Neg Hx      Allergy:  Review of patient's allergies indicates:  No Known Allergies  Outpatient Encounter Medications as of 3/17/2022   Medication Sig Dispense Refill    aspirin (ECOTRIN) 81 MG EC tablet Take 81 mg by mouth once daily.      fluticasone propionate (FLONASE) 50 mcg/actuation nasal spray 1 spray (50 mcg total) by Each Nostril route once daily. 16 g 3    gabapentin (NEURONTIN) 300 MG capsule Take 1 capsule (300 mg total) by mouth 3 (three) times daily. 90 capsule 1    ibuprofen (ADVIL,MOTRIN) 600 MG tablet Take 1 tablet (600 mg total) by mouth every 6 (six) hours as needed. 30 tablet 0    ibuprofen (ADVIL,MOTRIN) 800 MG tablet TAKE ONE(1) TABLET BY MOUTH EVERY EIGHT(8) HOURS AS NEEDED FOR PAIN 90 tablet 2    ipratropium (ATROVENT) 0.03 % nasal spray PLACE TWO SPRAYS IN EACH NOSTRIL THREE TIMES DAILY  30 mL 0    lovastatin (MEVACOR) 40 MG tablet TAKE ONE TABLET BY MOUTH AT BEDTIME 90 tablet 0    lovastatin (MEVACOR) 40 MG tablet Take 1 tablet (40 mg total) by mouth nightly. (Patient not taking: No sig reported) 90 tablet 0    omeprazole (PRILOSEC) 40 MG capsule Take 1 capsule (40 mg total) by mouth once daily. 90 capsule 3    solifenacin (VESICARE) 5 MG tablet TAKE ONE TABLET BY MOUTH ONCE DAILY. (Patient not taking: No sig reported) 30 tablet 11     Facility-Administered Encounter  Medications as of 3/17/2022   Medication Dose Route Frequency Provider Last Rate Last Admin    alprazolam ODT dissolvable tablet 0.5 mg  0.5 mg Oral Once PRN Ori Cali Jr., MD         Review of Systems   ROS    Physical Exam     There were no vitals filed for this visit.  Physical Exam  Constitutional:       General: He is not in acute distress.     Appearance: He is well-developed. He is not diaphoretic.   HENT:      Head: Normocephalic and atraumatic.      Right Ear: External ear normal.      Left Ear: External ear normal.      Nose: Nose normal.   Eyes:      Conjunctiva/sclera: Conjunctivae normal.      Pupils: Pupils are equal, round, and reactive to light.   Neck:      Thyroid: No thyromegaly.      Vascular: No JVD.      Trachea: No tracheal deviation.   Cardiovascular:      Rate and Rhythm: Normal rate and regular rhythm.      Heart sounds: Normal heart sounds. No murmur heard.    No friction rub. No gallop.   Pulmonary:      Effort: Pulmonary effort is normal. No respiratory distress.      Breath sounds: Normal breath sounds. No wheezing.   Chest:      Chest wall: No tenderness.   Abdominal:      General: Bowel sounds are normal. There is no distension.      Palpations: Abdomen is soft. There is no mass.      Tenderness: There is no abdominal tenderness. There is no guarding or rebound.   Genitourinary:     Penis: Normal. No tenderness.       Prostate: Normal.      Rectum: Normal.   Musculoskeletal:         General: No tenderness or deformity. Normal range of motion.      Cervical back: Normal range of motion and neck supple.   Lymphadenopathy:      Cervical: No cervical adenopathy.   Skin:     General: Skin is warm and dry.   Neurological:      Mental Status: He is alert and oriented to person, place, and time.   Psychiatric:         Behavior: Behavior normal.         Thought Content: Thought content normal.     he wears right knee braces.  Numbness below the left knee but has severe left ankle  pain.            LABS:  Lab Results   Component Value Date    PSA 0.79 02/03/2020    PSA 0.49 01/04/2019    PSA 0.62 05/24/2018    PSA 0.62 05/23/2017    PSA 0.71 05/12/2016    PSA 0.87 03/13/2015    PSA 0.78 03/10/2014    PSA 0.61 02/06/2013    PSA 0.48 08/19/2011    PSA 0.56 08/17/2010    PSADIAG 0.61 03/08/2022     Results for orders placed or performed in visit on 03/08/22   Prostate Specific Antigen, Diagnostic   Result Value Ref Range    PSA Diagnostic 0.61 0.00 - 4.00 ng/mL     Lab Results   Component Value Date    CREATININE 0.90 03/08/2022    CREATININE 1.1 12/07/2020    CREATININE 1.1 02/03/2020     No results found for this or any previous visit.  No results found for: LABURIN  Hemoglobin A1C   Date Value Ref Range Status   02/03/2020 5.8 (H) 4.0 - 5.6 % Final     Comment:     ADA Screening Guidelines:  5.7-6.4%  Consistent with prediabetes  >or=6.5%  Consistent with diabetes  High levels of fetal hemoglobin interfere with the HbA1C  assay. Heterozygous hemoglobin variants (HbS, HgC, etc)do  not significantly interfere with this assay.   However, presence of multiple variants may affect accuracy.     01/04/2019 5.7 (H) 4.0 - 5.6 % Final     Comment:     ADA Screening Guidelines:  5.7-6.4%  Consistent with prediabetes  >or=6.5%  Consistent with diabetes  High levels of fetal hemoglobin interfere with the HbA1C  assay. Heterozygous hemoglobin variants (HbS, HgC, etc)do  not significantly interfere with this assay.   However, presence of multiple variants may affect accuracy.         Radiology:    Assessment and Plan:  Diagnoses and all orders for this visit:    Benign prostatic hyperplasia with post-void dribbling      Again I explained that because he has been treated for BPH with obstruction and OAB with many different meds, I recommend that we should evaluate him better with urodynamic study and cysto.  Nature of the study explained to pt.  My nurse Lizzy Hilario explained to him depth  previously.  Reassurance was given.    hold off vesicare 5 mg 3 days before SUDS cysto.  He is not on Uroxatral at this time.  Gave him a brochure of the treatment of enlarged prostate ( medication, minimally invasive therapy and TURP) on his last visit.  Answered all his questions.  I spent 15 minutes with the patient of which more than half was spent in direct consultation with the patient in regards to our treatment and plan.    Follow-up:  Follow up for Urodynamic Study (SUDS) cysto.

## 2022-03-18 ENCOUNTER — PATIENT MESSAGE (OUTPATIENT)
Dept: UROLOGY | Facility: CLINIC | Age: 67
End: 2022-03-18
Payer: MEDICARE

## 2022-03-18 ENCOUNTER — CLINICAL SUPPORT (OUTPATIENT)
Dept: REHABILITATION | Facility: HOSPITAL | Age: 67
End: 2022-03-18
Payer: MEDICARE

## 2022-03-18 DIAGNOSIS — M25.661 DECREASED RANGE OF MOTION OF RIGHT KNEE: Primary | ICD-10-CM

## 2022-03-18 PROCEDURE — 97112 NEUROMUSCULAR REEDUCATION: CPT | Mod: PN

## 2022-03-18 PROCEDURE — 97110 THERAPEUTIC EXERCISES: CPT | Mod: PN

## 2022-03-18 NOTE — PROGRESS NOTES
"  Physical Therapy Treatment Note       Name: Casper Osborne  Clinic Number: [id number]    Therapy Diagnosis:   Encounter Diagnosis   Name Primary?    Decreased range of motion of right knee Yes     Physician: Wiliam Guerin MD    Visit Date: 3/18/2022    Physician Orders: PT Eval and Treat   Medical Diagnosis from Referral: Tear of medial collateral ligament of right knee, initial encounter   Rupture of posterior cruciate ligament of right knee, initial encounter   Rupture of anterior cruciate ligament of right knee, initial encounter   Evaluation Date: 1/11/2022  Authorization Period Expiration: 01/13/2023   Plan of Care Expiration: 3/16/2022 to 04/8/2022  Visit # / Visits authorized: 14/20      Time In: 1100  Time Out: 11:55  Total Billable Time: 55 minutes (2 TE, 2 NM)  Precautions: Standard, fall risk     Subjective     Pt reports:no new complaints.   He was compliant with home exercise program.  Response to previous treatment: no adverse reactions   Functional change: has swapped to a Playmaker-type hinged knee brace as opposed to the Bregg Brace.      Pain: 4/10  Location: R knee    Objective     Left knee inspection today:  · Mild effusion --> improved since last week  · Full passive range of motion right knee  · Increased varus laxity at 0 degrees  · Straight leg raise without lag today    Casper received therapeutic exercises to develop strength, endurance, ROM, flexibility, posture and core stabilization for 25 minutes including assessment:  Stationary bike lv 3 for 5' for increased ROM, circulation, and mm strength/endurance  Mat:   SLR 3x10     Machines: Leg press DL 6 plates 3x10 and SL 4x5 at 3 plates    Knee extension isometric 3 rounds x 5 second max holds        Neuromuscular re-education activities to improve: Balance and Proprioception for 30 minutes. The following activities were included:  - deadlift (modified) 30# kettlebell 3x10  - single-leg balance on foam pad 5 rounds x 15"  - Bosu " mini-squats 3x10  - single-leg (upper extremity supported) [place] dead-lift 2x10/each  - forward step-up Bosu ball 3x5/each      Home Exercises Provided and Patient Education Provided   - gym membership  - need for lower extremity balance training  - PT goals and dc planning.     Written Home Exercises Provided: Patient instructed to cont prior HEP.  Exercises were reviewed and Casper was able to demonstrate them prior to the end of the session.  Casper demonstrated good  understanding of the education provided.     See EMR under Patient Instructions for exercises provided from Initial Evaluation.    Assessment     Casper is a 66 y.o. male referred to outpatient Physical Therapy with a medical diagnosis of Rupture of anterior cruciate ligament of right knee, initial encounter, Rupture of posterior cruciate ligament of right knee, initial encounter. Mr Casper' knee was less irritable today. Only mild effusion.  Normal range of motion. He continues to voices episodes of instability/feeling of his knee 'about to give out' and mechanical symptoms.  This is resulting in fear with community and household ambulation and standing tasks. 'Non-coper' category at this time; hopes of moving patient into an 'adapter' or 'coper' category.  Discussed plan moving forward with PT including need for neuromuscular component to his treatment as his knee irritability continues to improved.  Recommend gym membership for transition to following DC from physical therapy. Plan to treat patient 3 more visits with working on transition to home program.  Patient okay to return to PT in several months for pre-hab if he elects to go forward with his knee surgery.     Casper is progressing  towards his goals.   Pt prognosis is good    Pt will continue to benefit from skilled outpatient physical therapy to address the deficits listed in the problem list box on initial evaluation, provide pt/family education and to maximize pt's level of independence  in the home and community environment.   Pt's spiritual, cultural and educational needs considered and pt agreeable to plan of care and goals.     Anticipated barriers to physical therapy: none identified    Short Term Goals: 2 weeks   1. Independent with HEP  MET  2. Increase pain-free knee flexion ROM to 135 degrees MET  3. Pain-free Gait MET     Long Term Goals: 4 weeks   1. Increase pain-free R knee flexion ROM to 135 degrees MET  2. Increase pain-free R knee extension ROM to at least 0 degrees MET  3. Gait pain-free MET  4. Squat Pain-free progressing towards; not met  5. Stairs Pain-free progressing towards; not met    Plan     Continue plan of care.  Monitor response to interventions.  Adjust intensity accordingly.     Thomas Casarez DPT, OCS

## 2022-03-21 ENCOUNTER — CLINICAL SUPPORT (OUTPATIENT)
Dept: REHABILITATION | Facility: HOSPITAL | Age: 67
End: 2022-03-21
Payer: MEDICARE

## 2022-03-21 DIAGNOSIS — M25.661 DECREASED RANGE OF MOTION OF RIGHT KNEE: Primary | ICD-10-CM

## 2022-03-21 PROCEDURE — 97112 NEUROMUSCULAR REEDUCATION: CPT | Mod: PN

## 2022-03-21 PROCEDURE — 97110 THERAPEUTIC EXERCISES: CPT | Mod: PN

## 2022-03-21 NOTE — PROGRESS NOTES
"  Physical Therapy Treatment Note       Name: Casper Osborne  Clinic Number: [id number]    Therapy Diagnosis:   Encounter Diagnosis   Name Primary?    Decreased range of motion of right knee Yes     Physician: Wiliam Guerin MD    Visit Date: 3/21/2022    Physician Orders: PT Eval and Treat   Medical Diagnosis from Referral: Tear of medial collateral ligament of right knee, initial encounter   Rupture of posterior cruciate ligament of right knee, initial encounter   Rupture of anterior cruciate ligament of right knee, initial encounter   Evaluation Date: 1/11/2022  Authorization Period Expiration: 01/13/2023   Plan of Care Expiration: 3/16/2022 to 04/8/2022  Visit # / Visits authorized: 15/20      Time In: 1100  Time Out: 11:55  Total Billable Time: 55 minutes (2 TE, 2 NM)  Precautions: Standard, fall risk     Subjective     Pt reports:no new complaints.   He was compliant with home exercise program.  Response to previous treatment: no adverse reactions   Functional change: has swapped to a Playmaker-type hinged knee brace as opposed to the Bregg Brace.      Pain: 4/10  Location: R knee    Objective     Left knee inspection today:  · Mild effusion --> improved since last week  · Full passive range of motion right knee  · Increased varus laxity at 0 degrees  · Straight leg raise without lag today    Casper received therapeutic exercises to develop strength, endurance, ROM, flexibility, posture and core stabilization for 25 minutes including assessment:  Stationary bike lv 3 for 5' for increased ROM, circulation, and mm strength/endurance  Mat:   SLR 3x10     Machines: Leg press DL 6 plates 3x10 and SL 4x5 at 3 plates    Knee extension isometric 3 rounds x 5 second max holds      Neuromuscular re-education activities to improve: Balance and Proprioception for 30 minutes. The following activities were included:  - deadlift (modified) 30# kettlebell 3x10  - single-leg balance on foam pad 5 rounds x 15"  - Bosu " mini-squats 3x10  - single-leg (upper extremity supported) [place]er dead-lift 2x10/each  - forward step-up Bosu ball 3x5/each      Home Exercises Provided and Patient Education Provided   - gym membership  - need for lower extremity balance training  - PT goals and dc planning.     Written Home Exercises Provided: Patient instructed to cont prior HEP.  Exercises were reviewed and Casper was able to demonstrate them prior to the end of the session.  Casper demonstrated good  understanding of the education provided.     See EMR under Patient Instructions for exercises provided from Initial Evaluation.    Assessment     Casper is a 66 y.o. male referred to outpatient Physical Therapy with a medical diagnosis of Rupture of anterior cruciate ligament of right knee, initial encounter, Rupture of posterior cruciate ligament of right knee, initial encounter. Mr Casper' knee was less irritable today. Only mild effusion.  Normal range of motion. He continues to voices episodes of instability/feeling of his knee 'about to give out' and mechanical symptoms.  This is resulting in fear with community and household ambulation and standing tasks. 'Non-coper' category at this time; hopes of moving patient into an 'adapter' or 'coper' category.  Discussed plan moving forward with PT including need for neuromuscular component to his treatment as his knee irritability continues to improved.  Recommend gym membership for transition to following DC from physical therapy. Plan to treat patient 2 more visits with working on transition to home program.  Patient okay to return to PT in several months for pre-hab if he elects to go forward with his knee surgery.     Casper is progressing  towards his goals.   Pt prognosis is good    Pt will continue to benefit from skilled outpatient physical therapy to address the deficits listed in the problem list box on initial evaluation, provide pt/family education and to maximize pt's level of independence  in the home and community environment.   Pt's spiritual, cultural and educational needs considered and pt agreeable to plan of care and goals.     Anticipated barriers to physical therapy: none identified    Short Term Goals: 2 weeks   1. Independent with HEP  MET  2. Increase pain-free knee flexion ROM to 135 degrees MET  3. Pain-free Gait MET     Long Term Goals: 4 weeks   1. Increase pain-free R knee flexion ROM to 135 degrees MET  2. Increase pain-free R knee extension ROM to at least 0 degrees MET  3. Gait pain-free MET  4. Squat Pain-free progressing towards; not met  5. Stairs Pain-free progressing towards; not met    Plan     Continue plan of care.  Monitor response to interventions.  Adjust intensity accordingly.     Thomas Casarez DPT, OCS

## 2022-03-22 ENCOUNTER — DOCUMENTATION ONLY (OUTPATIENT)
Dept: REHABILITATION | Facility: HOSPITAL | Age: 67
End: 2022-03-22
Payer: MEDICARE

## 2022-03-22 NOTE — PROGRESS NOTES
Face to face meeting completed with Thomas Hill PT regarding current status and progress of   Casper Osborne .  Pankaj Larios, PTA

## 2022-03-24 ENCOUNTER — PES CALL (OUTPATIENT)
Dept: ADMINISTRATIVE | Facility: CLINIC | Age: 67
End: 2022-03-24
Payer: MEDICARE

## 2022-03-25 ENCOUNTER — CLINICAL SUPPORT (OUTPATIENT)
Dept: REHABILITATION | Facility: HOSPITAL | Age: 67
End: 2022-03-25
Payer: MEDICARE

## 2022-03-25 DIAGNOSIS — M25.661 DECREASED RANGE OF MOTION OF RIGHT KNEE: Primary | ICD-10-CM

## 2022-03-25 PROCEDURE — 97112 NEUROMUSCULAR REEDUCATION: CPT | Mod: PN

## 2022-03-25 PROCEDURE — 97110 THERAPEUTIC EXERCISES: CPT | Mod: PN

## 2022-03-25 NOTE — PROGRESS NOTES
"  Physical Therapy Treatment Note       Name: Casper Osborne  Clinic Number: [id number]    Therapy Diagnosis:   Encounter Diagnosis   Name Primary?    Decreased range of motion of right knee Yes     Physician: Wiliam Guerin MD    Visit Date: 3/25/2022    Physician Orders: PT Eval and Treat   Medical Diagnosis from Referral: Tear of medial collateral ligament of right knee, initial encounter   Rupture of posterior cruciate ligament of right knee, initial encounter   Rupture of anterior cruciate ligament of right knee, initial encounter   Evaluation Date: 1/11/2022  Authorization Period Expiration: 01/13/2023   Plan of Care Expiration: 3/16/2022 to 04/8/2022  Visit # / Visits authorized: 15/20      Time In: 11:00 am  Time Out: 11:56 am  Total Billable Time: 56 minutes (2 TE, 2 NM)  Precautions: Standard, fall risk     Subjective     Pt reports: he is feeling some increase in knee pain today, not sure why, maybe because he didn't get much sleep last night     He was compliant with home exercise program.  Response to previous treatment: no adverse reactions   Functional change: ambulating with a Playmaker-type hinged knee brace      Pain: 6/10  Location: R knee    Objective     Left knee inspection today:  · Mild effusion --> improved since last week  · Full passive range of motion right knee  · Increased varus laxity at 0 degrees  · Straight leg raise without lag today    Casper received therapeutic exercises to develop strength, endurance, ROM, flexibility, posture and core stabilization for 26 minutes including assessment:  Stationary bike lv 3 for 5' for increased ROM, circulation, and mm strength/endurance  Mat:   SLR 5x5"     Machines: Leg press DL 6 plates 3x10 and SL 4x5 at 3 plates     Knee extension isometric 10 rounds x 5 second max holds      Neuromuscular re-education activities to improve: Balance and Proprioception for 30 minutes. The following activities were included:  - deadlift (modified) 30# " "kettlebell 3x10  - single-leg balance on foam pad 5 rounds x 15"  - Bosu mini-squats 3x10  - single-leg (upper extremity supported) [place] dead-lift 2x10/each  - forward step-up Bosu ball 3x5/each  - narrow stance on foam with eyes closed 3x30"    Home Exercises Provided and Patient Education Provided   - gym membership  - need for lower extremity balance training  - PT goals and dc planning.     Written Home Exercises Provided: Patient provided updated HEP.  Exercises were reviewed and Casper was able to demonstrate them prior to the end of the session.  Casper demonstrated good  understanding of the education provided.     See EMR under Patient Instructions for exercises provided from Initial Evaluation.    Assessment     Casper is a 66 y.o. male referred to outpatient Physical Therapy with a medical diagnosis of Rupture of anterior cruciate ligament of right knee, initial encounter, Rupture of posterior cruciate ligament of right knee, initial encounter. Mr Shirley presented to treatment with reports of increased knee irritability. He continues to endorse feelings of instability/feeling of his knee 'about to give out' and mechanical symptoms. Continued working on higher level balance and proprioception activities to address knee stability. Pt demonstrated difficulty with performance of exercises on unstable surface, but denied any symptoms of knee pain/discomfort. He reported appropriate fatigue upon completion of treatment. Updated home exercise program was provided for pt to review before discharge from PT next week.      Casper is progressing  towards his goals.   Pt prognosis is good    Pt will continue to benefit from skilled outpatient physical therapy to address the deficits listed in the problem list box on initial evaluation, provide pt/family education and to maximize pt's level of independence in the home and community environment.   Pt's spiritual, cultural and educational needs considered and pt " agreeable to plan of care and goals.     Anticipated barriers to physical therapy: none identified    Short Term Goals: 2 weeks   1. Independent with HEP  MET  2. Increase pain-free knee flexion ROM to 135 degrees MET  3. Pain-free Gait MET     Long Term Goals: 4 weeks   1. Increase pain-free R knee flexion ROM to 135 degrees MET  2. Increase pain-free R knee extension ROM to at least 0 degrees MET  3. Gait pain-free MET  4. Squat Pain-free progressing towards; not met  5. Stairs Pain-free progressing towards; not met    Plan     Continue plan of care.  Monitor response to interventions.  Adjust intensity accordingly.     Brain Welsh, PT, DPT

## 2022-03-28 ENCOUNTER — CLINICAL SUPPORT (OUTPATIENT)
Dept: REHABILITATION | Facility: HOSPITAL | Age: 67
End: 2022-03-28
Payer: MEDICARE

## 2022-03-28 DIAGNOSIS — M25.661 DECREASED RANGE OF MOTION OF RIGHT KNEE: Primary | ICD-10-CM

## 2022-03-28 PROCEDURE — 97530 THERAPEUTIC ACTIVITIES: CPT | Mod: PN

## 2022-03-28 PROCEDURE — 97110 THERAPEUTIC EXERCISES: CPT | Mod: PN

## 2022-03-28 PROCEDURE — 97112 NEUROMUSCULAR REEDUCATION: CPT | Mod: PN

## 2022-03-28 NOTE — PROGRESS NOTES
"  Physical Therapy Treatment Note       Name: Casper Osborne  Clinic Number: [id number]    Therapy Diagnosis:   Encounter Diagnosis   Name Primary?    Decreased range of motion of right knee Yes     Physician: Wiliam Guerin MD    Visit Date: 3/28/2022    Physician Orders: PT Eval and Treat   Medical Diagnosis from Referral: Tear of medial collateral ligament of right knee, initial encounter   Rupture of posterior cruciate ligament of right knee, initial encounter   Rupture of anterior cruciate ligament of right knee, initial encounter   Evaluation Date: 1/11/2022  Authorization Period Expiration: 01/13/2023   Plan of Care Expiration: 3/16/2022 to 04/8/2022  Visit # / Visits authorized: 17/20      Time In: 11:00 am  Time Out: 12:00 pm  Total Billable Time: 26 minutes 1:1 time with PT (1 NMR, 1 TA)  Precautions: Standard, fall risk     Subjective     Pt reports: he continues to experience the same amount of consistent pain, is not getting any better.  He was compliant with home exercise program.  Response to previous treatment: no adverse reactions   Functional change: none       Pain: 6/10  Location: R knee    Objective     Update:   Left knee inspection today:  · Mild effusion --> improved since last visit   · Full passive range of motion right knee  · Increased varus laxity at 0 degrees and 30 degrees  · Straight leg raise without lag   · Gait: wide-based pattern with lack of terminal knee extension during stance phase.       Casper received therapeutic exercises to develop strength, endurance, ROM, flexibility, posture and core stabilization for 34 minutes including assessment:  Stationary bike lv 3 for 5' for increased ROM, circulation, and mm strength/endurance  Mat:   SLR 5x5"     Machines: Leg press DL 6 plates 3x10 and SL 4x5 at 3 plates     Knee extension isometric 10 rounds x 5 second max holds      Neuromuscular re-education activities to improve: Balance and Proprioception for 16 minutes 1:1 with PT. " "The following activities were included:  - deadlift (modified) 30# kettlebell 3x10  - single-leg balance on foam pad 5 rounds x 15"  - Bosu mini-squats 3x10 (not today)  - single-leg (upper extremity supported) [place] dead-lift 2x10/each (not today)  - forward step-up Bosu ball 3x5/each  - narrow stance on foam with eyes closed 3x30"      therapeutic activities to improve functional performance for 10 minutes 1:1 with PT, including:  Questions and answers (see education section below)    Home Exercises Provided and Patient Education Provided   - gym membership and necessary equipment required for home exercise program   - need for lower extremity balance training  - plan moving forward - reaching out to pt in several months to check on plan for surgery     Written Home Exercises Provided: Patient provided updated HEP.  Exercises were reviewed and Casper was able to demonstrate them prior to the end of the session.  Casper demonstrated good  understanding of the education provided.     See EMR under Patient Instructions for exercises provided from Initial Evaluation.    Assessment     Casper is a 66 y.o. male referred to outpatient Physical Therapy with a medical diagnosis of Rupture of anterior cruciate ligament of right knee, initial encounter, Rupture of posterior cruciate ligament of right knee, initial encounter. Mr Shirley presented to treatment with reports of continued irritability and no changes in function or pain at this time. Pt continues to demonstrate some instability and difficulty with single leg activities on unstable surfaces and reports an uncomfortable popping sensation with certain strengthening activities such as leg press. He responds better to isometric exercises, and was educated on how to perform isometrics with machines at the gym. Pt received updated home exercise program previous session and reported understanding of all exercises and confidence in his ability to perform them independently.  "     Casper is progressing  towards his goals.   Pt prognosis is good    Pt will continue to benefit from skilled outpatient physical therapy to address the deficits listed in the problem list box on initial evaluation, provide pt/family education and to maximize pt's level of independence in the home and community environment.   Pt's spiritual, cultural and educational needs considered and pt agreeable to plan of care and goals.     Anticipated barriers to physical therapy: none identified    Short Term Goals: 2 weeks   1. Independent with HEP  MET  2. Increase pain-free knee flexion ROM to 135 degrees MET  3. Pain-free Gait MET     Long Term Goals: 4 weeks   1. Increase pain-free R knee flexion ROM to 135 degrees MET  2. Increase pain-free R knee extension ROM to at least 0 degrees MET  3. Gait pain-free MET  4. Squat Pain-free Met  5. Stairs Pain-free progressing towards; not met    Plan     Discharge pt at this time     Brain Welsh, PT, DPT           [facility]  Physical Therapy Discharge Note    Name: Casper Jaimepard  Clinic Number: [id number]    Therapy Diagnosis:   Encounter Diagnosis   Name Primary?    Decreased range of motion of right knee Yes     Physician: Wiliam Guerin MD    Physician Orders: PT Eval and Treat   Medical Diagnosis from Referral: Tear of medial collateral ligament of right knee, initial encounter   Rupture of posterior cruciate ligament of right knee, initial encounter   Rupture of anterior cruciate ligament of right knee, initial encounter   Evaluation Date: 1/11/2022      Date of Last visit: 03/28/2022  Total Visits Received: 19    ASSESSMENT      Casper is a 66 y.o. male referred to outpatient Physical Therapy with a medical diagnosis of Rupture of anterior cruciate ligament of right knee, initial encounter, Rupture of posterior cruciate ligament of right knee, initial encounter. At this point pt is very limited in progress with PT due to mechanical  symptoms with concentric activities and worsening knee discomfort over time. While pt demonstrates improvements in knee mobility and quad control, his reports of knee popping/shifting while performing exercises indicates lingering instability. Pt reports that due to unchanging symptoms he plans on undergoing ACL repair surgery later this year. He was provided updated home exercise program consisting of lower extremity strengthening so pt can work on maintaining knee stability until he is able to recieve surgery.     Discharge reason: Patient has reached the maximum rehab potential for the present time    Discharge FOTO Score: 75% limitation     Goals:  Short Term Goals: 2 weeks   1. Independent with HEP  MET  2. Increase pain-free knee flexion ROM to 135 degrees MET  3. Pain-free Gait MET     Long Term Goals: 4 weeks   1. Increase pain-free R knee flexion ROM to 135 degrees MET  2. Increase pain-free R knee extension ROM to at least 0 degrees MET  3. Gait pain-free MET  4. Squat Pain-free Met  5. Stairs Pain-free progressing towards; not met    PLAN   This patient is discharged from Physical Therapy      Brain Welsh, PT, DPT

## 2022-03-29 ENCOUNTER — PATIENT MESSAGE (OUTPATIENT)
Dept: INTERNAL MEDICINE | Facility: CLINIC | Age: 67
End: 2022-03-29
Payer: MEDICARE

## 2022-03-29 DIAGNOSIS — E78.5 HYPERLIPIDEMIA, UNSPECIFIED HYPERLIPIDEMIA TYPE: Primary | ICD-10-CM

## 2022-03-29 DIAGNOSIS — R73.01 IMPAIRED FASTING BLOOD SUGAR: ICD-10-CM

## 2022-03-29 DIAGNOSIS — Z12.5 ENCOUNTER FOR PROSTATE CANCER SCREENING: ICD-10-CM

## 2022-03-31 ENCOUNTER — PATIENT OUTREACH (OUTPATIENT)
Dept: ADMINISTRATIVE | Facility: HOSPITAL | Age: 67
End: 2022-03-31
Payer: MEDICARE

## 2022-04-19 ENCOUNTER — PATIENT MESSAGE (OUTPATIENT)
Dept: INTERNAL MEDICINE | Facility: CLINIC | Age: 67
End: 2022-04-19
Payer: MEDICARE

## 2022-04-26 ENCOUNTER — PROCEDURE VISIT (OUTPATIENT)
Dept: UROLOGY | Facility: CLINIC | Age: 67
End: 2022-04-26
Payer: MEDICARE

## 2022-04-26 VITALS
WEIGHT: 195 LBS | HEIGHT: 71 IN | DIASTOLIC BLOOD PRESSURE: 91 MMHG | HEART RATE: 59 BPM | BODY MASS INDEX: 27.3 KG/M2 | TEMPERATURE: 98 F | SYSTOLIC BLOOD PRESSURE: 175 MMHG

## 2022-04-26 DIAGNOSIS — R33.9 INCOMPLETE BLADDER EMPTYING: ICD-10-CM

## 2022-04-26 DIAGNOSIS — N40.1 BPH WITH URINARY OBSTRUCTION: ICD-10-CM

## 2022-04-26 DIAGNOSIS — N13.8 BPH WITH URINARY OBSTRUCTION: ICD-10-CM

## 2022-04-26 DIAGNOSIS — N32.81 OAB (OVERACTIVE BLADDER): ICD-10-CM

## 2022-04-26 LAB
BILIRUB SERPL-MCNC: NORMAL MG/DL
BLOOD URINE, POC: NORMAL
COLOR, POC UA: NORMAL
GLUCOSE UR QL STRIP: NORMAL
KETONES UR QL STRIP: NORMAL
LEUKOCYTE ESTERASE URINE, POC: NORMAL
NITRITE, POC UA: NORMAL
PH, POC UA: 5
PROTEIN, POC: NORMAL
SPECIFIC GRAVITY, POC UA: 1.02
UROBILINOGEN, POC UA: NORMAL

## 2022-04-26 PROCEDURE — 51728 CYSTOMETROGRAM W/VP: CPT | Mod: 26,S$GLB,, | Performed by: UROLOGY

## 2022-04-26 PROCEDURE — 51797 PR VOIDING PRESS STUDY INTRA-ABDOMINAL VOID: ICD-10-PCS | Mod: 26,S$GLB,, | Performed by: UROLOGY

## 2022-04-26 PROCEDURE — 81001 URINALYSIS AUTO W/SCOPE: CPT | Mod: S$GLB,,, | Performed by: UROLOGY

## 2022-04-26 PROCEDURE — 52000 CYSTOURETHROSCOPY: CPT | Mod: 59,S$GLB,, | Performed by: UROLOGY

## 2022-04-26 PROCEDURE — 52000 PR CYSTOURETHROSCOPY: ICD-10-PCS | Mod: 59,S$GLB,, | Performed by: UROLOGY

## 2022-04-26 PROCEDURE — 51728 PR COMPLEX CYSTOMETROGRAM VOIDING PRESSURE STUDIES: ICD-10-PCS | Mod: 26,S$GLB,, | Performed by: UROLOGY

## 2022-04-26 PROCEDURE — 51741 ELECTRO-UROFLOWMETRY FIRST: CPT | Mod: 26,51,S$GLB, | Performed by: UROLOGY

## 2022-04-26 PROCEDURE — 81001 POCT URINALYSIS, DIPSTICK OR TABLET REAGENT, AUTOMATED, WITH MICROSCOP: ICD-10-PCS | Mod: S$GLB,,, | Performed by: UROLOGY

## 2022-04-26 PROCEDURE — 51741 PR UROFLOWMETRY, COMPLEX: ICD-10-PCS | Mod: 26,51,S$GLB, | Performed by: UROLOGY

## 2022-04-26 PROCEDURE — 51784 PR ANAL/URINARY MUSCLE STUDY: ICD-10-PCS | Mod: 26,51,S$GLB, | Performed by: UROLOGY

## 2022-04-26 PROCEDURE — 51784 ANAL/URINARY MUSCLE STUDY: CPT | Mod: 26,51,S$GLB, | Performed by: UROLOGY

## 2022-04-26 PROCEDURE — 51797 INTRAABDOMINAL PRESSURE TEST: CPT | Mod: 26,S$GLB,, | Performed by: UROLOGY

## 2022-04-26 RX ORDER — TAMSULOSIN HYDROCHLORIDE 0.4 MG/1
0.4 CAPSULE ORAL NIGHTLY
Qty: 30 CAPSULE | Refills: 11 | Status: SHIPPED | OUTPATIENT
Start: 2022-04-26 | End: 2022-08-25 | Stop reason: SDUPTHER

## 2022-04-26 RX ORDER — DOXYCYCLINE HYCLATE 100 MG
100 TABLET ORAL ONCE
Status: COMPLETED | OUTPATIENT
Start: 2022-04-26 | End: 2022-04-26

## 2022-04-26 RX ORDER — LIDOCAINE HYDROCHLORIDE 20 MG/ML
JELLY TOPICAL ONCE
Status: COMPLETED | OUTPATIENT
Start: 2022-04-26 | End: 2022-04-26

## 2022-04-26 RX ORDER — FINASTERIDE 5 MG/1
5 TABLET, FILM COATED ORAL DAILY
Qty: 30 TABLET | Refills: 12 | Status: SHIPPED | OUTPATIENT
Start: 2022-04-26 | End: 2022-05-26

## 2022-04-26 RX ADMIN — Medication 100 MG: at 12:04

## 2022-04-26 RX ADMIN — LIDOCAINE HYDROCHLORIDE: 20 JELLY TOPICAL at 12:04

## 2022-04-26 NOTE — PATIENT INSTRUCTIONS
_                                                                                                                                                                                             If any problems after hours or weekends, you may call [phone] and ask for the urology resident on call. SIMPLE URODYNAMIC STUDY (SUDS) & CYSTOSCOPY  UROLOGY CLINIC DISCHARGE INSTRUCTIONS    You have had a procedure that will require time to properly heal. Follow the instructions you have been given on how to care for yourself once you are home. Below is additional information to help in your recovery.    ACTIVITY  There are no restrictions in activity. Start doing again the things you did before the procedure.  You may experience a slight burning sensation. You may notice a small amount of blood in your urine. This will clear up within a day. Call the clinic if this continues beyond 48 hours.    DIET  Continue your normal diet. You may eat the same foods you ate before your procedure.  Drink plenty of fluids during the first 24-48 hours following your procedure.    MEDICATIONS  Resume all other previous medications from your prescribing physician.  Continue any pre=procedure antibiotics until they are all gone.    SIGNS AND SYMPTOMS TO REPORT TO THE DOCTOR  Chills or fever greater than 101° F within 24 hours of procedure.  Changes in urination, such as increased bleeding, foul smell, cloudy urine, or painful urination.  Call your doctor with any questions or concerns.    For any emergency situation, call 911 immediately or go to your nearest emergency room.    [facility]  [phone]

## 2022-04-26 NOTE — PROCEDURES
Simple Urodynamics w/ Cysto    Date/Time: 4/26/2022 9:30 AM  Performed by: Torey Ireland MD  Authorized by: Torey Ireland MD   Comments: Procedure Date:  04/26/2022    Procedure:   Diagnostic Cystourethroscopy   Complex Cystometrogram   Voiding / Pressure Study with Intrarectal Balloon   Complex Uroflow   Electromyogram of Anal Sphincter.     Pre-OP Diagnosis:   BPH with obstruction, incomplete bladder emptying, OAB   Post-OP Diagnosis:   same   Anesthesia:   Anesthesia Administered:   Intraurethral instillation of 10 mL 2% lidocaine (Xylocaine) jelly.   Findings:  Uroflow:  Voided 80 ml,  ml. Q max 7 ml/sec   --- Bladder ---   CYSTOMETROGRAM ( Filling Phase ):   Cystometric Numeric Data:   - First Desire (Sensation): 250 mL at 16 cm of water.   - Normal Desire: 348mL at 28 cm of water.   - Strong Desire: 359 mL at 35 cm of water.   - Urgency (Imminent Void) : 459 mL at 63cm of water.   - Maximum Cystometric Capacity: 460 mL.   Compliance:   - normal.   Leak Point Pressure:   - Valsalva ( Abdominal ) Leak Point Pressure: none.   UROFLOW:   - Voided Volume: 71 mL.   - Residual Urine: 450 mL.   - Maximum Flow Rate: 5 mL/sec.   - Flow Pattern:  Minimal voiding with low flow  VOIDING PRESSURE STUDY ( Voiding Phase ):   Detrusor Pressure:   - Maximum Detrusor Pressure: 77 cm of water.   - Detrusor Pressure at Maximum Flow: 67 cm of water.   - Detrusor Contraction Characteristics: Sustained contraction(s).   ELECTROMYOGRAM:   - normal.     ---Diagnostic Cystourethroscopy ---   Normal urethra.    Prostate: 6 cm trilobar obstruction, moderate intravesical lobe of the prostate causing obstruction  Width of Bladder Neck Opening: Approximately 18 Fr.   Normal bladder. 3+ trabeculation. No stone or tumors seen.  Normal ureteral orifices bilaterally.       Description of Procedure:   Informed Consent:   - Risks, benefits and alternatives of procedure discussed with   patient and informed consent obtained.   Patient  Position:   - Supine.   --- Bladder ---   Prep and Drape:   - Patient prepped and draped in usual sterile fashion using povidone   iodine (Betadine).   --- Diagnostic Cystourethroscopy ---   Instruments:   - 16 Fr flexible cystoscope with 0 degree lens.   Procedure Details:   - Cystoscope passed under vision into bladder.   - Bladder and urethra examined in their entirety with findings as   above.   --- Urodynamic Studies ---   Procedure Details:   Cystometrogram:   - Catheter(s) passed into the bladder.   - Rectal balloon inserted.   - Catheter(s) connected to infusion medium and to pressure recording   device.   - Infusion Rate: 30 mL / min.   Electromyogram:   - Perineal electromyogram pad placed and connected to electromyogram   recording device.   Equipment:   - Catheters: Double lumen catheter.   - Medium: Liquid.   - Pressure Recording Device: Calibrated electronic equipment.   Complications:   No immediate complications.    CONCLUSIONS:   1. BPH with obstruction  Pt re-started flomax and noted that his urinary symptoms have gotten better.  He did not have to strain as much as he used to.  I explained the findings in detail.  Rather than Rezum Therapy, I think that it will be better for him to undergo bipolar TURP given the size and shape of his enlarged prostate ( approx. 75 grams in size from previous CT) with intravesical lobe and large median bar obstruction with significant change in his bladder due to chronic obstruction.  Pt informed me that he has a lot going on this summer and would like to wait until this fall.    So I start him on flomax and finasteride.  Will see him in 5 months for a follow up.    2. Incomplete bladder with larger PVR ( 450 ml)  3. OAB    Post-OP Plan:   Patient was discharged home in a stable condition.  Medications: doxy  Follow up:  Bipolar TURP      BPH with urinary obstruction   Simple Urodynamics w/ Cysto   finasteride (PROSCAR) 5 mg tablet; Take 1 tablet (5 mg total) by  mouth once daily.  Dispense: 30 tablet; Refill: 12   tamsulosin (FLOMAX) 0.4 mg Cap; Take 1 capsule (0.4 mg total) by mouth every evening.  Dispense: 30 capsule; Refill: 11     OAB (overactive bladder)   Simple Urodynamics w/ Cysto   POCT Urinalysis; Standing     Other orders   LIDOcaine HCl 2% urojet   doxycycline tablet 100 mg

## 2022-04-27 ENCOUNTER — PATIENT MESSAGE (OUTPATIENT)
Dept: INTERNAL MEDICINE | Facility: CLINIC | Age: 67
End: 2022-04-27
Payer: MEDICARE

## 2022-04-27 ENCOUNTER — PATIENT MESSAGE (OUTPATIENT)
Dept: UROLOGY | Facility: CLINIC | Age: 67
End: 2022-04-27
Payer: MEDICARE

## 2022-05-02 ENCOUNTER — OFFICE VISIT (OUTPATIENT)
Dept: UROLOGY | Facility: CLINIC | Age: 67
End: 2022-05-02
Payer: MEDICARE

## 2022-05-02 ENCOUNTER — PATIENT MESSAGE (OUTPATIENT)
Dept: UROLOGY | Facility: CLINIC | Age: 67
End: 2022-05-02
Payer: MEDICARE

## 2022-05-02 DIAGNOSIS — N40.1 ENLARGED PROSTATE WITH URINARY RETENTION: Primary | ICD-10-CM

## 2022-05-02 DIAGNOSIS — R33.9 URINARY RETENTION: ICD-10-CM

## 2022-05-02 DIAGNOSIS — R33.8 ENLARGED PROSTATE WITH URINARY RETENTION: Primary | ICD-10-CM

## 2022-05-02 DIAGNOSIS — R31.0 HEMATURIA, GROSS: ICD-10-CM

## 2022-05-02 PROCEDURE — 51702 INSERT TEMP BLADDER CATH: CPT | Mod: S$GLB,,, | Performed by: UROLOGY

## 2022-05-02 PROCEDURE — 99285 PR EMERGENCY DEPT VISIT,LEVEL V: ICD-10-PCS | Mod: GC,,, | Performed by: EMERGENCY MEDICINE

## 2022-05-02 PROCEDURE — 99285 EMERGENCY DEPT VISIT HI MDM: CPT | Mod: GC,,, | Performed by: EMERGENCY MEDICINE

## 2022-05-02 PROCEDURE — 99214 PR OFFICE/OUTPT VISIT, EST, LEVL IV, 30-39 MIN: ICD-10-PCS | Mod: 25,S$GLB,, | Performed by: UROLOGY

## 2022-05-02 PROCEDURE — 51702 PR INSERTION OF TEMPORARY INDWELLING BLADDER CATHETER, SIMPLE: ICD-10-PCS | Mod: S$GLB,,, | Performed by: UROLOGY

## 2022-05-02 PROCEDURE — 99214 OFFICE O/P EST MOD 30 MIN: CPT | Mod: 25,S$GLB,, | Performed by: UROLOGY

## 2022-05-02 PROCEDURE — 87086 URINE CULTURE/COLONY COUNT: CPT | Performed by: UROLOGY

## 2022-05-02 RX ORDER — SODIUM CHLORIDE 0.9 G/100ML
500 IRRIGANT IRRIGATION
Qty: 500 ML | Refills: 1 | Status: SHIPPED | OUTPATIENT
Start: 2022-05-02

## 2022-05-02 RX ORDER — CIPROFLOXACIN 500 MG/1
500 TABLET ORAL 2 TIMES DAILY
Qty: 14 TABLET | Refills: 0 | Status: SHIPPED | OUTPATIENT
Start: 2022-05-02 | End: 2022-05-09

## 2022-05-03 ENCOUNTER — HOSPITAL ENCOUNTER (EMERGENCY)
Facility: HOSPITAL | Age: 67
Discharge: HOME | End: 2022-05-03
Attending: EMERGENCY MEDICINE
Payer: MEDICARE

## 2022-05-03 VITALS
SYSTOLIC BLOOD PRESSURE: 135 MMHG | WEIGHT: 195 LBS | DIASTOLIC BLOOD PRESSURE: 76 MMHG | RESPIRATION RATE: 16 BRPM | OXYGEN SATURATION: 97 % | BODY MASS INDEX: 27.2 KG/M2 | HEART RATE: 75 BPM | TEMPERATURE: 98 F

## 2022-05-03 DIAGNOSIS — R31.9 HEMATURIA, UNSPECIFIED TYPE: Primary | ICD-10-CM

## 2022-05-03 LAB
BACTERIA #/AREA URNS AUTO: ABNORMAL /HPF
BASOPHILS # BLD AUTO: 0.04 K/UL (ref 0–0.2)
BASOPHILS NFR BLD: 0.4 % (ref 0–1.9)
BILIRUB UR QL STRIP: NEGATIVE
BUN SERPL-MCNC: 19 MG/DL (ref 6–30)
CHLORIDE SERPL-SCNC: 98 MMOL/L (ref 95–110)
CLARITY UR REFRACT.AUTO: ABNORMAL
COLOR UR AUTO: ABNORMAL
CREAT SERPL-MCNC: 0.9 MG/DL (ref 0.5–1.4)
DIFFERENTIAL METHOD: ABNORMAL
EOSINOPHIL # BLD AUTO: 0.1 K/UL (ref 0–0.5)
EOSINOPHIL NFR BLD: 1.2 % (ref 0–8)
ERYTHROCYTE [DISTWIDTH] IN BLOOD BY AUTOMATED COUNT: 11.4 % (ref 11.5–14.5)
GLUCOSE SERPL-MCNC: 122 MG/DL (ref 70–110)
GLUCOSE UR QL STRIP: NEGATIVE
HCT VFR BLD AUTO: 45.3 % (ref 40–54)
HCT VFR BLD CALC: 46 %PCV (ref 36–54)
HGB BLD-MCNC: 15.4 G/DL (ref 14–18)
HGB UR QL STRIP: ABNORMAL
HYALINE CASTS UR QL AUTO: 0 /LPF
IMM GRANULOCYTES # BLD AUTO: 0.02 K/UL (ref 0–0.04)
IMM GRANULOCYTES NFR BLD AUTO: 0.2 % (ref 0–0.5)
KETONES UR QL STRIP: NEGATIVE
LEUKOCYTE ESTERASE UR QL STRIP: NEGATIVE
LYMPHOCYTES # BLD AUTO: 1.9 K/UL (ref 1–4.8)
LYMPHOCYTES NFR BLD: 17.1 % (ref 18–48)
MCH RBC QN AUTO: 32.2 PG (ref 27–31)
MCHC RBC AUTO-ENTMCNC: 34 G/DL (ref 32–36)
MCV RBC AUTO: 95 FL (ref 82–98)
MICROSCOPIC COMMENT: ABNORMAL
MONOCYTES # BLD AUTO: 1.2 K/UL (ref 0.3–1)
MONOCYTES NFR BLD: 10.9 % (ref 4–15)
NEUTROPHILS # BLD AUTO: 7.7 K/UL (ref 1.8–7.7)
NEUTROPHILS NFR BLD: 70.2 % (ref 38–73)
NITRITE UR QL STRIP: NEGATIVE
NRBC BLD-RTO: 0 /100 WBC
PH UR STRIP: 5 [PH] (ref 5–8)
PLATELET # BLD AUTO: 257 K/UL (ref 150–450)
PMV BLD AUTO: 9.7 FL (ref 9.2–12.9)
POC IONIZED CALCIUM: 1.18 MMOL/L (ref 1.06–1.42)
POC TCO2 (MEASURED): 28 MMOL/L (ref 23–29)
POTASSIUM BLD-SCNC: 4.4 MMOL/L (ref 3.5–5.1)
PROT UR QL STRIP: ABNORMAL
RBC # BLD AUTO: 4.78 M/UL (ref 4.6–6.2)
RBC #/AREA URNS AUTO: >100 /HPF (ref 0–4)
SAMPLE: ABNORMAL
SODIUM BLD-SCNC: 139 MMOL/L (ref 136–145)
SP GR UR STRIP: 1.02 (ref 1–1.03)
URN SPEC COLLECT METH UR: ABNORMAL
WBC # BLD AUTO: 10.99 K/UL (ref 3.9–12.7)
WBC #/AREA URNS AUTO: 0 /HPF (ref 0–5)
YEAST UR QL AUTO: ABNORMAL

## 2022-05-03 PROCEDURE — 96360 HYDRATION IV INFUSION INIT: CPT

## 2022-05-03 PROCEDURE — 96374 THER/PROPH/DIAG INJ IV PUSH: CPT | Mod: 59

## 2022-05-03 PROCEDURE — 51798 US URINE CAPACITY MEASURE: CPT

## 2022-05-03 PROCEDURE — 80047 BASIC METABLC PNL IONIZED CA: CPT

## 2022-05-03 PROCEDURE — 99284 EMERGENCY DEPT VISIT MOD MDM: CPT | Mod: 25

## 2022-05-03 PROCEDURE — 81001 URINALYSIS AUTO W/SCOPE: CPT | Performed by: STUDENT IN AN ORGANIZED HEALTH CARE EDUCATION/TRAINING PROGRAM

## 2022-05-03 PROCEDURE — 63600175 PHARM REV CODE 636 W HCPCS: Performed by: EMERGENCY MEDICINE

## 2022-05-03 PROCEDURE — 86803 HEPATITIS C AB TEST: CPT | Performed by: EMERGENCY MEDICINE

## 2022-05-03 PROCEDURE — 85025 COMPLETE CBC W/AUTO DIFF WBC: CPT | Performed by: EMERGENCY MEDICINE

## 2022-05-03 PROCEDURE — 51702 INSERT TEMP BLADDER CATH: CPT

## 2022-05-03 RX ORDER — OXYBUTYNIN CHLORIDE 5 MG/1
5 TABLET ORAL 3 TIMES DAILY PRN
Qty: 90 TABLET | Refills: 11 | Status: SHIPPED | OUTPATIENT
Start: 2022-05-03 | End: 2023-05-03

## 2022-05-03 RX ORDER — MORPHINE SULFATE 4 MG/ML
4 INJECTION, SOLUTION INTRAMUSCULAR; INTRAVENOUS
Status: COMPLETED | OUTPATIENT
Start: 2022-05-03 | End: 2022-05-03

## 2022-05-03 RX ADMIN — MORPHINE SULFATE 4 MG: 4 INJECTION INTRAVENOUS at 01:05

## 2022-05-03 NOTE — HPI
Patient is a 65 yo M with PMH of BPH s/p SUDS cysto on 4/26, presenting with 1 day of gross hematuria. Patient reports voiding fine last week and over the weekend but developed gross hematuria yesterday morning. He was seen in Dr. Ireland's clinic yesterday, had a Talamantes placed, was irrigated, and sent home with irrigation supplies. He presented to the ED due to clot retention. In the ED his catheter fell out. A new Talamantes was placed (24Fr 3-way) and CBI was started. He denies fevers, chills, nausea, vomiting, flank pain, suprapubic pain. WBC 10. Hgb 15. Cr 0.9. UA nitrite negative, >100 RBC, 0 WBC, rare bacteria. AFVSS.

## 2022-05-03 NOTE — CONSULTS
[facility] - Emergency Dept  Urology  Consult Note    Patient Name: Casper Osborne  MRN: [id number]  Admission Date: 5/3/2022  Hospital Length of Stay: 0   Code Status: Prior   Attending Provider: Beltran Hancock MD   Consulting Provider: Cici Jaimes MD  Primary Care Physician: Leon Chaparro MD  Principal Problem:Hematuria    Consults    Subjective:     HPI:  Patient is a 65 yo M with PMH of BPH s/p SUDS cysto on 4/26, presenting with 1 day of gross hematuria. Patient reports voiding fine last week and over the weekend but developed gross hematuria yesterday morning. He was seen in Dr. Ireland's clinic yesterday, had a Talamantes placed, was irrigated, and sent home with irrigation supplies. He presented to the ED due to clot retention. In the ED his catheter fell out. A new Talamantes was placed (24Fr 3-way) and CBI was started. He denies fevers, chills, nausea, vomiting, flank pain, suprapubic pain. WBC 10. Hgb 15. Cr 0.9. UA nitrite negative, >100 RBC, 0 WBC, rare bacteria. AFVSS.       Past Medical History:   Diagnosis Date    GERD (gastroesophageal reflux disease)     Hyperlipidemia        Past Surgical History:   Procedure Laterality Date    ARTHROSCOPY OF ANKLE WITH DEBRIDEMENT Left 12/19/2019    Procedure: ARTHROSCOPY, ANKLE, WITH DEBRIDEMENT;  Surgeon: Bay Melendez MD;  Location: [facility] OR;  Service: Orthopedics;  Laterality: Left;    EPIDURAL STEROID INJECTION N/A 1/29/2021    Procedure: INJECTION, STEROID, EPIDURAL L4/5;  Surgeon: Alvarado Reaves MD;  Location: [facility] PAIN MGT;  Service: Pain Management;  Laterality: N/A;    EPIDURAL STEROID INJECTION INTO LUMBAR SPINE N/A 12/24/2020    Procedure: Injection-steroid-epidural-lumbar--L4-5;  Surgeon: Ori Cali Jr., MD;  Location: [facility] PAIN MGT;  Service: Pain Management;  Laterality: N/A;    FRACTURE SURGERY      left heel at 41yo    HEEL SPUR SURGERY      left heel - fell off a ladder and had to have this reconstructed    SPINE SURGERY      c7 fx -  prior to this, he was getting dizzy spells - none since    TONSILLECTOMY      VASECTOMY         Review of patient's allergies indicates:  No Known Allergies    Family History       Problem Relation (Age of Onset)    Cancer Father    Diabetes Mother    Heart disease Daughter    Hypothyroidism Daughter            Tobacco Use    Smoking status: Never Smoker    Smokeless tobacco: Never Used   Substance and Sexual Activity    Alcohol use: Yes     Comment: less than once a month    Drug use: No    Sexual activity: Yes     Partners: Female     Comment:        Review of Systems   Constitutional:  Negative for activity change, appetite change, chills, fever and unexpected weight change.   Respiratory:  Negative for shortness of breath.    Cardiovascular:  Negative for chest pain.   Gastrointestinal:  Negative for abdominal pain, constipation, diarrhea, nausea and vomiting.   Genitourinary:  Positive for hematuria and penile pain. Negative for difficulty urinating, dysuria, flank pain and frequency.   Musculoskeletal:  Negative for back pain.   Skin:  Negative for wound.   Neurological:  Negative for headaches.   Psychiatric/Behavioral:  Negative for confusion.      Objective:     Temp:  [98.3 °F (36.8 °C)] 98.3 °F (36.8 °C)  Pulse:  [75-84] 75  Resp:  [15-16] 16  SpO2:  [97 %] 97 %  BP: (125-135)/(76-80) 135/76     Body mass index is 27.2 kg/m².           Drains       Drain  Duration                  Urethral Catheter 05/03/22 0150 Triple-lumen 24 Fr. <1 day                    Physical Exam  Constitutional:       General: He is not in acute distress.     Appearance: He is not diaphoretic.   HENT:      Head: Normocephalic and atraumatic.      Nose: Nose normal.   Eyes:      Conjunctiva/sclera: Conjunctivae normal.   Cardiovascular:      Rate and Rhythm: Normal rate.   Pulmonary:      Effort: Pulmonary effort is normal. No respiratory distress.   Abdominal:      General: There is no distension.   Genitourinary:      Comments: 24Fr 3-way Talamantes catheter with 30cc in balloon. Draining light pink urine after irrigation, no clots.  Musculoskeletal:         General: Normal range of motion.      Cervical back: Normal range of motion.   Skin:     General: Skin is dry.   Neurological:      Mental Status: He is alert.   Psychiatric:         Behavior: Behavior normal.         Thought Content: Thought content normal.       Significant Labs:    BMP:  No results for input(s): NA, K, CL, CO2, BUN, CREATININE, LABGLOM, GLUCOSE, CALCIUM in the last 168 hours.    CBC:  Recent Labs   Lab 05/03/22  0125 05/03/22  0131   WBC 10.99  --    HGB 15.4  --    HCT 45.3 46     --        All pertinent labs results from the past 24 hours have been reviewed.    Significant Imaging:  All pertinent imaging results/findings from the past 24 hours have been reviewed.                      Assessment and Plan:     * Hematuria  67 yo M with hematuria.    - 24Fr 3-way Talamantes catheter with 30cc in balloon. Irrigated easily to clear, no clots. Remained light pink, thin after rechecking.   - Maintain Talamantes catheter.   - Follow up on 5/5 as scheduled for voiding trial.  - Please send patient home with Oxybutynin 5mg TID PRN for bladder spasms. Side effects discussed. Instructed patient to discontinue this medicine one day prior to voiding trial.  - Ok for discharge from Urology perspective.        VTE Risk Mitigation (From admission, onward)    None          Thank you for your consult. I will sign off. Please contact us if you have any additional questions.    Cici Jaimes MD  Urology  [facility] - Emergency Dept

## 2022-05-03 NOTE — ED PROVIDER NOTES
"Encounter Date: 5/2/2022       History     Chief Complaint   Patient presents with    Male  Problem     Jacques catheter placed today. Noticed blood clots in bag and stopped draining 1.5 hours ago. Pain to lower abdomen and urethra.      HPI     65 yo M with pmh of BPH with obstruction, s/p diagnotic cystourethroscopy 4/26, presenting with 1 day of gross hematuria. Pt states during the procedure initially, he had a small area inside the bladder that was "nicked" during the procedure. He had bleeding for 24 hrs that then resolved. 24 hrs prior to arrival, pt developed new onset of gross bleeding and clots. He was seen in urology clinic today and had a jacques placed earlier. At approx 9pm, the urine/blood stopped draining and pt started to develop lower abdominal pain and distension. Pt was started on 1 day of ciprofloxacin from his urologist.    Review of patient's allergies indicates:  No Known Allergies  Past Medical History:   Diagnosis Date    GERD (gastroesophageal reflux disease)     Hyperlipidemia      Past Surgical History:   Procedure Laterality Date    ARTHROSCOPY OF ANKLE WITH DEBRIDEMENT Left 12/19/2019    Procedure: ARTHROSCOPY, ANKLE, WITH DEBRIDEMENT;  Surgeon: Bay Melendez MD;  Location: [facility] OR;  Service: Orthopedics;  Laterality: Left;    EPIDURAL STEROID INJECTION N/A 1/29/2021    Procedure: INJECTION, STEROID, EPIDURAL L4/5;  Surgeon: Alvarado Reaves MD;  Location: [facility] PAIN MGT;  Service: Pain Management;  Laterality: N/A;    EPIDURAL STEROID INJECTION INTO LUMBAR SPINE N/A 12/24/2020    Procedure: Injection-steroid-epidural-lumbar--L4-5;  Surgeon: Ori Cali Jr., MD;  Location: [facility] PAIN MGT;  Service: Pain Management;  Laterality: N/A;    FRACTURE SURGERY      left heel at 41yo    HEEL SPUR SURGERY      left heel - fell off a ladder and had to have this reconstructed    SPINE SURGERY      c7 fx - prior to this, he was getting dizzy spells - none since    TONSILLECTOMY   "    VASECTOMY       Family History   Problem Relation Age of Onset    Diabetes Mother     Cancer Father         melanoma cancer with mets    Heart disease Daughter         enlarged heart    Hypothyroidism Daughter     Colon polyps Neg Hx     Prostate cancer Neg Hx     Colon cancer Neg Hx     Stroke Neg Hx     Hypertension Neg Hx     Hyperlipidemia Neg Hx      Social History     Tobacco Use    Smoking status: Never Smoker    Smokeless tobacco: Never Used   Substance Use Topics    Alcohol use: Yes     Comment: less than once a month    Drug use: No     Review of Systems   Constitutional: Negative for fever.   HENT: Negative for sore throat.    Eyes: Negative for photophobia.   Respiratory: Negative for shortness of breath and wheezing.    Cardiovascular: Negative for chest pain.   Gastrointestinal: Positive for abdominal pain. Negative for nausea and vomiting.   Endocrine: Negative for polyuria.   Genitourinary: Positive for decreased urine volume and hematuria. Negative for dysuria, penile discharge, penile pain, penile swelling, scrotal swelling and testicular pain.   Musculoskeletal: Negative for back pain.   Skin: Negative for rash.   Allergic/Immunologic: Negative for immunocompromised state.   Neurological: Negative for weakness.   Hematological: Does not bruise/bleed easily.       Physical Exam     Initial Vitals [05/02/22 2245]   BP Pulse Resp Temp SpO2   125/80 84 15 98.3 °F (36.8 °C) 97 %      MAP       --         Physical Exam    Nursing note and vitals reviewed.  Constitutional: He appears well-developed and well-nourished.   HENT:   Head: Normocephalic and atraumatic.   Eyes: Right eye exhibits no discharge. Left eye exhibits no discharge.   Neck:   Normal range of motion.  Cardiovascular: Normal rate and regular rhythm.   Pulmonary/Chest: Breath sounds normal. No respiratory distress.   Abdominal: Abdomen is soft. Bowel sounds are normal.   Lower abdominal ttp bl  24Fr Talamantes catheter w  balloon.    No right CVA tenderness.  No left CVA tenderness.   Genitourinary:    Penis normal.      Genitourinary Comments: Talamantes in place with clots within the bag.     Musculoskeletal:         General: Normal range of motion.      Cervical back: Normal range of motion.     Neurological: He is alert.   Skin: Skin is warm. Capillary refill takes less than 2 seconds.   Psychiatric: He has a normal mood and affect. Thought content normal.         ED Course   Procedures  Labs Reviewed   URINALYSIS, REFLEX TO URINE CULTURE - Abnormal; Notable for the following components:       Result Value    Color, UA Red (*)     Appearance, UA Cloudy (*)     Protein, UA 2+ (*)     Occult Blood UA 3+ (*)     All other components within normal limits    Narrative:     Specimen Source->Urine   CBC W/ AUTO DIFFERENTIAL - Abnormal; Notable for the following components:    MCH 32.2 (*)     RDW 11.4 (*)     Mono # 1.2 (*)     Lymph % 17.1 (*)     All other components within normal limits   URINALYSIS MICROSCOPIC - Abnormal; Notable for the following components:    RBC, UA >100 (*)     Yeast, UA Few (*)     All other components within normal limits    Narrative:     Specimen Source->Urine   ISTAT PROCEDURE - Abnormal; Notable for the following components:    POC Glucose 122 (*)     All other components within normal limits   HEPATITIS C ANTIBODY   ISTAT CHEM8          Imaging Results    None          Medications   morphine injection 4 mg (4 mg Intravenous Given 5/3/22 0139)     Medical Decision Making:   History:   Old Medical Records: I decided to obtain old medical records.  Old Records Summarized: records from clinic visits.  Differential Diagnosis:   Including, but not limited to:  BPH  Urinary retention  Gross hematuria  Urethral injury  Bladder injury  Clinical Tests:   Lab Tests: Ordered and Reviewed  Other:   I have discussed this case with another health care provider.            Attending Attestation:   Physician Attestation  Statement for Resident:  As the supervising MD   Physician Attestation Statement: I have personally seen and examined this patient.   I agree with the above history. -: 66-year-old male presenting with gross hematuria, Jacques catheter malfunction.  Denies fever, flank pain.  On aspirin, no other anticoagulation.   As the supervising MD I agree with the above PE.   -: Appears uncomfortable secondary to pain  No CVA tenderness  Leg bag with gross blood and clots  Suprapubic tenderness   As the supervising MD I agree with the above treatment, course, plan, and disposition.   -: Hemoglobin stable.  Renal function stable.  UA noted, instructed to continue Cipro as recommended by Urology.  Jacques replaced, perform CBI.  Appreciate urology evaluation of the patient.  Urology recommends outpatient follow-up, instructed patient on how to flush Jacques at home.  Patient understands and agrees with the plan.  All questions answered prior to discharge.  Strict return precautions given.  I have reviewed and agree with the residents interpretation of the following: lab data.  I have reviewed the following: old records at this facility.                ED Course as of 05/03/22 0745   Tue May 03, 2022   0255 POC Creatinine: 0.9 [AB]      ED Course User Index  [AB] Beltran Hancock MD           67 yo M with pmh of BPH with obstruction, s/p diagnotic cystourethroscopy 4/26, presenting with 1 day of gross hematuria for 1 day. DDx includes post-op obstruction, UTI. Nursing attempted to flush jacques however it did not pass fluids. Jacques came out on it's own, likely from patient deflating the balloon earlier while trying to clear the obstruction himself. VSS. Bladder scan with 250 cc, however on replacing jacques, he had 400 cc output and immediate relief of abdominal pain. Currently bladder irrigating bladder with 3 way stop cock, and urine quickly became clear. Consulted Urology to update.     Maryan Gordon MD PGY4  [facility]  2:41 AM 05/03/2022    Update:  UA without infection.   Talamantes irrigation nearing completing when it turned to deep red/bloody again. Concern for continued bleeding vs new clots reforming. Urology consulted and requested to come evaluate patient at bedside. Pending recs.    Maryan Gordon MD PGY4  [facility]  4:20 AM 05/03/2022    Urology instructed pt on irrigation at home.   Will follow up on 5/5.  C/w cipro  Oxybutynin for 5mg TID for bladder spasms.  Discharged in stable condition.    Maryan Gordon MD PGY4  [facility]  6:00 AM 05/03/2022                        Clinical Impression:   Final diagnoses:  [R31.9] Hematuria, unspecified type (Primary)          ED Disposition Condition    Discharge Stable        ED Prescriptions     Medication Sig Dispense Start Date End Date Auth. Provider    oxybutynin (DITROPAN) 5 MG Tab Take 1 tablet (5 mg total) by mouth 3 (three) times daily as needed (for bladder spasms). 90 tablet 5/3/2022 5/3/2023 Maryan Cormier MD        Follow-up Information     Follow up With Specialties Details Why Contact Info    [facility] - Emergency Dept Emergency Medicine Go to  If symptoms worsen [address] [facility] [id number] [phone]    Urology [address]  Schedule an appointment as soon as possible for a visit              Maryan Cormier MD  Resident  05/03/22 0605       Beltran Hancock MD  05/03/22 2725

## 2022-05-03 NOTE — ED TRIAGE NOTES
Pt reports procedure on the 26th on his bladder. Pt states that today he woke up urinating blood. Pt states he was seen at clinic and jacques was placed. Pt states that the jacques has now clogged and is no longer draining.    Past Medical History:   Diagnosis Date    GERD (gastroesophageal reflux disease)     Hyperlipidemia        Past Surgical History:   Procedure Laterality Date    ARTHROSCOPY OF ANKLE WITH DEBRIDEMENT Left 12/19/2019    Procedure: ARTHROSCOPY, ANKLE, WITH DEBRIDEMENT;  Surgeon: Bay Melendez MD;  Location: [facility] OR;  Service: Orthopedics;  Laterality: Left;    EPIDURAL STEROID INJECTION N/A 1/29/2021    Procedure: INJECTION, STEROID, EPIDURAL L4/5;  Surgeon: Alvarado Reaves MD;  Location: [facility] PAIN MGT;  Service: Pain Management;  Laterality: N/A;    EPIDURAL STEROID INJECTION INTO LUMBAR SPINE N/A 12/24/2020    Procedure: Injection-steroid-epidural-lumbar--L4-5;  Surgeon: Ori Cali Jr., MD;  Location: [facility] PAIN MGT;  Service: Pain Management;  Laterality: N/A;    FRACTURE SURGERY      left heel at 41yo    HEEL SPUR SURGERY      left heel - fell off a ladder and had to have this reconstructed    SPINE SURGERY      c7 fx - prior to this, he was getting dizzy spells - none since    TONSILLECTOMY      VASECTOMY         Family History   Problem Relation Age of Onset    Diabetes Mother     Cancer Father         melanoma cancer with mets    Heart disease Daughter         enlarged heart    Hypothyroidism Daughter     Colon polyps Neg Hx     Prostate cancer Neg Hx     Colon cancer Neg Hx     Stroke Neg Hx     Hypertension Neg Hx     Hyperlipidemia Neg Hx        Social History     Socioeconomic History    Marital status:      Spouse name: Nithya    Number of children: 1   Occupational History    Occupation: Retried   Tobacco Use    Smoking status: Never Smoker    Smokeless tobacco: Never Used   Substance and Sexual Activity    Alcohol use: Yes     Comment: less  than once a month    Drug use: No    Sexual activity: Yes     Partners: Female     Comment:        No current facility-administered medications for this encounter.     Current Outpatient Medications   Medication Sig Dispense Refill    aspirin (ECOTRIN) 81 MG EC tablet Take 81 mg by mouth once daily.      ciprofloxacin HCl (CIPRO) 500 MG tablet Take 1 tablet (500 mg total) by mouth 2 (two) times daily. for 14 doses 14 tablet 0    finasteride (PROSCAR) 5 mg tablet Take 1 tablet (5 mg total) by mouth once daily. 30 tablet 12    gabapentin (NEURONTIN) 300 MG capsule Take 1 capsule (300 mg total) by mouth 3 (three) times daily. 90 capsule 1    ibuprofen (ADVIL,MOTRIN) 800 MG tablet TAKE ONE(1) TABLET BY MOUTH EVERY EIGHT(8) HOURS AS NEEDED FOR PAIN 90 tablet 2    ipratropium (ATROVENT) 0.03 % nasal spray PLACE TWO SPRAYS IN EACH NOSTRIL THREE TIMES DAILY  30 mL 0    lovastatin (MEVACOR) 40 MG tablet Take 1 tablet (40 mg total) by mouth nightly. 90 tablet 0    omeprazole (PRILOSEC) 40 MG capsule Take 1 capsule (40 mg total) by mouth once daily. 90 capsule 3    sodium chloride 0.9% 0.9 % irrigation Irrigate with 500 mLs as directed as needed (use  ml to irrigate the bladder prn blood clots or catheter not draining). 500 mL 1    tamsulosin (FLOMAX) 0.4 mg Cap Take 1 capsule (0.4 mg total) by mouth every evening. 30 capsule 11     Facility-Administered Medications Ordered in Other Encounters   Medication Dose Route Frequency Provider Last Rate Last Admin    alprazolam ODT dissolvable tablet 0.5 mg  0.5 mg Oral Once PRN Ori Cali Jr., MD           Review of patient's allergies indicates:  No Known Allergies

## 2022-05-03 NOTE — SUBJECTIVE & OBJECTIVE
Past Medical History:   Diagnosis Date    GERD (gastroesophageal reflux disease)     Hyperlipidemia        Past Surgical History:   Procedure Laterality Date    ARTHROSCOPY OF ANKLE WITH DEBRIDEMENT Left 12/19/2019    Procedure: ARTHROSCOPY, ANKLE, WITH DEBRIDEMENT;  Surgeon: Bay Melendez MD;  Location: [facility] OR;  Service: Orthopedics;  Laterality: Left;    EPIDURAL STEROID INJECTION N/A 1/29/2021    Procedure: INJECTION, STEROID, EPIDURAL L4/5;  Surgeon: Alvarado Reaves MD;  Location: [facility] PAIN T;  Service: Pain Management;  Laterality: N/A;    EPIDURAL STEROID INJECTION INTO LUMBAR SPINE N/A 12/24/2020    Procedure: Injection-steroid-epidural-lumbar--L4-5;  Surgeon: Ori Cali Jr., MD;  Location: [facility];  Service: Pain Management;  Laterality: N/A;    FRACTURE SURGERY      left heel at 41yo    HEEL SPUR SURGERY      left heel - fell off a ladder and had to have this reconstructed    SPINE SURGERY      c7 fx - prior to this, he was getting dizzy spells - none since    TONSILLECTOMY      VASECTOMY         Review of patient's allergies indicates:  No Known Allergies    Family History       Problem Relation (Age of Onset)    Cancer Father    Diabetes Mother    Heart disease Daughter    Hypothyroidism Daughter            Tobacco Use    Smoking status: Never Smoker    Smokeless tobacco: Never Used   Substance and Sexual Activity    Alcohol use: Yes     Comment: less than once a month    Drug use: No    Sexual activity: Yes     Partners: Female     Comment:        Review of Systems   Constitutional:  Negative for activity change, appetite change, chills, fever and unexpected weight change.   Respiratory:  Negative for shortness of breath.    Cardiovascular:  Negative for chest pain.   Gastrointestinal:  Negative for abdominal pain, constipation, diarrhea, nausea and vomiting.   Genitourinary:  Positive for hematuria and penile pain. Negative for difficulty urinating, dysuria, flank pain and  frequency.   Musculoskeletal:  Negative for back pain.   Skin:  Negative for wound.   Neurological:  Negative for headaches.   Psychiatric/Behavioral:  Negative for confusion.      Objective:     Temp:  [98.3 °F (36.8 °C)] 98.3 °F (36.8 °C)  Pulse:  [75-84] 75  Resp:  [15-16] 16  SpO2:  [97 %] 97 %  BP: (125-135)/(76-80) 135/76     Body mass index is 27.2 kg/m².           Drains       Drain  Duration                  Urethral Catheter 05/03/22 0150 Triple-lumen 24 Fr. <1 day                    Physical Exam  Constitutional:       General: He is not in acute distress.     Appearance: He is not diaphoretic.   HENT:      Head: Normocephalic and atraumatic.      Nose: Nose normal.   Eyes:      Conjunctiva/sclera: Conjunctivae normal.   Cardiovascular:      Rate and Rhythm: Normal rate.   Pulmonary:      Effort: Pulmonary effort is normal. No respiratory distress.   Abdominal:      General: There is no distension.   Genitourinary:     Comments: 24Fr 3-way Talamantes catheter with 30cc in balloon. Draining light pink urine after irrigation, no clots.  Musculoskeletal:         General: Normal range of motion.      Cervical back: Normal range of motion.   Skin:     General: Skin is dry.   Neurological:      Mental Status: He is alert.   Psychiatric:         Behavior: Behavior normal.         Thought Content: Thought content normal.       Significant Labs:    BMP:  No results for input(s): NA, K, CL, CO2, BUN, CREATININE, LABGLOM, GLUCOSE, CALCIUM in the last 168 hours.    CBC:  Recent Labs   Lab 05/03/22  0125 05/03/22  0131   WBC 10.99  --    HGB 15.4  --    HCT 45.3 46     --        All pertinent labs results from the past 24 hours have been reviewed.    Significant Imaging:  All pertinent imaging results/findings from the past 24 hours have been reviewed.

## 2022-05-03 NOTE — ASSESSMENT & PLAN NOTE
67 yo M with hematuria.    - 24Fr 3-way Talamantes catheter with 30cc in balloon. Irrigated easily to clear, no clots. Remained light pink, thin after rechecking.   - Maintain Talamantes catheter.   - Follow up on 5/5 as scheduled for voiding trial.  - Please send patient home with Oxybutynin 5mg TID PRN for bladder spasms. Side effects discussed. Instructed patient to discontinue this medicine one day prior to voiding trial.  - Ok for discharge from Urology perspective.

## 2022-05-03 NOTE — ED NOTES
I-STAT Chem-8+ Results:   Value Reference Range   Sodium 139 136-145 mmol/L   Potassium  4.4 3.5-5.1 mmol/L   Chloride 98  mmol/L   Ionized Calcium 1.18 1.06-1.42 mmol/L   CO2 (measured) 28 23-29 mmol/L   Glucose 122  mg/dL   BUN 19 6-30 mg/dL   Creatinine 0.9 0.5-1.4 mg/dL   Hematocrit 46 36-54%

## 2022-05-04 ENCOUNTER — PATIENT MESSAGE (OUTPATIENT)
Dept: UROLOGY | Facility: CLINIC | Age: 67
End: 2022-05-04
Payer: MEDICARE

## 2022-05-04 LAB — BACTERIA UR CULT: NO GROWTH

## 2022-05-05 ENCOUNTER — OFFICE VISIT (OUTPATIENT)
Dept: UROLOGY | Facility: CLINIC | Age: 67
End: 2022-05-05
Payer: MEDICARE

## 2022-05-05 VITALS
BODY MASS INDEX: 25.77 KG/M2 | HEART RATE: 76 BPM | SYSTOLIC BLOOD PRESSURE: 113 MMHG | DIASTOLIC BLOOD PRESSURE: 71 MMHG | HEIGHT: 71 IN | WEIGHT: 184.06 LBS

## 2022-05-05 DIAGNOSIS — R33.9 URINARY RETENTION: ICD-10-CM

## 2022-05-05 DIAGNOSIS — R33.8 ENLARGED PROSTATE WITH URINARY RETENTION: Primary | ICD-10-CM

## 2022-05-05 DIAGNOSIS — Z46.6 ENCOUNTER FOR FOLEY CATHETER REMOVAL: ICD-10-CM

## 2022-05-05 DIAGNOSIS — N40.1 ENLARGED PROSTATE WITH URINARY RETENTION: Primary | ICD-10-CM

## 2022-05-05 PROCEDURE — 99214 OFFICE O/P EST MOD 30 MIN: CPT | Mod: S$GLB,,, | Performed by: NURSE PRACTITIONER

## 2022-05-05 PROCEDURE — 99999 PR PBB SHADOW E&M-EST. PATIENT-LVL III: CPT | Mod: PBBFAC,,, | Performed by: NURSE PRACTITIONER

## 2022-05-05 PROCEDURE — 99999 PR PBB SHADOW E&M-EST. PATIENT-LVL III: ICD-10-PCS | Mod: PBBFAC,,, | Performed by: NURSE PRACTITIONER

## 2022-05-05 PROCEDURE — 99214 PR OFFICE/OUTPT VISIT, EST, LEVL IV, 30-39 MIN: ICD-10-PCS | Mod: S$GLB,,, | Performed by: NURSE PRACTITIONER

## 2022-05-05 NOTE — PROGRESS NOTES
CHIEF COMPLAINT:  Voiding trial     HISTORY OF PRESENTING ILLINESS:  Casper Osborne is a 66 y.o. male established to urology, pt of Dr. Ireland, last seen 5/2/22 for BPH w/ obstruction causing urinary retention and hematuria. A jacques catheter was placed during that visit for urinary retention and he was started on a 7 day course of cipro for UTI prophylaxis and was told to continue Flomax and finasteride. TURP recommended - pt wants to wait until after the summer. Here today for voiding trial.    Recent SUDS cysto w/ Dr. Ireland performed 4/26/22   CONCLUSIONS:   1. BPH with obstruction  Pt re-started flomax and noted that his urinary symptoms have gotten better.  He did not have to strain as much as he used to.  I explained the findings in detail.  Rather than Rezum Therapy, I think that it will be better for him to undergo bipolar TURP given the size and shape of his enlarged prostate ( approx. 75 grams in size from previous CT) with intravesical lobe and large median bar obstruction with significant change in his bladder due to chronic obstruction.  Pt informed me that he has a lot going on this summer and would like to wait until this fall.      REVIEW OF SYSTEMS:  Review of Systems   Constitutional: Negative for chills and fever.   HENT: Negative for congestion and sore throat.    Respiratory: Negative for cough and shortness of breath.    Cardiovascular: Negative for chest pain and palpitations.   Genitourinary: Positive for hematuria. Negative for dysuria, flank pain, frequency and urgency.        Jacques catheter with leg bag, draining yellow urine - slightly blood tinged. No clots.   Neurological: Negative for dizziness and headaches.     PATIENT HISTORY:  Past Medical History:   Diagnosis Date    GERD (gastroesophageal reflux disease)     Hyperlipidemia        Past Surgical History:   Procedure Laterality Date    ARTHROSCOPY OF ANKLE WITH DEBRIDEMENT Left 12/19/2019    Procedure: ARTHROSCOPY, ANKLE, WITH  DEBRIDEMENT;  Surgeon: Bay Melendez MD;  Location: [facility] OR;  Service: Orthopedics;  Laterality: Left;    EPIDURAL STEROID INJECTION N/A 1/29/2021    Procedure: INJECTION, STEROID, EPIDURAL L4/5;  Surgeon: Alvarado Reaves MD;  Location: [facility] PAIN MGT;  Service: Pain Management;  Laterality: N/A;    EPIDURAL STEROID INJECTION INTO LUMBAR SPINE N/A 12/24/2020    Procedure: Injection-steroid-epidural-lumbar--L4-5;  Surgeon: Ori Cali Jr., MD;  Location: [facility] PAIN MGT;  Service: Pain Management;  Laterality: N/A;    FRACTURE SURGERY      left heel at 41yo    HEEL SPUR SURGERY      left heel - fell off a ladder and had to have this reconstructed    SPINE SURGERY      c7 fx - prior to this, he was getting dizzy spells - none since    TONSILLECTOMY      VASECTOMY         Family History   Problem Relation Age of Onset    Diabetes Mother     Cancer Father         melanoma cancer with mets    Heart disease Daughter         enlarged heart    Hypothyroidism Daughter     Colon polyps Neg Hx     Prostate cancer Neg Hx     Colon cancer Neg Hx     Stroke Neg Hx     Hypertension Neg Hx     Hyperlipidemia Neg Hx        Social History     Socioeconomic History    Marital status:      Spouse name: Nithya    Number of children: 1   Occupational History    Occupation: Retried   Tobacco Use    Smoking status: Never Smoker    Smokeless tobacco: Never Used   Substance and Sexual Activity    Alcohol use: Yes     Comment: less than once a month    Drug use: No    Sexual activity: Yes     Partners: Female     Comment:        Allergies:  Patient has no known allergies.    Medications:    Current Outpatient Medications:     aspirin (ECOTRIN) 81 MG EC tablet, Take 81 mg by mouth once daily., Disp: , Rfl:     ciprofloxacin HCl (CIPRO) 500 MG tablet, Take 1 tablet (500 mg total) by mouth 2 (two) times daily. for 14 doses, Disp: 14 tablet, Rfl: 0    finasteride (PROSCAR) 5 mg tablet, Take 1  tablet (5 mg total) by mouth once daily., Disp: 30 tablet, Rfl: 12    gabapentin (NEURONTIN) 300 MG capsule, Take 1 capsule (300 mg total) by mouth 3 (three) times daily., Disp: 90 capsule, Rfl: 1    ibuprofen (ADVIL,MOTRIN) 800 MG tablet, TAKE ONE(1) TABLET BY MOUTH EVERY EIGHT(8) HOURS AS NEEDED FOR PAIN, Disp: 90 tablet, Rfl: 2    ipratropium (ATROVENT) 0.03 % nasal spray, PLACE TWO SPRAYS IN EACH NOSTRIL THREE TIMES DAILY , Disp: 30 mL, Rfl: 0    lovastatin (MEVACOR) 40 MG tablet, Take 1 tablet (40 mg total) by mouth nightly., Disp: 90 tablet, Rfl: 0    omeprazole (PRILOSEC) 40 MG capsule, Take 1 capsule (40 mg total) by mouth once daily., Disp: 90 capsule, Rfl: 3    oxybutynin (DITROPAN) 5 MG Tab, Take 1 tablet (5 mg total) by mouth 3 (three) times daily as needed (for bladder spasms)., Disp: 90 tablet, Rfl: 11    sodium chloride 0.9% 0.9 % irrigation, Irrigate with 500 mLs as directed as needed (use  ml to irrigate the bladder prn blood clots or catheter not draining)., Disp: 500 mL, Rfl: 1    tamsulosin (FLOMAX) 0.4 mg Cap, Take 1 capsule (0.4 mg total) by mouth every evening., Disp: 30 capsule, Rfl: 11  No current facility-administered medications for this visit.    Facility-Administered Medications Ordered in Other Visits:     alprazolam ODT dissolvable tablet 0.5 mg, 0.5 mg, Oral, Once PRN, Ori Cali Jr., MD    PHYSICAL EXAMINATION:  Physical Exam  Constitutional:       Appearance: Normal appearance.   HENT:      Head: Normocephalic.      Right Ear: External ear normal.      Left Ear: External ear normal.   Pulmonary:      Effort: Pulmonary effort is normal. No respiratory distress.   Genitourinary:     Comments: Talamantes catheter in place. Voiding trial performed by Nurse Garrett.  200 ml of sterile water was instilled into bladder.  Talamantes catheter was removed @ 1405. Patient urinated in office with some clots.   Musculoskeletal:         General: Normal range of motion.   Skin:      General: Skin is warm and dry.   Neurological:      General: No focal deficit present.      Mental Status: He is alert and oriented to person, place, and time.   Psychiatric:         Mood and Affect: Mood normal.         Behavior: Behavior normal.       LABS:    Lab Results   Component Value Date    PSA 0.79 02/03/2020    PSA 0.49 01/04/2019    PSA 0.62 05/24/2018    PSADIAG 0.61 03/08/2022     IMPRESSION:    Encounter Diagnoses   Name Primary?    Enlarged prostate with urinary retention Yes    Encounter for Jacques catheter removal     Urinary retention          Assessment:       1. Enlarged prostate with urinary retention    2. Encounter for Jacques catheter removal    3. Urinary retention        Plan:   - Continue Flomax and Proscar     Voiding trial passed  Patient was instructed to drink plenty of fluids today.  Because this is a late voiding trial - the patient was instructed to go to the emergency department to have jacques catheter put back in if unable to urinate within 5 hours of jacques catheter removal or starts to experience bladder pressure/pain, decrease flow, straining/difficulty urinating, urinary frequency.    Patient voiced understanding.    Return to clinic in 6 weeks for PVR or sooner if he fails VT today.    I spent 30 minutes with the patient of which more than half was spent in direct consultation with the patient in regards to our treatment and plan.  We addressed the office findings and recent labs.   Education and recommendations of today's plan of care including home remedies and needed follow up with PCP.   We discussed the LUTS and possible contributory factors.   Diet modifications; reducing bladder irritants; healthy diet; benefits of regular exercise.

## 2022-05-06 LAB — HCV AB SERPL QL IA: NEGATIVE

## 2022-05-09 ENCOUNTER — PES CALL (OUTPATIENT)
Dept: ADMINISTRATIVE | Facility: CLINIC | Age: 67
End: 2022-05-09
Payer: MEDICARE

## 2022-05-09 ENCOUNTER — TELEPHONE (OUTPATIENT)
Dept: UROLOGY | Facility: CLINIC | Age: 67
End: 2022-05-09
Payer: MEDICARE

## 2022-05-09 ENCOUNTER — PATIENT MESSAGE (OUTPATIENT)
Dept: INTERNAL MEDICINE | Facility: CLINIC | Age: 67
End: 2022-05-09
Payer: MEDICARE

## 2022-05-09 DIAGNOSIS — R31.0 HEMATURIA, GROSS: ICD-10-CM

## 2022-05-09 DIAGNOSIS — R33.8 ENLARGED PROSTATE WITH URINARY RETENTION: Primary | ICD-10-CM

## 2022-05-09 DIAGNOSIS — N40.1 ENLARGED PROSTATE WITH URINARY RETENTION: Primary | ICD-10-CM

## 2022-05-09 DIAGNOSIS — R33.9 URINARY RETENTION: ICD-10-CM

## 2022-05-11 ENCOUNTER — PATIENT OUTREACH (OUTPATIENT)
Dept: ADMINISTRATIVE | Facility: OTHER | Age: 67
End: 2022-05-11
Payer: MEDICARE

## 2022-05-11 ENCOUNTER — PATIENT MESSAGE (OUTPATIENT)
Dept: INTERNAL MEDICINE | Facility: CLINIC | Age: 67
End: 2022-05-11
Payer: MEDICARE

## 2022-05-12 ENCOUNTER — OFFICE VISIT (OUTPATIENT)
Dept: UROLOGY | Facility: CLINIC | Age: 67
End: 2022-05-12
Payer: MEDICARE

## 2022-05-12 ENCOUNTER — OFFICE VISIT (OUTPATIENT)
Dept: SPORTS MEDICINE | Facility: CLINIC | Age: 67
End: 2022-05-12
Payer: MEDICARE

## 2022-05-12 VITALS
HEIGHT: 71 IN | SYSTOLIC BLOOD PRESSURE: 125 MMHG | BODY MASS INDEX: 26.46 KG/M2 | DIASTOLIC BLOOD PRESSURE: 78 MMHG | WEIGHT: 189 LBS | HEART RATE: 68 BPM

## 2022-05-12 VITALS
WEIGHT: 189.5 LBS | HEIGHT: 71 IN | SYSTOLIC BLOOD PRESSURE: 120 MMHG | BODY MASS INDEX: 26.53 KG/M2 | DIASTOLIC BLOOD PRESSURE: 76 MMHG | HEART RATE: 71 BPM

## 2022-05-12 DIAGNOSIS — N40.1 ENLARGED PROSTATE WITH URINARY RETENTION: ICD-10-CM

## 2022-05-12 DIAGNOSIS — R31.0 HEMATURIA, GROSS: ICD-10-CM

## 2022-05-12 DIAGNOSIS — S83.511A RUPTURE OF ANTERIOR CRUCIATE LIGAMENT OF RIGHT KNEE, INITIAL ENCOUNTER: ICD-10-CM

## 2022-05-12 DIAGNOSIS — S83.241A TEAR OF MEDIAL MENISCUS OF RIGHT KNEE, CURRENT, UNSPECIFIED TEAR TYPE, INITIAL ENCOUNTER: ICD-10-CM

## 2022-05-12 DIAGNOSIS — R33.8 ENLARGED PROSTATE WITH URINARY RETENTION: ICD-10-CM

## 2022-05-12 DIAGNOSIS — R33.9 URINARY RETENTION: ICD-10-CM

## 2022-05-12 DIAGNOSIS — S83.521A RUPTURE OF POSTERIOR CRUCIATE LIGAMENT OF RIGHT KNEE, INITIAL ENCOUNTER: Primary | ICD-10-CM

## 2022-05-12 LAB — POC RESIDUAL URINE VOLUME: 211 ML (ref 0–100)

## 2022-05-12 PROCEDURE — 99214 PR OFFICE/OUTPT VISIT, EST, LEVL IV, 30-39 MIN: ICD-10-PCS | Mod: S$GLB,,, | Performed by: UROLOGY

## 2022-05-12 PROCEDURE — 99999 PR PBB SHADOW E&M-EST. PATIENT-LVL IV: ICD-10-PCS | Mod: PBBFAC,,, | Performed by: UROLOGY

## 2022-05-12 PROCEDURE — 99214 OFFICE O/P EST MOD 30 MIN: CPT | Mod: S$GLB,,, | Performed by: UROLOGY

## 2022-05-12 PROCEDURE — 99213 OFFICE O/P EST LOW 20 MIN: CPT | Mod: S$GLB,,, | Performed by: STUDENT IN AN ORGANIZED HEALTH CARE EDUCATION/TRAINING PROGRAM

## 2022-05-12 PROCEDURE — 99999 PR PBB SHADOW E&M-EST. PATIENT-LVL III: CPT | Mod: PBBFAC,,, | Performed by: STUDENT IN AN ORGANIZED HEALTH CARE EDUCATION/TRAINING PROGRAM

## 2022-05-12 PROCEDURE — 99999 PR PBB SHADOW E&M-EST. PATIENT-LVL IV: CPT | Mod: PBBFAC,,, | Performed by: UROLOGY

## 2022-05-12 PROCEDURE — 99213 PR OFFICE/OUTPT VISIT, EST, LEVL III, 20-29 MIN: ICD-10-PCS | Mod: S$GLB,,, | Performed by: STUDENT IN AN ORGANIZED HEALTH CARE EDUCATION/TRAINING PROGRAM

## 2022-05-12 PROCEDURE — 51798 POCT BLADDER SCAN: ICD-10-PCS | Mod: S$GLB,,, | Performed by: UROLOGY

## 2022-05-12 PROCEDURE — 99999 PR PBB SHADOW E&M-EST. PATIENT-LVL III: ICD-10-PCS | Mod: PBBFAC,,, | Performed by: STUDENT IN AN ORGANIZED HEALTH CARE EDUCATION/TRAINING PROGRAM

## 2022-05-12 PROCEDURE — 51798 US URINE CAPACITY MEASURE: CPT | Mod: S$GLB,,, | Performed by: UROLOGY

## 2022-05-12 NOTE — PROGRESS NOTES
Subjective:      Patient ID: Casper Osborne is a 66 y.o. male.    Chief Complaint: BPH  Patient is a 66 y.o. male who is new to me but not the system for BPH, urinary retention.    Patient complains of lower urinary tract symptoms. He reports frequency, incomplete emptying, intermittency, nocturia four times a night, straining, urgency and weak stream. He denies none. Patient states symptoms are of severe severity. Onset of symptoms was several years ago and was gradual in onset. His AUA Symptom Score is, 20/4. He has no personal history and no family history of prostate cancer. He reports a history of no complicating symptoms. He denies flank pain, gross hematuria, kidney stones and recurrent UTI.  Underwent UDS with Dr. Ireland 5/2/22, hematuria and retention 5/3 in ED, void trial 5/6.  Dr. Ireland recommended TURP given large prostate and UDS findings.  Still significant LUTs on meds,  today.          Lab Results   Component Value Date    PSA 0.79 02/03/2020    PSA 0.49 01/04/2019    PSA 0.62 05/24/2018    PSA 0.62 05/23/2017    PSA 0.71 05/12/2016    PSA 0.87 03/13/2015    PSA 0.78 03/10/2014    PSA 0.61 02/06/2013    PSA 0.48 08/19/2011    PSA 0.56 08/17/2010    PSA 0.44 04/09/2009    PSA 0.8 03/13/2007    PSADIAG 0.61 03/08/2022       IPSS Questionnaire (AUA-SS):  Over the past month    1)  Incomplete Emptying - How often have you had a sensation of not emptying your bladder completely after you finish urinating?  0 - Not at all   2)  Frequency - How often have you had to urinate again less than two hours after you finished urinating? 3 - About half the time   3)  Intermittency - How often have you found you stopped and started again several times when you urinated?  1 - Less than 1 time in 5   4) Urgency - How difficult have you found it to postpone urination?  3 - About half the time   5) Weak Stream - How often have you had a weak urinary stream?  4 - More than half the time   6) Straining  - How often  have you had to push or strain to begin urination?  5 - Almost always   7) Nocturia - How many times did you most typically get up to urinate from the time you went to bed until the time you got up in the morning?  4 - 4 times   Total score:  0-7 mild, 8-19 moderate, 20-35 severe 20   Quality of Life:  4 - Mostly Dissatisfied      Review of Systems   Constitutional: Negative for activity change, chills and fever.   HENT: Negative for congestion.    Respiratory: Negative for cough, chest tightness and shortness of breath.    Cardiovascular: Negative for chest pain and palpitations.   Gastrointestinal: Negative for abdominal distention, abdominal pain, nausea and vomiting.   Genitourinary: Positive for difficulty urinating, frequency and urgency. Negative for flank pain, hematuria, penile pain, scrotal swelling and testicular pain.   Musculoskeletal: Negative for gait problem.     All other systems reviewed and negative except pertinent positives noted in HPI.      Objective:     Physical Exam  Vitals reviewed.   Constitutional:       Appearance: Normal appearance.   HENT:      Head: Atraumatic.   Cardiovascular:      Pulses: Normal pulses.   Pulmonary:      Effort: Pulmonary effort is normal.      Breath sounds: Normal breath sounds.   Abdominal:      General: There is no distension.      Palpations: Abdomen is soft.      Tenderness: There is no abdominal tenderness. There is no right CVA tenderness, left CVA tenderness or guarding.   Musculoskeletal:      Cervical back: Normal range of motion.   Neurological:      Mental Status: He is alert.         Assessment:     Problem Noted   Enlarged Prostate With Urinary Retention 1/8/2019    UDS 5/2/22 voided 71,  mL; QMax 5, Pdet 77         Plan:   1.  Discussed past workup by Dr. Ireland in detail  2.  IN my hands recommend GLL PVP vs TURP vs HoLEP vs RASP.  Will do cystoscopy/TRUS to evaluate and given recommendation.    Alexandro Pate MD

## 2022-05-12 NOTE — PROGRESS NOTES
Subjective:          Chief Complaint: Casper Osborne is a 66 y.o. male who had concerns including Pain of the Right Knee.    Casper Osborne is a 66 y.o. male returns today for follow-up for his right knee.  He has a known ACL tear, grade 2 PCL tear, and grade 2 proximal MCL tear as well as a medial meniscus tear.  Originally we discussed ligament reconstructive surgery, however he made good improvement with physical therapy we decided to cancel this.  He continue physical therapy until he was discharged has been doing a home-based program.  This was 2 months ago when he made this transition.  Would like to keep going to physical therapy as he says he was making better progress there.  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  He is wearing a T scope brace for the lawn care, otherwise he is wearing short runner brace.  He says he has both help with his stability.  He does have pain on his knee, this is global but most prominent over the anterior medial aspects.  He has some clicking in his knee but no true mechanical symptoms such as catching or locking.  He says he has limited his activities was unsure of any instability sensations.      Pain  Pertinent negatives include no joint swelling or myalgias.     Past Medical History:   Diagnosis Date    GERD (gastroesophageal reflux disease)     Hyperlipidemia        Current Outpatient Medications on File Prior to Visit   Medication Sig Dispense Refill    aspirin (ECOTRIN) 81 MG EC tablet Take 81 mg by mouth once daily.      finasteride (PROSCAR) 5 mg tablet Take 1 tablet (5 mg total) by mouth once daily. 30 tablet 12    ibuprofen (ADVIL,MOTRIN) 800 MG tablet TAKE ONE(1) TABLET BY MOUTH EVERY EIGHT(8) HOURS AS NEEDED FOR PAIN 90 tablet 2    ipratropium (ATROVENT) 0.03 % nasal spray PLACE TWO SPRAYS IN EACH NOSTRIL THREE TIMES DAILY  30 mL 0    lovastatin (MEVACOR) 40 MG tablet Take 1 tablet (40 mg total) by mouth nightly. 90  tablet 0    omeprazole (PRILOSEC) 40 MG capsule Take 1 capsule (40 mg total) by mouth once daily. 90 capsule 3    oxybutynin (DITROPAN) 5 MG Tab Take 1 tablet (5 mg total) by mouth 3 (three) times daily as needed (for bladder spasms). 90 tablet 11    sodium chloride 0.9% 0.9 % irrigation Irrigate with 500 mLs as directed as needed (use  ml to irrigate the bladder prn blood clots or catheter not draining). 500 mL 1    tamsulosin (FLOMAX) 0.4 mg Cap Take 1 capsule (0.4 mg total) by mouth every evening. 30 capsule 11    gabapentin (NEURONTIN) 300 MG capsule Take 1 capsule (300 mg total) by mouth 3 (three) times daily. 90 capsule 1     Current Facility-Administered Medications on File Prior to Visit   Medication Dose Route Frequency Provider Last Rate Last Admin    alprazolam ODT dissolvable tablet 0.5 mg  0.5 mg Oral Once PRN Ori Cali Jr., MD           Past Surgical History:   Procedure Laterality Date    ARTHROSCOPY OF ANKLE WITH DEBRIDEMENT Left 12/19/2019    Procedure: ARTHROSCOPY, ANKLE, WITH DEBRIDEMENT;  Surgeon: Bay Melendez MD;  Location: [facility] OR;  Service: Orthopedics;  Laterality: Left;    EPIDURAL STEROID INJECTION N/A 1/29/2021    Procedure: INJECTION, STEROID, EPIDURAL L4/5;  Surgeon: Alvarado Reaves MD;  Location: [facility] PAIN [facility]GT;  Service: Pain Management;  Laterality: N/A;    EPIDURAL STEROID INJECTION INTO LUMBAR SPINE N/A 12/24/2020    Procedure: Injection-steroid-epidural-lumbar--L4-5;  Surgeon: Ori Cali Jr., MD;  Location: [facility] PAIN MGT;  Service: Pain Management;  Laterality: N/A;    FRACTURE SURGERY      left heel at 41yo    HEEL SPUR SURGERY      left heel - fell off a ladder and had to have this reconstructed    SPINE SURGERY      c7 fx - prior to this, he was getting dizzy spells - none since    TONSILLECTOMY      VASECTOMY         Family History   Problem Relation Age of Onset    Diabetes Mother     Cancer Father         melanoma cancer with mets     Heart disease Daughter         enlarged heart    Hypothyroidism Daughter     Colon polyps Neg Hx     Prostate cancer Neg Hx     Colon cancer Neg Hx     Stroke Neg Hx     Hypertension Neg Hx     Hyperlipidemia Neg Hx        Social History     Socioeconomic History    Marital status:      Spouse name: Nithya    Number of children: 1   Occupational History    Occupation: Retried   Tobacco Use    Smoking status: Never Smoker    Smokeless tobacco: Never Used   Substance and Sexual Activity    Alcohol use: Yes     Comment: less than once a month    Drug use: No    Sexual activity: Yes     Partners: Female     Comment:          Review of Systems   Constitutional: Negative.   HENT: Negative.    Eyes: Negative.    Cardiovascular: Negative.    Respiratory: Negative.    Endocrine: Negative.    Hematologic/Lymphatic: Negative.    Skin: Negative.    Musculoskeletal: Positive for joint pain (right knee) and muscle weakness. Negative for arthritis, back pain, falls, joint swelling, muscle cramps, myalgias and stiffness.   Neurological: Negative.    Psychiatric/Behavioral: Negative.    Allergic/Immunologic: Negative.        Pain Related Questions  Over the past 3 days, what was your average pain during activity? (I.e. running, jogging, walking, climbing stairs, getting dressed, ect.): 8  Over the past 3 days, what was your highest pain level?: 8  Over the past 3 days, what was your lowest pain level? : 5    Other  Was the patient's HEIGHT measured or patient reported?: Patient Reported  Was the patient's WEIGHT measured or patient reported?: Measured      Objective:        General: Casper is well-developed, well-nourished, appears stated age, in no acute distress, alert and oriented to time, place and person.     General    Nursing note and vitals reviewed.  Constitutional: He is oriented to person, place, and time. He appears well-developed and well-nourished. No distress.   HENT:   Head: Normocephalic  and atraumatic.   Nose: Nose normal.   Eyes: EOM are normal.   Cardiovascular: Normal rate and intact distal pulses.    Pulmonary/Chest: Effort normal. No respiratory distress.   Neurological: He is alert and oriented to person, place, and time.   Psychiatric: He has a normal mood and affect. His behavior is normal. Judgment and thought content normal.     General Musculoskeletal Exam   Gait: normal       Right Knee Exam     Inspection   Erythema: absent  Scars: absent  Swelling: absent  Effusion: absent  Deformity: absent  Bruising: absent    Tenderness   The patient is experiencing no tenderness.     Range of Motion   Extension: 0   Flexion: 140     Tests   Meniscus   Eddie:  Medial - negative Lateral - negative  Ligament Examination   Lachman: abnormal - grade II  PCL-Posterior Drawer: abnormal   - grade I  MCL - Valgus: normal (0 to 2mm)  LCL - Varus: normal  Posterolateral Corner: stable  Patella   Patellar apprehension: negative  Passive Patellar Tilt: neutral  Patellar Tracking: normal    Other   Sensation: normal    Left Knee Exam   Left knee exam is normal.    Inspection   Erythema: absent  Scars: absent  Swelling: absent  Effusion: absent  Deformity: absent  Bruising: absent    Tenderness   The patient is experiencing no tenderness.     Range of Motion   Extension: -5   Flexion: 140     Tests   Meniscus   Eddie:  Medial - negative Lateral - negative  Stability Lachman: normal (-1 to 2mm) PCL-Posterior Drawer: normal (0 to 2mm)  MCL - Valgus: normal (0 to 2mm)  LCL - Varus: normal (0 to 2mm)  Posterolateral Corner: stable  Patella   Patellar apprehension: negative  Passive Patellar Tilt: neutral  Patellar Tracking: normal    Other   Sensation: normal    Muscle Strength   Right Lower Extremity   Hip Abduction: 5/5   Quadriceps:  5/5   Hamstrin/5   Left Lower Extremity   Quadriceps:  5/5   Hamstrin/5     Vascular Exam     Right Pulses  Dorsalis Pedis:      2+  Posterior Tibial:       2+        Left Pulses  Dorsalis Pedis:      2+  Posterior Tibial:      2+        Edema  Right Lower Leg: absent  Left Lower Leg: absent    Imaging:  X-rays of the bilateral knee from 12/30/2021 personally reviewed by me on that day.  These include weight-bearing AP, PA flexion, lateral, and Merchant views.  There is marginal osteophytes along the notch, however these are very small.  There is no subchondral sclerosis.  There is no joint space loss in all 3 compartments bilaterally.    MRI of the right knee from 12/22/2021 personally reviewed by me on 12/30/2021.  There is a complete ACL tear.  Grade 2 PCL tear in the proximal half near the midportion.  There is a high grade 2, possible grade 3, MCL tear off the femoral origin.  There are medial and lateral meniscus tears, the medial is a the posterior horn with a radial and horizontal component.  The medial root is intact.  Laterally, the meniscus has a radial tear in the posterior horn.  The root is not well visualized.  There is no extrusion of either the medial or lateral meniscus.  Overall, the cartilage looks very good in all 3 compartments with no significant or full-thickness loss.          Assessment:     Casper Osborne is a 66 y.o. male with right ACL tear, PCL tear, MCL tear, and medial and lateral meniscus tears as detailed above.  He is doing well.  He is able to function and perform his work.  He does still have some pain however.  Encounter Diagnoses   Name Primary?    Rupture of anterior cruciate ligament of right knee, initial encounter     Rupture of posterior cruciate ligament of right knee, initial encounter Yes    Tear of medial meniscus of right knee, current, unspecified tear type, initial encounter           Plan:       He has had good improvement.  His MCL feels very stable.  His PCL is a very soft endpoint, grade 1 translation, as ACL he still grade 2. He says he feels okay while wearing the brace.  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  We will refer him back to physical therapy to work on knee strengthening and stability.  He can wear the braces as needed.  I told him to increase activity to everything he would like as a true test for the stability in function of his knee.  He will return to clinic in late September or October for re-evaluation.  We will obtain repeat weight-bearing x-rays at that time.    All of their questions were answered.  They will call the clinic with any questions or concerns in the interim.    Should the patient's symptoms worsen, persist, or fail to improve they should return for reevaluation and I would be happy to see them back anytime.        Wiliam Guerin M.D.     Please be aware that this note has been generated with the assistance of [employer] voice-to-text.  Please excuse any spelling or grammatical errors.    Thank you for choosing Dr. Wiliam Guerin for your sports medicine care. It is our goal to provide you with exceptional care that will help keep you healthy, active, and get you back in the game.     If you felt that you received exemplary care today, please consider leaving feedback for Dr. Guerin on [employer] at [email or web address].    Please do not hesitate to reach out to us via email, phone, or MyChart with any questions, concerns, or feedback.

## 2022-05-16 ENCOUNTER — OFFICE VISIT (OUTPATIENT)
Dept: INTERNAL MEDICINE | Facility: CLINIC | Age: 67
End: 2022-05-16
Payer: MEDICARE

## 2022-05-16 ENCOUNTER — TELEPHONE (OUTPATIENT)
Dept: ADMINISTRATIVE | Facility: CLINIC | Age: 67
End: 2022-05-16
Payer: MEDICARE

## 2022-05-16 ENCOUNTER — TELEPHONE (OUTPATIENT)
Dept: INTERNAL MEDICINE | Facility: CLINIC | Age: 67
End: 2022-05-16

## 2022-05-16 ENCOUNTER — LAB VISIT (OUTPATIENT)
Dept: LAB | Facility: HOSPITAL | Age: 67
End: 2022-05-16
Attending: INTERNAL MEDICINE
Payer: MEDICARE

## 2022-05-16 VITALS
HEIGHT: 71 IN | SYSTOLIC BLOOD PRESSURE: 120 MMHG | WEIGHT: 188.69 LBS | OXYGEN SATURATION: 97 % | BODY MASS INDEX: 26.42 KG/M2 | TEMPERATURE: 97 F | RESPIRATION RATE: 16 BRPM | DIASTOLIC BLOOD PRESSURE: 80 MMHG | HEART RATE: 68 BPM

## 2022-05-16 DIAGNOSIS — R73.01 IMPAIRED FASTING BLOOD SUGAR: ICD-10-CM

## 2022-05-16 DIAGNOSIS — Z00.00 ANNUAL PHYSICAL EXAM: Primary | ICD-10-CM

## 2022-05-16 DIAGNOSIS — M51.36 DDD (DEGENERATIVE DISC DISEASE), LUMBAR: ICD-10-CM

## 2022-05-16 DIAGNOSIS — R97.20 ELEVATED PSA: Primary | ICD-10-CM

## 2022-05-16 DIAGNOSIS — M25.661 DECREASED RANGE OF MOTION OF RIGHT KNEE: ICD-10-CM

## 2022-05-16 DIAGNOSIS — K21.9 GASTROESOPHAGEAL REFLUX DISEASE, UNSPECIFIED WHETHER ESOPHAGITIS PRESENT: Chronic | ICD-10-CM

## 2022-05-16 DIAGNOSIS — E78.5 HYPERLIPIDEMIA, UNSPECIFIED HYPERLIPIDEMIA TYPE: ICD-10-CM

## 2022-05-16 DIAGNOSIS — E78.5 HYPERLIPIDEMIA, UNSPECIFIED HYPERLIPIDEMIA TYPE: Chronic | ICD-10-CM

## 2022-05-16 DIAGNOSIS — M47.816 LUMBAR FACET ARTHROPATHY: Chronic | ICD-10-CM

## 2022-05-16 DIAGNOSIS — Z12.5 ENCOUNTER FOR PROSTATE CANCER SCREENING: ICD-10-CM

## 2022-05-16 LAB
ALBUMIN SERPL BCP-MCNC: 4 G/DL (ref 3.5–5.2)
ALP SERPL-CCNC: 79 U/L (ref 55–135)
ALT SERPL W/O P-5'-P-CCNC: 35 U/L (ref 10–44)
ANION GAP SERPL CALC-SCNC: 9 MMOL/L (ref 8–16)
AST SERPL-CCNC: 27 U/L (ref 10–40)
BASOPHILS # BLD AUTO: 0.05 K/UL (ref 0–0.2)
BASOPHILS NFR BLD: 0.6 % (ref 0–1.9)
BILIRUB SERPL-MCNC: 0.9 MG/DL (ref 0.1–1)
BUN SERPL-MCNC: 17 MG/DL (ref 8–23)
CALCIUM SERPL-MCNC: 9.6 MG/DL (ref 8.7–10.5)
CHLORIDE SERPL-SCNC: 104 MMOL/L (ref 95–110)
CHOLEST SERPL-MCNC: 182 MG/DL (ref 120–199)
CHOLEST/HDLC SERPL: 5.7 {RATIO} (ref 2–5)
CO2 SERPL-SCNC: 28 MMOL/L (ref 23–29)
COMPLEXED PSA SERPL-MCNC: 4.1 NG/ML (ref 0–4)
CREAT SERPL-MCNC: 0.9 MG/DL (ref 0.5–1.4)
DIFFERENTIAL METHOD: ABNORMAL
EOSINOPHIL # BLD AUTO: 0.3 K/UL (ref 0–0.5)
EOSINOPHIL NFR BLD: 3.9 % (ref 0–8)
ERYTHROCYTE [DISTWIDTH] IN BLOOD BY AUTOMATED COUNT: 11.3 % (ref 11.5–14.5)
EST. GFR  (AFRICAN AMERICAN): >60 ML/MIN/1.73 M^2
EST. GFR  (NON AFRICAN AMERICAN): >60 ML/MIN/1.73 M^2
ESTIMATED AVG GLUCOSE: 114 MG/DL (ref 68–131)
GLUCOSE SERPL-MCNC: 103 MG/DL (ref 70–110)
HBA1C MFR BLD: 5.6 % (ref 4–5.6)
HCT VFR BLD AUTO: 46 % (ref 40–54)
HDLC SERPL-MCNC: 32 MG/DL (ref 40–75)
HDLC SERPL: 17.6 % (ref 20–50)
HGB BLD-MCNC: 15 G/DL (ref 14–18)
IMM GRANULOCYTES # BLD AUTO: 0.03 K/UL (ref 0–0.04)
IMM GRANULOCYTES NFR BLD AUTO: 0.4 % (ref 0–0.5)
LDLC SERPL CALC-MCNC: 114 MG/DL (ref 63–159)
LYMPHOCYTES # BLD AUTO: 2.7 K/UL (ref 1–4.8)
LYMPHOCYTES NFR BLD: 33.8 % (ref 18–48)
MCH RBC QN AUTO: 32.3 PG (ref 27–31)
MCHC RBC AUTO-ENTMCNC: 32.6 G/DL (ref 32–36)
MCV RBC AUTO: 99 FL (ref 82–98)
MONOCYTES # BLD AUTO: 0.6 K/UL (ref 0.3–1)
MONOCYTES NFR BLD: 7.3 % (ref 4–15)
NEUTROPHILS # BLD AUTO: 4.3 K/UL (ref 1.8–7.7)
NEUTROPHILS NFR BLD: 54 % (ref 38–73)
NONHDLC SERPL-MCNC: 150 MG/DL
NRBC BLD-RTO: 0 /100 WBC
PLATELET # BLD AUTO: 340 K/UL (ref 150–450)
PMV BLD AUTO: 9.8 FL (ref 9.2–12.9)
POTASSIUM SERPL-SCNC: 4.2 MMOL/L (ref 3.5–5.1)
PROT SERPL-MCNC: 7.3 G/DL (ref 6–8.4)
RBC # BLD AUTO: 4.64 M/UL (ref 4.6–6.2)
SODIUM SERPL-SCNC: 141 MMOL/L (ref 136–145)
TRIGL SERPL-MCNC: 180 MG/DL (ref 30–150)
TSH SERPL DL<=0.005 MIU/L-ACNC: 1.65 UIU/ML (ref 0.4–4)
WBC # BLD AUTO: 7.98 K/UL (ref 3.9–12.7)

## 2022-05-16 PROCEDURE — 84153 ASSAY OF PSA TOTAL: CPT | Performed by: INTERNAL MEDICINE

## 2022-05-16 PROCEDURE — 90677 PNEUMOCOCCAL CONJUGATE VACCINE 20-VALENT: ICD-10-PCS | Mod: S$GLB,,, | Performed by: INTERNAL MEDICINE

## 2022-05-16 PROCEDURE — 99999 PR PBB SHADOW E&M-EST. PATIENT-LVL IV: ICD-10-PCS | Mod: PBBFAC,,, | Performed by: INTERNAL MEDICINE

## 2022-05-16 PROCEDURE — 84443 ASSAY THYROID STIM HORMONE: CPT | Performed by: INTERNAL MEDICINE

## 2022-05-16 PROCEDURE — 80053 COMPREHEN METABOLIC PANEL: CPT | Performed by: INTERNAL MEDICINE

## 2022-05-16 PROCEDURE — G0009 ADMIN PNEUMOCOCCAL VACCINE: HCPCS | Mod: S$GLB,,, | Performed by: INTERNAL MEDICINE

## 2022-05-16 PROCEDURE — 99999 PR PBB SHADOW E&M-EST. PATIENT-LVL IV: CPT | Mod: PBBFAC,,, | Performed by: INTERNAL MEDICINE

## 2022-05-16 PROCEDURE — 83036 HEMOGLOBIN GLYCOSYLATED A1C: CPT | Performed by: INTERNAL MEDICINE

## 2022-05-16 PROCEDURE — 36415 COLL VENOUS BLD VENIPUNCTURE: CPT | Mod: PO | Performed by: INTERNAL MEDICINE

## 2022-05-16 PROCEDURE — 90677 PCV20 VACCINE IM: CPT | Mod: S$GLB,,, | Performed by: INTERNAL MEDICINE

## 2022-05-16 PROCEDURE — 85025 COMPLETE CBC W/AUTO DIFF WBC: CPT | Performed by: INTERNAL MEDICINE

## 2022-05-16 PROCEDURE — G0009 PNEUMOCOCCAL CONJUGATE VACCINE 20-VALENT: ICD-10-PCS | Mod: S$GLB,,, | Performed by: INTERNAL MEDICINE

## 2022-05-16 PROCEDURE — 99397 PR PREVENTIVE VISIT,EST,65 & OVER: ICD-10-PCS | Mod: S$GLB,,, | Performed by: INTERNAL MEDICINE

## 2022-05-16 PROCEDURE — 99397 PER PM REEVAL EST PAT 65+ YR: CPT | Mod: S$GLB,,, | Performed by: INTERNAL MEDICINE

## 2022-05-16 PROCEDURE — 80061 LIPID PANEL: CPT | Performed by: INTERNAL MEDICINE

## 2022-05-16 NOTE — TELEPHONE ENCOUNTER
Your PSA is elevated.  I am referring you to urology.  Your electrolytes, kidney/liver function, cholesterol are normal.

## 2022-05-16 NOTE — PROGRESS NOTES
Subjective:       Patient ID: Casper Osborne is a 66 y.o. male.    Chief Complaint: Annual Exam    HPI     66 y.o. male here for annual exam.     Cholesterol: pending  Vaccines: Influenza - 2021; Tetanus - 2019; Prevnar 20 - needs; Zoster - 2 done; COVID - 3 done  Sexual Screening:   STD screening: no concern  Eye exam: done last years ago (4 yrs)  Prostate: pending  Colonoscopy: cologuard done  A1c: pending    Exercise: not regularly.  Tries to be as active as possible.  Getting ready to start PT on his knee.  He fell off a ladder and injured his knee.  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  He tore his ACL, MCL.  He did not have surgery.  Diet: mostly home cooked    Past Medical History:   Diagnosis Date    GERD (gastroesophageal reflux disease)     Hyperlipidemia      Past Surgical History:   Procedure Laterality Date    ARTHROSCOPY OF ANKLE WITH DEBRIDEMENT Left 12/19/2019    Procedure: ARTHROSCOPY, ANKLE, WITH DEBRIDEMENT;  Surgeon: Bay Melendez MD;  Location: [facility] OR;  Service: Orthopedics;  Laterality: Left;    EPIDURAL STEROID INJECTION N/A 1/29/2021    Procedure: INJECTION, STEROID, EPIDURAL L4/5;  Surgeon: Alvarado Reaves MD;  Location: [facility] PAIN MGT;  Service: Pain Management;  Laterality: N/A;    EPIDURAL STEROID INJECTION INTO LUMBAR SPINE N/A 12/24/2020    Procedure: Injection-steroid-epidural-lumbar--L4-5;  Surgeon: Ori Cali Jr., MD;  Location: [facility] PAIN MGT;  Service: Pain Management;  Laterality: N/A;    FRACTURE SURGERY      left heel at 41yo    HEEL SPUR SURGERY      left heel - fell off a ladder and had to have this reconstructed    SPINE SURGERY      c7 fx - prior to this, he was getting dizzy spells - none since    TONSILLECTOMY      VASECTOMY       Social History     Socioeconomic History    Marital status:      Spouse name: Nithya    Number of children: 1   Occupational History    Occupation: Retried    Tobacco Use    Smoking status: Never Smoker    Smokeless tobacco: Never Used   Substance and Sexual Activity    Alcohol use: Yes     Comment: less than once a month    Drug use: No    Sexual activity: Yes     Partners: Female     Comment:      Review of patient's allergies indicates:  No Known Allergies  Casper Osborne had no medications administered during this visit.    Review of Systems      Objective:      Physical Exam  Vitals reviewed.   Constitutional:       Appearance: He is well-developed.   HENT:      Head: Normocephalic and atraumatic.      Mouth/Throat:      Pharynx: No oropharyngeal exudate.   Eyes:      General: No scleral icterus.        Right eye: No discharge.         Left eye: No discharge.      Pupils: Pupils are equal, round, and reactive to light.   Neck:      Thyroid: No thyromegaly.      Trachea: No tracheal deviation.   Cardiovascular:      Rate and Rhythm: Normal rate and regular rhythm.      Heart sounds: Normal heart sounds. No murmur heard.    No friction rub. No gallop.   Pulmonary:      Effort: Pulmonary effort is normal. No respiratory distress.      Breath sounds: Normal breath sounds. No wheezing or rales.   Chest:      Chest wall: No tenderness.   Abdominal:      General: Bowel sounds are normal. There is no distension.      Palpations: Abdomen is soft. There is no mass.      Tenderness: There is no abdominal tenderness. There is no guarding or rebound.   Musculoskeletal:         General: No tenderness. Normal range of motion.      Cervical back: Normal range of motion and neck supple.   Skin:     General: Skin is warm and dry.      Coloration: Skin is not pale.      Findings: No erythema or rash.   Neurological:      Mental Status: He is alert and oriented to person, place, and time.   Psychiatric:         Behavior: Behavior normal.         Assessment:       1. Annual physical exam  - Ambulatory referral/consult to Optometry; Future    2. Hyperlipidemia, unspecified  hyperlipidemia type    3. Gastroesophageal reflux disease, unspecified whether esophagitis present    4. DDD (degenerative disc disease), lumbar    5. Lumbar facet arthropathy    6. Decreased range of motion of right knee      Plan:       1.  Waiting blood work.  Refer to Optometry.  Up-to-date on most vaccines.  Prevnar 20 given today.  Recommend next COVID vaccine.  2. Continue lovastatin 40 mg.  3. Continue Prilosec as needed.  4/5.  Continue ibuprofen and gabapentin as needed.  6. Continue follow-up with Orthopedics.

## 2022-05-18 ENCOUNTER — TELEPHONE (OUTPATIENT)
Dept: ADMINISTRATIVE | Facility: CLINIC | Age: 67
End: 2022-05-18
Payer: MEDICARE

## 2022-05-19 ENCOUNTER — TELEPHONE (OUTPATIENT)
Dept: ADMINISTRATIVE | Facility: CLINIC | Age: 67
End: 2022-05-19
Payer: MEDICARE

## 2022-05-23 ENCOUNTER — TELEPHONE (OUTPATIENT)
Dept: INTERNAL MEDICINE | Facility: CLINIC | Age: 67
End: 2022-05-23
Payer: MEDICARE

## 2022-05-23 DIAGNOSIS — Z12.11 SCREEN FOR COLON CANCER: Primary | ICD-10-CM

## 2022-05-23 NOTE — TELEPHONE ENCOUNTER
Please let patient know that no Cologuard results were found when we queried the system.  Please ensure that patient has done the test.  If he has, please have him forward results to us.

## 2022-05-23 NOTE — TELEPHONE ENCOUNTER
----- Message from Renea Valderrama LPN sent at 5/23/2022  9:00 AM CDT -----  No cologuard results found at [employer].    Thanks  ----- Message -----  From: Leon Chaparro MD  Sent: 5/16/2022   9:43 AM CDT  To: Renea Valderrama LPN    Please check cologuard for results.  None in system.

## 2022-05-23 NOTE — TELEPHONE ENCOUNTER
Spoke with patient, he states that he was sent the test, completed this and got an acknowledgement from [facility] it was received. He did not receive any further communications or results.     He advises if needed, he would redo test if a new order and kit wre sent. Please advise

## 2022-05-24 ENCOUNTER — PATIENT MESSAGE (OUTPATIENT)
Dept: INTERNAL MEDICINE | Facility: CLINIC | Age: 67
End: 2022-05-24
Payer: MEDICARE

## 2022-05-24 ENCOUNTER — PATIENT MESSAGE (OUTPATIENT)
Dept: UROLOGY | Facility: CLINIC | Age: 67
End: 2022-05-24
Payer: MEDICARE

## 2022-06-14 DIAGNOSIS — E78.5 HYPERLIPIDEMIA, UNSPECIFIED HYPERLIPIDEMIA TYPE: Chronic | ICD-10-CM

## 2022-06-14 RX ORDER — LOVASTATIN 40 MG/1
TABLET ORAL
Qty: 90 TABLET | Refills: 3 | Status: SHIPPED | OUTPATIENT
Start: 2022-06-14 | End: 2023-06-15 | Stop reason: SDUPTHER

## 2022-06-14 NOTE — TELEPHONE ENCOUNTER
Refill Decision Note   Casper Osborne  is requesting a refill authorization.  Brief Assessment and Rationale for Refill:  Approve     Medication Therapy Plan:       Medication Reconciliation Completed: No   Comments:     No Care Gaps recommended.     Note composed:3:24 PM 06/14/2022

## 2022-06-24 ENCOUNTER — PES CALL (OUTPATIENT)
Dept: ADMINISTRATIVE | Facility: CLINIC | Age: 67
End: 2022-06-24
Payer: MEDICARE

## 2022-06-28 ENCOUNTER — PES CALL (OUTPATIENT)
Dept: ADMINISTRATIVE | Facility: CLINIC | Age: 67
End: 2022-06-28
Payer: MEDICARE

## 2022-07-08 ENCOUNTER — PES CALL (OUTPATIENT)
Dept: ADMINISTRATIVE | Facility: CLINIC | Age: 67
End: 2022-07-08
Payer: MEDICARE

## 2022-07-11 ENCOUNTER — PES CALL (OUTPATIENT)
Dept: ADMINISTRATIVE | Facility: CLINIC | Age: 67
End: 2022-07-11
Payer: MEDICARE

## 2022-07-13 ENCOUNTER — PATIENT MESSAGE (OUTPATIENT)
Dept: INTERNAL MEDICINE | Facility: CLINIC | Age: 67
End: 2022-07-13
Payer: MEDICARE

## 2022-07-13 RX ORDER — IPRATROPIUM BROMIDE 21 UG/1
SPRAY, METERED NASAL
Qty: 30 ML | Refills: 4 | Status: SHIPPED | OUTPATIENT
Start: 2022-07-13

## 2022-07-19 ENCOUNTER — TELEPHONE (OUTPATIENT)
Dept: ADMINISTRATIVE | Facility: CLINIC | Age: 67
End: 2022-07-19
Payer: MEDICARE

## 2022-07-19 ENCOUNTER — PATIENT MESSAGE (OUTPATIENT)
Dept: ADMINISTRATIVE | Facility: CLINIC | Age: 67
End: 2022-07-19
Payer: MEDICARE

## 2022-07-21 ENCOUNTER — TELEPHONE (OUTPATIENT)
Dept: ADMINISTRATIVE | Facility: CLINIC | Age: 67
End: 2022-07-21
Payer: MEDICARE

## 2022-07-25 ENCOUNTER — PES CALL (OUTPATIENT)
Dept: ADMINISTRATIVE | Facility: CLINIC | Age: 67
End: 2022-07-25
Payer: MEDICARE

## 2022-08-03 ENCOUNTER — PES CALL (OUTPATIENT)
Dept: ADMINISTRATIVE | Facility: CLINIC | Age: 67
End: 2022-08-03
Payer: MEDICARE

## 2022-08-25 ENCOUNTER — PATIENT MESSAGE (OUTPATIENT)
Dept: UROLOGY | Facility: CLINIC | Age: 67
End: 2022-08-25
Payer: MEDICARE

## 2022-08-26 ENCOUNTER — PES CALL (OUTPATIENT)
Dept: ADMINISTRATIVE | Facility: CLINIC | Age: 67
End: 2022-08-26
Payer: MEDICARE

## 2022-09-15 ENCOUNTER — HOSPITAL ENCOUNTER (OUTPATIENT)
Dept: RADIOLOGY | Facility: HOSPITAL | Age: 67
Discharge: HOME | End: 2022-09-15
Attending: STUDENT IN AN ORGANIZED HEALTH CARE EDUCATION/TRAINING PROGRAM
Payer: MEDICARE

## 2022-09-15 ENCOUNTER — OFFICE VISIT (OUTPATIENT)
Dept: SPORTS MEDICINE | Facility: CLINIC | Age: 67
End: 2022-09-15
Payer: MEDICARE

## 2022-09-15 VITALS
HEIGHT: 71 IN | DIASTOLIC BLOOD PRESSURE: 79 MMHG | BODY MASS INDEX: 26.32 KG/M2 | WEIGHT: 188 LBS | SYSTOLIC BLOOD PRESSURE: 126 MMHG

## 2022-09-15 DIAGNOSIS — S83.511A RUPTURE OF ANTERIOR CRUCIATE LIGAMENT OF RIGHT KNEE, INITIAL ENCOUNTER: Primary | ICD-10-CM

## 2022-09-15 DIAGNOSIS — S83.411A TEAR OF MEDIAL COLLATERAL LIGAMENT OF RIGHT KNEE, INITIAL ENCOUNTER: ICD-10-CM

## 2022-09-15 DIAGNOSIS — S83.241A TEAR OF MEDIAL MENISCUS OF RIGHT KNEE, CURRENT, UNSPECIFIED TEAR TYPE, INITIAL ENCOUNTER: ICD-10-CM

## 2022-09-15 DIAGNOSIS — S83.521A RUPTURE OF POSTERIOR CRUCIATE LIGAMENT OF RIGHT KNEE, INITIAL ENCOUNTER: ICD-10-CM

## 2022-09-15 PROCEDURE — 3074F SYST BP LT 130 MM HG: CPT | Mod: CPTII,S$GLB,, | Performed by: STUDENT IN AN ORGANIZED HEALTH CARE EDUCATION/TRAINING PROGRAM

## 2022-09-15 PROCEDURE — 73564 XR KNEE ORTHO BILAT WITH FLEXION: ICD-10-PCS | Mod: 26,50,, | Performed by: RADIOLOGY

## 2022-09-15 PROCEDURE — 3078F PR MOST RECENT DIASTOLIC BLOOD PRESSURE < 80 MM HG: ICD-10-PCS | Mod: CPTII,S$GLB,, | Performed by: STUDENT IN AN ORGANIZED HEALTH CARE EDUCATION/TRAINING PROGRAM

## 2022-09-15 PROCEDURE — 99214 OFFICE O/P EST MOD 30 MIN: CPT | Mod: S$GLB,,, | Performed by: STUDENT IN AN ORGANIZED HEALTH CARE EDUCATION/TRAINING PROGRAM

## 2022-09-15 PROCEDURE — 3008F PR BODY MASS INDEX (BMI) DOCUMENTED: ICD-10-PCS | Mod: CPTII,S$GLB,, | Performed by: STUDENT IN AN ORGANIZED HEALTH CARE EDUCATION/TRAINING PROGRAM

## 2022-09-15 PROCEDURE — 3008F BODY MASS INDEX DOCD: CPT | Mod: CPTII,S$GLB,, | Performed by: STUDENT IN AN ORGANIZED HEALTH CARE EDUCATION/TRAINING PROGRAM

## 2022-09-15 PROCEDURE — 3288F FALL RISK ASSESSMENT DOCD: CPT | Mod: CPTII,S$GLB,, | Performed by: STUDENT IN AN ORGANIZED HEALTH CARE EDUCATION/TRAINING PROGRAM

## 2022-09-15 PROCEDURE — 3044F HG A1C LEVEL LT 7.0%: CPT | Mod: CPTII,S$GLB,, | Performed by: STUDENT IN AN ORGANIZED HEALTH CARE EDUCATION/TRAINING PROGRAM

## 2022-09-15 PROCEDURE — 3288F PR FALLS RISK ASSESSMENT DOCUMENTED: ICD-10-PCS | Mod: CPTII,S$GLB,, | Performed by: STUDENT IN AN ORGANIZED HEALTH CARE EDUCATION/TRAINING PROGRAM

## 2022-09-15 PROCEDURE — 99999 PR PBB SHADOW E&M-EST. PATIENT-LVL III: ICD-10-PCS | Mod: PBBFAC,,, | Performed by: STUDENT IN AN ORGANIZED HEALTH CARE EDUCATION/TRAINING PROGRAM

## 2022-09-15 PROCEDURE — 3074F PR MOST RECENT SYSTOLIC BLOOD PRESSURE < 130 MM HG: ICD-10-PCS | Mod: CPTII,S$GLB,, | Performed by: STUDENT IN AN ORGANIZED HEALTH CARE EDUCATION/TRAINING PROGRAM

## 2022-09-15 PROCEDURE — 1159F MED LIST DOCD IN RCRD: CPT | Mod: CPTII,S$GLB,, | Performed by: STUDENT IN AN ORGANIZED HEALTH CARE EDUCATION/TRAINING PROGRAM

## 2022-09-15 PROCEDURE — 1160F PR REVIEW ALL MEDS BY PRESCRIBER/CLIN PHARMACIST DOCUMENTED: ICD-10-PCS | Mod: CPTII,S$GLB,, | Performed by: STUDENT IN AN ORGANIZED HEALTH CARE EDUCATION/TRAINING PROGRAM

## 2022-09-15 PROCEDURE — 73564 X-RAY EXAM KNEE 4 OR MORE: CPT | Mod: TC,50

## 2022-09-15 PROCEDURE — 73564 X-RAY EXAM KNEE 4 OR MORE: CPT | Mod: 26,50,, | Performed by: RADIOLOGY

## 2022-09-15 PROCEDURE — 1159F PR MEDICATION LIST DOCUMENTED IN MEDICAL RECORD: ICD-10-PCS | Mod: CPTII,S$GLB,, | Performed by: STUDENT IN AN ORGANIZED HEALTH CARE EDUCATION/TRAINING PROGRAM

## 2022-09-15 PROCEDURE — 1125F AMNT PAIN NOTED PAIN PRSNT: CPT | Mod: CPTII,S$GLB,, | Performed by: STUDENT IN AN ORGANIZED HEALTH CARE EDUCATION/TRAINING PROGRAM

## 2022-09-15 PROCEDURE — 1101F PR PT FALLS ASSESS DOC 0-1 FALLS W/OUT INJ PAST YR: ICD-10-PCS | Mod: CPTII,S$GLB,, | Performed by: STUDENT IN AN ORGANIZED HEALTH CARE EDUCATION/TRAINING PROGRAM

## 2022-09-15 PROCEDURE — 99999 PR PBB SHADOW E&M-EST. PATIENT-LVL III: CPT | Mod: PBBFAC,,, | Performed by: STUDENT IN AN ORGANIZED HEALTH CARE EDUCATION/TRAINING PROGRAM

## 2022-09-15 PROCEDURE — 1101F PT FALLS ASSESS-DOCD LE1/YR: CPT | Mod: CPTII,S$GLB,, | Performed by: STUDENT IN AN ORGANIZED HEALTH CARE EDUCATION/TRAINING PROGRAM

## 2022-09-15 PROCEDURE — 1125F PR PAIN SEVERITY QUANTIFIED, PAIN PRESENT: ICD-10-PCS | Mod: CPTII,S$GLB,, | Performed by: STUDENT IN AN ORGANIZED HEALTH CARE EDUCATION/TRAINING PROGRAM

## 2022-09-15 PROCEDURE — 99214 PR OFFICE/OUTPT VISIT, EST, LEVL IV, 30-39 MIN: ICD-10-PCS | Mod: S$GLB,,, | Performed by: STUDENT IN AN ORGANIZED HEALTH CARE EDUCATION/TRAINING PROGRAM

## 2022-09-15 PROCEDURE — 3078F DIAST BP <80 MM HG: CPT | Mod: CPTII,S$GLB,, | Performed by: STUDENT IN AN ORGANIZED HEALTH CARE EDUCATION/TRAINING PROGRAM

## 2022-09-15 PROCEDURE — 1160F RVW MEDS BY RX/DR IN RCRD: CPT | Mod: CPTII,S$GLB,, | Performed by: STUDENT IN AN ORGANIZED HEALTH CARE EDUCATION/TRAINING PROGRAM

## 2022-09-15 PROCEDURE — 3044F PR MOST RECENT HEMOGLOBIN A1C LEVEL <7.0%: ICD-10-PCS | Mod: CPTII,S$GLB,, | Performed by: STUDENT IN AN ORGANIZED HEALTH CARE EDUCATION/TRAINING PROGRAM

## 2022-09-15 NOTE — PROGRESS NOTES
Subjective:          Chief Complaint: Casper Osborne is a 66 y.o. male who had concerns including Pain of the Right Knee.    Casper Osborne is a 66 y.o. male returns today for follow-up for his right knee.  He has a known ACL tear, grade 2 PCL tear, and grade 2 proximal MCL tear as well as a medial meniscus tear.  Originally we discussed ligament reconstructive surgery, however he made good improvement with physical therapy we decided to cancel this.  He has been discharged form physical therapy since his last visit. He feels that he is fairly functional, however is ready to explore surgical options. He denies any instances of true instability, however does note that he has the sensation of occasional instability, but he relates this more to apprehension and fear of having an instability event and a true instability event.  He has return to wearing his brace when he is working and performing activities which he will be weight-bearing for extended period of time more due to caution.  Denies any numbness or paresthesias.  Denies any catching, locking, or mechanical symptoms.    Pain  Pertinent negatives include no joint swelling or myalgias.   Past Medical History:   Diagnosis Date    GERD (gastroesophageal reflux disease)     Hyperlipidemia        Current Outpatient Medications on File Prior to Visit   Medication Sig Dispense Refill    aspirin (ECOTRIN) 81 MG EC tablet Take 81 mg by mouth once daily.      ipratropium (ATROVENT) 21 mcg (0.03 %) nasal spray PLACE TWO SPRAYS IN EACH NOSTRIL THREE TIMES DAILY 30 mL 4    lovastatin (MEVACOR) 40 MG tablet TAKE ONE TABLET BY MOUTH AT BEDTIME 90 tablet 3    omeprazole (PRILOSEC) 40 MG capsule Take 1 capsule (40 mg total) by mouth once daily. 90 capsule 3    oxybutynin (DITROPAN) 5 MG Tab Take 1 tablet (5 mg total) by mouth 3 (three) times daily as needed (for bladder spasms). 90 tablet 11    sodium chloride 0.9% 0.9 % irrigation Irrigate with 500 mLs as directed as  needed (use  ml to irrigate the bladder prn blood clots or catheter not draining). 500 mL 1    tamsulosin (FLOMAX) 0.4 mg Cap Take 1 capsule (0.4 mg total) by mouth every evening. 30 capsule 3    finasteride (PROSCAR) 5 mg tablet Take 1 tablet (5 mg total) by mouth once daily. 30 tablet 12    gabapentin (NEURONTIN) 300 MG capsule Take 1 capsule (300 mg total) by mouth 3 (three) times daily. 90 capsule 1    ibuprofen (ADVIL,MOTRIN) 800 MG tablet TAKE ONE(1) TABLET BY MOUTH EVERY EIGHT(8) HOURS AS NEEDED FOR PAIN (Patient not taking: No sig reported) 90 tablet 2     Current Facility-Administered Medications on File Prior to Visit   Medication Dose Route Frequency Provider Last Rate Last Admin    alprazolam ODT dissolvable tablet 0.5 mg  0.5 mg Oral Once PRN Ori Cali Jr., MD           Past Surgical History:   Procedure Laterality Date    ARTHROSCOPY OF ANKLE WITH DEBRIDEMENT Left 12/19/2019    Procedure: ARTHROSCOPY, ANKLE, WITH DEBRIDEMENT;  Surgeon: Bay Melednez MD;  Location: [facility];  Service: Orthopedics;  Laterality: Left;    EPIDURAL STEROID INJECTION N/A 1/29/2021    Procedure: INJECTION, STEROID, EPIDURAL L4/5;  Surgeon: Alvarado Reaves MD;  Location: [facility] PAIN MGT;  Service: Pain Management;  Laterality: N/A;    EPIDURAL STEROID INJECTION INTO LUMBAR SPINE N/A 12/24/2020    Procedure: Injection-steroid-epidural-lumbar--L4-5;  Surgeon: Ori Cali Jr., MD;  Location: [facility] PAIN [facility]GT;  Service: Pain Management;  Laterality: N/A;    FRACTURE SURGERY      left heel at 41yo    HEEL SPUR SURGERY      left heel - fell off a ladder and had to have this reconstructed    SPINE SURGERY      c7 fx - prior to this, he was getting dizzy spells - none since    TONSILLECTOMY      VASECTOMY         Family History   Problem Relation Age of Onset    Diabetes Mother     Cancer Father         melanoma cancer with mets    Heart disease Daughter         enlarged heart    Hypothyroidism  Daughter     Colon polyps Neg Hx     Prostate cancer Neg Hx     Colon cancer Neg Hx     Stroke Neg Hx     Hypertension Neg Hx     Hyperlipidemia Neg Hx        Social History     Socioeconomic History    Marital status:      Spouse name: Nithya    Number of children: 1   Occupational History    Occupation: Retried   Tobacco Use    Smoking status: Never    Smokeless tobacco: Never   Substance and Sexual Activity    Alcohol use: Yes     Comment: less than once a month    Drug use: No    Sexual activity: Yes     Partners: Female     Comment:          Review of Systems   Constitutional: Negative.   HENT: Negative.     Eyes: Negative.    Cardiovascular: Negative.    Respiratory: Negative.     Endocrine: Negative.    Hematologic/Lymphatic: Negative.    Skin: Negative.    Musculoskeletal:  Positive for joint pain (right knee) and muscle weakness. Negative for arthritis, back pain, falls, joint swelling, muscle cramps, myalgias and stiffness.   Neurological: Negative.    Psychiatric/Behavioral: Negative.     Allergic/Immunologic: Negative.      Pain Related Questions  Over the past 3 days, what was your average pain during activity? (I.e. running, jogging, walking, climbing stairs, getting dressed, ect.): 5  Over the past 3 days, what was your highest pain level?: 8  Over the past 3 days, what was your lowest pain level? : 5    Other  How many nights a week are you awakened by your affected body part?: 0  Was the patient's HEIGHT measured or patient reported?: Patient Reported  Was the patient's WEIGHT measured or patient reported?: Measured      Objective:        General: Casper is well-developed, well-nourished, appears stated age, in no acute distress, alert and oriented to time, place and person.     General    Nursing note and vitals reviewed.  Constitutional: He is oriented to person, place, and time. He appears well-developed and well-nourished. No distress.   HENT:   Head: Normocephalic and  atraumatic.   Nose: Nose normal.   Eyes: EOM are normal.   Cardiovascular:  Normal rate and intact distal pulses.            Pulmonary/Chest: Effort normal. No respiratory distress.   Neurological: He is alert and oriented to person, place, and time.   Psychiatric: He has a normal mood and affect. His behavior is normal. Judgment and thought content normal.     General Musculoskeletal Exam   Gait: normal       Right Knee Exam     Inspection   Erythema: absent  Scars: absent  Swelling: absent  Effusion: absent  Deformity: absent  Bruising: absent    Tenderness   The patient is experiencing no tenderness.     Range of Motion   Extension:  0   Flexion:  140     Tests   Meniscus   Eddie:  Medial - negative Lateral - negative  Ligament Examination   Lachman: abnormal - grade I  PCL-Posterior Drawer: abnormal   - grade I  MCL - Valgus: normal (0 to 2mm)  LCL - Varus: normal  Posterolateral Corner: stable  Patella   Patellar apprehension: negative  Passive Patellar Tilt: neutral  Patellar Tracking: normal    Other   Sensation: normal    Left Knee Exam   Left knee exam is normal.    Inspection   Erythema: absent  Scars: absent  Swelling: absent  Effusion: absent  Deformity: absent  Bruising: absent    Tenderness   The patient is experiencing no tenderness.     Range of Motion   Extension:  -5   Flexion:  140     Tests   Meniscus   Eddie:  Medial - negative Lateral - negative  Stability   Lachman: normal (-1 to 2mm)   PCL-Posterior Drawer: normal (0 to 2mm)  MCL - Valgus: normal (0 to 2mm)  LCL - Varus: normal (0 to 2mm)  Posterolateral Corner: stable  Patella   Patellar apprehension: negative  Passive Patellar Tilt: neutral  Patellar Tracking: normal    Other   Sensation: normal    Muscle Strength   Right Lower Extremity   Quadriceps:  5/5   Hamstrin/5   Left Lower Extremity   Quadriceps:  5/5   Hamstrin/5     Vascular Exam     Right Pulses  Dorsalis Pedis:      2+  Posterior Tibial:      2+        Left  Pulses  Dorsalis Pedis:      2+  Posterior Tibial:      2+        Edema  Right Lower Leg: absent  Left Lower Leg: absent  Imaging:  X-rays of the bilateral knee from 12/30/2021 personally reviewed by me on that day.  These include weight-bearing AP, PA flexion, lateral, and Merchant views.  There is marginal osteophytes along the notch, however these are very small.  There is no subchondral sclerosis.  There is no joint space loss in all 3 compartments bilaterally.    MRI of the right knee from 12/22/2021 personally reviewed by me on 12/30/2021.  There is a complete ACL tear.  Grade 2 PCL tear in the proximal half near the midportion.  There is a high grade 2, possible grade 3, MCL tear off the femoral origin.  There are medial and lateral meniscus tears, the medial is a the posterior horn with a radial and horizontal component.  The medial root is intact.  Laterally, the meniscus has a radial tear in the posterior horn.  The root is not well visualized.  There is no extrusion of either the medial or lateral meniscus.  Overall, the cartilage looks very good in all 3 compartments with no significant or full-thickness loss.    X-rays of the bilateral knees from 09/15/2022 personally viewed by me on that day.  These include weight-bearing AP, PA flexion, lateral, and Merchant views.  Marginal osteophyte formation along the notch with well-preserved joint space in all 3 compartments bilaterally.  When compared to prior x-rays from 12/30/2021 there is minimal, if any, change in the arthritic findings.      Assessment:     Casper Osborne is a 66 y.o. male with right ACL tear, PCL tear, MCL tear, and medial and lateral meniscus tears as detailed above.  He is doing well.  He is able to function and perform his work.  He does still have some pain but does not endorse any episodes of instability just more worried about it.  Encounter Diagnosis   Name Primary?    Rupture of posterior cruciate ligament of right knee, initial  encounter Yes          Plan:       He is curious about surgical intervention for knee stabilization.  I stated that at this time I do not think it will be necessary given his exam findings today.  However, we will obtain an MRI of his knee to evaluate the integrity of the ligaments is been about 1 year since his injury.  He will return to clinic after the MRI to discuss the findings and potential treatment options.    All of their questions were answered.  They will call the clinic with any questions or concerns in the interim.    Should the patient's symptoms worsen, persist, or fail to improve they should return for reevaluation and I would be happy to see them back anytime.        Wiliam Guerin M.D.    Please be aware that this note has been generated with the assistance of [employer] voice-to-text.  Please excuse any spelling or grammatical errors.    Thank you for choosing Dr. Wiliam Guerin for your sports medicine care. It is our goal to provide you with exceptional care that will help keep you healthy, active, and get you back in the game.     If you felt that you received exemplary care today, please consider leaving feedback for Dr. Guerin on [facility] at [email or web address].    Please do not hesitate to reach out to us via email, phone, or MyChart with any questions, concerns, or feedback.

## 2022-09-23 ENCOUNTER — HOSPITAL ENCOUNTER (OUTPATIENT)
Dept: RADIOLOGY | Facility: HOSPITAL | Age: 67
Discharge: HOME | End: 2022-09-23
Attending: STUDENT IN AN ORGANIZED HEALTH CARE EDUCATION/TRAINING PROGRAM
Payer: MEDICARE

## 2022-09-23 DIAGNOSIS — S83.521A RUPTURE OF POSTERIOR CRUCIATE LIGAMENT OF RIGHT KNEE, INITIAL ENCOUNTER: ICD-10-CM

## 2022-09-23 DIAGNOSIS — S83.241A TEAR OF MEDIAL MENISCUS OF RIGHT KNEE, CURRENT, UNSPECIFIED TEAR TYPE, INITIAL ENCOUNTER: ICD-10-CM

## 2022-09-23 DIAGNOSIS — S83.511A RUPTURE OF ANTERIOR CRUCIATE LIGAMENT OF RIGHT KNEE, INITIAL ENCOUNTER: ICD-10-CM

## 2022-09-23 DIAGNOSIS — S83.411A TEAR OF MEDIAL COLLATERAL LIGAMENT OF RIGHT KNEE, INITIAL ENCOUNTER: ICD-10-CM

## 2022-09-23 PROCEDURE — 73721 MRI KNEE WITHOUT CONTRAST RIGHT: ICD-10-PCS | Mod: 26,RT,, | Performed by: RADIOLOGY

## 2022-09-23 PROCEDURE — 73721 MRI JNT OF LWR EXTRE W/O DYE: CPT | Mod: TC,RT

## 2022-09-23 PROCEDURE — 73721 MRI JNT OF LWR EXTRE W/O DYE: CPT | Mod: 26,RT,, | Performed by: RADIOLOGY

## 2022-09-27 ENCOUNTER — OFFICE VISIT (OUTPATIENT)
Dept: SPORTS MEDICINE | Facility: CLINIC | Age: 67
End: 2022-09-27
Payer: MEDICARE

## 2022-09-27 VITALS
HEART RATE: 61 BPM | DIASTOLIC BLOOD PRESSURE: 90 MMHG | BODY MASS INDEX: 26.49 KG/M2 | WEIGHT: 189.25 LBS | SYSTOLIC BLOOD PRESSURE: 144 MMHG | HEIGHT: 71 IN

## 2022-09-27 DIAGNOSIS — S83.411S TEAR OF MEDIAL COLLATERAL LIGAMENT OF RIGHT KNEE, SEQUELA: ICD-10-CM

## 2022-09-27 DIAGNOSIS — S83.241S TEAR OF MEDIAL MENISCUS OF RIGHT KNEE, CURRENT, UNSPECIFIED TEAR TYPE, SEQUELA: ICD-10-CM

## 2022-09-27 DIAGNOSIS — S83.511S RUPTURE OF ANTERIOR CRUCIATE LIGAMENT OF RIGHT KNEE, SEQUELA: ICD-10-CM

## 2022-09-27 DIAGNOSIS — S83.521S RUPTURE OF POSTERIOR CRUCIATE LIGAMENT OF RIGHT KNEE, SEQUELA: Primary | ICD-10-CM

## 2022-09-27 PROCEDURE — 3008F BODY MASS INDEX DOCD: CPT | Mod: CPTII,S$GLB,, | Performed by: STUDENT IN AN ORGANIZED HEALTH CARE EDUCATION/TRAINING PROGRAM

## 2022-09-27 PROCEDURE — 3288F FALL RISK ASSESSMENT DOCD: CPT | Mod: CPTII,S$GLB,, | Performed by: STUDENT IN AN ORGANIZED HEALTH CARE EDUCATION/TRAINING PROGRAM

## 2022-09-27 PROCEDURE — 99214 OFFICE O/P EST MOD 30 MIN: CPT | Mod: S$GLB,,, | Performed by: STUDENT IN AN ORGANIZED HEALTH CARE EDUCATION/TRAINING PROGRAM

## 2022-09-27 PROCEDURE — 99999 PR PBB SHADOW E&M-EST. PATIENT-LVL III: ICD-10-PCS | Mod: PBBFAC,,, | Performed by: STUDENT IN AN ORGANIZED HEALTH CARE EDUCATION/TRAINING PROGRAM

## 2022-09-27 PROCEDURE — 3077F SYST BP >= 140 MM HG: CPT | Mod: CPTII,S$GLB,, | Performed by: STUDENT IN AN ORGANIZED HEALTH CARE EDUCATION/TRAINING PROGRAM

## 2022-09-27 PROCEDURE — 1160F PR REVIEW ALL MEDS BY PRESCRIBER/CLIN PHARMACIST DOCUMENTED: ICD-10-PCS | Mod: CPTII,S$GLB,, | Performed by: STUDENT IN AN ORGANIZED HEALTH CARE EDUCATION/TRAINING PROGRAM

## 2022-09-27 PROCEDURE — 3080F PR MOST RECENT DIASTOLIC BLOOD PRESSURE >= 90 MM HG: ICD-10-PCS | Mod: CPTII,S$GLB,, | Performed by: STUDENT IN AN ORGANIZED HEALTH CARE EDUCATION/TRAINING PROGRAM

## 2022-09-27 PROCEDURE — 1159F MED LIST DOCD IN RCRD: CPT | Mod: CPTII,S$GLB,, | Performed by: STUDENT IN AN ORGANIZED HEALTH CARE EDUCATION/TRAINING PROGRAM

## 2022-09-27 PROCEDURE — 99214 PR OFFICE/OUTPT VISIT, EST, LEVL IV, 30-39 MIN: ICD-10-PCS | Mod: S$GLB,,, | Performed by: STUDENT IN AN ORGANIZED HEALTH CARE EDUCATION/TRAINING PROGRAM

## 2022-09-27 PROCEDURE — 1159F PR MEDICATION LIST DOCUMENTED IN MEDICAL RECORD: ICD-10-PCS | Mod: CPTII,S$GLB,, | Performed by: STUDENT IN AN ORGANIZED HEALTH CARE EDUCATION/TRAINING PROGRAM

## 2022-09-27 PROCEDURE — 1160F RVW MEDS BY RX/DR IN RCRD: CPT | Mod: CPTII,S$GLB,, | Performed by: STUDENT IN AN ORGANIZED HEALTH CARE EDUCATION/TRAINING PROGRAM

## 2022-09-27 PROCEDURE — 3080F DIAST BP >= 90 MM HG: CPT | Mod: CPTII,S$GLB,, | Performed by: STUDENT IN AN ORGANIZED HEALTH CARE EDUCATION/TRAINING PROGRAM

## 2022-09-27 PROCEDURE — 1101F PT FALLS ASSESS-DOCD LE1/YR: CPT | Mod: CPTII,S$GLB,, | Performed by: STUDENT IN AN ORGANIZED HEALTH CARE EDUCATION/TRAINING PROGRAM

## 2022-09-27 PROCEDURE — 1125F PR PAIN SEVERITY QUANTIFIED, PAIN PRESENT: ICD-10-PCS | Mod: CPTII,S$GLB,, | Performed by: STUDENT IN AN ORGANIZED HEALTH CARE EDUCATION/TRAINING PROGRAM

## 2022-09-27 PROCEDURE — 3288F PR FALLS RISK ASSESSMENT DOCUMENTED: ICD-10-PCS | Mod: CPTII,S$GLB,, | Performed by: STUDENT IN AN ORGANIZED HEALTH CARE EDUCATION/TRAINING PROGRAM

## 2022-09-27 PROCEDURE — 3044F PR MOST RECENT HEMOGLOBIN A1C LEVEL <7.0%: ICD-10-PCS | Mod: CPTII,S$GLB,, | Performed by: STUDENT IN AN ORGANIZED HEALTH CARE EDUCATION/TRAINING PROGRAM

## 2022-09-27 PROCEDURE — 3008F PR BODY MASS INDEX (BMI) DOCUMENTED: ICD-10-PCS | Mod: CPTII,S$GLB,, | Performed by: STUDENT IN AN ORGANIZED HEALTH CARE EDUCATION/TRAINING PROGRAM

## 2022-09-27 PROCEDURE — 1125F AMNT PAIN NOTED PAIN PRSNT: CPT | Mod: CPTII,S$GLB,, | Performed by: STUDENT IN AN ORGANIZED HEALTH CARE EDUCATION/TRAINING PROGRAM

## 2022-09-27 PROCEDURE — 3077F PR MOST RECENT SYSTOLIC BLOOD PRESSURE >= 140 MM HG: ICD-10-PCS | Mod: CPTII,S$GLB,, | Performed by: STUDENT IN AN ORGANIZED HEALTH CARE EDUCATION/TRAINING PROGRAM

## 2022-09-27 PROCEDURE — 99999 PR PBB SHADOW E&M-EST. PATIENT-LVL III: CPT | Mod: PBBFAC,,, | Performed by: STUDENT IN AN ORGANIZED HEALTH CARE EDUCATION/TRAINING PROGRAM

## 2022-09-27 PROCEDURE — 1101F PR PT FALLS ASSESS DOC 0-1 FALLS W/OUT INJ PAST YR: ICD-10-PCS | Mod: CPTII,S$GLB,, | Performed by: STUDENT IN AN ORGANIZED HEALTH CARE EDUCATION/TRAINING PROGRAM

## 2022-09-27 PROCEDURE — 3044F HG A1C LEVEL LT 7.0%: CPT | Mod: CPTII,S$GLB,, | Performed by: STUDENT IN AN ORGANIZED HEALTH CARE EDUCATION/TRAINING PROGRAM

## 2022-09-27 NOTE — PROGRESS NOTES
Subjective:          Chief Complaint: Casper Osborne is a 66 y.o. male who had concerns including Results of the Right Knee (MRI).    HPI 09/27/2022  Casper Osborne is a 66 y.o. male returns today for follow-up for his right knee.  He had an MRI since his last visit, see the detailed findings below.  Overall he says the symptoms are essentially unchanged.  He complains more of the fear of instability than true instability itself.  He wears a short runner brace when he is performing activities such as climbing a ladder mowing the grass and this does help.  Occasionally has sharp pains located mainly in the anterior aspect if his leg has a hyperextension type episode.  Additionally he notes some pain and soreness type feelings when he goes from sit to stand position.  Denies any true episodes of instability or mechanical symptoms.  He is here today to discuss the MRI results and potential treatment options.      HPI 9/15/2022  Casper Osborne is a 66 y.o. male returns today for follow-up for his right knee.  He has a known ACL tear, grade 2 PCL tear, and grade 2 proximal MCL tear as well as a medial meniscus tear.  Originally we discussed ligament reconstructive surgery, however he made good improvement with physical therapy we decided to cancel this.  He has been discharged form physical therapy since his last visit. He feels that he is fairly functional, however is ready to explore surgical options. He denies any instances of true instability, however does note that he has the sensation of occasional instability, but he relates this more to apprehension and fear of having an instability event and a true instability event.  He has return to wearing his brace when he is working and performing activities which he will be weight-bearing for extended period of time more due to caution.  Denies any numbness or paresthesias.  Denies any catching, locking, or mechanical symptoms.    Pain  Pertinent negatives include no  joint swelling or myalgias.   Past Medical History:   Diagnosis Date    GERD (gastroesophageal reflux disease)     Hyperlipidemia        Current Outpatient Medications on File Prior to Visit   Medication Sig Dispense Refill    aspirin (ECOTRIN) 81 MG EC tablet Take 81 mg by mouth once daily.      ibuprofen (ADVIL,MOTRIN) 800 MG tablet TAKE ONE(1) TABLET BY MOUTH EVERY EIGHT(8) HOURS AS NEEDED FOR PAIN 90 tablet 2    ipratropium (ATROVENT) 21 mcg (0.03 %) nasal spray PLACE TWO SPRAYS IN EACH NOSTRIL THREE TIMES DAILY 30 mL 4    lovastatin (MEVACOR) 40 MG tablet TAKE ONE TABLET BY MOUTH AT BEDTIME 90 tablet 3    omeprazole (PRILOSEC) 40 MG capsule Take 1 capsule (40 mg total) by mouth once daily. 90 capsule 3    oxybutynin (DITROPAN) 5 MG Tab Take 1 tablet (5 mg total) by mouth 3 (three) times daily as needed (for bladder spasms). 90 tablet 11    sodium chloride 0.9% 0.9 % irrigation Irrigate with 500 mLs as directed as needed (use  ml to irrigate the bladder prn blood clots or catheter not draining). 500 mL 1    tamsulosin (FLOMAX) 0.4 mg Cap Take 1 capsule (0.4 mg total) by mouth every evening. 30 capsule 3    finasteride (PROSCAR) 5 mg tablet Take 1 tablet (5 mg total) by mouth once daily. 30 tablet 12    gabapentin (NEURONTIN) 300 MG capsule Take 1 capsule (300 mg total) by mouth 3 (three) times daily. 90 capsule 1     Current Facility-Administered Medications on File Prior to Visit   Medication Dose Route Frequency Provider Last Rate Last Admin    alprazolam ODT dissolvable tablet 0.5 mg  0.5 mg Oral Once PRN Ori Cali Jr., MD           Past Surgical History:   Procedure Laterality Date    ARTHROSCOPY OF ANKLE WITH DEBRIDEMENT Left 12/19/2019    Procedure: ARTHROSCOPY, ANKLE, WITH DEBRIDEMENT;  Surgeon: Bay Melendez MD;  Location: [facility];  Service: Orthopedics;  Laterality: Left;    EPIDURAL STEROID INJECTION N/A 1/29/2021    Procedure: INJECTION, STEROID, EPIDURAL L4/5;  Surgeon: Alvarado  MD Jelly;  Location: [facility];  Service: Pain Management;  Laterality: N/A;    EPIDURAL STEROID INJECTION INTO LUMBAR SPINE N/A 12/24/2020    Procedure: Injection-steroid-epidural-lumbar--L4-5;  Surgeon: Ori Cali Jr., MD;  Location: [facility]IN [facility];  Service: Pain Management;  Laterality: N/A;    FRACTURE SURGERY      left heel at 39yo    HEEL SPUR SURGERY      left heel - fell off a ladder and had to have this reconstructed    SPINE SURGERY      c7 fx - prior to this, he was getting dizzy spells - none since    TONSILLECTOMY      VASECTOMY         Family History   Problem Relation Age of Onset    Diabetes Mother     Cancer Father         melanoma cancer with mets    Heart disease Daughter         enlarged heart    Hypothyroidism Daughter     Colon polyps Neg Hx     Prostate cancer Neg Hx     Colon cancer Neg Hx     Stroke Neg Hx     Hypertension Neg Hx     Hyperlipidemia Neg Hx        Social History     Socioeconomic History    Marital status:      Spouse name: Nithya    Number of children: 1   Occupational History    Occupation: Retried   Tobacco Use    Smoking status: Never    Smokeless tobacco: Never   Substance and Sexual Activity    Alcohol use: Yes     Comment: less than once a month    Drug use: No    Sexual activity: Yes     Partners: Female     Comment:          Review of Systems   Constitutional: Negative.   HENT: Negative.     Eyes: Negative.    Cardiovascular: Negative.    Respiratory: Negative.     Endocrine: Negative.    Hematologic/Lymphatic: Negative.    Skin: Negative.    Musculoskeletal:  Positive for joint pain (right knee) and muscle weakness. Negative for arthritis, back pain, falls, joint swelling, muscle cramps, myalgias and stiffness.   Neurological: Negative.    Psychiatric/Behavioral: Negative.     Allergic/Immunologic: Negative.                  Objective:        General: Casper is well-developed, well-nourished, appears stated age, in no acute distress, alert  and oriented to time, place and person.     General    Nursing note and vitals reviewed.  Constitutional: He is oriented to person, place, and time. He appears well-developed and well-nourished. No distress.   HENT:   Head: Normocephalic and atraumatic.   Nose: Nose normal.   Eyes: EOM are normal.   Cardiovascular:  Normal rate and intact distal pulses.            Pulmonary/Chest: Effort normal. No respiratory distress.   Neurological: He is alert and oriented to person, place, and time.   Psychiatric: He has a normal mood and affect. His behavior is normal. Judgment and thought content normal.     General Musculoskeletal Exam   Gait: normal       Right Knee Exam     Inspection   Erythema: absent  Scars: absent  Swelling: absent  Effusion: absent  Deformity: absent  Bruising: absent    Tenderness   The patient is experiencing no tenderness.     Range of Motion   Extension:  0   Flexion:  140     Tests   Meniscus   Eddie:  Medial - negative Lateral - negative  Ligament Examination   Lachman: abnormal - grade I  PCL-Posterior Drawer: abnormal   - grade I  MCL - Valgus: normal (0 to 2mm)  LCL - Varus: normal  Posterolateral Corner: stable  Patella   Patellar apprehension: negative  Passive Patellar Tilt: neutral  Patellar Tracking: normal    Other   Sensation: normal    Left Knee Exam   Left knee exam is normal.    Inspection   Erythema: absent  Scars: absent  Swelling: absent  Effusion: absent  Deformity: absent  Bruising: absent    Tenderness   The patient is experiencing no tenderness.     Range of Motion   Extension:  -5   Flexion:  140     Tests   Meniscus   Eddie:  Medial - negative Lateral - negative  Stability   Lachman: normal (-1 to 2mm)   PCL-Posterior Drawer: normal (0 to 2mm)  MCL - Valgus: normal (0 to 2mm)  LCL - Varus: normal (0 to 2mm)  Posterolateral Corner: stable  Patella   Patellar apprehension: negative  Passive Patellar Tilt: neutral  Patellar Tracking: normal    Other   Sensation:  normal    Muscle Strength   Right Lower Extremity   Quadriceps:  5/5   Hamstrin/5   Left Lower Extremity   Quadriceps:  5/5   Hamstrin/5     Vascular Exam     Right Pulses  Dorsalis Pedis:      2+  Posterior Tibial:      2+        Left Pulses  Dorsalis Pedis:      2+  Posterior Tibial:      2+        Edema  Right Lower Leg: absent  Left Lower Leg: absent  Imaging:  X-rays of the bilateral knee from 2021 personally reviewed by me on that day.  These include weight-bearing AP, PA flexion, lateral, and Merchant views.  There is marginal osteophytes along the notch, however these are very small.  There is no subchondral sclerosis.  There is no joint space loss in all 3 compartments bilaterally.    MRI of the right knee from 2021 personally reviewed by me on 2021.  There is a complete ACL tear.  Grade 2 PCL tear in the proximal half near the midportion.  There is a high grade 2, possible grade 3, MCL tear off the femoral origin.  There are medial and lateral meniscus tears, the medial is a the posterior horn with a radial and horizontal component.  The medial root is intact.  Laterally, the meniscus has a radial tear in the posterior horn.  The root is not well visualized.  There is no extrusion of either the medial or lateral meniscus.  Overall, the cartilage looks very good in all 3 compartments with no significant or full-thickness loss.    X-rays of the bilateral knees from 09/15/2022 personally viewed by me on that day.  These include weight-bearing AP, PA flexion, lateral, and Merchant views.  Marginal osteophyte formation along the notch with well-preserved joint space in all 3 compartments bilaterally.  When compared to prior x-rays from 2021 there is minimal, if any, change in the arthritic findings.    MRI of the right knee from 2022 personally reviewed by me 2022.  There is chronic rupture of the ACL.  PCL is thickening with some increased signal along its  insertion indicating chronic injury.  There was a chronic sprain of both the MCL and the LCL but no complete or full-thickness tears.  There was a tear in the posterior horn of the lateral meniscus as well as the medial meniscus.  There was thinning of the articular cartilage most prevalent in the medial patellofemoral compartments.  I do not visualize any full-thickness chondral defects.      Assessment:     Casper Osborne is a 66 y.o. male with right ACL tear, PCL tear, MCL tear, and medial and lateral meniscus tears as detailed above.  He is doing well.  He is able to function and perform his work.  He does still have some pain but does not endorse any episodes of instability just more worried about it.  Encounter Diagnoses   Name Primary?    Rupture of posterior cruciate ligament of right knee, sequela Yes    Rupture of anterior cruciate ligament of right knee, sequela     Tear of medial meniscus of right knee, current, unspecified tear type, sequela     Tear of medial collateral ligament of right knee, sequela           Plan:       The diagnosis and treatment options were discussed at length with the patient all his questions were answered.  I showed him the MRI and I reviewed the findings with him.  We discussed treatment options for this.  He did have good relief with physical therapy in the past that did give him a stable knee.  However due to insurance reasons he can not afford anymore physical therapy visits this year as he has run out.  I recommended a home program as he is done an extensive amount of therapy and knows the exercises.  He is in agreement with this plan.  He will joint gym to get some increased resistance training.  He will return to clinic in 6-8 weeks for re-evaluation.  He does not have improvement in his symptoms we will discuss surgical intervention.  I said at this point, surgical intervention would entail medial and lateral partial meniscectomies as well as diagnostic arthroscopy.   We know he had a multi ligamentous injury and I worry that a complex ligament reconstruction would for the worsen the arthritic changes he already has.  We would evaluate the amount of chondromalacia and possibly proceed with an allograft ACL reconstruction.  He voiced understanding.  Return to clinic in 6-8 weeks.    All of their questions were answered.  They will call the clinic with any questions or concerns in the interim.    Should the patient's symptoms worsen, persist, or fail to improve they should return for reevaluation and I would be happy to see them back anytime.        Wiliam Guerin M.D.    Please be aware that this note has been generated with the assistance of [employer] voice-to-text.  Please excuse any spelling or grammatical errors.    Thank you for choosing Dr. Wiliam Guerin for your sports medicine care. It is our goal to provide you with exceptional care that will help keep you healthy, active, and get you back in the game.     If you felt that you received exemplary care today, please consider leaving feedback for Dr. Guerin on [employer] at [email or web address].    Please do not hesitate to reach out to us via email, phone, or MyChart with any questions, concerns, or feedback.

## 2022-11-22 ENCOUNTER — TELEPHONE (OUTPATIENT)
Dept: INTERNAL MEDICINE | Facility: CLINIC | Age: 67
End: 2022-11-22
Payer: MEDICARE

## 2022-11-22 ENCOUNTER — OFFICE VISIT (OUTPATIENT)
Dept: SPORTS MEDICINE | Facility: CLINIC | Age: 67
End: 2022-11-22
Payer: MEDICARE

## 2022-11-22 VITALS — WEIGHT: 184.19 LBS | HEIGHT: 71 IN | BODY MASS INDEX: 25.79 KG/M2

## 2022-11-22 DIAGNOSIS — S83.271A COMPLEX TEAR OF LATERAL MENISCUS OF RIGHT KNEE AS CURRENT INJURY, INITIAL ENCOUNTER: ICD-10-CM

## 2022-11-22 DIAGNOSIS — S83.241S TEAR OF MEDIAL MENISCUS OF RIGHT KNEE, CURRENT, UNSPECIFIED TEAR TYPE, SEQUELA: Primary | ICD-10-CM

## 2022-11-22 PROCEDURE — 1101F PR PT FALLS ASSESS DOC 0-1 FALLS W/OUT INJ PAST YR: ICD-10-PCS | Mod: CPTII,S$GLB,, | Performed by: STUDENT IN AN ORGANIZED HEALTH CARE EDUCATION/TRAINING PROGRAM

## 2022-11-22 PROCEDURE — 1125F AMNT PAIN NOTED PAIN PRSNT: CPT | Mod: CPTII,S$GLB,, | Performed by: STUDENT IN AN ORGANIZED HEALTH CARE EDUCATION/TRAINING PROGRAM

## 2022-11-22 PROCEDURE — 1160F RVW MEDS BY RX/DR IN RCRD: CPT | Mod: CPTII,S$GLB,, | Performed by: STUDENT IN AN ORGANIZED HEALTH CARE EDUCATION/TRAINING PROGRAM

## 2022-11-22 PROCEDURE — 3288F PR FALLS RISK ASSESSMENT DOCUMENTED: ICD-10-PCS | Mod: CPTII,S$GLB,, | Performed by: STUDENT IN AN ORGANIZED HEALTH CARE EDUCATION/TRAINING PROGRAM

## 2022-11-22 PROCEDURE — 1159F MED LIST DOCD IN RCRD: CPT | Mod: CPTII,S$GLB,, | Performed by: STUDENT IN AN ORGANIZED HEALTH CARE EDUCATION/TRAINING PROGRAM

## 2022-11-22 PROCEDURE — 1101F PT FALLS ASSESS-DOCD LE1/YR: CPT | Mod: CPTII,S$GLB,, | Performed by: STUDENT IN AN ORGANIZED HEALTH CARE EDUCATION/TRAINING PROGRAM

## 2022-11-22 PROCEDURE — 99999 PR PBB SHADOW E&M-EST. PATIENT-LVL IV: ICD-10-PCS | Mod: PBBFAC,,, | Performed by: STUDENT IN AN ORGANIZED HEALTH CARE EDUCATION/TRAINING PROGRAM

## 2022-11-22 PROCEDURE — 99215 PR OFFICE/OUTPT VISIT, EST, LEVL V, 40-54 MIN: ICD-10-PCS | Mod: S$GLB,,, | Performed by: STUDENT IN AN ORGANIZED HEALTH CARE EDUCATION/TRAINING PROGRAM

## 2022-11-22 PROCEDURE — 3044F PR MOST RECENT HEMOGLOBIN A1C LEVEL <7.0%: ICD-10-PCS | Mod: CPTII,S$GLB,, | Performed by: STUDENT IN AN ORGANIZED HEALTH CARE EDUCATION/TRAINING PROGRAM

## 2022-11-22 PROCEDURE — 3044F HG A1C LEVEL LT 7.0%: CPT | Mod: CPTII,S$GLB,, | Performed by: STUDENT IN AN ORGANIZED HEALTH CARE EDUCATION/TRAINING PROGRAM

## 2022-11-22 PROCEDURE — 1159F PR MEDICATION LIST DOCUMENTED IN MEDICAL RECORD: ICD-10-PCS | Mod: CPTII,S$GLB,, | Performed by: STUDENT IN AN ORGANIZED HEALTH CARE EDUCATION/TRAINING PROGRAM

## 2022-11-22 PROCEDURE — 3008F PR BODY MASS INDEX (BMI) DOCUMENTED: ICD-10-PCS | Mod: CPTII,S$GLB,, | Performed by: STUDENT IN AN ORGANIZED HEALTH CARE EDUCATION/TRAINING PROGRAM

## 2022-11-22 PROCEDURE — 1125F PR PAIN SEVERITY QUANTIFIED, PAIN PRESENT: ICD-10-PCS | Mod: CPTII,S$GLB,, | Performed by: STUDENT IN AN ORGANIZED HEALTH CARE EDUCATION/TRAINING PROGRAM

## 2022-11-22 PROCEDURE — 3288F FALL RISK ASSESSMENT DOCD: CPT | Mod: CPTII,S$GLB,, | Performed by: STUDENT IN AN ORGANIZED HEALTH CARE EDUCATION/TRAINING PROGRAM

## 2022-11-22 PROCEDURE — 99215 OFFICE O/P EST HI 40 MIN: CPT | Mod: S$GLB,,, | Performed by: STUDENT IN AN ORGANIZED HEALTH CARE EDUCATION/TRAINING PROGRAM

## 2022-11-22 PROCEDURE — 99999 PR PBB SHADOW E&M-EST. PATIENT-LVL IV: CPT | Mod: PBBFAC,,, | Performed by: STUDENT IN AN ORGANIZED HEALTH CARE EDUCATION/TRAINING PROGRAM

## 2022-11-22 PROCEDURE — 3008F BODY MASS INDEX DOCD: CPT | Mod: CPTII,S$GLB,, | Performed by: STUDENT IN AN ORGANIZED HEALTH CARE EDUCATION/TRAINING PROGRAM

## 2022-11-22 PROCEDURE — 1160F PR REVIEW ALL MEDS BY PRESCRIBER/CLIN PHARMACIST DOCUMENTED: ICD-10-PCS | Mod: CPTII,S$GLB,, | Performed by: STUDENT IN AN ORGANIZED HEALTH CARE EDUCATION/TRAINING PROGRAM

## 2022-11-22 NOTE — TELEPHONE ENCOUNTER
Left message on home phone for pt to return call to schedule preop in the next 2 weeks. Also spoke with wife at work, she asked that message be left and she will follow up with  re scheduling.

## 2022-11-22 NOTE — PROGRESS NOTES
Subjective:          Chief Complaint: Casper Osborne is a 66 y.o. male who had concerns including Follow-up of the Right Knee.    HPI 11/22/22:  Casper is here today for follow up evaluation for his right knee. Since his last visit, he states that he has plateaued in regards with his improvement. He states that his pain is at a 7/10 mainly when descending stairs or steps. He denies any true mechanical symptoms such as catching or locking. He does endorse some instability stating that he has fallen due to his knee buckling. He is not currently in formal physical therapy but is performing a home exercise program. He monserrat any new injury or trauma. He notes pain over the medial joint line.      HPI 09/27/2022  Casper Osborne is a 66 y.o. male returns today for follow-up for his right knee.  He had an MRI since his last visit, see the detailed findings below.  Overall he says the symptoms are essentially unchanged.  He complains more of the fear of instability than true instability itself.  He wears a short runner brace when he is performing activities such as climbing a ladder mowing the grass and this does help.  Occasionally has sharp pains located mainly in the anterior aspect if his leg has a hyperextension type episode.  Additionally he notes some pain and soreness type feelings when he goes from sit to stand position.  Denies any true episodes of instability or mechanical symptoms.  He is here today to discuss the MRI results and potential treatment options.      HPI 9/15/2022  Casper Osborne is a 66 y.o. male returns today for follow-up for his right knee.  He has a known ACL tear, grade 2 PCL tear, and grade 2 proximal MCL tear as well as a medial meniscus tear.  Originally we discussed ligament reconstructive surgery, however he made good improvement with physical therapy we decided to cancel this.  He has been discharged form physical therapy since his last visit. He feels that he is fairly functional,  however is ready to explore surgical options. He denies any instances of true instability, however does note that he has the sensation of occasional instability, but he relates this more to apprehension and fear of having an instability event and a true instability event.  He has return to wearing his brace when he is working and performing activities which he will be weight-bearing for extended period of time more due to caution.  Denies any numbness or paresthesias.  Denies any catching, locking, or mechanical symptoms.    Pain  Pertinent negatives include no joint swelling or myalgias.   Follow-up  Pertinent negatives include no joint swelling or myalgias.   Past Medical History:   Diagnosis Date    GERD (gastroesophageal reflux disease)     Hyperlipidemia        Current Outpatient Medications on File Prior to Visit   Medication Sig Dispense Refill    aspirin (ECOTRIN) 81 MG EC tablet Take 81 mg by mouth once daily.      ibuprofen (ADVIL,MOTRIN) 800 MG tablet TAKE ONE(1) TABLET BY MOUTH EVERY EIGHT(8) HOURS AS NEEDED FOR PAIN 90 tablet 2    ipratropium (ATROVENT) 21 mcg (0.03 %) nasal spray PLACE TWO SPRAYS IN EACH NOSTRIL THREE TIMES DAILY 30 mL 4    lovastatin (MEVACOR) 40 MG tablet TAKE ONE TABLET BY MOUTH AT BEDTIME 90 tablet 3    omeprazole (PRILOSEC) 40 MG capsule Take 1 capsule (40 mg total) by mouth once daily. 90 capsule 3    oxybutynin (DITROPAN) 5 MG Tab Take 1 tablet (5 mg total) by mouth 3 (three) times daily as needed (for bladder spasms). 90 tablet 11    sodium chloride 0.9% 0.9 % irrigation Irrigate with 500 mLs as directed as needed (use  ml to irrigate the bladder prn blood clots or catheter not draining). 500 mL 1    tamsulosin (FLOMAX) 0.4 mg Cap Take 1 capsule (0.4 mg total) by mouth every evening. 30 capsule 3    finasteride (PROSCAR) 5 mg tablet Take 1 tablet (5 mg total) by mouth once daily. 30 tablet 12    gabapentin (NEURONTIN) 300 MG capsule Take 1 capsule (300 mg  total) by mouth 3 (three) times daily. 90 capsule 1     Current Facility-Administered Medications on File Prior to Visit   Medication Dose Route Frequency Provider Last Rate Last Admin    alprazolam ODT dissolvable tablet 0.5 mg  0.5 mg Oral Once PRN Ori Cali Jr., MD           Past Surgical History:   Procedure Laterality Date    ARTHROSCOPY OF ANKLE WITH DEBRIDEMENT Left 12/19/2019    Procedure: ARTHROSCOPY, ANKLE, WITH DEBRIDEMENT;  Surgeon: Bay Melendez MD;  Location: [facility] OR;  Service: Orthopedics;  Laterality: Left;    EPIDURAL STEROID INJECTION N/A 1/29/2021    Procedure: INJECTION, STEROID, EPIDURAL L4/5;  Surgeon: Alvarado Reaves MD;  Location: [facility];  Service: Pain Management;  Laterality: N/A;    EPIDURAL STEROID INJECTION INTO LUMBAR SPINE N/A 12/24/2020    Procedure: Injection-steroid-epidural-lumbar--L4-5;  Surgeon: Ori Cali Jr., MD;  Location: [facility] PAIN [facility]GT;  Service: Pain Management;  Laterality: N/A;    FRACTURE SURGERY      left heel at 39yo    HEEL SPUR SURGERY      left heel - fell off a ladder and had to have this reconstructed    SPINE SURGERY      c7 fx - prior to this, he was getting dizzy spells - none since    TONSILLECTOMY      VASECTOMY         Family History   Problem Relation Age of Onset    Diabetes Mother     Cancer Father         melanoma cancer with mets    Heart disease Daughter         enlarged heart    Hypothyroidism Daughter     Colon polyps Neg Hx     Prostate cancer Neg Hx     Colon cancer Neg Hx     Stroke Neg Hx     Hypertension Neg Hx     Hyperlipidemia Neg Hx        Social History     Socioeconomic History    Marital status:      Spouse name: Nithya    Number of children: 1   Occupational History    Occupation: Retried   Tobacco Use    Smoking status: Never    Smokeless tobacco: Never   Substance and Sexual Activity    Alcohol use: Yes     Comment: less than once a month    Drug use: No    Sexual activity: Yes      Partners: Female     Comment:          Review of Systems   Constitutional: Negative.   HENT: Negative.     Eyes: Negative.    Cardiovascular: Negative.    Respiratory: Negative.     Endocrine: Negative.    Hematologic/Lymphatic: Negative.    Skin: Negative.    Musculoskeletal:  Positive for joint pain (right knee) and muscle weakness. Negative for arthritis, back pain, falls, joint swelling, muscle cramps, myalgias and stiffness.   Neurological: Negative.    Psychiatric/Behavioral: Negative.     Allergic/Immunologic: Negative.                  Objective:        General: Casper is well-developed, well-nourished, appears stated age, in no acute distress, alert and oriented to time, place and person.     General    Nursing note and vitals reviewed.  Constitutional: He is oriented to person, place, and time. He appears well-developed and well-nourished. No distress.   HENT:   Head: Normocephalic and atraumatic.   Nose: Nose normal.   Eyes: EOM are normal.   Cardiovascular:  Normal rate and intact distal pulses.            Pulmonary/Chest: Effort normal. No respiratory distress.   Neurological: He is alert and oriented to person, place, and time.   Psychiatric: He has a normal mood and affect. His behavior is normal. Judgment and thought content normal.     General Musculoskeletal Exam   Gait: normal       Right Knee Exam     Inspection   Erythema: absent  Scars: absent  Swelling: absent  Effusion: absent  Deformity: absent  Bruising: absent    Tenderness   The patient is experiencing no tenderness.     Range of Motion   Extension:  0   Flexion:  140     Tests   Meniscus   Eddie:  Medial - positive Lateral - negative  Ligament Examination   Lachman: abnormal - grade II  PCL-Posterior Drawer: abnormal   - grade I  MCL - Valgus: normal (0 to 2mm)  LCL - Varus: normal  Posterolateral Corner: stable  Patella   Patellar apprehension: negative  Passive Patellar Tilt: neutral  Patellar Tracking: normal    Other    Sensation: normal    Left Knee Exam   Left knee exam is normal.    Inspection   Erythema: absent  Scars: absent  Swelling: absent  Effusion: absent  Deformity: absent  Bruising: absent    Tenderness   The patient is experiencing no tenderness.     Range of Motion   Extension:  -5   Flexion:  140     Tests   Meniscus   Eddie:  Medial - negative Lateral - negative  Stability   Lachman: normal (-1 to 2mm)   PCL-Posterior Drawer: normal (0 to 2mm)  MCL - Valgus: normal (0 to 2mm)  LCL - Varus: normal (0 to 2mm)  Posterolateral Corner: stable  Patella   Patellar apprehension: negative  Passive Patellar Tilt: neutral  Patellar Tracking: normal    Other   Sensation: normal    Muscle Strength   Right Lower Extremity   Quadriceps:  5/5   Hamstrin/5   Left Lower Extremity   Quadriceps:  5/5   Hamstrin/5     Vascular Exam     Right Pulses  Dorsalis Pedis:      2+  Posterior Tibial:      2+        Left Pulses  Dorsalis Pedis:      2+  Posterior Tibial:      2+        Edema  Right Lower Leg: absent  Left Lower Leg: absent  Imaging:  X-rays of the bilateral knee from 2021 personally reviewed by me on that day.  These include weight-bearing AP, PA flexion, lateral, and Merchant views.  There is marginal osteophytes along the notch, however these are very small.  There is no subchondral sclerosis.  There is no joint space loss in all 3 compartments bilaterally.    MRI of the right knee from 2021 personally reviewed by me on 2021.  There is a complete ACL tear.  Grade 2 PCL tear in the proximal half near the midportion.  There is a high grade 2, possible grade 3, MCL tear off the femoral origin.  There are medial and lateral meniscus tears, the medial is a the posterior horn with a radial and horizontal component.  The medial root is intact.  Laterally, the meniscus has a radial tear in the posterior horn.  The root is not well visualized.  There is no extrusion of either the medial or lateral  meniscus.  Overall, the cartilage looks very good in all 3 compartments with no significant or full-thickness loss.    X-rays of the bilateral knees from 09/15/2022 personally viewed by me on that day.  These include weight-bearing AP, PA flexion, lateral, and Merchant views.  Marginal osteophyte formation along the notch with well-preserved joint space in all 3 compartments bilaterally.  When compared to prior x-rays from 12/30/2021 there is minimal, if any, change in the arthritic findings.    MRI of the right knee from 09/23/2022 personally reviewed by me 09/27/2022.  There is chronic rupture of the ACL.  PCL is thickening with some increased signal along its insertion indicating chronic injury.  There was a chronic sprain of both the MCL and the LCL but no complete or full-thickness tears.  There was a tear in the posterior horn of the lateral meniscus as well as the medial meniscus.  There was thinning of the articular cartilage most prevalent in the medial patellofemoral compartments.  I do not visualize any full-thickness chondral defects.      Assessment:     Casper Osborne is a 66 y.o. male with right ACL tear, PCL tear, MCL tear, and medial and lateral meniscus tears as detailed above.  He is doing okay.  He does have some pain mainly along the medial joint line.  Encounter Diagnoses   Name Primary?    Tear of medial meniscus of right knee, current, unspecified tear type, sequela Yes    Complex tear of lateral meniscus of right knee as current injury, initial encounter             Plan:       I discussed treatment options from this point.  I stated that I do not think surgical intervention with reconstruction of the ACL and PCL are in his best interest.  I feel like this would further accelerate the progression of his osteoarthritic changes and lead him to a quick path for a total knee arthroplasty.  He voiced understanding.  I stated we can either continue to observe it and he can modify his  activities.  The alternative would be a surgical intervention which would entail right knee arthroscopy with partial medial and lateral meniscectomies and possible chondroplasty.  The procedure, as well as the risks, benefits, and rehabilitation protocol were discussed at length and all his questions were answered.  After this lengthy discussion, he elected to proceed with partial mediolateral meniscectomies.  We will plan this for 12/23/2022.  He will obtain clearance from his primary care physician or the preoperative center.    The risks, benefits and alternatives to surgery were discussed with the patient at great length.  These include, but not limited to, bleeding, infection, vessel/nerve damage, pain, numbness, tingling, complex regional pain syndrome, hardware/surgical failure, need for further surgery, instability, stiffness, progression of arthritic changes, DVT, PE, arthritis and death.  Patient states an understanding and wishes to proceed with surgery.   All questions were answered.  No guarantees were implied or stated.     All of their questions were answered.  They will call the clinic with any questions or concerns in the interim.    Should the patient's symptoms worsen, persist, or fail to improve they should return for reevaluation and I would be happy to see them back anytime.        Wiliam Guerin M.D.    Please be aware that this note has been generated with the assistance of [employer] voice-to-text.  Please excuse any spelling or grammatical errors.    Thank you for choosing Dr. Wiliam Guerin for your sports medicine care. It is our goal to provide you with exceptional care that will help keep you healthy, active, and get you back in the game.     If you felt that you received exemplary care today, please consider leaving feedback for Dr. Guerin on [employer] at [email or web address].    Please do not hesitate to reach out to us via email, phone, or  MyChart with any questions, concerns, or feedback.

## 2022-11-22 NOTE — TELEPHONE ENCOUNTER
----- Message from Leon Chaparro MD sent at 11/22/2022  1:11 PM CST -----  Please help schedule as soon as possible for preop.    Leon Chaparro MD    ----- Message -----  From: Milagro Melgar RN  Sent: 11/22/2022  12:38 PM CST  To: Leon Chaparro MD, Keshia Nieves MA, #    Dr. Chaparro ,    This patient is scheduled for surgery (Knee Arthroscopy with meniscectomy) on 12/23/22 with Dr. Guerin. Requesting medical optimization prior to surgery. Please advise. Will await your response.                                                                                   Thanks so much,                                                                            Milagro Melgar RN BSN                                [facility]

## 2022-12-02 RX ORDER — IBUPROFEN 800 MG/1
TABLET ORAL
Qty: 90 TABLET | Refills: 2 | Status: SHIPPED | OUTPATIENT
Start: 2022-12-02 | End: 2022-12-02 | Stop reason: SDUPTHER

## 2022-12-02 NOTE — TELEPHONE ENCOUNTER
Attempted to contact pt,to notify pt that his refill has been approved and sent to his pharmacy. No answer lvm          ----- Message from Yolie Whitlock sent at 12/2/2022 11:41 AM CST -----  Regarding: ibuprofen (ADVIL,MOTRIN) 800 MG tablet  Contact: pt  Refill request.  , pt says Rx needs a renewal         ibuprofen (ADVIL,MOTRIN) 800 MG tablet        [id number] - [place] - [id number] [facility]  [place] [address]  Phone: [phone] Fax: [phone]        Confirmed patient's contact info below:  Contact Name: Casper Osborne  Phone Number: [phone]

## 2022-12-07 ENCOUNTER — PATIENT MESSAGE (OUTPATIENT)
Dept: PREADMISSION TESTING | Facility: HOSPITAL | Age: 67
End: 2022-12-07
Payer: MEDICARE

## 2022-12-14 ENCOUNTER — PATIENT MESSAGE (OUTPATIENT)
Dept: PREADMISSION TESTING | Facility: HOSPITAL | Age: 67
End: 2022-12-14
Payer: MEDICARE

## 2022-12-15 ENCOUNTER — PATIENT MESSAGE (OUTPATIENT)
Dept: SURGERY | Facility: HOSPITAL | Age: 67
End: 2022-12-15
Payer: MEDICARE

## 2022-12-15 ENCOUNTER — TELEPHONE (OUTPATIENT)
Dept: SPORTS MEDICINE | Facility: CLINIC | Age: 67
End: 2022-12-15
Payer: MEDICARE

## 2022-12-15 ENCOUNTER — PATIENT MESSAGE (OUTPATIENT)
Dept: INTERNAL MEDICINE | Facility: CLINIC | Age: 67
End: 2022-12-15
Payer: MEDICARE

## 2022-12-15 NOTE — TELEPHONE ENCOUNTER
Spoke with patient to check status of PCP clearances. Patient stated that he misunderstood and thought that the pre-op visit with us was the pre op clearance visit. He was informed that the pre op visit scheduled on 12/20 is to sign consents and surgical education on the procedure. He stated that he will get in touch with his PCP to schedule.

## 2022-12-16 ENCOUNTER — LAB VISIT (OUTPATIENT)
Dept: LAB | Facility: HOSPITAL | Age: 67
End: 2022-12-16
Attending: INTERNAL MEDICINE
Payer: MEDICARE

## 2022-12-16 ENCOUNTER — OFFICE VISIT (OUTPATIENT)
Dept: INTERNAL MEDICINE | Facility: CLINIC | Age: 67
End: 2022-12-16
Payer: MEDICARE

## 2022-12-16 VITALS
HEART RATE: 60 BPM | DIASTOLIC BLOOD PRESSURE: 72 MMHG | SYSTOLIC BLOOD PRESSURE: 130 MMHG | TEMPERATURE: 97 F | HEIGHT: 71 IN | BODY MASS INDEX: 25.6 KG/M2 | RESPIRATION RATE: 12 BRPM | WEIGHT: 182.88 LBS

## 2022-12-16 DIAGNOSIS — E78.5 DYSLIPIDEMIA: ICD-10-CM

## 2022-12-16 DIAGNOSIS — Z01.818 PREOP EXAM FOR INTERNAL MEDICINE: Primary | ICD-10-CM

## 2022-12-16 DIAGNOSIS — K21.9 GASTROESOPHAGEAL REFLUX DISEASE, UNSPECIFIED WHETHER ESOPHAGITIS PRESENT: ICD-10-CM

## 2022-12-16 DIAGNOSIS — N40.0 BENIGN PROSTATIC HYPERPLASIA, UNSPECIFIED WHETHER LOWER URINARY TRACT SYMPTOMS PRESENT: ICD-10-CM

## 2022-12-16 DIAGNOSIS — Z01.818 PREOP EXAM FOR INTERNAL MEDICINE: ICD-10-CM

## 2022-12-16 DIAGNOSIS — S83.289D ACUTE LATERAL MENISCAL TEAR, UNSPECIFIED LATERALITY, SUBSEQUENT ENCOUNTER: ICD-10-CM

## 2022-12-16 LAB
ANION GAP SERPL CALC-SCNC: 11 MMOL/L (ref 8–16)
BUN SERPL-MCNC: 18 MG/DL (ref 8–23)
CALCIUM SERPL-MCNC: 9.7 MG/DL (ref 8.7–10.5)
CHLORIDE SERPL-SCNC: 105 MMOL/L (ref 95–110)
CO2 SERPL-SCNC: 24 MMOL/L (ref 23–29)
CREAT SERPL-MCNC: 0.9 MG/DL (ref 0.5–1.4)
EST. GFR  (NO RACE VARIABLE): >60 ML/MIN/1.73 M^2
GLUCOSE SERPL-MCNC: 77 MG/DL (ref 70–110)
POTASSIUM SERPL-SCNC: 4.7 MMOL/L (ref 3.5–5.1)
SODIUM SERPL-SCNC: 140 MMOL/L (ref 136–145)

## 2022-12-16 PROCEDURE — 3044F PR MOST RECENT HEMOGLOBIN A1C LEVEL <7.0%: ICD-10-PCS | Mod: CPTII,S$GLB,, | Performed by: INTERNAL MEDICINE

## 2022-12-16 PROCEDURE — 1126F PR PAIN SEVERITY QUANTIFIED, NO PAIN PRESENT: ICD-10-PCS | Mod: CPTII,S$GLB,, | Performed by: INTERNAL MEDICINE

## 2022-12-16 PROCEDURE — 3078F DIAST BP <80 MM HG: CPT | Mod: CPTII,S$GLB,, | Performed by: INTERNAL MEDICINE

## 2022-12-16 PROCEDURE — 80048 BASIC METABOLIC PNL TOTAL CA: CPT | Performed by: INTERNAL MEDICINE

## 2022-12-16 PROCEDURE — 3008F BODY MASS INDEX DOCD: CPT | Mod: CPTII,S$GLB,, | Performed by: INTERNAL MEDICINE

## 2022-12-16 PROCEDURE — 99999 PR PBB SHADOW E&M-EST. PATIENT-LVL III: CPT | Mod: PBBFAC,,, | Performed by: INTERNAL MEDICINE

## 2022-12-16 PROCEDURE — 93010 ELECTROCARDIOGRAM REPORT: CPT | Mod: S$GLB,,, | Performed by: INTERNAL MEDICINE

## 2022-12-16 PROCEDURE — 1159F MED LIST DOCD IN RCRD: CPT | Mod: CPTII,S$GLB,, | Performed by: INTERNAL MEDICINE

## 2022-12-16 PROCEDURE — 93005 ELECTROCARDIOGRAM TRACING: CPT | Mod: S$GLB,,, | Performed by: INTERNAL MEDICINE

## 2022-12-16 PROCEDURE — 93005 EKG 12-LEAD: ICD-10-PCS | Mod: S$GLB,,, | Performed by: INTERNAL MEDICINE

## 2022-12-16 PROCEDURE — 99214 PR OFFICE/OUTPT VISIT, EST, LEVL IV, 30-39 MIN: ICD-10-PCS | Mod: S$GLB,,, | Performed by: INTERNAL MEDICINE

## 2022-12-16 PROCEDURE — 1159F PR MEDICATION LIST DOCUMENTED IN MEDICAL RECORD: ICD-10-PCS | Mod: CPTII,S$GLB,, | Performed by: INTERNAL MEDICINE

## 2022-12-16 PROCEDURE — 3075F PR MOST RECENT SYSTOLIC BLOOD PRESS GE 130-139MM HG: ICD-10-PCS | Mod: CPTII,S$GLB,, | Performed by: INTERNAL MEDICINE

## 2022-12-16 PROCEDURE — 1126F AMNT PAIN NOTED NONE PRSNT: CPT | Mod: CPTII,S$GLB,, | Performed by: INTERNAL MEDICINE

## 2022-12-16 PROCEDURE — 99214 OFFICE O/P EST MOD 30 MIN: CPT | Mod: S$GLB,,, | Performed by: INTERNAL MEDICINE

## 2022-12-16 PROCEDURE — 85025 COMPLETE CBC W/AUTO DIFF WBC: CPT | Performed by: INTERNAL MEDICINE

## 2022-12-16 PROCEDURE — 99999 PR PBB SHADOW E&M-EST. PATIENT-LVL III: ICD-10-PCS | Mod: PBBFAC,,, | Performed by: INTERNAL MEDICINE

## 2022-12-16 PROCEDURE — 3078F PR MOST RECENT DIASTOLIC BLOOD PRESSURE < 80 MM HG: ICD-10-PCS | Mod: CPTII,S$GLB,, | Performed by: INTERNAL MEDICINE

## 2022-12-16 PROCEDURE — 3008F PR BODY MASS INDEX (BMI) DOCUMENTED: ICD-10-PCS | Mod: CPTII,S$GLB,, | Performed by: INTERNAL MEDICINE

## 2022-12-16 PROCEDURE — 93010 EKG 12-LEAD: ICD-10-PCS | Mod: S$GLB,,, | Performed by: INTERNAL MEDICINE

## 2022-12-16 PROCEDURE — 3044F HG A1C LEVEL LT 7.0%: CPT | Mod: CPTII,S$GLB,, | Performed by: INTERNAL MEDICINE

## 2022-12-16 PROCEDURE — 36415 COLL VENOUS BLD VENIPUNCTURE: CPT | Mod: PO | Performed by: INTERNAL MEDICINE

## 2022-12-16 PROCEDURE — 3075F SYST BP GE 130 - 139MM HG: CPT | Mod: CPTII,S$GLB,, | Performed by: INTERNAL MEDICINE

## 2022-12-16 NOTE — PROGRESS NOTES
History of present illness:   66-year-old gentleman in today for a preoperative medical evaluation for planned right arthroscopic knee surgery for meniscal tear.  The patient is generally healthy with a history of dyslipidemia, BPH, GERD.  Takes his medications as directed.  Currently without chest pain palpitations syncope cough shortness of breath, URI symptomatologies.  He has had previous surgeries with no complications.      Current medications:  All medications are noted and reviewed in the electronic medical record medication list.      Review of systems:  Constitutional:  No fever no chills no general body aches.    HEENT:  No hoarseness no dysphagia.  No URI symptoms.    Respiratory:  No cough no shortness of breath  Cardiovascular:  No chest pain or palpitations no syncope no claudication.    Neurologic:  No headaches or focal neurological symptomatologies  .      Physical examination:  General:  Pleasant alert appropriately groomed gentleman acute distress.    Vital signs:  All noted and reviewed as normal.    HEENT:  Normocephalic.  Neck supple no masses no thyromegaly.    Lungs:  Clear to auscultation.    Cardiovascular: Regular rate rhythm.  No significant murmur.  Carotids are full bilaterally without bruits.  No peripheral extremity edema.  Mental status:  Alert oriented affect mood all appropriate.      Impression:   Generally healthy 66-year-old gentleman.    Dyslipidemia.    Right knee meniscal tear.    BPH.    GERD.        Plan:  Check CBC, basic metabolic profile and ECG.    If acceptable as anticipated he will be a reasonable candidate for the planned procedure with no increased cardiovascular risk.      ADDENDUM:  Cbc, BMP, ECG reviewed and all reasonable.  He is an acceptable candidate for planned orthopedic procedure with no significant increased cardiovascular risk.

## 2022-12-17 LAB
BASOPHILS # BLD AUTO: 0.03 K/UL (ref 0–0.2)
BASOPHILS NFR BLD: 0.5 % (ref 0–1.9)
DIFFERENTIAL METHOD: ABNORMAL
EOSINOPHIL # BLD AUTO: 0.3 K/UL (ref 0–0.5)
EOSINOPHIL NFR BLD: 4.1 % (ref 0–8)
ERYTHROCYTE [DISTWIDTH] IN BLOOD BY AUTOMATED COUNT: 12 % (ref 11.5–14.5)
HCT VFR BLD AUTO: 47 % (ref 40–54)
HGB BLD-MCNC: 15.3 G/DL (ref 14–18)
IMM GRANULOCYTES # BLD AUTO: 0.01 K/UL (ref 0–0.04)
IMM GRANULOCYTES NFR BLD AUTO: 0.2 % (ref 0–0.5)
LYMPHOCYTES # BLD AUTO: 2.5 K/UL (ref 1–4.8)
LYMPHOCYTES NFR BLD: 40.4 % (ref 18–48)
MCH RBC QN AUTO: 32.6 PG (ref 27–31)
MCHC RBC AUTO-ENTMCNC: 32.6 G/DL (ref 32–36)
MCV RBC AUTO: 100 FL (ref 82–98)
MONOCYTES # BLD AUTO: 0.6 K/UL (ref 0.3–1)
MONOCYTES NFR BLD: 9.9 % (ref 4–15)
NEUTROPHILS # BLD AUTO: 2.7 K/UL (ref 1.8–7.7)
NEUTROPHILS NFR BLD: 44.9 % (ref 38–73)
NRBC BLD-RTO: 0 /100 WBC
PLATELET # BLD AUTO: 276 K/UL (ref 150–450)
PMV BLD AUTO: 10.7 FL (ref 9.2–12.9)
RBC # BLD AUTO: 4.7 M/UL (ref 4.6–6.2)
WBC # BLD AUTO: 6.09 K/UL (ref 3.9–12.7)

## 2022-12-19 ENCOUNTER — PATIENT MESSAGE (OUTPATIENT)
Dept: SURGERY | Facility: HOSPITAL | Age: 67
End: 2022-12-19
Payer: MEDICARE

## 2022-12-20 ENCOUNTER — TELEPHONE (OUTPATIENT)
Dept: SPORTS MEDICINE | Facility: CLINIC | Age: 67
End: 2022-12-20
Payer: MEDICARE

## 2022-12-20 NOTE — TELEPHONE ENCOUNTER
Provider called and spoke with patient regarding questions about scheduled surgery for 12/23/22. All questions answered. Patients concerns were addressed. Patient would like to cancel scheduled surgery for now.

## 2022-12-27 ENCOUNTER — PATIENT MESSAGE (OUTPATIENT)
Dept: INTERNAL MEDICINE | Facility: CLINIC | Age: 67
End: 2022-12-27
Payer: MEDICARE

## 2022-12-27 DIAGNOSIS — N13.8 BPH WITH URINARY OBSTRUCTION: ICD-10-CM

## 2022-12-27 DIAGNOSIS — N40.1 BPH WITH URINARY OBSTRUCTION: ICD-10-CM

## 2022-12-27 RX ORDER — TAMSULOSIN HYDROCHLORIDE 0.4 MG/1
0.4 CAPSULE ORAL NIGHTLY
Qty: 90 CAPSULE | Refills: 1 | Status: SHIPPED | OUTPATIENT
Start: 2022-12-27 | End: 2023-06-15 | Stop reason: SDUPTHER

## 2023-01-17 ENCOUNTER — PATIENT MESSAGE (OUTPATIENT)
Dept: INTERNAL MEDICINE | Facility: CLINIC | Age: 68
End: 2023-01-17
Payer: MEDICARE

## 2023-01-18 RX ORDER — FLUTICASONE PROPIONATE 50 MCG
1 SPRAY, SUSPENSION (ML) NASAL DAILY
Qty: 16 G | Refills: 3 | Status: SHIPPED | OUTPATIENT
Start: 2023-01-18

## 2023-02-07 ENCOUNTER — PES CALL (OUTPATIENT)
Dept: ADMINISTRATIVE | Facility: CLINIC | Age: 68
End: 2023-02-07
Payer: MEDICARE

## 2023-03-13 ENCOUNTER — PES CALL (OUTPATIENT)
Dept: ADMINISTRATIVE | Facility: CLINIC | Age: 68
End: 2023-03-13
Payer: MEDICARE

## 2023-04-21 ENCOUNTER — PATIENT MESSAGE (OUTPATIENT)
Dept: ADMINISTRATIVE | Facility: HOSPITAL | Age: 68
End: 2023-04-21
Payer: MEDICARE

## 2023-06-15 DIAGNOSIS — N40.1 BPH WITH URINARY OBSTRUCTION: ICD-10-CM

## 2023-06-15 DIAGNOSIS — E78.5 HYPERLIPIDEMIA, UNSPECIFIED HYPERLIPIDEMIA TYPE: Chronic | ICD-10-CM

## 2023-06-15 DIAGNOSIS — N13.8 BPH WITH URINARY OBSTRUCTION: ICD-10-CM

## 2023-06-15 RX ORDER — LOVASTATIN 40 MG/1
40 TABLET ORAL NIGHTLY
Qty: 90 TABLET | Refills: 0 | Status: SHIPPED | OUTPATIENT
Start: 2023-06-15 | End: 2023-09-25

## 2023-06-15 RX ORDER — LOVASTATIN 40 MG/1
TABLET ORAL
Qty: 90 TABLET | Refills: 3 | OUTPATIENT
Start: 2023-06-15

## 2023-06-15 RX ORDER — TAMSULOSIN HYDROCHLORIDE 0.4 MG/1
0.4 CAPSULE ORAL NIGHTLY
Qty: 90 CAPSULE | Refills: 0 | Status: SHIPPED | OUTPATIENT
Start: 2023-06-15 | End: 2023-07-11

## 2023-06-15 RX ORDER — TAMSULOSIN HYDROCHLORIDE 0.4 MG/1
CAPSULE ORAL
Qty: 90 CAPSULE | Refills: 1 | OUTPATIENT
Start: 2023-06-15

## 2023-06-15 NOTE — TELEPHONE ENCOUNTER
Refill Decision Note   Casper Dylon  is requesting a refill authorization.  Brief Assessment and Rationale for Refill:  Quick Discontinue     Medication Therapy Plan:  Duplicates      Comments:     Note composed:10:51 AM 06/15/2023

## 2023-06-15 NOTE — TELEPHONE ENCOUNTER
Due for annual exam.  Needs an appointment get further refills.  One refill done.  
No care due was identified.  [facility] Embedded Care Due Messages. Reference number: [id number].   6/15/2023 9:26:30 AM CDT  
Refill Routing Note   Medication(s) are not appropriate for processing by [facility] for the following reason(s):      Required labs outdated    [facility] action(s):  Defer Care Due:  None identified          Appointments  past 12m or future 3m with PCP    Date Provider   Last Visit   5/16/2022 Leon Chaparro MD   Next Visit   Visit date not found Leon Chaparro MD   ED visits in past 90 days: 0        Note composed:10:52 AM 06/15/2023          
Duration Of Freeze Thaw-Cycle (Seconds): 0
Detail Level: Detailed
Number Of Freeze-Thaw Cycles: 1 freeze-thaw cycle
Render Note In Bullet Format When Appropriate: No
Consent: The patient's consent was obtained including but not limited to risks of crusting, scabbing, blistering, scarring, darker or lighter pigmentary change, recurrence, incomplete removal and infection.
Post-Care Instructions: I reviewed with the patient in detail post-care instructions. Patient is to wear sunprotection, and avoid picking at any of the treated lesions. Pt may apply Vaseline to crusted or scabbing areas.

## 2023-06-15 NOTE — TELEPHONE ENCOUNTER
Provider Staff:  Action required for this patient     Please see care gap opportunities below in Care Due Message.    Thanks!  [facility]     Appointments      Date Provider   Last Visit   5/16/2022 Leon Chaparro MD   Next Visit   Visit date not found Leon Chaparro MD     Refill Decision Note   Casper Osborne  is requesting a refill authorization.  Brief Assessment and Rationale for Refill:  Approve     Medication Therapy Plan:         Comments:     Note composed:10:49 AM 06/15/2023

## 2023-06-15 NOTE — TELEPHONE ENCOUNTER
No care due was identified.  [facility] Embedded Care Due Messages. Reference number: [id number].   6/15/2023 10:50:27 AM CDT

## 2023-06-15 NOTE — TELEPHONE ENCOUNTER
Care Due:                  Date            Visit Type   Department     Provider  --------------------------------------------------------------------------------                                EP -                              PRIMARY      [facility]  Last Visit: 05-      CARE ([facility])   MEDICINE       Leon Chaparro  Next Visit: None Scheduled  None         None Found                                                            Last  Test          Frequency    Reason                     Performed    Due Date  --------------------------------------------------------------------------------    Office Visit  12 months..  lovastatin, tamsulosin...  05- 05-    CMP.........  12 months..  lovastatin...............  05- 05-    Lipid Panel.  12 months..  lovastatin...............  05- 05-    [employer] Embedded Care Due Messages. Reference number: [id number].   6/15/2023 9:26:02 AM CDT

## 2023-06-16 DIAGNOSIS — Z12.5 SCREENING FOR PROSTATE CANCER: Primary | ICD-10-CM

## 2023-06-16 DIAGNOSIS — E78.5 DYSLIPIDEMIA: Primary | ICD-10-CM

## 2023-06-16 DIAGNOSIS — E78.5 HYPERLIPIDEMIA, UNSPECIFIED HYPERLIPIDEMIA TYPE: ICD-10-CM

## 2023-06-16 DIAGNOSIS — R73.01 IMPAIRED FASTING BLOOD SUGAR: ICD-10-CM

## 2023-06-16 DIAGNOSIS — M62.81 GENERALIZED MUSCLE WEAKNESS: ICD-10-CM

## 2023-06-20 ENCOUNTER — LAB VISIT (OUTPATIENT)
Dept: LAB | Facility: HOSPITAL | Age: 68
End: 2023-06-20
Attending: INTERNAL MEDICINE
Payer: MEDICARE

## 2023-06-20 ENCOUNTER — PATIENT OUTREACH (OUTPATIENT)
Dept: ADMINISTRATIVE | Facility: HOSPITAL | Age: 68
End: 2023-06-20
Payer: MEDICARE

## 2023-06-20 DIAGNOSIS — Z12.5 SCREENING FOR PROSTATE CANCER: ICD-10-CM

## 2023-06-20 DIAGNOSIS — M62.81 GENERALIZED MUSCLE WEAKNESS: ICD-10-CM

## 2023-06-20 DIAGNOSIS — R73.01 IMPAIRED FASTING BLOOD SUGAR: ICD-10-CM

## 2023-06-20 DIAGNOSIS — E78.5 HYPERLIPIDEMIA, UNSPECIFIED HYPERLIPIDEMIA TYPE: ICD-10-CM

## 2023-06-20 DIAGNOSIS — Z12.11 ENCOUNTER FOR FIT (FECAL IMMUNOCHEMICAL TEST) SCREENING: Primary | ICD-10-CM

## 2023-06-20 LAB
ALBUMIN SERPL BCP-MCNC: 4.2 G/DL (ref 3.5–5.2)
ALP SERPL-CCNC: 83 U/L (ref 55–135)
ALT SERPL W/O P-5'-P-CCNC: 22 U/L (ref 10–44)
ANION GAP SERPL CALC-SCNC: 12 MMOL/L (ref 8–16)
AST SERPL-CCNC: 19 U/L (ref 10–40)
BASOPHILS # BLD AUTO: 0.06 K/UL (ref 0–0.2)
BASOPHILS NFR BLD: 0.8 % (ref 0–1.9)
BILIRUB SERPL-MCNC: 0.4 MG/DL (ref 0.1–1)
BUN SERPL-MCNC: 16 MG/DL (ref 8–23)
CALCIUM SERPL-MCNC: 10.1 MG/DL (ref 8.7–10.5)
CHLORIDE SERPL-SCNC: 103 MMOL/L (ref 95–110)
CO2 SERPL-SCNC: 25 MMOL/L (ref 23–29)
COMPLEXED PSA SERPL-MCNC: 0.36 NG/ML (ref 0–4)
CREAT SERPL-MCNC: 1 MG/DL (ref 0.5–1.4)
DIFFERENTIAL METHOD: ABNORMAL
EOSINOPHIL # BLD AUTO: 0.4 K/UL (ref 0–0.5)
EOSINOPHIL NFR BLD: 6 % (ref 0–8)
ERYTHROCYTE [DISTWIDTH] IN BLOOD BY AUTOMATED COUNT: 11.6 % (ref 11.5–14.5)
EST. GFR  (NO RACE VARIABLE): >60 ML/MIN/1.73 M^2
ESTIMATED AVG GLUCOSE: 108 MG/DL (ref 68–131)
GLUCOSE SERPL-MCNC: 118 MG/DL (ref 70–110)
HBA1C MFR BLD: 5.4 % (ref 4–5.6)
HCT VFR BLD AUTO: 47.7 % (ref 40–54)
HGB BLD-MCNC: 16.4 G/DL (ref 14–18)
IMM GRANULOCYTES # BLD AUTO: 0.02 K/UL (ref 0–0.04)
IMM GRANULOCYTES NFR BLD AUTO: 0.3 % (ref 0–0.5)
LYMPHOCYTES # BLD AUTO: 2.9 K/UL (ref 1–4.8)
LYMPHOCYTES NFR BLD: 39.8 % (ref 18–48)
MCH RBC QN AUTO: 33.4 PG (ref 27–31)
MCHC RBC AUTO-ENTMCNC: 34.4 G/DL (ref 32–36)
MCV RBC AUTO: 97 FL (ref 82–98)
MONOCYTES # BLD AUTO: 0.7 K/UL (ref 0.3–1)
MONOCYTES NFR BLD: 9.6 % (ref 4–15)
NEUTROPHILS # BLD AUTO: 3.1 K/UL (ref 1.8–7.7)
NEUTROPHILS NFR BLD: 43.5 % (ref 38–73)
NRBC BLD-RTO: 0 /100 WBC
PLATELET # BLD AUTO: 273 K/UL (ref 150–450)
PMV BLD AUTO: 10.8 FL (ref 9.2–12.9)
POTASSIUM SERPL-SCNC: 4.3 MMOL/L (ref 3.5–5.1)
PROT SERPL-MCNC: 7.2 G/DL (ref 6–8.4)
RBC # BLD AUTO: 4.91 M/UL (ref 4.6–6.2)
SODIUM SERPL-SCNC: 140 MMOL/L (ref 136–145)
TSH SERPL DL<=0.005 MIU/L-ACNC: 2.43 UIU/ML (ref 0.4–4)
WBC # BLD AUTO: 7.16 K/UL (ref 3.9–12.7)

## 2023-06-20 PROCEDURE — 84153 ASSAY OF PSA TOTAL: CPT | Performed by: INTERNAL MEDICINE

## 2023-06-20 PROCEDURE — 83036 HEMOGLOBIN GLYCOSYLATED A1C: CPT | Performed by: INTERNAL MEDICINE

## 2023-06-20 PROCEDURE — 85025 COMPLETE CBC W/AUTO DIFF WBC: CPT | Performed by: INTERNAL MEDICINE

## 2023-06-20 PROCEDURE — 80053 COMPREHEN METABOLIC PANEL: CPT | Performed by: INTERNAL MEDICINE

## 2023-06-20 PROCEDURE — 84443 ASSAY THYROID STIM HORMONE: CPT | Performed by: INTERNAL MEDICINE

## 2023-06-20 NOTE — PROGRESS NOTES
Health Maintenance Due   Topic Date Due    COVID-19 Vaccine (4 - Booster for Pfizer series) 12/01/2021    Colorectal Cancer Screening  12/22/2022     Chart reviewed.   Immunizations: Reconciled  Orders placed: [employer]  Upcoming appts to satisfy KAREN topics: Labs 6/20/2023

## 2023-06-21 ENCOUNTER — OFFICE VISIT (OUTPATIENT)
Dept: INTERNAL MEDICINE | Facility: CLINIC | Age: 68
End: 2023-06-21
Payer: MEDICARE

## 2023-06-21 ENCOUNTER — HOSPITAL ENCOUNTER (OUTPATIENT)
Dept: RADIOLOGY | Facility: HOSPITAL | Age: 68
Discharge: HOME | End: 2023-06-21
Attending: INTERNAL MEDICINE
Payer: MEDICARE

## 2023-06-21 ENCOUNTER — DOCUMENTATION ONLY (OUTPATIENT)
Dept: SPORTS MEDICINE | Facility: CLINIC | Age: 68
End: 2023-06-21
Payer: MEDICARE

## 2023-06-21 VITALS
DIASTOLIC BLOOD PRESSURE: 94 MMHG | BODY MASS INDEX: 27.28 KG/M2 | SYSTOLIC BLOOD PRESSURE: 136 MMHG | RESPIRATION RATE: 18 BRPM | HEART RATE: 62 BPM | HEIGHT: 71 IN | OXYGEN SATURATION: 97 % | TEMPERATURE: 98 F | WEIGHT: 194.88 LBS

## 2023-06-21 DIAGNOSIS — G89.29 CHRONIC PAIN OF RIGHT KNEE: ICD-10-CM

## 2023-06-21 DIAGNOSIS — M79.645 PAIN OF LEFT THUMB: ICD-10-CM

## 2023-06-21 DIAGNOSIS — J32.9 SINUSITIS, UNSPECIFIED CHRONICITY, UNSPECIFIED LOCATION: ICD-10-CM

## 2023-06-21 DIAGNOSIS — K21.9 GASTROESOPHAGEAL REFLUX DISEASE, UNSPECIFIED WHETHER ESOPHAGITIS PRESENT: Chronic | ICD-10-CM

## 2023-06-21 DIAGNOSIS — E78.5 HYPERLIPIDEMIA, UNSPECIFIED HYPERLIPIDEMIA TYPE: Chronic | ICD-10-CM

## 2023-06-21 DIAGNOSIS — Z00.00 ANNUAL PHYSICAL EXAM: Primary | ICD-10-CM

## 2023-06-21 DIAGNOSIS — M25.561 CHRONIC PAIN OF RIGHT KNEE: ICD-10-CM

## 2023-06-21 DIAGNOSIS — M47.816 LUMBAR FACET ARTHROPATHY: Chronic | ICD-10-CM

## 2023-06-21 PROCEDURE — 1101F PR PT FALLS ASSESS DOC 0-1 FALLS W/OUT INJ PAST YR: ICD-10-PCS | Mod: CPTII,S$GLB,, | Performed by: INTERNAL MEDICINE

## 2023-06-21 PROCEDURE — 1159F MED LIST DOCD IN RCRD: CPT | Mod: CPTII,S$GLB,, | Performed by: INTERNAL MEDICINE

## 2023-06-21 PROCEDURE — 99397 PER PM REEVAL EST PAT 65+ YR: CPT | Mod: S$GLB,,, | Performed by: INTERNAL MEDICINE

## 2023-06-21 PROCEDURE — 1160F RVW MEDS BY RX/DR IN RCRD: CPT | Mod: CPTII,S$GLB,, | Performed by: INTERNAL MEDICINE

## 2023-06-21 PROCEDURE — 3075F SYST BP GE 130 - 139MM HG: CPT | Mod: CPTII,S$GLB,, | Performed by: INTERNAL MEDICINE

## 2023-06-21 PROCEDURE — 73130 XR HAND COMPLETE 3 VIEW LEFT: ICD-10-PCS | Mod: 26,LT,, | Performed by: RADIOLOGY

## 2023-06-21 PROCEDURE — 3008F PR BODY MASS INDEX (BMI) DOCUMENTED: ICD-10-PCS | Mod: CPTII,S$GLB,, | Performed by: INTERNAL MEDICINE

## 2023-06-21 PROCEDURE — 3080F PR MOST RECENT DIASTOLIC BLOOD PRESSURE >= 90 MM HG: ICD-10-PCS | Mod: CPTII,S$GLB,, | Performed by: INTERNAL MEDICINE

## 2023-06-21 PROCEDURE — 99999 PR PBB SHADOW E&M-EST. PATIENT-LVL V: CPT | Mod: PBBFAC,,, | Performed by: INTERNAL MEDICINE

## 2023-06-21 PROCEDURE — 1126F AMNT PAIN NOTED NONE PRSNT: CPT | Mod: CPTII,S$GLB,, | Performed by: INTERNAL MEDICINE

## 2023-06-21 PROCEDURE — 3044F HG A1C LEVEL LT 7.0%: CPT | Mod: CPTII,S$GLB,, | Performed by: INTERNAL MEDICINE

## 2023-06-21 PROCEDURE — 3288F PR FALLS RISK ASSESSMENT DOCUMENTED: ICD-10-PCS | Mod: CPTII,S$GLB,, | Performed by: INTERNAL MEDICINE

## 2023-06-21 PROCEDURE — 3288F FALL RISK ASSESSMENT DOCD: CPT | Mod: CPTII,S$GLB,, | Performed by: INTERNAL MEDICINE

## 2023-06-21 PROCEDURE — 1126F PR PAIN SEVERITY QUANTIFIED, NO PAIN PRESENT: ICD-10-PCS | Mod: CPTII,S$GLB,, | Performed by: INTERNAL MEDICINE

## 2023-06-21 PROCEDURE — 73130 X-RAY EXAM OF HAND: CPT | Mod: TC,PO,LT

## 2023-06-21 PROCEDURE — 3044F PR MOST RECENT HEMOGLOBIN A1C LEVEL <7.0%: ICD-10-PCS | Mod: CPTII,S$GLB,, | Performed by: INTERNAL MEDICINE

## 2023-06-21 PROCEDURE — 1159F PR MEDICATION LIST DOCUMENTED IN MEDICAL RECORD: ICD-10-PCS | Mod: CPTII,S$GLB,, | Performed by: INTERNAL MEDICINE

## 2023-06-21 PROCEDURE — 3080F DIAST BP >= 90 MM HG: CPT | Mod: CPTII,S$GLB,, | Performed by: INTERNAL MEDICINE

## 2023-06-21 PROCEDURE — 99999 PR PBB SHADOW E&M-EST. PATIENT-LVL V: ICD-10-PCS | Mod: PBBFAC,,, | Performed by: INTERNAL MEDICINE

## 2023-06-21 PROCEDURE — 73130 X-RAY EXAM OF HAND: CPT | Mod: 26,LT,, | Performed by: RADIOLOGY

## 2023-06-21 PROCEDURE — 99397 PR PREVENTIVE VISIT,EST,65 & OVER: ICD-10-PCS | Mod: S$GLB,,, | Performed by: INTERNAL MEDICINE

## 2023-06-21 PROCEDURE — 3008F BODY MASS INDEX DOCD: CPT | Mod: CPTII,S$GLB,, | Performed by: INTERNAL MEDICINE

## 2023-06-21 PROCEDURE — 1160F PR REVIEW ALL MEDS BY PRESCRIBER/CLIN PHARMACIST DOCUMENTED: ICD-10-PCS | Mod: CPTII,S$GLB,, | Performed by: INTERNAL MEDICINE

## 2023-06-21 PROCEDURE — 1101F PT FALLS ASSESS-DOCD LE1/YR: CPT | Mod: CPTII,S$GLB,, | Performed by: INTERNAL MEDICINE

## 2023-06-21 PROCEDURE — 3075F PR MOST RECENT SYSTOLIC BLOOD PRESS GE 130-139MM HG: ICD-10-PCS | Mod: CPTII,S$GLB,, | Performed by: INTERNAL MEDICINE

## 2023-06-21 RX ORDER — LEVOCETIRIZINE DIHYDROCHLORIDE 5 MG/1
5 TABLET, FILM COATED ORAL NIGHTLY
Qty: 30 TABLET | Refills: 0 | Status: SHIPPED | OUTPATIENT
Start: 2023-06-21 | End: 2024-06-20

## 2023-06-21 RX ORDER — TIZANIDINE 4 MG/1
4 TABLET ORAL EVERY 8 HOURS PRN
Qty: 50 TABLET | Refills: 0 | Status: SHIPPED | OUTPATIENT
Start: 2023-06-21 | End: 2023-07-01

## 2023-06-21 NOTE — PROGRESS NOTES
Pt was scheduled for same day appointment with Dr. Lujan. Upon reviewing Dr. Chaparro's note, pt is requesting a second surgical opinion. Explained to patient that Dr. Lujan is not a surgeon. Pt elected to cancel today's appointment. Message sent to surgical staff to reschedule appointment with an appropriate provider. Today's visit with Dr. Lujan was cancelled and copay refunded due to scheduled with an inappropriate provider. Pt states understanding and appreciation.     Lisa James, MS, OTC  Clinical Assistant to Dr. Annalisa Lujan

## 2023-06-21 NOTE — PROGRESS NOTES
Subjective:       Patient ID: Casper Osborne is a 67 y.o. male.    Chief Complaint: Annual Exam    HPI    67 y.o. male here for annual exam.     Cholesterol: Total 179, trig 315, HDL 30  Vaccines: Influenza - done; Tetanus - 2019; Prevnar 20 - 20 done; Zoster - 2 done; COVID - 3 done  Sexual Screening: active  STD screening: no concern  Eye exam: done last years ago.  Prostate: 0.36  Colonoscopy:  ordered  A1c: 5.4    Exercise: no regular exercise.  Diet: no fast food.  Home cooked (mostly) and eating out.    Wife has leukemia and is on chemo for this.    He has not had surgery on his right knee.  He is concerned about surgery.    He thinks he might have carpal tunnel.  He has a knot in his thumb.  He is a  (hobby).      He has had sniffles the last three weeks.    Past Medical History:   Diagnosis Date    GERD (gastroesophageal reflux disease)     Hyperlipidemia      Past Surgical History:   Procedure Laterality Date    ADENOIDECTOMY  1966    ARTHROSCOPY OF ANKLE WITH DEBRIDEMENT Left 12/19/2019    Procedure: ARTHROSCOPY, ANKLE, WITH DEBRIDEMENT;  Surgeon: aBy Melendez MD;  Location: [facility] OR;  Service: Orthopedics;  Laterality: Left;    EPIDURAL STEROID INJECTION N/A 01/29/2021    Procedure: INJECTION, STEROID, EPIDURAL L4/5;  Surgeon: Alvarado Reaves MD;  Location: [facility] PAIN MGT;  Service: Pain Management;  Laterality: N/A;    EPIDURAL STEROID INJECTION INTO LUMBAR SPINE N/A 12/24/2020    Procedure: Injection-steroid-epidural-lumbar--L4-5;  Surgeon: Ori Cali Jr., MD;  Location: [facility] PAIN [facility]GT;  Service: Pain Management;  Laterality: N/A;    FRACTURE SURGERY      left heel at 41yo    HEEL SPUR SURGERY      left heel - fell off a ladder and had to have this reconstructed    SPINE SURGERY      c7 fx - prior to this, he was getting dizzy spells - none since    TONSILLECTOMY      VASECTOMY       Social History     Socioeconomic History    Marital status:      Spouse name: Nithya     Number of children: 1   Occupational History    Occupation: Retried   Tobacco Use    Smoking status: Never    Smokeless tobacco: Never   Substance and Sexual Activity    Alcohol use: Not Currently     Alcohol/week: 2.0 standard drinks     Types: 2 Glasses of wine per week     Comment: less than once a month    Drug use: No    Sexual activity: Yes     Partners: Female     Birth control/protection: None     Comment: vasectomy     Social Determinants of Health     Financial Resource Strain: Low Risk     Difficulty of Paying Living Expenses: Not hard at all   Food Insecurity: No Food Insecurity    Worried About Running Out of Food in the Last Year: Never true    Ran Out of Food in the Last Year: Never true   Transportation Needs: No Transportation Needs    Lack of Transportation (Medical): No    Lack of Transportation (Non-Medical): No   Physical Activity: Unknown    Days of Exercise per Week: 5 days    Minutes of Exercise per Session: Patient refused   Stress: Stress Concern Present    Feeling of Stress : To some extent   Social Connections: Unknown    Frequency of Communication with Friends and Family: Three times a week    Frequency of Social Gatherings with Friends and Family: Once a week    Active Member of Clubs or Organizations: No    Attends Club or Organization Meetings: Patient refused    Marital Status:    Housing Stability: Low Risk     Unable to Pay for Housing in the Last Year: No    Number of Places Lived in the Last Year: 1    Unstable Housing in the Last Year: No     Review of patient's allergies indicates:  No Known Allergies  Casper Osborne had no medications administered during this visit.    Review of Systems      Objective:      Physical Exam  Vitals reviewed.   Constitutional:       Appearance: He is well-developed.   HENT:      Head: Normocephalic and atraumatic.      Mouth/Throat:      Pharynx: No oropharyngeal exudate.   Eyes:      General: No scleral icterus.        Right eye: No  discharge.         Left eye: No discharge.      Pupils: Pupils are equal, round, and reactive to light.   Neck:      Thyroid: No thyromegaly.      Trachea: No tracheal deviation.   Cardiovascular:      Rate and Rhythm: Normal rate and regular rhythm.      Heart sounds: Normal heart sounds. No murmur heard.    No friction rub. No gallop.   Pulmonary:      Effort: Pulmonary effort is normal. No respiratory distress.      Breath sounds: Normal breath sounds. No wheezing or rales.   Chest:      Chest wall: No tenderness.   Abdominal:      General: Bowel sounds are normal. There is no distension.      Palpations: Abdomen is soft. There is no mass.      Tenderness: There is no abdominal tenderness. There is no guarding or rebound.   Musculoskeletal:         General: No tenderness. Normal range of motion.      Cervical back: Normal range of motion and neck supple.   Skin:     General: Skin is warm and dry.      Coloration: Skin is not pale.      Findings: No erythema or rash.   Neurological:      Mental Status: He is alert and oriented to person, place, and time.   Psychiatric:         Behavior: Behavior normal.       Assessment:       1. Annual physical exam  - Ambulatory referral/consult to Optometry; Future    2. Hyperlipidemia, unspecified hyperlipidemia type  - Ambulatory referral/consult to [facility]); Future    3. Gastroesophageal reflux disease, unspecified whether esophagitis present    4. Lumbar facet arthropathy    5. Pain of left thumb  - X-Ray Hand 3 view Left; Future  - Ambulatory referral/consult to Physical/Occupational Therapy; Future    6. Sinusitis, unspecified chronicity, unspecified location    7. Chronic pain of right knee  - Ambulatory referral/consult to Orthopedics; Future      Plan:       1. Reviewed lab work with patient.  Discussed diet and exercise.  Discussed vaccines.  2.  Continue Mevacor 40 mg.  3.  Continue Prilosec 40 mg.  4.  Continue gabapentin as prescribed by physician.     5.  Check x-ray and refer to physical therapy.    6.  Try Xyzal and Flonase.    7.  Refer to orthopedics for 2nd opinion.

## 2023-06-22 ENCOUNTER — PATIENT MESSAGE (OUTPATIENT)
Dept: ADMINISTRATIVE | Facility: HOSPITAL | Age: 68
End: 2023-06-22
Payer: MEDICARE

## 2023-06-29 ENCOUNTER — LAB VISIT (OUTPATIENT)
Dept: LAB | Facility: HOSPITAL | Age: 68
End: 2023-06-29
Attending: INTERNAL MEDICINE
Payer: MEDICARE

## 2023-06-29 DIAGNOSIS — Z12.11 ENCOUNTER FOR FIT (FECAL IMMUNOCHEMICAL TEST) SCREENING: ICD-10-CM

## 2023-06-29 PROCEDURE — 82274 ASSAY TEST FOR BLOOD FECAL: CPT | Performed by: INTERNAL MEDICINE

## 2023-07-06 LAB — HEMOCCULT STL QL IA: NEGATIVE

## 2023-07-10 DIAGNOSIS — N13.8 BPH WITH URINARY OBSTRUCTION: ICD-10-CM

## 2023-07-10 DIAGNOSIS — N40.1 BPH WITH URINARY OBSTRUCTION: ICD-10-CM

## 2023-07-10 NOTE — TELEPHONE ENCOUNTER
No care due was identified.  [facility] Embedded Care Due Messages. Reference number: [id number].   7/10/2023 4:56:14 PM CDT

## 2023-07-11 RX ORDER — TAMSULOSIN HYDROCHLORIDE 0.4 MG/1
CAPSULE ORAL
Qty: 90 CAPSULE | Refills: 3 | Status: SHIPPED | OUTPATIENT
Start: 2023-07-11 | End: 2023-10-09 | Stop reason: SDUPTHER

## 2023-07-11 NOTE — TELEPHONE ENCOUNTER
Refill Decision Note   Casper Osborne  is requesting a refill authorization.  Brief Assessment and Rationale for Refill:  Approve     Medication Therapy Plan:         Comments:     Note composed:12:40 PM 07/11/2023

## 2023-09-24 DIAGNOSIS — E78.5 HYPERLIPIDEMIA, UNSPECIFIED HYPERLIPIDEMIA TYPE: Chronic | ICD-10-CM

## 2023-09-24 NOTE — TELEPHONE ENCOUNTER
No care due was identified.  [facility] Embedded Care Due Messages. Reference number: [id number].   9/24/2023 9:15:33 AM CDT

## 2023-09-25 ENCOUNTER — PATIENT MESSAGE (OUTPATIENT)
Dept: PHARMACY | Facility: CLINIC | Age: 68
End: 2023-09-25
Payer: MEDICARE

## 2023-09-25 RX ORDER — LOVASTATIN 40 MG/1
40 TABLET ORAL NIGHTLY
Qty: 90 TABLET | Refills: 2 | Status: SHIPPED | OUTPATIENT
Start: 2023-09-25

## 2023-09-25 NOTE — TELEPHONE ENCOUNTER
Refill Decision Note   Casper Osborne  is requesting a refill authorization.  Brief Assessment and Rationale for Refill:  Approve     Medication Therapy Plan:       Medication Reconciliation Completed: No   Comments:     No Care Gaps recommended.     Note composed:10:29 AM 09/25/2023

## 2023-10-09 DIAGNOSIS — N40.1 BPH WITH URINARY OBSTRUCTION: ICD-10-CM

## 2023-10-09 DIAGNOSIS — N13.8 BPH WITH URINARY OBSTRUCTION: ICD-10-CM

## 2023-10-09 RX ORDER — TAMSULOSIN HYDROCHLORIDE 0.4 MG/1
1 CAPSULE ORAL NIGHTLY
Qty: 90 CAPSULE | Refills: 2 | Status: SHIPPED | OUTPATIENT
Start: 2023-10-09 | End: 2024-01-11 | Stop reason: SDUPTHER

## 2023-10-09 NOTE — TELEPHONE ENCOUNTER
Refill Decision Note   Casper Osborne  is requesting a refill authorization.  Brief Assessment and Rationale for Refill:  Approve     Medication Therapy Plan:         Comments:     Note composed:11:30 AM 10/09/2023

## 2023-10-09 NOTE — TELEPHONE ENCOUNTER
No care due was identified.  [facility] Embedded Care Due Messages. Reference number: [id number].   10/09/2023 10:03:54 AM CDT

## 2023-10-30 ENCOUNTER — OFFICE VISIT (OUTPATIENT)
Dept: OPTOMETRY | Facility: CLINIC | Age: 68
End: 2023-10-30
Payer: MEDICARE

## 2023-10-30 DIAGNOSIS — Z00.00 ANNUAL PHYSICAL EXAM: ICD-10-CM

## 2023-10-30 DIAGNOSIS — H04.123 DRY EYE SYNDROME, BILATERAL: ICD-10-CM

## 2023-10-30 DIAGNOSIS — H25.13 NS (NUCLEAR SCLEROSIS), BILATERAL: ICD-10-CM

## 2023-10-30 DIAGNOSIS — D31.32 CHOROIDAL NEVUS OF LEFT EYE: Primary | ICD-10-CM

## 2023-10-30 DIAGNOSIS — H52.4 PRESBYOPIA: ICD-10-CM

## 2023-10-30 PROCEDURE — 3288F FALL RISK ASSESSMENT DOCD: CPT | Mod: CPTII,S$GLB,, | Performed by: OPTOMETRIST

## 2023-10-30 PROCEDURE — 99204 OFFICE O/P NEW MOD 45 MIN: CPT | Mod: S$GLB,,, | Performed by: OPTOMETRIST

## 2023-10-30 PROCEDURE — 99999 PR PBB SHADOW E&M-EST. PATIENT-LVL III: CPT | Mod: PBBFAC,,, | Performed by: OPTOMETRIST

## 2023-10-30 PROCEDURE — 1126F PR PAIN SEVERITY QUANTIFIED, NO PAIN PRESENT: ICD-10-PCS | Mod: CPTII,S$GLB,, | Performed by: OPTOMETRIST

## 2023-10-30 PROCEDURE — 99204 PR OFFICE/OUTPT VISIT, NEW, LEVL IV, 45-59 MIN: ICD-10-PCS | Mod: S$GLB,,, | Performed by: OPTOMETRIST

## 2023-10-30 PROCEDURE — 1126F AMNT PAIN NOTED NONE PRSNT: CPT | Mod: CPTII,S$GLB,, | Performed by: OPTOMETRIST

## 2023-10-30 PROCEDURE — 3044F HG A1C LEVEL LT 7.0%: CPT | Mod: CPTII,S$GLB,, | Performed by: OPTOMETRIST

## 2023-10-30 PROCEDURE — 1159F PR MEDICATION LIST DOCUMENTED IN MEDICAL RECORD: ICD-10-PCS | Mod: CPTII,S$GLB,, | Performed by: OPTOMETRIST

## 2023-10-30 PROCEDURE — 92015 DETERMINE REFRACTIVE STATE: CPT | Mod: S$GLB,,, | Performed by: OPTOMETRIST

## 2023-10-30 PROCEDURE — 3288F PR FALLS RISK ASSESSMENT DOCUMENTED: ICD-10-PCS | Mod: CPTII,S$GLB,, | Performed by: OPTOMETRIST

## 2023-10-30 PROCEDURE — 1101F PT FALLS ASSESS-DOCD LE1/YR: CPT | Mod: CPTII,S$GLB,, | Performed by: OPTOMETRIST

## 2023-10-30 PROCEDURE — 3044F PR MOST RECENT HEMOGLOBIN A1C LEVEL <7.0%: ICD-10-PCS | Mod: CPTII,S$GLB,, | Performed by: OPTOMETRIST

## 2023-10-30 PROCEDURE — 1101F PR PT FALLS ASSESS DOC 0-1 FALLS W/OUT INJ PAST YR: ICD-10-PCS | Mod: CPTII,S$GLB,, | Performed by: OPTOMETRIST

## 2023-10-30 PROCEDURE — 99999 PR PBB SHADOW E&M-EST. PATIENT-LVL III: ICD-10-PCS | Mod: PBBFAC,,, | Performed by: OPTOMETRIST

## 2023-10-30 PROCEDURE — 92015 PR REFRACTION: ICD-10-PCS | Mod: S$GLB,,, | Performed by: OPTOMETRIST

## 2023-10-30 PROCEDURE — 1159F MED LIST DOCD IN RCRD: CPT | Mod: CPTII,S$GLB,, | Performed by: OPTOMETRIST

## 2023-10-30 RX ORDER — VARENICLINE 0.03 MG/.05ML
1 SPRAY NASAL 2 TIMES DAILY
Qty: 25.2 ML | Refills: 3 | Status: SHIPPED | OUTPATIENT
Start: 2023-10-30

## 2023-10-30 NOTE — PROGRESS NOTES
[facility]    GONZALES: 6/27/2014 - Dr. Dickinson    CC: Pt is here today for a routine eye exam. 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 Pt states that he has been having a FB sensation over the   last 2 months and rinses his eye once a week with no relief.    (+)Dryness - underneath upper eyelids  (-)Burning  (-)Itchiness  (-)Tearing  (+)Flashes - occasionally  (+)Floaters - occasionally  (-)Photophobia  (-)Eye Pain      Diabetic: no    Contact Lens: no    Eye Meds: none      Last edited by Sherie Baig MA on 10/30/2023  8:07 AM.            Assessment /Plan     For exam results, see Encounter Report.    Choroidal nevus of left eye   Inferior arcade OS   Stable, monitor yearly    PHotos next visit    NS (nuclear sclerosis), bilateral   Mild, monitor    Presbyopia   Rx specs    Dry eye syndrome, bilateral   Start IVIZIA PRN  -     Start varenicline (TYRVAYA) 0.03 mg/spray sprm; 1 spray by Each Nostril route 2 (two) times a day.  Dispense: 25.2 mL; Refill: 3      RTC  1 year, sooner PRN

## 2024-01-11 DIAGNOSIS — N40.1 BPH WITH URINARY OBSTRUCTION: ICD-10-CM

## 2024-01-11 DIAGNOSIS — N13.8 BPH WITH URINARY OBSTRUCTION: ICD-10-CM

## 2024-01-11 RX ORDER — TAMSULOSIN HYDROCHLORIDE 0.4 MG/1
1 CAPSULE ORAL NIGHTLY
Qty: 90 CAPSULE | Refills: 1 | Status: SHIPPED | OUTPATIENT
Start: 2024-01-11

## 2024-01-11 NOTE — TELEPHONE ENCOUNTER
Refill Decision Note   Casper Dylon  is requesting a refill authorization.  Brief Assessment and Rationale for Refill:  Approve     Medication Therapy Plan:         Comments:     Note composed:4:30 PM 01/11/2024

## 2024-01-11 NOTE — TELEPHONE ENCOUNTER
No care due was identified.  [facility] Embedded Care Due Messages. Reference number: [id number].   1/11/2024 9:48:45 AM CST

## 2024-02-19 ENCOUNTER — OFFICE VISIT (OUTPATIENT)
Dept: URGENT CARE | Facility: CLINIC | Age: 69
End: 2024-02-19
Payer: MEDICARE

## 2024-02-19 VITALS
HEART RATE: 61 BPM | DIASTOLIC BLOOD PRESSURE: 80 MMHG | SYSTOLIC BLOOD PRESSURE: 135 MMHG | RESPIRATION RATE: 18 BRPM | TEMPERATURE: 98 F | HEIGHT: 71 IN | OXYGEN SATURATION: 95 % | BODY MASS INDEX: 27.16 KG/M2 | WEIGHT: 194 LBS

## 2024-02-19 DIAGNOSIS — J06.9 VIRAL URI: Primary | ICD-10-CM

## 2024-02-19 DIAGNOSIS — R05.9 COUGH, UNSPECIFIED TYPE: ICD-10-CM

## 2024-02-19 LAB
CTP QC/QA: YES
SARS-COV-2 AG RESP QL IA.RAPID: NEGATIVE

## 2024-02-19 PROCEDURE — 99203 OFFICE O/P NEW LOW 30 MIN: CPT | Mod: S$GLB,,,

## 2024-02-19 PROCEDURE — 87811 SARS-COV-2 COVID19 W/OPTIC: CPT | Mod: QW,S$GLB,,

## 2024-02-19 RX ORDER — PROMETHAZINE HYDROCHLORIDE AND DEXTROMETHORPHAN HYDROBROMIDE 6.25; 15 MG/5ML; MG/5ML
5 SYRUP ORAL EVERY 4 HOURS PRN
Qty: 118 ML | Refills: 0 | Status: SHIPPED | OUTPATIENT
Start: 2024-02-19 | End: 2024-02-29

## 2024-02-19 RX ORDER — BENZONATATE 200 MG/1
200 CAPSULE ORAL 3 TIMES DAILY PRN
Qty: 30 CAPSULE | Refills: 0 | Status: SHIPPED | OUTPATIENT
Start: 2024-02-19 | End: 2024-02-29

## 2024-02-20 NOTE — PATIENT INSTRUCTIONS
Your illness is likely caused by a virus and antibiotics would not be beneficial at this time.    Please drink plenty of fluids and get plenty of rest.    Tessalon for cough during the day Promethazine for cough at night    Take over the counter Sudafed or Mucinex D or Allegra-D or Claritin-D or Zyrtec-D for symptoms.  May gargle salt water for sore throat, a tablespoon of honey is helpful for cough, especially at night.     If not allergic, please take over the counter Tylenol (Acetaminophen) and/or Motrin (Ibuprofen) as directed for control of pain and/or fever.    Please follow up with your primary care doctor or specialist as needed.    If you  smoke, please stop smoking.

## 2024-02-20 NOTE — PROGRESS NOTES
"Subjective:      Patient ID: Casper Osborne is a 68 y.o. male.    Vitals:  height is 5' 11" (1.803 m) and weight is 88 kg (194 lb). His oral temperature is 98.3 °F (36.8 °C). His blood pressure is 135/80 and his pulse is 61. His respiration is 18 and oxygen saturation is 95%.     Chief Complaint: Cough    This is a 68 y.o. male who presents today with a chief complaint of cough that started 5 days ago. Patient took a Covid test at home and the results were positive.  States grand-daughter was sick last week with similar symptoms.  Denies any fever, wheezing, respiratory distress, vomiting, diarrhea, rash, decrease in oral intake or urinary output.      Cough  This is a new problem. The current episode started in the past 7 days (02/14/2024). Associated symptoms include chills, headaches, myalgias, nasal congestion, postnasal drip, rhinorrhea and a sore throat. Pertinent negatives include no chest pain, ear pain, fever or shortness of breath. Treatments tried: tylenol, zyrtec. The treatment provided mild relief.       Constitution: Positive for chills. Negative for fever and generalized weakness.   HENT:  Positive for congestion, postnasal drip and sore throat. Negative for ear pain and sinus pain.    Neck: Negative for neck pain.   Cardiovascular:  Negative for chest pain.   Respiratory:  Positive for cough. Negative for shortness of breath.    Gastrointestinal:  Negative for abdominal pain.   Musculoskeletal:  Positive for muscle ache.   Neurological:  Positive for headaches.      Objective:     Physical Exam   Constitutional: He is oriented to person, place, and time. He appears well-developed. He is cooperative.   HENT:   Head: Normocephalic and atraumatic.   Ears:   Right Ear: Hearing, tympanic membrane, external ear and ear canal normal. Tympanic membrane is not erythematous and not bulging.   Left Ear: Hearing, tympanic membrane, external ear and ear canal normal. Tympanic membrane is not erythematous and not " bulging.   Nose: Mucosal edema and rhinorrhea present. No nasal deformity. No epistaxis. Right sinus exhibits no maxillary sinus tenderness and no frontal sinus tenderness. Left sinus exhibits no maxillary sinus tenderness and no frontal sinus tenderness.   Mouth/Throat: Uvula is midline, oropharynx is clear and moist and mucous membranes are normal. No trismus in the jaw. Normal dentition. No uvula swelling. No posterior oropharyngeal erythema.   Sinus congestion      Comments: Sinus congestion  Eyes: Conjunctivae and lids are normal.   Neck: Trachea normal and phonation normal. Neck supple.   Cardiovascular: Normal rate, regular rhythm, normal heart sounds and normal pulses.   Pulmonary/Chest: Effort normal and breath sounds normal.   Clear breath sounds bilaterally         Comments: Clear breath sounds bilaterally    Abdominal: Normal appearance and bowel sounds are normal. Soft.   Musculoskeletal: Normal range of motion.         General: Normal range of motion.   Neurological: He is alert and oriented to person, place, and time. He exhibits normal muscle tone.   Skin: Skin is warm, dry and intact.   Psychiatric: His speech is normal and behavior is normal. Judgment and thought content normal.   Nursing note and vitals reviewed.      Assessment:     1. Cough, unspecified type        Plan:     Patient is very well-appearing, alert and active, VSS, afebrile without recent antipyretic, and appears well-hydrated.  Physical exam as documented above, no clinical evidence of respiratory failure, dehydration, or focal/systemic bacterial infection at this time. Covid testing negative in clinic. Anticipatory guidance regarding viral URI's discussed with patient.  Low suspicion of bacterial sinusitis at this time based on history and physical exam.  Tessalon and promethazine for cough. Discussed use of over-the-counter medications, such as Sudafed and Mucinez-D, for symptoms as well as supportive care and return  precautions. Patient verbalizes understanding and agrees to follow-up with PCP as needed.     Results for orders placed or performed in visit on 02/19/24   SARS Coronavirus 2 Antigen, POCT Manual Read   Result Value Ref Range    SARS Coronavirus 2 Antigen Negative Negative     Acceptable Yes        Cough, unspecified type  -     SARS Coronavirus 2 Antigen, POCT Manual Read        Patient Instructions   Your illness is likely caused by a virus and antibiotics would not be beneficial at this time.    Please drink plenty of fluids and get plenty of rest.    Tessalon for cough during the day Promethazine for cough at night    Take over the counter Sudafed or Mucinex D or Allegra-D or Claritin-D or Zyrtec-D for symptoms.  May gargle salt water for sore throat, a tablespoon of honey is helpful for cough, especially at night.     If not allergic, please take over the counter Tylenol (Acetaminophen) and/or Motrin (Ibuprofen) as directed for control of pain and/or fever.    Please follow up with your primary care doctor or specialist as needed.    If you  smoke, please stop smoking.'

## 2024-04-18 DIAGNOSIS — R31.0 HEMATURIA, GROSS: ICD-10-CM

## 2024-04-18 DIAGNOSIS — R73.01 IMPAIRED FASTING BLOOD SUGAR: ICD-10-CM

## 2024-04-18 DIAGNOSIS — Z12.5 SCREENING FOR PROSTATE CANCER: ICD-10-CM

## 2024-04-18 DIAGNOSIS — K21.9 GASTROESOPHAGEAL REFLUX DISEASE, UNSPECIFIED WHETHER ESOPHAGITIS PRESENT: Chronic | ICD-10-CM

## 2024-04-18 DIAGNOSIS — E78.5 HYPERLIPIDEMIA, UNSPECIFIED HYPERLIPIDEMIA TYPE: Primary | ICD-10-CM

## 2024-05-22 ENCOUNTER — OFFICE VISIT (OUTPATIENT)
Dept: DERMATOLOGY | Facility: CLINIC | Age: 69
End: 2024-05-22
Payer: MEDICARE

## 2024-05-22 DIAGNOSIS — L82.1 SK (SEBORRHEIC KERATOSIS): ICD-10-CM

## 2024-05-22 DIAGNOSIS — D21.9 FIBROMA: ICD-10-CM

## 2024-05-22 DIAGNOSIS — L83 ACANTHOSIS: Primary | ICD-10-CM

## 2024-05-22 PROCEDURE — 1160F RVW MEDS BY RX/DR IN RCRD: CPT | Mod: CPTII,S$GLB,, | Performed by: DERMATOLOGY

## 2024-05-22 PROCEDURE — G2211 COMPLEX E/M VISIT ADD ON: HCPCS | Mod: S$GLB,,, | Performed by: DERMATOLOGY

## 2024-05-22 PROCEDURE — 3288F FALL RISK ASSESSMENT DOCD: CPT | Mod: CPTII,S$GLB,, | Performed by: DERMATOLOGY

## 2024-05-22 PROCEDURE — 1159F MED LIST DOCD IN RCRD: CPT | Mod: CPTII,S$GLB,, | Performed by: DERMATOLOGY

## 2024-05-22 PROCEDURE — 99203 OFFICE O/P NEW LOW 30 MIN: CPT | Mod: S$GLB,,, | Performed by: DERMATOLOGY

## 2024-05-22 PROCEDURE — 1126F AMNT PAIN NOTED NONE PRSNT: CPT | Mod: CPTII,S$GLB,, | Performed by: DERMATOLOGY

## 2024-05-22 PROCEDURE — 99999 PR PBB SHADOW E&M-EST. PATIENT-LVL III: CPT | Mod: PBBFAC,,, | Performed by: DERMATOLOGY

## 2024-05-22 PROCEDURE — 1101F PT FALLS ASSESS-DOCD LE1/YR: CPT | Mod: CPTII,S$GLB,, | Performed by: DERMATOLOGY

## 2024-05-22 RX ORDER — AMMONIUM LACTATE 12 G/100G
CREAM TOPICAL
Qty: 140 G | Refills: 3 | Status: SHIPPED | OUTPATIENT
Start: 2024-05-22

## 2024-05-22 NOTE — PATIENT INSTRUCTIONS
Patient instructed to start Amlactin cream or lotion nightly to AK prone areas or other specified affected areas.  Warned of skin irritation and to decrease frequency of usage if this occurs.

## 2024-05-22 NOTE — PROGRESS NOTES
Subjective:      Patient ID:  Casper Osborne is a 68 y.o. male who presents for   Chief Complaint   Patient presents with    Mole     Forehead     Dry Skin     Mole - Initial  Affected locations: forehead  Duration: 1 month  Signs / symptoms: rough  Severity: mild to moderate  Timing: constant  Relieving factors/Treatments tried: nothing    Dry Skin - Initial  Affected locations: left elbow and left knee  Duration: 2 years  Signs / symptoms: dryness  Severity: mild to moderate  Timing: constant      Review of Systems   Constitutional: Negative.    HENT: Negative.     Respiratory: Negative.     Musculoskeletal: Negative.    Skin:  Positive for dry skin.       Objective:   Physical Exam   Constitutional: He appears well-developed and well-nourished.   Neurological: He is alert and oriented to person, place, and time.   Psychiatric: He has a normal mood and affect.   Skin:                    Diagram Legend     Erythematous scaling macule/papule c/w actinic keratosis       Vascular papule c/w angioma      Pigmented verrucoid papule/plaque c/w seborrheic keratosis      Yellow umbilicated papule c/w sebaceous hyperplasia      Irregularly shaped tan macule c/w lentigo     1-2 mm smooth white papules consistent with Milia      Movable subcutaneous cyst with punctum c/w epidermal inclusion cyst      Subcutaneous movable cyst c/w pilar cyst      Firm pink to brown papule c/w dermatofibroma      Pedunculated fleshy papule(s) c/w skin tag(s)      Evenly pigmented macule c/w junctional nevus     Mildly variegated pigmented, slightly irregular-bordered macule c/w mildly atypical nevus      Flesh colored to evenly pigmented papule c/w intradermal nevus       Pink pearly papule/plaque c/w basal cell carcinoma      Erythematous hyperkeratotic cursted plaque c/w SCC      Surgical scar with no sign of skin cancer recurrence      Open and closed comedones      Inflammatory papules and pustules      Verrucoid papule consistent  consistent with wart     Erythematous eczematous patches and plaques     Dystrophic onycholytic nail with subungual debris c/w onychomycosis     Umbilicated papule    Erythematous-base heme-crusted tan verrucoid plaque consistent with inflamed seborrheic keratosis     Erythematous Silvery Scaling Plaque c/w Psoriasis     See annotation    L thigh with thick skin area.    Assessment / Plan:        Acanthosis  -     ammonium lactate 12 % Crea; Bid to dry elbow and knee and thick dry skin of thigh  Dispense: 140 g; Refill: 3  Discussed with patient the etiology and pathogenesis of the disease or skin lesion(s) and possible treatments and aggravators.    Reviewed with patient different treatment options and associated risks.  Proper application of medications and or care for affected area(s) and condition(s) reviewed.  Chronic nature of this condition discussed with patient.    Fibroma  -     ammonium lactate 12 % Crea; Bid to dry elbow and knee and thick dry skin of thigh  Dispense: 140 g; Refill: 3  Discussed with patient the etiology and pathogenesis of the disease or skin lesion(s) and possible treatments and aggravators.    Reviewed with patient different treatment options and associated risks.  Proper application of medications and or care for affected area(s) and condition(s) reviewed.  Patient instructed to start Amlactin cream or lotion nightly to AK prone areas or other specified affected areas.  Warned of skin irritation and to decrease frequency of usage if this occurs.  Discussed with patient the importance of not picking at the skin or squeezing spots due to risks of infection, non healing, and scarring.      SK (seborrheic keratosis)  Discussed with patient the benign nature of these lesions and that no treatment is indicated.  Prn cosmetic hyfrecation of sk on the f head.  Costs $150.  Scar and recurrence reviewed.             Follow up in about 1 year (around 5/22/2025), or if symptoms worsen or fail to  improve.

## 2024-06-11 ENCOUNTER — PATIENT MESSAGE (OUTPATIENT)
Dept: OPTOMETRY | Facility: CLINIC | Age: 69
End: 2024-06-11
Payer: MEDICARE

## 2024-06-11 DIAGNOSIS — H04.123 DRY EYE SYNDROME, BILATERAL: Primary | ICD-10-CM

## 2024-06-12 RX ORDER — VARENICLINE 0.03 MG/.05ML
1 SPRAY NASAL 2 TIMES DAILY
Qty: 25.2 ML | Refills: 3 | Status: SHIPPED | OUTPATIENT
Start: 2024-06-12

## 2024-06-14 ENCOUNTER — TELEPHONE (OUTPATIENT)
Dept: OPTOMETRY | Facility: CLINIC | Age: 69
End: 2024-06-14
Payer: MEDICARE

## 2024-06-14 DIAGNOSIS — E78.5 HYPERLIPIDEMIA, UNSPECIFIED HYPERLIPIDEMIA TYPE: Chronic | ICD-10-CM

## 2024-06-14 RX ORDER — CYCLOSPORINE 0.5 MG/ML
1 EMULSION OPHTHALMIC 2 TIMES DAILY
Qty: 180 EACH | Refills: 3 | Status: SHIPPED | OUTPATIENT
Start: 2024-06-14 | End: 2025-06-14

## 2024-06-14 RX ORDER — LOVASTATIN 40 MG/1
40 TABLET ORAL NIGHTLY
Qty: 90 TABLET | Refills: 0 | Status: SHIPPED | OUTPATIENT
Start: 2024-06-14

## 2024-06-14 NOTE — TELEPHONE ENCOUNTER
Provider Staff:  Action required for this patient    Requires labs      Please see care gap opportunities below in Care Due Message.    Thanks!  [facility]     Appointments      Date Provider   Last Visit   6/21/2023 Leon Chaparro MD   Next Visit   6/21/2024 Leon Chaparro MD     Refill Decision Note   Casper Osborne  is requesting a refill authorization.  Brief Assessment and Rationale for Refill:  Approve     Medication Therapy Plan:         Comments:     Note composed:9:27 AM 06/14/2024

## 2024-06-14 NOTE — TELEPHONE ENCOUNTER
LVM informing pt that his insurance won't cover tyrvaya until he used a formulary drug, so we've sent restasis to his pharmacy.       ----- Message from Michelle Jules OD sent at 6/14/2024 12:04 PM CDT -----  Contact: [phone]    ----- Message -----  From: Meliza Paulson  Sent: 6/14/2024  12:01 PM CDT  To: Jorge A CRISTINA Staff    [employer] is calling regarding Prior Authorization

## 2024-06-14 NOTE — TELEPHONE ENCOUNTER
Care Due:                  Date            Visit Type   Department     Provider  --------------------------------------------------------------------------------                                EP -                              PRIMARY      MET INTERNAL  Last Visit: 06-      CARE ([facility])   ENDY Chaparro                              EP -                              PRIMARY      [facility] INTERNAL  Next Visit: 06-      CARE ([facility])   ENDY Chaparro                                                            Last  Test          Frequency    Reason                     Performed    Due Date  --------------------------------------------------------------------------------    CMP.........  12 months..  lovastatin...............  06- 06-    Lipid Panel.  12 months..  lovastatin...............  06- 06-    [employer] Embedded Care Due Messages. Reference number: [id number].   6/14/2024 9:14:13 AM CDT

## 2024-06-21 ENCOUNTER — OFFICE VISIT (OUTPATIENT)
Dept: INTERNAL MEDICINE | Facility: CLINIC | Age: 69
End: 2024-06-21
Payer: MEDICARE

## 2024-06-21 ENCOUNTER — LAB VISIT (OUTPATIENT)
Dept: LAB | Facility: HOSPITAL | Age: 69
End: 2024-06-21
Attending: INTERNAL MEDICINE
Payer: MEDICARE

## 2024-06-21 VITALS
WEIGHT: 190.56 LBS | HEART RATE: 59 BPM | SYSTOLIC BLOOD PRESSURE: 128 MMHG | RESPIRATION RATE: 16 BRPM | DIASTOLIC BLOOD PRESSURE: 78 MMHG | OXYGEN SATURATION: 98 % | TEMPERATURE: 98 F | BODY MASS INDEX: 26.58 KG/M2

## 2024-06-21 DIAGNOSIS — R73.01 IMPAIRED FASTING BLOOD SUGAR: ICD-10-CM

## 2024-06-21 DIAGNOSIS — Z12.5 SCREENING FOR PROSTATE CANCER: ICD-10-CM

## 2024-06-21 DIAGNOSIS — Z12.5 PROSTATE CANCER SCREENING: ICD-10-CM

## 2024-06-21 DIAGNOSIS — Z12.11 ENCOUNTER FOR COLORECTAL CANCER SCREENING: ICD-10-CM

## 2024-06-21 DIAGNOSIS — R31.0 HEMATURIA, GROSS: ICD-10-CM

## 2024-06-21 DIAGNOSIS — K21.9 GASTROESOPHAGEAL REFLUX DISEASE, UNSPECIFIED WHETHER ESOPHAGITIS PRESENT: Chronic | ICD-10-CM

## 2024-06-21 DIAGNOSIS — E78.5 HYPERLIPIDEMIA, UNSPECIFIED HYPERLIPIDEMIA TYPE: ICD-10-CM

## 2024-06-21 DIAGNOSIS — Z00.00 ANNUAL PHYSICAL EXAM: Primary | ICD-10-CM

## 2024-06-21 DIAGNOSIS — M25.561 CHRONIC PAIN OF RIGHT KNEE: ICD-10-CM

## 2024-06-21 DIAGNOSIS — I70.0 AORTIC ATHEROSCLEROSIS: ICD-10-CM

## 2024-06-21 DIAGNOSIS — E78.5 HYPERLIPIDEMIA, UNSPECIFIED HYPERLIPIDEMIA TYPE: Chronic | ICD-10-CM

## 2024-06-21 DIAGNOSIS — Z12.12 ENCOUNTER FOR COLORECTAL CANCER SCREENING: ICD-10-CM

## 2024-06-21 DIAGNOSIS — G89.29 CHRONIC PAIN OF RIGHT KNEE: ICD-10-CM

## 2024-06-21 LAB
ALBUMIN SERPL BCP-MCNC: 4.1 G/DL (ref 3.5–5.2)
ALP SERPL-CCNC: 75 U/L (ref 55–135)
ALT SERPL W/O P-5'-P-CCNC: 28 U/L (ref 10–44)
ANION GAP SERPL CALC-SCNC: 5 MMOL/L (ref 8–16)
AST SERPL-CCNC: 24 U/L (ref 10–40)
BASOPHILS # BLD AUTO: 0.05 K/UL (ref 0–0.2)
BASOPHILS NFR BLD: 0.8 % (ref 0–1.9)
BILIRUB SERPL-MCNC: 0.8 MG/DL (ref 0.1–1)
BUN SERPL-MCNC: 20 MG/DL (ref 8–23)
CALCIUM SERPL-MCNC: 9.6 MG/DL (ref 8.7–10.5)
CHLORIDE SERPL-SCNC: 104 MMOL/L (ref 95–110)
CHOLEST SERPL-MCNC: 158 MG/DL (ref 120–199)
CHOLEST/HDLC SERPL: 4.5 {RATIO} (ref 2–5)
CO2 SERPL-SCNC: 29 MMOL/L (ref 23–29)
CREAT SERPL-MCNC: 1.1 MG/DL (ref 0.5–1.4)
DIFFERENTIAL METHOD BLD: ABNORMAL
EOSINOPHIL # BLD AUTO: 0.3 K/UL (ref 0–0.5)
EOSINOPHIL NFR BLD: 4.1 % (ref 0–8)
ERYTHROCYTE [DISTWIDTH] IN BLOOD BY AUTOMATED COUNT: 11.9 % (ref 11.5–14.5)
EST. GFR  (NO RACE VARIABLE): >60 ML/MIN/1.73 M^2
ESTIMATED AVG GLUCOSE: 114 MG/DL (ref 68–131)
GLUCOSE SERPL-MCNC: 100 MG/DL (ref 70–110)
HBA1C MFR BLD: 5.6 % (ref 4–5.6)
HCT VFR BLD AUTO: 47.4 % (ref 40–54)
HDLC SERPL-MCNC: 35 MG/DL (ref 40–75)
HDLC SERPL: 22.2 % (ref 20–50)
HGB BLD-MCNC: 15.9 G/DL (ref 14–18)
IMM GRANULOCYTES # BLD AUTO: 0.02 K/UL (ref 0–0.04)
IMM GRANULOCYTES NFR BLD AUTO: 0.3 % (ref 0–0.5)
LDLC SERPL CALC-MCNC: 98.8 MG/DL (ref 63–159)
LYMPHOCYTES # BLD AUTO: 2.7 K/UL (ref 1–4.8)
LYMPHOCYTES NFR BLD: 39.9 % (ref 18–48)
MCH RBC QN AUTO: 33 PG (ref 27–31)
MCHC RBC AUTO-ENTMCNC: 33.5 G/DL (ref 32–36)
MCV RBC AUTO: 98 FL (ref 82–98)
MONOCYTES # BLD AUTO: 0.8 K/UL (ref 0.3–1)
MONOCYTES NFR BLD: 11.4 % (ref 4–15)
NEUTROPHILS # BLD AUTO: 2.9 K/UL (ref 1.8–7.7)
NEUTROPHILS NFR BLD: 43.5 % (ref 38–73)
NONHDLC SERPL-MCNC: 123 MG/DL
NRBC BLD-RTO: 0 /100 WBC
PLATELET # BLD AUTO: 257 K/UL (ref 150–450)
PMV BLD AUTO: 10.8 FL (ref 9.2–12.9)
POTASSIUM SERPL-SCNC: 5 MMOL/L (ref 3.5–5.1)
PROT SERPL-MCNC: 7.1 G/DL (ref 6–8.4)
RBC # BLD AUTO: 4.82 M/UL (ref 4.6–6.2)
SODIUM SERPL-SCNC: 138 MMOL/L (ref 136–145)
TRIGL SERPL-MCNC: 121 MG/DL (ref 30–150)
TSH SERPL DL<=0.005 MIU/L-ACNC: 2.69 UIU/ML (ref 0.4–4)
WBC # BLD AUTO: 6.64 K/UL (ref 3.9–12.7)

## 2024-06-21 PROCEDURE — 83036 HEMOGLOBIN GLYCOSYLATED A1C: CPT | Performed by: INTERNAL MEDICINE

## 2024-06-21 PROCEDURE — 84443 ASSAY THYROID STIM HORMONE: CPT | Performed by: INTERNAL MEDICINE

## 2024-06-21 PROCEDURE — 80053 COMPREHEN METABOLIC PANEL: CPT | Performed by: INTERNAL MEDICINE

## 2024-06-21 PROCEDURE — 99999 PR PBB SHADOW E&M-EST. PATIENT-LVL IV: CPT | Mod: PBBFAC,,, | Performed by: INTERNAL MEDICINE

## 2024-06-21 PROCEDURE — 85025 COMPLETE CBC W/AUTO DIFF WBC: CPT | Performed by: INTERNAL MEDICINE

## 2024-06-21 PROCEDURE — 80061 LIPID PANEL: CPT | Performed by: INTERNAL MEDICINE

## 2024-06-21 PROCEDURE — 36415 COLL VENOUS BLD VENIPUNCTURE: CPT | Mod: PO | Performed by: INTERNAL MEDICINE

## 2024-06-21 NOTE — PROGRESS NOTES
Subjective:       Patient ID: Casper Osborne is a 68 y.o. male.    Chief Complaint: Annual Exam    HPI    68 y.o. male here for annual exam.     Cholesterol: drawn  Vaccines: Influenza - 2023; Tetanus - 2015; Prevnar 20 - 20 done; Zoster - 2 done; COVID - 3 done  Sexual Screening: active  STD screening: no concern  Eye exam: done about a year ago  Prostate: needs  Colonoscopy:  FIT - 2023  A1c: drawn    Exercise: no regular exercise.  He has had trouble exercising since he injured his knee 3 yrs ago.  He is limited in getting around.  Diet: mostly home cooked    He screened positive for depression.  He does not find he has lack of interest or enegery.  He works on clocks and cars.  He keeps himself busy.  He does not think he has depression.    Past Medical History:   Diagnosis Date    GERD (gastroesophageal reflux disease)     Hyperlipidemia      Past Surgical History:   Procedure Laterality Date    ADENOIDECTOMY  1966    ARTHROSCOPY OF ANKLE WITH DEBRIDEMENT Left 12/19/2019    Procedure: ARTHROSCOPY, ANKLE, WITH DEBRIDEMENT;  Surgeon: Bay Melendez MD;  Location: [facility] OR;  Service: Orthopedics;  Laterality: Left;    EPIDURAL STEROID INJECTION N/A 01/29/2021    Procedure: INJECTION, STEROID, EPIDURAL L4/5;  Surgeon: Alvarado Reaves MD;  Location: [facility] PAIN MGT;  Service: Pain Management;  Laterality: N/A;    EPIDURAL STEROID INJECTION INTO LUMBAR SPINE N/A 12/24/2020    Procedure: Injection-steroid-epidural-lumbar--L4-5;  Surgeon: Ori Cali Jr., MD;  Location: [facility] PAIN MGT;  Service: Pain Management;  Laterality: N/A;    FRACTURE SURGERY      left heel at 39yo    HEEL SPUR SURGERY      left heel - fell off a ladder and had to have this reconstructed    SPINE SURGERY      c7 fx - prior to this, he was getting dizzy spells - none since    TONSILLECTOMY      VASECTOMY       Social History     Socioeconomic History    Marital status:      Spouse name: Nithya    Number of children: 1   Occupational  History    Occupation: Retried   Tobacco Use    Smoking status: Never    Smokeless tobacco: Never   Substance and Sexual Activity    Alcohol use: Not Currently    Drug use: No    Sexual activity: Yes     Partners: Female     Birth control/protection: None     Comment: vasectomy     Social Determinants of Health     Financial Resource Strain: Low Risk  (12/15/2022)    Overall Financial Resource Strain (CARDIA)     Difficulty of Paying Living Expenses: Not hard at all   Food Insecurity: No Food Insecurity (12/15/2022)    Hunger Vital Sign     Worried About Running Out of Food in the Last Year: Never true     Ran Out of Food in the Last Year: Never true   Transportation Needs: No Transportation Needs (12/15/2022)    PRAPARE - Transportation     Lack of Transportation (Medical): No     Lack of Transportation (Non-Medical): No   Physical Activity: Unknown (12/15/2022)    Exercise Vital Sign     Days of Exercise per Week: 5 days     Minutes of Exercise per Session: Patient declined   Stress: Stress Concern Present (12/15/2022)    [employer] [place] [employer] - Occupational Stress Questionnaire     Feeling of Stress : To some extent   Housing Stability: Low Risk  (12/15/2022)    Housing Stability Vital Sign     Unable to Pay for Housing in the Last Year: No     Number of Places Lived in the Last Year: 1     Unstable Housing in the Last Year: No     Review of patient's allergies indicates:  No Known Allergies  Casper Osborne had no medications administered during this visit.    Review of Systems      Objective:      Physical Exam  Vitals reviewed.   Constitutional:       Appearance: He is well-developed.   HENT:      Head: Normocephalic and atraumatic.      Mouth/Throat:      Pharynx: No oropharyngeal exudate.   Eyes:      General: No scleral icterus.        Right eye: No discharge.         Left eye: No discharge.      Pupils: Pupils are equal, round, and reactive to light.   Neck:      Thyroid: No  thyromegaly.      Trachea: No tracheal deviation.   Cardiovascular:      Rate and Rhythm: Normal rate and regular rhythm.      Heart sounds: Normal heart sounds. No murmur heard.     No friction rub. No gallop.   Pulmonary:      Effort: Pulmonary effort is normal. No respiratory distress.      Breath sounds: Normal breath sounds. No wheezing or rales.   Chest:      Chest wall: No tenderness.   Abdominal:      General: Bowel sounds are normal. There is no distension.      Palpations: Abdomen is soft. There is no mass.      Tenderness: There is no abdominal tenderness. There is no guarding or rebound.   Musculoskeletal:         General: No tenderness. Normal range of motion.      Cervical back: Normal range of motion and neck supple.   Skin:     General: Skin is warm and dry.      Coloration: Skin is not pale.      Findings: No erythema or rash.   Neurological:      Mental Status: He is alert and oriented to person, place, and time.   Psychiatric:         Behavior: Behavior normal.         Assessment:       1. Annual physical exam    2. Hyperlipidemia, unspecified hyperlipidemia type    3. Gastroesophageal reflux disease, unspecified whether esophagitis present    4. Prostate cancer screening  - PSA, SCREENING; Future    5. Encounter for colorectal cancer screening  - Fecal Immunochemical Test (iFOBT); Future    6. Chronic pain of right knee  - Ambulatory referral/consult to Orthopedics; Future    7. Aortic atherosclerosis      Plan:       1. Lab orders pending.  Discussed diet and exercise.  Discussed vaccines.    2. Continue Mevacor 40 mg.  3. Prilosec 40 mg.    4.  Check PSA.    5.  FIT test ordered.    6.  Get 2nd opinion from Orthopedics.    7. Continue Mevacor.

## 2024-06-26 ENCOUNTER — PATIENT MESSAGE (OUTPATIENT)
Dept: INTERNAL MEDICINE | Facility: CLINIC | Age: 69
End: 2024-06-26
Payer: MEDICARE

## 2024-06-26 ENCOUNTER — TELEPHONE (OUTPATIENT)
Dept: ORTHOPEDICS | Facility: CLINIC | Age: 69
End: 2024-06-26

## 2024-06-26 NOTE — TELEPHONE ENCOUNTER
Desean with name and call back number    ----- Message from Yolie Whitlock sent at 6/26/2024  1:25 PM CDT -----  Pt calling in regards as to why he wasn't seen today for artur , pt was told they didn't have any rooms available     Confirmed patient's contact info below:  Contact Name: Casper Osborne  Phone Number: [phone]

## 2024-06-28 ENCOUNTER — TELEPHONE (OUTPATIENT)
Dept: INTERNAL MEDICINE | Facility: CLINIC | Age: 69
End: 2024-06-28
Payer: MEDICARE

## 2024-06-28 DIAGNOSIS — M25.561 CHRONIC PAIN OF RIGHT KNEE: Primary | ICD-10-CM

## 2024-06-28 DIAGNOSIS — G89.29 CHRONIC PAIN OF RIGHT KNEE: Primary | ICD-10-CM

## 2024-06-28 NOTE — TELEPHONE ENCOUNTER
----- Message from Angelica Mondragon sent at 6/28/2024  8:12 AM CDT -----  A new order has to be put in its not letting me reinstate the order to reschedule It  ----- Message -----  From: Thania Vee LPN  Sent: 6/27/2024   2:40 PM CDT  To: [facility] Referral Coordinators    Patient would like to get reschedule with ortho. Had appt but after waiting more than 30 minutes after his appt time he left unseen and requested copay back. Having knee issues.

## 2024-06-28 NOTE — TELEPHONE ENCOUNTER
Patient needs a new referral to ortho. He left without being seen and now wants to reschedule but old order not working.

## 2024-07-01 NOTE — PROGRESS NOTES
CC: Right knee instability    68 y.o. Male who presents as a new patient to me. 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 Complaint is right knee subjective instability since a fall off a ladder on 12/20/21. He sustained a multi ligament right knee injury including a complete ACL tear, partial PCL tear, and grade 2 proximal MCL tear as well as a medial meniscus tear.  He initially saw and was treated by my colleague Dr. Wiliam Guerin.  There was some discussion regarding ACL reconstruction but the patient canceled surgery and upon follow up there was discussion regarding arthroscopic debridement of meniscus pathology in isolation.  The patient did not follow through with surgery.  His chief complaint is one of instability rather than pain.  He denies any prominent mechanical symptoms.  He feels that he can not trust the knee.  In particular twisting and turning can be problematic.  He feels a slipping sensation in the knee constantly.  He denies any recent swelling.  Conservative treatment has been extensive to include extensive formal physical therapy, anti-inflammatory medication, bracing.  He has become quite frustrated with how the knee feels and would like to discuss ACL reconstruction surgery.  Here today for a second surgical opinion as referred by his PCP, Leon Chaparro MD.     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    PMHx notable for GERD, HLD.   Negative for tobacco.   Negative for diabetes. Last A1C: 5.6 06/21/24    REVIEW OF SYSTEMS:   Constitution: Negative. Negative for chills, fever and night sweats.    Hematologic/Lymphatic: Negative for bleeding problem. Does not bruise/bleed easily.   Skin: Negative for dry skin, itching and rash.   Musculoskeletal: Negative for falls. Positive for right knee pain and muscle  weakness.     All other review of symptoms were reviewed and found to be noncontributory.     PAST MEDICAL HISTORY:   Past Medical History:   Diagnosis Date    GERD (gastroesophageal reflux disease)     Hyperlipidemia      PAST SURGICAL HISTORY:   Past Surgical History:   Procedure Laterality Date    ADENOIDECTOMY  1966    ARTHROSCOPY OF ANKLE WITH DEBRIDEMENT Left 12/19/2019    Procedure: ARTHROSCOPY, ANKLE, WITH DEBRIDEMENT;  Surgeon: Bay Melendez MD;  Location: [facility] OR;  Service: Orthopedics;  Laterality: Left;    EPIDURAL STEROID INJECTION N/A 01/29/2021    Procedure: INJECTION, STEROID, EPIDURAL L4/5;  Surgeon: Alvarado Reaves MD;  Location: [facility] PAIN MGT;  Service: Pain Management;  Laterality: N/A;    EPIDURAL STEROID INJECTION INTO LUMBAR SPINE N/A 12/24/2020    Procedure: Injection-steroid-epidural-lumbar--L4-5;  Surgeon: Ori Cali Jr., MD;  Location: [facility] PAIN MGT;  Service: Pain Management;  Laterality: N/A;    FRACTURE SURGERY      left heel at 39yo    HEEL SPUR SURGERY      left heel - fell off a ladder and had to have this reconstructed    SPINE SURGERY      c7 fx - prior to this, he was getting dizzy spells - none since    TONSILLECTOMY      VASECTOMY       FAMILY HISTORY:   Family History   Problem Relation Name Age of Onset    Diabetes Mother Stephanie Kimbrough     Cancer Father Merlin Gaspard         menostatic    Heart disease Daughter Adelina Jaimepard         enlarged heart    Hypothyroidism Daughter Adelina Osborne     Colon polyps Neg Hx      Prostate cancer Neg Hx      Colon cancer Neg Hx      Stroke Neg Hx      Hypertension Neg Hx      Hyperlipidemia Neg Hx       SOCIAL HISTORY:   Social History     Socioeconomic History    Marital status:      Spouse name: Nithya    Number of children: 1   Occupational History    Occupation: Retried   Tobacco Use    Smoking status: Never    Smokeless tobacco: Never   Substance and Sexual Activity    Alcohol use: Not Currently    Drug use: No     Sexual activity: Yes     Partners: Female     Birth control/protection: None     Comment: vasectomy     Social Determinants of Health     Financial Resource Strain: Low Risk  (12/15/2022)    Overall Financial Resource Strain (CARDIA)     Difficulty of Paying Living Expenses: Not hard at all   Food Insecurity: No Food Insecurity (12/15/2022)    Hunger Vital Sign     Worried About Running Out of Food in the Last Year: Never true     Ran Out of Food in the Last Year: Never true   Transportation Needs: No Transportation Needs (12/15/2022)    PRAPARE - Transportation     Lack of Transportation (Medical): No     Lack of Transportation (Non-Medical): No   Physical Activity: Unknown (12/15/2022)    Exercise Vital Sign     Days of Exercise per Week: 5 days     Minutes of Exercise per Session: Patient declined   Stress: Stress Concern Present (12/15/2022)    [employer] - Occupational Stress Questionnaire     Feeling of Stress : To some extent   Housing Stability: Low Risk  (12/15/2022)    Housing Stability Vital Sign     Unable to Pay for Housing in the Last Year: No     Number of Places Lived in the Last Year: 1     Unstable Housing in the Last Year: No     MEDICATIONS:     Current Outpatient Medications:     ammonium lactate 12 % Crea, Bid to dry elbow and knee and thick dry skin of thigh, Disp: 140 g, Rfl: 3    aspirin (ECOTRIN) 81 MG EC tablet, Take 81 mg by mouth once daily., Disp: , Rfl:     cycloSPORINE (RESTASIS) 0.05 % ophthalmic emulsion, Place 1 drop into both eyes 2 (two) times daily., Disp: 180 each, Rfl: 3    ibuprofen (ADVIL,MOTRIN) 800 MG tablet, Take 1 tablet (800 mg total) by mouth 3 (three) times daily as needed for Pain., Disp: 90 tablet, Rfl: 2    lovastatin (MEVACOR) 40 MG tablet, TAKE ONE TABLET BY MOUTH AT BEDTIME, Disp: 90 tablet, Rfl: 0    tamsulosin (FLOMAX) 0.4 mg Cap, Take 1 capsule (0.4 mg total) by mouth every evening., Disp: 90 capsule, Rfl: 1    varenicline  "(TYRVAYA) 0.03 mg/spray sprm, 1 spray by Each Nostril route 2 (two) times a day., Disp: 25.2 mL, Rfl: 3    finasteride (PROSCAR) 5 mg tablet, Take 1 tablet (5 mg total) by mouth once daily., Disp: 30 tablet, Rfl: 12    fluticasone propionate (FLONASE) 50 mcg/actuation nasal spray, 1 spray (50 mcg total) by Each Nostril route once daily. (Patient not taking: Reported on 6/21/2024), Disp: 16 g, Rfl: 3    gabapentin (NEURONTIN) 300 MG capsule, Take 1 capsule (300 mg total) by mouth 3 (three) times daily., Disp: 90 capsule, Rfl: 1    ipratropium (ATROVENT) 21 mcg (0.03 %) nasal spray, PLACE TWO SPRAYS IN EACH NOSTRIL THREE TIMES DAILY (Patient not taking: Reported on 6/21/2024), Disp: 30 mL, Rfl: 4    levocetirizine (XYZAL) 5 MG tablet, Take 1 tablet (5 mg total) by mouth every evening., Disp: 30 tablet, Rfl: 0    omeprazole (PRILOSEC) 40 MG capsule, Take 1 capsule (40 mg total) by mouth once daily. (Patient not taking: Reported on 6/21/2024), Disp: 90 capsule, Rfl: 3    oxybutynin (DITROPAN) 5 MG Tab, Take 1 tablet (5 mg total) by mouth 3 (three) times daily as needed (for bladder spasms)., Disp: 90 tablet, Rfl: 11    sodium chloride 0.9% 0.9 % irrigation, Irrigate with 500 mLs as directed as needed (use  ml to irrigate the bladder prn blood clots or catheter not draining). (Patient not taking: Reported on 6/21/2024), Disp: 500 mL, Rfl: 1  No current facility-administered medications for this visit.    Facility-Administered Medications Ordered in Other Visits:     alprazolam ODT dissolvable tablet 0.5 mg, 0.5 mg, Oral, Once PRN, Ori Cali Jr., MD    ALLERGIES:   Review of patient's allergies indicates:  No Known Allergies     PHYSICAL EXAMINATION:  /77   Pulse 70   Ht 5' 11" (1.803 m)   Wt 86 kg (189 lb 7.8 oz)   BMI 26.43 kg/m²   General: Well-developed well-nourished 68 y.o. malein no acute distress   Cardiovascular: Regular rhythm by palpation of distal pulse, normal color and temperature, " no concerning varicosities on symptomatic side   Lungs: No labored breathing or wheezing appreciated   Neuro: Alert and oriented ×3   Psychiatric: well oriented to person, place and time, demonstrates normal mood and affect   Skin: No rashes, lesions or ulcers, normal temperature, turgor, and texture on involved extremity    Ortho/SPM Exam  Examination of the right knee demonstrates intact extensor mechanism.  No swelling or effusion.  Full active extension and flexion.  No apparent pain on range of motion.  Patient denies any significant medial or lateral joint line tenderness or pain to palpation.  Negative Eddie's maneuver for pain.  Stable to varus and valgus stress at 0 and 30°.  Negative posterior drawer.  No pain on posterior drawer testing. Lachman 2B. 1+ pivot shift which is asymmetric to the other side.  No hyperlaxity.  Fairly good quad activation and strength.  Intact straight leg raise.  No pain with passive internal and external rotation of the right hip.  Negative straight leg raise for referred pain or radicular complaints    IMAGING:  X-rays including standing, weight bearing AP and flexion bilateral knees, RIGHT knee lateral and sunrise views ordered and images reviewed by me show:    Well-maintained joint spaces.  Minimal degenerative changes.  No acute findings.  Posterior tibial slope measures 9-10 degrees.    X-rays full length standing hip to ankle view ordered and image reviewed by me show:    Neutral standing alignment.    Prior MRI 09/22/22 of right knee reviewed by me and discussed with patient. Study shows:   1. Chronic complete ACL tear with superimposed scar tissue extending toward the adjacent PCL.  2. Thickened PCL consistent with a scarred partial-thickness tear.  3. Chronic sprain superficial MCL.  4. Stable chronic complex tear of the posterior horn of the medial meniscus with suspected superimposed scar tissue.  5. Stable chronic complex tear of the posterior horn of the  "lateral meniscus that closely approximates the posterior root ligament.  6. Mild tricompartmental osteoarthritis with chondral loss.    ASSESSMENT:      ICD-10-CM ICD-9-CM   1. Rupture of anterior cruciate ligament of right knee, subsequent encounter  S83.511D V58.89     844.2   2. Chronic knee instability  M23.50 718.86   3. Complex tear of lateral meniscus of right knee as current injury, subsequent encounter  S83.271D V58.89   4. Complex tear of medial meniscus of right knee as current injury, subsequent encounter  S83.231D V58.89   5. Chondromalacia of right knee  M94.261 717.7   6. Chronic pain of right knee  M25.561 719.46    G89.29 338.29     PLAN:     I had a long discussion with the patient.  Much of the discussion was centered around determining his primary complaint.  I gather that he does not trust the knee and feels these "slipping" episodes on a daily basis.  This is frustrating for him.  I do think he feels subjective instability from his ACL deficiency.  Not just a quad inhibition issue.  He has had prior physical therapy and conservative treatment.  He wants to discuss surgery.  I do think there is some room to consider arthroscopic ACL reconstruction given the circumstances.  This has been a longstanding issue for him.  Surgery would include arthroscopic debridement most likely of the meniscus pathology along with chondroplasty.  Details of surgery were discussed to include the expected postop rehab and recovery course.  Graft options reviewed.  Allograft would be very reasonable and would be my recommended choice in this case.  Generally speaking his joint spaces are fairly well-maintained on repeat standing films today.  I would like to get a repeat MRI of the knee to assess the articular cartilage in particular.  Patient can follow up afterwards to discuss a final treatment plan.     Return to clinic after MRI.    All questions answered    Procedures  "

## 2024-07-02 ENCOUNTER — HOSPITAL ENCOUNTER (OUTPATIENT)
Dept: RADIOLOGY | Facility: HOSPITAL | Age: 69
Discharge: HOME | End: 2024-07-02
Attending: ORTHOPAEDIC SURGERY
Payer: MEDICARE

## 2024-07-02 ENCOUNTER — OFFICE VISIT (OUTPATIENT)
Dept: SPORTS MEDICINE | Facility: CLINIC | Age: 69
End: 2024-07-02
Payer: MEDICARE

## 2024-07-02 VITALS
HEART RATE: 70 BPM | BODY MASS INDEX: 26.53 KG/M2 | DIASTOLIC BLOOD PRESSURE: 77 MMHG | WEIGHT: 189.5 LBS | HEIGHT: 71 IN | SYSTOLIC BLOOD PRESSURE: 131 MMHG

## 2024-07-02 DIAGNOSIS — M25.561 CHRONIC PAIN OF RIGHT KNEE: ICD-10-CM

## 2024-07-02 DIAGNOSIS — M23.50: ICD-10-CM

## 2024-07-02 DIAGNOSIS — G89.29 CHRONIC PAIN OF RIGHT KNEE: ICD-10-CM

## 2024-07-02 DIAGNOSIS — S83.271D COMPLEX TEAR OF LATERAL MENISCUS OF RIGHT KNEE AS CURRENT INJURY, SUBSEQUENT ENCOUNTER: ICD-10-CM

## 2024-07-02 DIAGNOSIS — M94.261 CHONDROMALACIA OF RIGHT KNEE: ICD-10-CM

## 2024-07-02 DIAGNOSIS — S83.511D RUPTURE OF ANTERIOR CRUCIATE LIGAMENT OF RIGHT KNEE, SUBSEQUENT ENCOUNTER: Primary | ICD-10-CM

## 2024-07-02 DIAGNOSIS — S83.231D COMPLEX TEAR OF MEDIAL MENISCUS OF RIGHT KNEE AS CURRENT INJURY, SUBSEQUENT ENCOUNTER: ICD-10-CM

## 2024-07-02 PROCEDURE — 3075F SYST BP GE 130 - 139MM HG: CPT | Mod: CPTII,S$GLB,, | Performed by: ORTHOPAEDIC SURGERY

## 2024-07-02 PROCEDURE — 77073 BONE LENGTH STUDIES: CPT | Mod: TC

## 2024-07-02 PROCEDURE — 99215 OFFICE O/P EST HI 40 MIN: CPT | Mod: S$GLB,,, | Performed by: ORTHOPAEDIC SURGERY

## 2024-07-02 PROCEDURE — 1101F PT FALLS ASSESS-DOCD LE1/YR: CPT | Mod: CPTII,S$GLB,, | Performed by: ORTHOPAEDIC SURGERY

## 2024-07-02 PROCEDURE — 73562 X-RAY EXAM OF KNEE 3: CPT | Mod: 26,59,LT, | Performed by: RADIOLOGY

## 2024-07-02 PROCEDURE — 77073 BONE LENGTH STUDIES: CPT | Mod: 26,,, | Performed by: RADIOLOGY

## 2024-07-02 PROCEDURE — 3044F HG A1C LEVEL LT 7.0%: CPT | Mod: CPTII,S$GLB,, | Performed by: ORTHOPAEDIC SURGERY

## 2024-07-02 PROCEDURE — 73562 X-RAY EXAM OF KNEE 3: CPT | Mod: TC,LT

## 2024-07-02 PROCEDURE — 3078F DIAST BP <80 MM HG: CPT | Mod: CPTII,S$GLB,, | Performed by: ORTHOPAEDIC SURGERY

## 2024-07-02 PROCEDURE — 73564 X-RAY EXAM KNEE 4 OR MORE: CPT | Mod: 26,RT,, | Performed by: RADIOLOGY

## 2024-07-02 PROCEDURE — 3288F FALL RISK ASSESSMENT DOCD: CPT | Mod: CPTII,S$GLB,, | Performed by: ORTHOPAEDIC SURGERY

## 2024-07-02 PROCEDURE — 99999 PR PBB SHADOW E&M-EST. PATIENT-LVL IV: CPT | Mod: PBBFAC,,, | Performed by: ORTHOPAEDIC SURGERY

## 2024-07-02 PROCEDURE — 1125F AMNT PAIN NOTED PAIN PRSNT: CPT | Mod: CPTII,S$GLB,, | Performed by: ORTHOPAEDIC SURGERY

## 2024-07-02 PROCEDURE — 1159F MED LIST DOCD IN RCRD: CPT | Mod: CPTII,S$GLB,, | Performed by: ORTHOPAEDIC SURGERY

## 2024-07-02 PROCEDURE — 3008F BODY MASS INDEX DOCD: CPT | Mod: CPTII,S$GLB,, | Performed by: ORTHOPAEDIC SURGERY

## 2024-07-02 PROCEDURE — 73564 X-RAY EXAM KNEE 4 OR MORE: CPT | Mod: TC,RT

## 2024-07-06 ENCOUNTER — HOSPITAL ENCOUNTER (OUTPATIENT)
Dept: RADIOLOGY | Facility: HOSPITAL | Age: 69
Discharge: HOME | End: 2024-07-06
Attending: ORTHOPAEDIC SURGERY
Payer: MEDICARE

## 2024-07-06 DIAGNOSIS — M23.50: ICD-10-CM

## 2024-07-06 PROCEDURE — 73721 MRI JNT OF LWR EXTRE W/O DYE: CPT | Mod: TC,RT

## 2024-07-06 PROCEDURE — 73721 MRI JNT OF LWR EXTRE W/O DYE: CPT | Mod: 26,RT,, | Performed by: RADIOLOGY

## 2024-07-09 ENCOUNTER — LAB VISIT (OUTPATIENT)
Dept: LAB | Facility: HOSPITAL | Age: 69
End: 2024-07-09
Attending: INTERNAL MEDICINE
Payer: MEDICARE

## 2024-07-09 DIAGNOSIS — Z12.11 ENCOUNTER FOR COLORECTAL CANCER SCREENING: ICD-10-CM

## 2024-07-09 DIAGNOSIS — Z12.12 ENCOUNTER FOR COLORECTAL CANCER SCREENING: ICD-10-CM

## 2024-07-09 LAB — HEMOCCULT STL QL IA: NEGATIVE

## 2024-07-09 PROCEDURE — 82274 ASSAY TEST FOR BLOOD FECAL: CPT | Performed by: INTERNAL MEDICINE

## 2024-07-09 NOTE — PROGRESS NOTES
CC: Right knee repeat MRI results review    Casper Osborne who presents for MRI review of right knee.  Known complete ACL tear with medial and lateral meniscus pathology.  Patient reports ongoing subjective instability with intermittent pain.  Repeat MRI ordered for further assessment.  Patient does not report any new incidents or injuries since their last appointment. Pain and symptoms remain unchanged since his last appointment. Here today to discuss treatment options.     Prior Hx 7/2/2024:  68 y.o. Male who presents as a new patient to me. 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 Complaint is right knee subjective instability since a fall off a ladder on 12/20/21. He sustained a multi ligament right knee injury including a complete ACL tear, partial PCL tear, and grade 2 proximal MCL tear as well as a medial meniscus tear.  He initially saw and was treated by my colleague Dr. Wiliam Guerin.  There was some discussion regarding ACL reconstruction but the patient canceled surgery and upon follow up there was discussion regarding arthroscopic debridement of meniscus pathology in isolation.  The patient did not follow through with surgery.  His chief complaint is one of instability rather than pain.  He denies any prominent mechanical symptoms.  He feels that he can not trust the knee.  In particular twisting and turning can be problematic.  He feels a slipping sensation in the knee constantly.  He denies any recent swelling.  Conservative treatment has been extensive to include extensive formal physical therapy, anti-inflammatory medication, bracing.  He has become quite frustrated with how the knee feels and would like to discuss ACL reconstruction surgery.  Here today for a second surgical opinion as referred by his PCP, Leon Chaparro MD.     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    PMHx notable for GERD, HLD.   Negative for tobacco.   Negative for diabetes. Last A1C: 5.6 06/21/24    REVIEW OF SYSTEMS:   Constitution: Negative. Negative for chills, fever and night sweats.    Hematologic/Lymphatic: Negative for bleeding problem. Does not bruise/bleed easily.   Skin: Negative for dry skin, itching and rash.   Musculoskeletal: Negative for falls. Positive for right knee pain and muscle weakness.     All other review of symptoms were reviewed and found to be noncontributory.     PAST MEDICAL HISTORY:   Past Medical History:   Diagnosis Date    GERD (gastroesophageal reflux disease)     Hyperlipidemia      PAST SURGICAL HISTORY:   Past Surgical History:   Procedure Laterality Date    ADENOIDECTOMY  1966    ARTHROSCOPY OF ANKLE WITH DEBRIDEMENT Left 12/19/2019    Procedure: ARTHROSCOPY, ANKLE, WITH DEBRIDEMENT;  Surgeon: Bay Melendez MD;  Location: [facility] OR;  Service: Orthopedics;  Laterality: Left;    EPIDURAL STEROID INJECTION N/A 01/29/2021    Procedure: INJECTION, STEROID, EPIDURAL L4/5;  Surgeon: Alvarado Reaves MD;  Location: [facility] PAIN MGT;  Service: Pain Management;  Laterality: N/A;    EPIDURAL STEROID INJECTION INTO LUMBAR SPINE N/A 12/24/2020    Procedure: Injection-steroid-epidural-lumbar--L4-5;  Surgeon: Ori Cali Jr., MD;  Location: [facility] PAIN T;  Service: Pain Management;  Laterality: N/A;    FRACTURE SURGERY      left heel at 41yo    HEEL SPUR SURGERY      left heel - fell off a ladder and had to have this reconstructed    SPINE SURGERY      c7 fx - prior to this, he was getting dizzy spells - none since    TONSILLECTOMY      VASECTOMY       FAMILY HISTORY:   Family History   Problem Relation Name Age of Onset    Diabetes Mother Stephanie Kimbrough     Cancer Father Merlin Gaspard         menostatic    Heart disease Daughter Adelina Osborne         enlarged heart    Hypothyroidism Daughter Adelina Osborne      Colon polyps Neg Hx      Prostate cancer Neg Hx      Colon cancer Neg Hx      Stroke Neg Hx      Hypertension Neg Hx      Hyperlipidemia Neg Hx       SOCIAL HISTORY:   Social History     Socioeconomic History    Marital status:      Spouse name: Nithya    Number of children: 1   Occupational History    Occupation: Retried   Tobacco Use    Smoking status: Never    Smokeless tobacco: Never   Substance and Sexual Activity    Alcohol use: Not Currently    Drug use: No    Sexual activity: Yes     Partners: Female     Birth control/protection: None     Comment: vasectomy     Social Determinants of Health     Financial Resource Strain: Low Risk  (12/15/2022)    Overall Financial Resource Strain (CARDIA)     Difficulty of Paying Living Expenses: Not hard at all   Food Insecurity: No Food Insecurity (12/15/2022)    Hunger Vital Sign     Worried About Running Out of Food in the Last Year: Never true     Ran Out of Food in the Last Year: Never true   Transportation Needs: No Transportation Needs (12/15/2022)    PRAPARE - Transportation     Lack of Transportation (Medical): No     Lack of Transportation (Non-Medical): No   Physical Activity: Unknown (12/15/2022)    Exercise Vital Sign     Days of Exercise per Week: 5 days     Minutes of Exercise per Session: Patient declined   Stress: Stress Concern Present (12/15/2022)    [employer] - Occupational Stress Questionnaire     Feeling of Stress : To some extent   Housing Stability: Low Risk  (12/15/2022)    Housing Stability Vital Sign     Unable to Pay for Housing in the Last Year: No     Number of Places Lived in the Last Year: 1     Unstable Housing in the Last Year: No     MEDICATIONS:     Current Outpatient Medications:     ammonium lactate 12 % Crea, Bid to dry elbow and knee and thick dry skin of thigh, Disp: 140 g, Rfl: 3    aspirin (ECOTRIN) 81 MG EC tablet, Take 81 mg by mouth once daily., Disp: , Rfl:     cycloSPORINE (RESTASIS) 0.05 %  ophthalmic emulsion, Place 1 drop into both eyes 2 (two) times daily., Disp: 180 each, Rfl: 3    fluticasone propionate (FLONASE) 50 mcg/actuation nasal spray, 1 spray (50 mcg total) by Each Nostril route once daily., Disp: 16 g, Rfl: 3    ibuprofen (ADVIL,MOTRIN) 800 MG tablet, Take 1 tablet (800 mg total) by mouth 3 (three) times daily as needed for Pain., Disp: 90 tablet, Rfl: 2    ipratropium (ATROVENT) 21 mcg (0.03 %) nasal spray, PLACE TWO SPRAYS IN EACH NOSTRIL THREE TIMES DAILY, Disp: 30 mL, Rfl: 4    lovastatin (MEVACOR) 40 MG tablet, TAKE ONE TABLET BY MOUTH AT BEDTIME, Disp: 90 tablet, Rfl: 0    omeprazole (PRILOSEC) 40 MG capsule, Take 1 capsule (40 mg total) by mouth once daily., Disp: 90 capsule, Rfl: 3    tamsulosin (FLOMAX) 0.4 mg Cap, Take 1 capsule (0.4 mg total) by mouth every evening., Disp: 90 capsule, Rfl: 3    varenicline (TYRVAYA) 0.03 mg/spray sprm, 1 spray by Each Nostril route 2 (two) times a day., Disp: 25.2 mL, Rfl: 3    finasteride (PROSCAR) 5 mg tablet, Take 1 tablet (5 mg total) by mouth once daily., Disp: 30 tablet, Rfl: 12    gabapentin (NEURONTIN) 300 MG capsule, Take 1 capsule (300 mg total) by mouth 3 (three) times daily., Disp: 90 capsule, Rfl: 1    levocetirizine (XYZAL) 5 MG tablet, Take 1 tablet (5 mg total) by mouth every evening., Disp: 30 tablet, Rfl: 0    oxybutynin (DITROPAN) 5 MG Tab, Take 1 tablet (5 mg total) by mouth 3 (three) times daily as needed (for bladder spasms)., Disp: 90 tablet, Rfl: 11    sodium chloride 0.9% 0.9 % irrigation, Irrigate with 500 mLs as directed as needed (use  ml to irrigate the bladder prn blood clots or catheter not draining). (Patient not taking: Reported on 6/21/2024), Disp: 500 mL, Rfl: 1  No current facility-administered medications for this visit.    Facility-Administered Medications Ordered in Other Visits:     alprazolam ODT dissolvable tablet 0.5 mg, 0.5 mg, Oral, Once PRN, Ori Cali Jr., MD    ALLERGIES:   Review of  "patient's allergies indicates:  No Known Allergies     PHYSICAL EXAMINATION:  /69   Pulse 67   Ht 5' 11" (1.803 m)   Wt 86 kg (189 lb 9.5 oz)   BMI 26.44 kg/m²   General: Well-developed well-nourished 68 y.o. malein no acute distress   Cardiovascular: Regular rhythm by palpation of distal pulse, normal color and temperature, no concerning varicosities on symptomatic side   Lungs: No labored breathing or wheezing appreciated   Neuro: Alert and oriented ×3   Psychiatric: well oriented to person, place and time, demonstrates normal mood and affect   Skin: No rashes, lesions or ulcers, normal temperature, turgor, and texture on involved extremity    Ortho/SPM Exam  Examination of the right knee again demonstrates intact extensor mechanism.  No swelling or effusion.  Full active extension and flexion.  No apparent pain on range of motion.  Patient denies any significant medial or lateral joint line tenderness or pain to palpation.  Negative Eddie's maneuver for pain.  Stable to varus and valgus stress at 0 and 30°.  Negative posterior drawer.  No pain on posterior drawer testing. Lachman 2B. 1+ pivot shift which is asymmetric to the other side.  No hyperlaxity.  Fairly good quad activation and strength.  Intact straight leg raise.  No pain with passive internal and external rotation of the right hip.  Negative straight leg raise for referred pain or radicular complaints.  Fairly neutral standing alignment.    IMAGING:  X-rays including standing, weight bearing AP and flexion bilateral knees, RIGHT knee lateral and sunrise views ordered and images reviewed by me show:    Well-maintained joint spaces.  Minimal degenerative changes.  No acute findings.  Posterior tibial slope measures 9-10 degrees.    X-rays full length standing hip to ankle view ordered and image reviewed by me show:    Neutral standing alignment.    Repeat MRI 07/06/24 of right knee reviewed by me and discussed with patient. Study shows: "   Bucket-handle tear of the lateral meniscus  Findings equivocal for medial meniscal tear.  Findings suggestive of remote injuries to the ACL, MCL, and lateral collateral ligament complexes.  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  Insertional quadriceps tendinosis.    ASSESSMENT:      ICD-10-CM ICD-9-CM   1. Rupture of anterior cruciate ligament of right knee, subsequent encounter  S83.511D V58.89     844.2   2. Chronic instability of right knee  M23.51 718.86   3. Complex tear of lateral meniscus of right knee as current injury, subsequent encounter  S83.271D V58.89   4. Complex tear of medial meniscus of right knee as current injury, subsequent encounter  S83.231D V58.89   5. Chondromalacia of right knee  M94.261 717.7   6. Chronic pain of right knee  M25.561 719.46    G89.29 338.29       PLAN:     Imaging reviewed with the patient.  On my read he has fairly well-maintained articular cartilage.  Complete ACL tear is reconfirmed.  Chronic.  The patient has developed a flipped bucket-handle tear lateral meniscus which is a new finding compared to prior study.  This appears to be within the white-white zone primarily.  He does have some underlying chondromalacia but nothing too significant.  No bone marrow edema.  The patient's chief complaint is subjective instability.  I do elicit a pivot shift on clinical exam.  I do think he is symptomatic likely from his ACL chronic rupture.  We have discussed treatment options for this to range the spectrum of continued nonoperative treatment, limited goals arthroscopy for debridement, versus ACL reconstruction with meniscus treatment as indicated.  The patient has provoked typical instability symptoms on pivot shift testing today and I think this is a less common scenario where despite his age he is active and has ongoing functional ACL deficiency of the knee despite extensive prior conservative treatment.  We discussed the details of  ACL reconstruction to include graft options.  I have recommended allograft.  He does wish to proceed with that.  He would like to delay surgery if possible until after summer for work-related reasons.  I think that is okay given the circumstances.  He will let us know when he wants to proceed.  I will coordinate and tag team with my partner Dr. Guerin who has taking care of the patient previously.    Plan is Right knee arthroscopic ACL reconstruction with allograft, partial medial and lateral meniscectomy versus meniscus repair as indicated, chondroplasty    Preop clearance per PCP    Patient is adamant that he does not want a regional block.  He had a bad experience previously with a block by his account.    Informed Consent:    The details of the surgical procedure were explained, including the location of probable incisions and a description of possible hardware and/or grafts to be used. Alternatives to both operative and non-operative options with associated risks and benefits were discussed. The patient understands the likely convalescence after surgery and, in particular, the expected postop rehab and recovery course. The outlined risks and potential complications of the proposed procedure include but are not limited to: infection, poor wound healing, scarring, deformity, stiffness, swelling, continued or recurrent pain, instability, hardware or prosthetic failure if implanted, symptomatic hardware requiring removal, dislocation, weakness, neurovascular injury, numbness, chronic regional pain disorder, tissue nonhealing/irreparability/retear, subsequent contralateral limb injury or pathology, chondral injury, arthritis, fracture, blood clot formation, inability to return to previous level of activity, anesthetic or regional block complication up to death, need for additional procedure as indicated intraoperatively, and potential need for further surgery.    The patient was also informed and understands that  the risks of surgery are greater for patients with a current condition or history of heart disease, obesity, clotting disorders, recurrent infections, steroid use, current or past smoking, and factors such as sedentary lifestyle and noncompliance with medications, therapy or follow-up. The degree of the increased risk is hard to estimate with any degree of precision. If applicable, smoking cessation was discussed.     All questions were answered. The patient has verbalized understanding of these issues and wishes to proceed with the surgery as discussed.

## 2024-07-15 ENCOUNTER — OFFICE VISIT (OUTPATIENT)
Dept: SPORTS MEDICINE | Facility: CLINIC | Age: 69
End: 2024-07-15
Payer: MEDICARE

## 2024-07-15 VITALS
DIASTOLIC BLOOD PRESSURE: 69 MMHG | HEIGHT: 71 IN | WEIGHT: 189.63 LBS | BODY MASS INDEX: 26.55 KG/M2 | SYSTOLIC BLOOD PRESSURE: 126 MMHG | HEART RATE: 67 BPM

## 2024-07-15 DIAGNOSIS — G89.29 CHRONIC PAIN OF RIGHT KNEE: ICD-10-CM

## 2024-07-15 DIAGNOSIS — M25.561 CHRONIC PAIN OF RIGHT KNEE: ICD-10-CM

## 2024-07-15 DIAGNOSIS — S83.511D RUPTURE OF ANTERIOR CRUCIATE LIGAMENT OF RIGHT KNEE, SUBSEQUENT ENCOUNTER: Primary | ICD-10-CM

## 2024-07-15 DIAGNOSIS — M94.261 CHONDROMALACIA OF RIGHT KNEE: ICD-10-CM

## 2024-07-15 DIAGNOSIS — M23.51 CHRONIC INSTABILITY OF RIGHT KNEE: ICD-10-CM

## 2024-07-15 DIAGNOSIS — S83.231D COMPLEX TEAR OF MEDIAL MENISCUS OF RIGHT KNEE AS CURRENT INJURY, SUBSEQUENT ENCOUNTER: ICD-10-CM

## 2024-07-15 DIAGNOSIS — S83.271D COMPLEX TEAR OF LATERAL MENISCUS OF RIGHT KNEE AS CURRENT INJURY, SUBSEQUENT ENCOUNTER: ICD-10-CM

## 2024-07-15 PROCEDURE — 1125F AMNT PAIN NOTED PAIN PRSNT: CPT | Mod: CPTII,S$GLB,, | Performed by: ORTHOPAEDIC SURGERY

## 2024-07-15 PROCEDURE — 99215 OFFICE O/P EST HI 40 MIN: CPT | Mod: S$GLB,,, | Performed by: ORTHOPAEDIC SURGERY

## 2024-07-15 PROCEDURE — 3044F HG A1C LEVEL LT 7.0%: CPT | Mod: CPTII,S$GLB,, | Performed by: ORTHOPAEDIC SURGERY

## 2024-07-15 PROCEDURE — 3078F DIAST BP <80 MM HG: CPT | Mod: CPTII,S$GLB,, | Performed by: ORTHOPAEDIC SURGERY

## 2024-07-15 PROCEDURE — 99999 PR PBB SHADOW E&M-EST. PATIENT-LVL III: CPT | Mod: PBBFAC,,, | Performed by: ORTHOPAEDIC SURGERY

## 2024-07-15 PROCEDURE — 3288F FALL RISK ASSESSMENT DOCD: CPT | Mod: CPTII,S$GLB,, | Performed by: ORTHOPAEDIC SURGERY

## 2024-07-15 PROCEDURE — 3008F BODY MASS INDEX DOCD: CPT | Mod: CPTII,S$GLB,, | Performed by: ORTHOPAEDIC SURGERY

## 2024-07-15 PROCEDURE — 3074F SYST BP LT 130 MM HG: CPT | Mod: CPTII,S$GLB,, | Performed by: ORTHOPAEDIC SURGERY

## 2024-07-15 PROCEDURE — 1101F PT FALLS ASSESS-DOCD LE1/YR: CPT | Mod: CPTII,S$GLB,, | Performed by: ORTHOPAEDIC SURGERY

## 2024-07-15 PROCEDURE — 1159F MED LIST DOCD IN RCRD: CPT | Mod: CPTII,S$GLB,, | Performed by: ORTHOPAEDIC SURGERY

## 2024-07-16 ENCOUNTER — TELEPHONE (OUTPATIENT)
Dept: SPORTS MEDICINE | Facility: CLINIC | Age: 69
End: 2024-07-16
Payer: MEDICARE

## 2024-07-16 ENCOUNTER — PATIENT MESSAGE (OUTPATIENT)
Dept: SPORTS MEDICINE | Facility: CLINIC | Age: 69
End: 2024-07-16
Payer: MEDICARE

## 2024-07-16 NOTE — TELEPHONE ENCOUNTER
----- Message from Lorenzo Pineda sent at 7/16/2024  1:04 PM CDT -----  Regarding: PT IS REQUESTING A CALL BACK REGARDING MRI COMPARED TO THE ONE A YEAR AND A HALF AGO?  Contact: pt  Confirmed contact info below:  Contact Name: Casper Osborne  Phone Number: [phone]

## 2024-07-16 NOTE — TELEPHONE ENCOUNTER
Pt called & was transferred by phone staff.     ==  Spoke c pt. He stated that Dr. Corrigan called him last night but wasn't fully engaged in conversation. He asked questions regarding most current MRI & prior MRIs. Confirmed that most recent MRI 07/06/24 revealed progressed meniscus tear not previously seen on prior MRIs from 2021 & 2022. Pt will call c additional questions/concerns in interim. Pt expressed understanding & was thankful.

## 2024-07-16 NOTE — TELEPHONE ENCOUNTER
Attempted to contact pt. Left voicemail. Called to answer his questions about current & past MRIs. Asked pt to return call to clinic at [phone].

## 2024-08-06 ENCOUNTER — PATIENT MESSAGE (OUTPATIENT)
Dept: INTERNAL MEDICINE | Facility: CLINIC | Age: 69
End: 2024-08-06
Payer: MEDICARE

## 2024-08-06 RX ORDER — OMEPRAZOLE 40 MG/1
40 CAPSULE, DELAYED RELEASE ORAL DAILY
Qty: 90 CAPSULE | Refills: 2 | Status: SHIPPED | OUTPATIENT
Start: 2024-08-06

## 2024-09-14 DIAGNOSIS — E78.5 HYPERLIPIDEMIA, UNSPECIFIED HYPERLIPIDEMIA TYPE: Chronic | ICD-10-CM

## 2024-09-14 NOTE — TELEPHONE ENCOUNTER
No care due was identified.  [facility] Embedded Care Due Messages. Reference number: [id number].   9/14/2024 11:15:36 AM CDT

## 2024-09-16 RX ORDER — IBUPROFEN 800 MG/1
800 TABLET ORAL 3 TIMES DAILY PRN
Qty: 90 TABLET | Refills: 2 | Status: SHIPPED | OUTPATIENT
Start: 2024-09-16

## 2024-09-16 RX ORDER — LOVASTATIN 40 MG/1
40 TABLET ORAL NIGHTLY
Qty: 90 TABLET | Refills: 0 | OUTPATIENT
Start: 2024-09-16

## 2024-09-16 NOTE — TELEPHONE ENCOUNTER
Refill Decision Note   Casper Osborne  is requesting a refill authorization.  Brief Assessment and Rationale for Refill:        Medication Therapy Plan:  E-Prescribing Status: Receipt confirmed by [facility] (9/15/2024      Comments:     Note composed:11:15 AM 09/16/2024

## 2024-09-25 DIAGNOSIS — Z00.00 ENCOUNTER FOR MEDICARE ANNUAL WELLNESS EXAM: ICD-10-CM

## 2024-10-21 ENCOUNTER — TELEPHONE (OUTPATIENT)
Dept: ORTHOPEDICS | Facility: CLINIC | Age: 69
End: 2024-10-21
Payer: MEDICARE

## 2024-10-21 NOTE — TELEPHONE ENCOUNTER
I spoke with pt,scheduled appointment for left ankle injection. Pt,verbalized understanding.      ----- Message from Josiah sent at 10/21/2024  8:20 AM CDT -----  Regarding: Post Op  Contact: pt.  [phone]  Pt is calling to schedule a post op appt with provider and to get an injection in left ankle. Patient Requesting Call Back @  [phone]

## 2024-10-24 ENCOUNTER — HOSPITAL ENCOUNTER (OUTPATIENT)
Dept: RADIOLOGY | Facility: HOSPITAL | Age: 69
Discharge: HOME | End: 2024-10-24
Attending: ORTHOPAEDIC SURGERY
Payer: MEDICARE

## 2024-10-24 ENCOUNTER — OFFICE VISIT (OUTPATIENT)
Dept: ORTHOPEDICS | Facility: CLINIC | Age: 69
End: 2024-10-24
Payer: MEDICARE

## 2024-10-24 ENCOUNTER — TELEPHONE (OUTPATIENT)
Dept: INTERVENTIONAL RADIOLOGY/VASCULAR | Facility: CLINIC | Age: 69
End: 2024-10-24
Payer: MEDICARE

## 2024-10-24 VITALS — BODY MASS INDEX: 26.55 KG/M2 | WEIGHT: 189.63 LBS | HEIGHT: 71 IN

## 2024-10-24 DIAGNOSIS — M19.072 ARTHRITIS OF LEFT SUBTALAR JOINT: ICD-10-CM

## 2024-10-24 DIAGNOSIS — R52 PAIN: ICD-10-CM

## 2024-10-24 DIAGNOSIS — R52 PAIN: Primary | ICD-10-CM

## 2024-10-24 PROCEDURE — 3044F HG A1C LEVEL LT 7.0%: CPT | Mod: CPTII,S$GLB,, | Performed by: ORTHOPAEDIC SURGERY

## 2024-10-24 PROCEDURE — 73610 X-RAY EXAM OF ANKLE: CPT | Mod: TC,LT

## 2024-10-24 PROCEDURE — 1125F AMNT PAIN NOTED PAIN PRSNT: CPT | Mod: CPTII,S$GLB,, | Performed by: ORTHOPAEDIC SURGERY

## 2024-10-24 PROCEDURE — 1159F MED LIST DOCD IN RCRD: CPT | Mod: CPTII,S$GLB,, | Performed by: ORTHOPAEDIC SURGERY

## 2024-10-24 PROCEDURE — 1101F PT FALLS ASSESS-DOCD LE1/YR: CPT | Mod: CPTII,S$GLB,, | Performed by: ORTHOPAEDIC SURGERY

## 2024-10-24 PROCEDURE — 3008F BODY MASS INDEX DOCD: CPT | Mod: CPTII,S$GLB,, | Performed by: ORTHOPAEDIC SURGERY

## 2024-10-24 PROCEDURE — 3288F FALL RISK ASSESSMENT DOCD: CPT | Mod: CPTII,S$GLB,, | Performed by: ORTHOPAEDIC SURGERY

## 2024-10-24 PROCEDURE — 73610 X-RAY EXAM OF ANKLE: CPT | Mod: 26,LT,, | Performed by: RADIOLOGY

## 2024-10-24 PROCEDURE — 99214 OFFICE O/P EST MOD 30 MIN: CPT | Mod: S$GLB,,, | Performed by: ORTHOPAEDIC SURGERY

## 2024-10-24 PROCEDURE — 99999 PR PBB SHADOW E&M-EST. PATIENT-LVL IV: CPT | Mod: PBBFAC,,, | Performed by: ORTHOPAEDIC SURGERY

## 2024-10-24 NOTE — PROGRESS NOTES
Subjective:      Patient ID: Casper Osborne is a 68 y.o. male.    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  I had been following for a long time doing periodic corticosteroid injections and his last visit with me was on 02/01/2021 at which time I had referred him to Dr. Brennan to discuss possible surgical intervention.  He saw Dr. Brennan on 02/04/2021 at which time surgical options were discussed.  He returns today and reports that he is about to have surgery on his right knee from an injury a couple of years ago in his having continued symptoms with his left foot.  He is concerned that he is going to have difficulty after his knee surgery getting around on his foot.  He reports that his left foot and ankle have been flared up recently.  He would like to consider a corticosteroid injection.    Past Medical History:   Diagnosis Date    GERD (gastroesophageal reflux disease)     Hyperlipidemia        Current Outpatient Medications:     ammonium lactate 12 % Crea, Bid to dry elbow and knee and thick dry skin of thigh, Disp: 140 g, Rfl: 3    aspirin (ECOTRIN) 81 MG EC tablet, Take 81 mg by mouth once daily., Disp: , Rfl:     cycloSPORINE (RESTASIS) 0.05 % ophthalmic emulsion, Place 1 drop into both eyes 2 (two) times daily., Disp: 180 each, Rfl: 3    finasteride (PROSCAR) 5 mg tablet, Take 1 tablet (5 mg total) by mouth once daily., Disp: 30 tablet, Rfl: 12    fluticasone propionate (FLONASE) 50 mcg/actuation nasal spray, 1 spray (50 mcg total) by Each Nostril route once daily., Disp: 16 g, Rfl: 3    gabapentin (NEURONTIN) 300 MG capsule, Take 1 capsule (300 mg total) by mouth 3 (three) times daily., Disp: 90 capsule, Rfl: 1    ibuprofen (ADVIL,MOTRIN) 800 MG tablet, Take 1 tablet (800 mg total) by mouth 3  "(three) times daily as needed for Pain., Disp: 90 tablet, Rfl: 2    ipratropium (ATROVENT) 21 mcg (0.03 %) nasal spray, PLACE TWO SPRAYS IN EACH NOSTRIL THREE TIMES DAILY, Disp: 30 mL, Rfl: 4    levocetirizine (XYZAL) 5 MG tablet, Take 1 tablet (5 mg total) by mouth every evening., Disp: 30 tablet, Rfl: 0    lovastatin (MEVACOR) 40 MG tablet, TAKE ONE TABLET BY MOUTH AT BEDTIME, Disp: 90 tablet, Rfl: 3    omeprazole (PRILOSEC) 40 MG capsule, Take 1 capsule (40 mg total) by mouth once daily., Disp: 90 capsule, Rfl: 2    oxybutynin (DITROPAN) 5 MG Tab, Take 1 tablet (5 mg total) by mouth 3 (three) times daily as needed (for bladder spasms)., Disp: 90 tablet, Rfl: 11    sodium chloride 0.9% 0.9 % irrigation, Irrigate with 500 mLs as directed as needed (use  ml to irrigate the bladder prn blood clots or catheter not draining). (Patient not taking: Reported on 6/21/2024), Disp: 500 mL, Rfl: 1    tamsulosin (FLOMAX) 0.4 mg Cap, Take 1 capsule (0.4 mg total) by mouth every evening., Disp: 90 capsule, Rfl: 3    varenicline (TYRVAYA) 0.03 mg/spray sprm, 1 spray by Each Nostril route 2 (two) times a day., Disp: 25.2 mL, Rfl: 3  No current facility-administered medications for this visit.    Facility-Administered Medications Ordered in Other Visits:     alprazolam ODT dissolvable tablet 0.5 mg, 0.5 mg, Oral, Once PRN, Ori Cali Jr., MD  Review of patient's allergies indicates:  No Known Allergies    Ht 5' 11" (1.803 m)   Wt 86 kg (189 lb 9.5 oz)   BMI 26.44 kg/m²     ROS:  Negative for chest pain, shortness of breath, fevers, or unexplained weight loss      Objective:    Ortho Exam       He walks in today with an antalgic gait.  Inspection of the left ankle and hindfoot reveals some mild enlargement relative to the right ankle and hindfoot.  He has limited motion of the left ankle and almost no motion of the left subtalar joint.  He has tenderness in the sinus tarsi area of his left hindfoot.  He is " neurovascularly intact      Assessment:     Imaging:  I ordered and reviewed a standing x-ray of the left ankle today.  I compared the x-rays today from x-rays six years ago and there has been some slight progression of his ankle arthritis on the lateral view but no significant changes of the subtalar arthritis which has been severe for a long time.        1. Pain  X-Ray Ankle Complete Left    Ambulatory referral/consult  to [facility] Interventional RAD    IR Aspiration Injection Medium Joint W Imaging      2. Arthritis of left subtalar joint  Ambulatory referral/consult  to [facility] Interventional RAD    IR Aspiration Injection Medium Joint W Imaging              Plan:       Orders Placed This Encounter    X-Ray Ankle Complete Left      Recommendation:  It has been very difficult and painful to inject his left subtalar joint in the past.  I suggested that we try an injection with interventional radiology.  He is in agreement so I put in an order for a guided steroid injection by Interventional Radiology.    Follow-up as needed

## 2024-10-25 ENCOUNTER — PATIENT MESSAGE (OUTPATIENT)
Dept: ORTHOPEDICS | Facility: CLINIC | Age: 69
End: 2024-10-25
Payer: MEDICARE

## 2024-10-29 DIAGNOSIS — M25.561 CHRONIC PAIN OF RIGHT KNEE: ICD-10-CM

## 2024-10-29 DIAGNOSIS — G89.29 CHRONIC PAIN OF RIGHT KNEE: ICD-10-CM

## 2024-10-29 DIAGNOSIS — M19.072 ARTHRITIS OF LEFT SUBTALAR JOINT: Primary | ICD-10-CM

## 2024-10-29 RX ORDER — DICLOFENAC SODIUM 10 MG/G
2 GEL TOPICAL DAILY PRN
Qty: 450 G | Refills: 3 | Status: SHIPPED | OUTPATIENT
Start: 2024-10-29

## 2024-12-02 ENCOUNTER — OFFICE VISIT (OUTPATIENT)
Dept: SPORTS MEDICINE | Facility: CLINIC | Age: 69
End: 2024-12-02
Payer: MEDICARE

## 2024-12-02 VITALS
HEART RATE: 68 BPM | WEIGHT: 191.81 LBS | BODY MASS INDEX: 26.75 KG/M2 | DIASTOLIC BLOOD PRESSURE: 87 MMHG | SYSTOLIC BLOOD PRESSURE: 142 MMHG

## 2024-12-02 DIAGNOSIS — M23.51 CHRONIC INSTABILITY OF RIGHT KNEE: ICD-10-CM

## 2024-12-02 DIAGNOSIS — M25.561 CHRONIC PAIN OF RIGHT KNEE: ICD-10-CM

## 2024-12-02 DIAGNOSIS — S83.511D RUPTURE OF ANTERIOR CRUCIATE LIGAMENT OF RIGHT KNEE, SUBSEQUENT ENCOUNTER: Primary | ICD-10-CM

## 2024-12-02 DIAGNOSIS — S83.271D COMPLEX TEAR OF LATERAL MENISCUS OF RIGHT KNEE AS CURRENT INJURY, SUBSEQUENT ENCOUNTER: ICD-10-CM

## 2024-12-02 DIAGNOSIS — S83.231D COMPLEX TEAR OF MEDIAL MENISCUS OF RIGHT KNEE AS CURRENT INJURY, SUBSEQUENT ENCOUNTER: ICD-10-CM

## 2024-12-02 DIAGNOSIS — G89.29 CHRONIC PAIN OF RIGHT KNEE: ICD-10-CM

## 2024-12-02 DIAGNOSIS — M94.261 CHONDROMALACIA OF RIGHT KNEE: ICD-10-CM

## 2024-12-02 PROCEDURE — 99215 OFFICE O/P EST HI 40 MIN: CPT | Mod: S$GLB,,, | Performed by: ORTHOPAEDIC SURGERY

## 2024-12-02 PROCEDURE — 99999 PR PBB SHADOW E&M-EST. PATIENT-LVL II: CPT | Mod: PBBFAC,,, | Performed by: ORTHOPAEDIC SURGERY

## 2024-12-02 PROCEDURE — 3077F SYST BP >= 140 MM HG: CPT | Mod: CPTII,S$GLB,, | Performed by: ORTHOPAEDIC SURGERY

## 2024-12-02 PROCEDURE — 1101F PT FALLS ASSESS-DOCD LE1/YR: CPT | Mod: CPTII,S$GLB,, | Performed by: ORTHOPAEDIC SURGERY

## 2024-12-02 PROCEDURE — 1125F AMNT PAIN NOTED PAIN PRSNT: CPT | Mod: CPTII,S$GLB,, | Performed by: ORTHOPAEDIC SURGERY

## 2024-12-02 PROCEDURE — 3044F HG A1C LEVEL LT 7.0%: CPT | Mod: CPTII,S$GLB,, | Performed by: ORTHOPAEDIC SURGERY

## 2024-12-02 PROCEDURE — 3079F DIAST BP 80-89 MM HG: CPT | Mod: CPTII,S$GLB,, | Performed by: ORTHOPAEDIC SURGERY

## 2024-12-02 PROCEDURE — 3288F FALL RISK ASSESSMENT DOCD: CPT | Mod: CPTII,S$GLB,, | Performed by: ORTHOPAEDIC SURGERY

## 2024-12-02 PROCEDURE — 3008F BODY MASS INDEX DOCD: CPT | Mod: CPTII,S$GLB,, | Performed by: ORTHOPAEDIC SURGERY

## 2024-12-02 NOTE — PROGRESS NOTES
CC: Right knee follow up     Casper Osborne, presents today for follow up appointment of his right knee.  Chief complaint of ongoing right knee pain with subjective instability.  See prior notes for details.  The patient has had extensive prior conservative treatment.  He is quite frustrated with his knee as he sees that his current complaints have been persistent for the last few years.  He describes subjective instability with shifting episodes with day-to-day activities.  Limits ability to do work on feet.  He also has contralateral lower extremity subtalar arthritis which is quite significant following prior calcaneal fracture.  He is under the care of my colleague Dr. Melendez.  Prior corticosteroid injections in that area and more recently was referred to IR for guided repeat steroid injection.  We had a long discussion today regarding which side seems to limit him the most and he really feels that he would like to get the right knee addressed at this time prior to any further intervention for the left hindfoot and subtalar region.  He also canceled his IR guided injection because he does not feel that the steroid will last too long and we will be of much benefit.    Prior Hx 7/15/2024:  Casper Osborne who presents for MRI review of right knee.  Known complete ACL tear with medial and lateral meniscus pathology.  Patient reports ongoing subjective instability with intermittent pain.  Repeat MRI ordered for further assessment.  Patient does not report any new incidents or injuries since their last appointment. Pain and symptoms remain unchanged since his last appointment. Here today to discuss treatment options.     Prior Hx 7/2/2024:  68 y.o. Male who presents as a new patient to me. 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 Complaint is right knee subjective instability since a fall off a ladder on 12/20/21. He sustained a multi ligament right knee  injury including a complete ACL tear, partial PCL tear, and grade 2 proximal MCL tear as well as a medial meniscus tear.  He initially saw and was treated by my colleague Dr. Wiliam Guerin.  There was some discussion regarding ACL reconstruction but the patient canceled surgery and upon follow up there was discussion regarding arthroscopic debridement of meniscus pathology in isolation.  The patient did not follow through with surgery.  His chief complaint is one of instability rather than pain.  He denies any prominent mechanical symptoms.  He feels that he can not trust the knee.  In particular twisting and turning can be problematic.  He feels a slipping sensation in the knee constantly.  He denies any recent swelling.  Conservative treatment has been extensive to include extensive formal physical therapy, anti-inflammatory medication, bracing.  He has become quite frustrated with how the knee feels and would like to discuss ACL reconstruction surgery.  Here today for a second surgical opinion as referred by his PCP, Leon Chaparro MD.     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    PMHx notable for GERD, HLD.   Negative for tobacco.   Negative for diabetes. Last A1C: 5.6 06/21/24    REVIEW OF SYSTEMS:   Constitution: Negative. Negative for chills, fever and night sweats.    Hematologic/Lymphatic: Negative for bleeding problem. Does not bruise/bleed easily.   Skin: Negative for dry skin, itching and rash.   Musculoskeletal: Negative for falls. Positive for right knee pain and muscle weakness.     All other review of symptoms were reviewed and found to be noncontributory.     PAST MEDICAL HISTORY:   Past Medical History:   Diagnosis Date    GERD (gastroesophageal reflux disease)     Hyperlipidemia      PAST SURGICAL HISTORY:   Past Surgical History:   Procedure Laterality Date     ADENOIDECTOMY  1966    ARTHROSCOPY OF ANKLE WITH DEBRIDEMENT Left 12/19/2019    Procedure: ARTHROSCOPY, ANKLE, WITH DEBRIDEMENT;  Surgeon: Bay Melendez MD;  Location: [facility] OR;  Service: Orthopedics;  Laterality: Left;    EPIDURAL STEROID INJECTION N/A 01/29/2021    Procedure: INJECTION, STEROID, EPIDURAL L4/5;  Surgeon: Alvarado Reaves MD;  Location: [facility] PAIN MGT;  Service: Pain Management;  Laterality: N/A;    EPIDURAL STEROID INJECTION INTO LUMBAR SPINE N/A 12/24/2020    Procedure: Injection-steroid-epidural-lumbar--L4-5;  Surgeon: Ori Cali Jr., MD;  Location: [facility] PAIN MGT;  Service: Pain Management;  Laterality: N/A;    FRACTURE SURGERY      left heel at 41yo    HEEL SPUR SURGERY      left heel - fell off a ladder and had to have this reconstructed    SPINE SURGERY      c7 fx - prior to this, he was getting dizzy spells - none since    TONSILLECTOMY      VASECTOMY       FAMILY HISTORY:   Family History   Problem Relation Name Age of Onset    Diabetes Mother Stephanie Kimbrough     Cancer Father Merlin Gaspard         menostatic    Heart disease Daughter Adelina Osborne         enlarged heart    Hypothyroidism Daughter Adelina Osborne     Colon polyps Neg Hx      Prostate cancer Neg Hx      Colon cancer Neg Hx      Stroke Neg Hx      Hypertension Neg Hx      Hyperlipidemia Neg Hx       SOCIAL HISTORY:   Social History     Socioeconomic History    Marital status:      Spouse name: Nithya    Number of children: 1   Occupational History    Occupation: Retried   Tobacco Use    Smoking status: Never    Smokeless tobacco: Never   Substance and Sexual Activity    Alcohol use: Not Currently    Drug use: No    Sexual activity: Yes     Partners: Female     Birth control/protection: None     Comment: vasectomy     Social Drivers of Health     Financial Resource Strain: Low Risk  (12/15/2022)    Overall Financial Resource Strain (CARDIA)     Difficulty of Paying Living Expenses: Not hard at all   Food  Insecurity: No Food Insecurity (12/15/2022)    Hunger Vital Sign     Worried About Running Out of Food in the Last Year: Never true     Ran Out of Food in the Last Year: Never true   Transportation Needs: No Transportation Needs (12/15/2022)    PRAPARE - Transportation     Lack of Transportation (Medical): No     Lack of Transportation (Non-Medical): No   Physical Activity: Unknown (12/15/2022)    Exercise Vital Sign     Days of Exercise per Week: 5 days     Minutes of Exercise per Session: Patient declined   Stress: Stress Concern Present (12/15/2022)    [employer] [place] [employer] - Occupational Stress Questionnaire     Feeling of Stress : To some extent   Housing Stability: Low Risk  (12/15/2022)    Housing Stability Vital Sign     Unable to Pay for Housing in the Last Year: No     Number of Places Lived in the Last Year: 1     Unstable Housing in the Last Year: No     MEDICATIONS:     Current Outpatient Medications:     ammonium lactate 12 % Crea, Bid to dry elbow and knee and thick dry skin of thigh, Disp: 140 g, Rfl: 3    aspirin (ECOTRIN) 81 MG EC tablet, Take 81 mg by mouth once daily., Disp: , Rfl:     cycloSPORINE (RESTASIS) 0.05 % ophthalmic emulsion, Place 1 drop into both eyes 2 (two) times daily., Disp: 180 each, Rfl: 3    diclofenac sodium (VOLTAREN) 1 % Gel, Apply 2 g topically daily as needed (pain foot and ankle)., Disp: 450 g, Rfl: 3    fluticasone propionate (FLONASE) 50 mcg/actuation nasal spray, 1 spray (50 mcg total) by Each Nostril route once daily., Disp: 16 g, Rfl: 3    ibuprofen (ADVIL,MOTRIN) 800 MG tablet, Take 1 tablet (800 mg total) by mouth 3 (three) times daily as needed for Pain., Disp: 90 tablet, Rfl: 2    ipratropium (ATROVENT) 21 mcg (0.03 %) nasal spray, PLACE TWO SPRAYS IN EACH NOSTRIL THREE TIMES DAILY, Disp: 30 mL, Rfl: 4    lovastatin (MEVACOR) 40 MG tablet, TAKE ONE TABLET BY MOUTH AT BEDTIME, Disp: 90 tablet, Rfl: 3    omeprazole (PRILOSEC) 40 MG capsule, Take  1 capsule (40 mg total) by mouth once daily., Disp: 90 capsule, Rfl: 2    tamsulosin (FLOMAX) 0.4 mg Cap, Take 1 capsule (0.4 mg total) by mouth every evening., Disp: 90 capsule, Rfl: 3    varenicline (TYRVAYA) 0.03 mg/spray sprm, 1 spray by Each Nostril route 2 (two) times a day., Disp: 25.2 mL, Rfl: 3    finasteride (PROSCAR) 5 mg tablet, Take 1 tablet (5 mg total) by mouth once daily., Disp: 30 tablet, Rfl: 12    gabapentin (NEURONTIN) 300 MG capsule, Take 1 capsule (300 mg total) by mouth 3 (three) times daily., Disp: 90 capsule, Rfl: 1    levocetirizine (XYZAL) 5 MG tablet, Take 1 tablet (5 mg total) by mouth every evening., Disp: 30 tablet, Rfl: 0    oxybutynin (DITROPAN) 5 MG Tab, Take 1 tablet (5 mg total) by mouth 3 (three) times daily as needed (for bladder spasms)., Disp: 90 tablet, Rfl: 11    sodium chloride 0.9% 0.9 % irrigation, Irrigate with 500 mLs as directed as needed (use  ml to irrigate the bladder prn blood clots or catheter not draining). (Patient not taking: Reported on 6/21/2024), Disp: 500 mL, Rfl: 1  No current facility-administered medications for this visit.    Facility-Administered Medications Ordered in Other Visits:     alprazolam ODT dissolvable tablet 0.5 mg, 0.5 mg, Oral, Once PRN, Ori Cali Jr., MD    ALLERGIES:   Review of patient's allergies indicates:  No Known Allergies     PHYSICAL EXAMINATION:  BP (!) 142/87   Pulse 68   Wt 87 kg (191 lb 12.8 oz)   BMI 26.75 kg/m²   General: Well-developed well-nourished 68 y.o. malein no acute distress   Cardiovascular: Regular rhythm by palpation of distal pulse, normal color and temperature, no concerning varicosities on symptomatic side   Lungs: No labored breathing or wheezing appreciated   Neuro: Alert and oriented ×3   Psychiatric: well oriented to person, place and time, demonstrates normal mood and affect   Skin: No rashes, lesions or ulcers, normal temperature, turgor, and texture on involved extremity    Ortho/SPM  Exam  Examination of the right knee again demonstrates intact extensor mechanism.  No swelling or effusion.  Full active extension and flexion.  No apparent pain on range of motion.  Patient denies any significant medial or lateral joint line tenderness or pain to palpation.  Negative Eddie's maneuver for pain.  Stable to varus and valgus stress at 0 and 30°.  Negative posterior drawer.  No pain on posterior drawer testing. Lachman 2B. 1+ pivot shift which is asymmetric to the other side.  No hyperlaxity.  Fairly good quad activation and strength.  Intact straight leg raise.  No pain with passive internal and external rotation of the right hip.  Negative straight leg raise for referred pain or radicular complaints.  Fairly neutral standing alignment.    IMAGING:  X-rays 07/02/24 including standing, weight bearing AP and flexion bilateral knees, RIGHT knee lateral and sunrise views ordered and images reviewed by me show:    Well-maintained joint spaces.  Minimal degenerative changes.  No acute findings.  Posterior tibial slope measures 9-10 degrees.    X-rays 07/02/24  full length standing hip to ankle view ordered and image reviewed by me show:    Neutral standing alignment.    Repeat MRI 07/06/24 of right knee reviewed by me and discussed with patient. Study shows:   Bucket-handle tear of the lateral meniscus  Findings equivocal for medial meniscal tear.  Findings suggestive of remote injuries to the ACL, MCL, and lateral collateral ligament complexes.  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  Insertional quadriceps tendinosis.    ASSESSMENT:      ICD-10-CM ICD-9-CM   1. Rupture of anterior cruciate ligament of right knee, subsequent encounter  S83.511D V58.89     844.2   2. Chronic instability of right knee  M23.51 718.86   3. Complex tear of medial meniscus of right knee as current injury, subsequent encounter  S83.231D V58.89   4. Complex tear of lateral meniscus of  right knee as current injury, subsequent encounter  S83.271D V58.89   5. Chondromalacia of right knee  M94.261 717.7   6. Chronic pain of right knee  M25.561 719.46    G89.29 338.29       PLAN:     I had another long discussion with the patient regarding his current findings.  I gather that his chief complaint for the right knee is one of instability.  He does not trust the knee and on exam today reports typical provocation of instability symptoms on pivot shift testing.  I detect at least a pivot glide on the table.  Asymmetric to the other side.  His knee ligament exam otherwise is within normal limits.  Stable to varus and valgus stress.  Negative posterior drawer.  We did discuss the details of ACL reconstruction surgery in addition to meniscus treatment.  I do have significant concerns that simple arthroscopic intervention for partial meniscectomy and debridement is unlikely to provide the kind of relief he is looking for.  Again he perceives subjective instability in the knee despite prior conservative treatment to include PT. I have some concerns regarding his ability to fully participate in the rehab process for the right knee given his left lower leg complaint but I gather that his treatment options for the hindfoot are limited to this point and certainly I think he would struggle with rehab following hindfoot fusion as it relates to his right lower extremity complaints.  This is something that he verbalized multiple times today.  For that reason he wishes to proceed with surgery on his right knee as soon as possible by his account.  We did discuss the issue of his underlying arthritic change and how that can not be fully addressed at the time of right knee arthroscopy.  I have spent a good bit of time with him setting appropriate expectations and he understands that this surgery may not fully address all of his symptoms and complaints.  I think it is important in this case to have matched expectations.  He  verbalized understanding.    Plan is Right knee arthroscopic ACL reconstruction with allograft, partial medial and lateral meniscectomy versus meniscus repair as indicated, chondroplasty    Preop clearance per PCP    Patient is adamant that he does not want a regional block.  He had a bad experience previously with a block by his account.    Informed Consent:    The details of the surgical procedure were explained, including the location of probable incisions and a description of possible hardware and/or grafts to be used. Alternatives to both operative and non-operative options with associated risks and benefits were discussed. The patient understands the likely convalescence after surgery and, in particular, the expected postop rehab and recovery course. The outlined risks and potential complications of the proposed procedure include but are not limited to: infection, poor wound healing, scarring, deformity, stiffness, swelling, continued or recurrent pain, instability, hardware or prosthetic failure if implanted, symptomatic hardware requiring removal, dislocation, weakness, neurovascular injury, numbness, chronic regional pain disorder, tissue nonhealing/irreparability/retear, subsequent contralateral limb injury or pathology, chondral injury, arthritis, fracture, blood clot formation, inability to return to previous level of activity, anesthetic or regional block complication up to death, need for additional procedure as indicated intraoperatively, and potential need for further surgery.    The patient was also informed and understands that the risks of surgery are greater for patients with a current condition or history of heart disease, obesity, clotting disorders, recurrent infections, steroid use, current or past smoking, and factors such as sedentary lifestyle and noncompliance with medications, therapy or follow-up. The degree of the increased risk is hard to estimate with any degree of precision. If  applicable, smoking cessation was discussed.     All questions were answered. The patient has verbalized understanding of these issues and wishes to proceed with the surgery as discussed.

## 2024-12-03 ENCOUNTER — TELEPHONE (OUTPATIENT)
Dept: INTERNAL MEDICINE | Facility: CLINIC | Age: 69
End: 2024-12-03
Payer: MEDICARE

## 2024-12-05 ENCOUNTER — LAB VISIT (OUTPATIENT)
Dept: LAB | Facility: HOSPITAL | Age: 69
End: 2024-12-05
Attending: INTERNAL MEDICINE
Payer: MEDICARE

## 2024-12-05 ENCOUNTER — OFFICE VISIT (OUTPATIENT)
Dept: INTERNAL MEDICINE | Facility: CLINIC | Age: 69
End: 2024-12-05
Payer: MEDICARE

## 2024-12-05 VITALS
BODY MASS INDEX: 27 KG/M2 | SYSTOLIC BLOOD PRESSURE: 122 MMHG | WEIGHT: 192.88 LBS | HEART RATE: 74 BPM | HEIGHT: 71 IN | DIASTOLIC BLOOD PRESSURE: 80 MMHG | OXYGEN SATURATION: 98 % | TEMPERATURE: 98 F

## 2024-12-05 DIAGNOSIS — K21.9 GASTROESOPHAGEAL REFLUX DISEASE, UNSPECIFIED WHETHER ESOPHAGITIS PRESENT: Chronic | ICD-10-CM

## 2024-12-05 DIAGNOSIS — Z01.818 PRE-OP EVALUATION: Primary | ICD-10-CM

## 2024-12-05 DIAGNOSIS — Z12.5 PROSTATE CANCER SCREENING: ICD-10-CM

## 2024-12-05 DIAGNOSIS — M51.369 DEGENERATION OF INTERVERTEBRAL DISC OF LUMBAR REGION, UNSPECIFIED WHETHER PAIN PRESENT: ICD-10-CM

## 2024-12-05 DIAGNOSIS — M47.816 LUMBAR FACET ARTHROPATHY: Chronic | ICD-10-CM

## 2024-12-05 DIAGNOSIS — E78.5 HYPERLIPIDEMIA, UNSPECIFIED HYPERLIPIDEMIA TYPE: Chronic | ICD-10-CM

## 2024-12-05 DIAGNOSIS — Z01.818 PRE-OP EVALUATION: ICD-10-CM

## 2024-12-05 DIAGNOSIS — R09.82 POST-NASAL DRIP: ICD-10-CM

## 2024-12-05 DIAGNOSIS — I70.0 AORTIC ATHEROSCLEROSIS: Chronic | ICD-10-CM

## 2024-12-05 DIAGNOSIS — R94.31 ABNORMAL EKG: ICD-10-CM

## 2024-12-05 LAB
ALBUMIN SERPL BCP-MCNC: 4 G/DL (ref 3.5–5.2)
ALP SERPL-CCNC: 79 U/L (ref 40–150)
ALT SERPL W/O P-5'-P-CCNC: 29 U/L (ref 10–44)
ANION GAP SERPL CALC-SCNC: 6 MMOL/L (ref 8–16)
AST SERPL-CCNC: 24 U/L (ref 10–40)
BASOPHILS # BLD AUTO: 0.06 K/UL (ref 0–0.2)
BASOPHILS NFR BLD: 0.9 % (ref 0–1.9)
BILIRUB SERPL-MCNC: 0.5 MG/DL (ref 0.1–1)
BUN SERPL-MCNC: 21 MG/DL (ref 8–23)
CALCIUM SERPL-MCNC: 9.8 MG/DL (ref 8.7–10.5)
CHLORIDE SERPL-SCNC: 108 MMOL/L (ref 95–110)
CO2 SERPL-SCNC: 27 MMOL/L (ref 23–29)
COMPLEXED PSA SERPL-MCNC: 0.67 NG/ML (ref 0–4)
CREAT SERPL-MCNC: 1.1 MG/DL (ref 0.5–1.4)
DIFFERENTIAL METHOD BLD: ABNORMAL
EOSINOPHIL # BLD AUTO: 0.4 K/UL (ref 0–0.5)
EOSINOPHIL NFR BLD: 6.2 % (ref 0–8)
ERYTHROCYTE [DISTWIDTH] IN BLOOD BY AUTOMATED COUNT: 12.1 % (ref 11.5–14.5)
EST. GFR  (NO RACE VARIABLE): >60 ML/MIN/1.73 M^2
GLUCOSE SERPL-MCNC: 113 MG/DL (ref 70–110)
HCT VFR BLD AUTO: 47.8 % (ref 40–54)
HGB BLD-MCNC: 15.8 G/DL (ref 14–18)
IMM GRANULOCYTES # BLD AUTO: 0.01 K/UL (ref 0–0.04)
IMM GRANULOCYTES NFR BLD AUTO: 0.2 % (ref 0–0.5)
LYMPHOCYTES # BLD AUTO: 2.2 K/UL (ref 1–4.8)
LYMPHOCYTES NFR BLD: 34 % (ref 18–48)
MCH RBC QN AUTO: 32.4 PG (ref 27–31)
MCHC RBC AUTO-ENTMCNC: 33.1 G/DL (ref 32–36)
MCV RBC AUTO: 98 FL (ref 82–98)
MONOCYTES # BLD AUTO: 0.7 K/UL (ref 0.3–1)
MONOCYTES NFR BLD: 10.8 % (ref 4–15)
NEUTROPHILS # BLD AUTO: 3.1 K/UL (ref 1.8–7.7)
NEUTROPHILS NFR BLD: 47.9 % (ref 38–73)
NRBC BLD-RTO: 0 /100 WBC
OHS QRS DURATION: 88 MS
OHS QTC CALCULATION: 376 MS
PLATELET # BLD AUTO: 255 K/UL (ref 150–450)
PMV BLD AUTO: 10.4 FL (ref 9.2–12.9)
POTASSIUM SERPL-SCNC: 4.5 MMOL/L (ref 3.5–5.1)
PROT SERPL-MCNC: 7.2 G/DL (ref 6–8.4)
RBC # BLD AUTO: 4.88 M/UL (ref 4.6–6.2)
SODIUM SERPL-SCNC: 141 MMOL/L (ref 136–145)
WBC # BLD AUTO: 6.41 K/UL (ref 3.9–12.7)

## 2024-12-05 PROCEDURE — 84153 ASSAY OF PSA TOTAL: CPT | Performed by: INTERNAL MEDICINE

## 2024-12-05 PROCEDURE — 36415 COLL VENOUS BLD VENIPUNCTURE: CPT | Mod: PO | Performed by: INTERNAL MEDICINE

## 2024-12-05 PROCEDURE — 93005 ELECTROCARDIOGRAM TRACING: CPT | Mod: S$GLB,,, | Performed by: INTERNAL MEDICINE

## 2024-12-05 PROCEDURE — 93010 ELECTROCARDIOGRAM REPORT: CPT | Mod: S$GLB,,, | Performed by: INTERNAL MEDICINE

## 2024-12-05 PROCEDURE — 99999 PR PBB SHADOW E&M-EST. PATIENT-LVL IV: CPT | Mod: PBBFAC,,, | Performed by: INTERNAL MEDICINE

## 2024-12-05 PROCEDURE — 85025 COMPLETE CBC W/AUTO DIFF WBC: CPT | Performed by: INTERNAL MEDICINE

## 2024-12-05 PROCEDURE — 80053 COMPREHEN METABOLIC PANEL: CPT | Performed by: INTERNAL MEDICINE

## 2024-12-05 RX ORDER — AZELASTINE 1 MG/ML
1 SPRAY, METERED NASAL 2 TIMES DAILY
Qty: 30 ML | Refills: 0 | Status: SHIPPED | OUTPATIENT
Start: 2024-12-05 | End: 2025-12-05

## 2024-12-05 NOTE — PATIENT INSTRUCTIONS
aspirin 81 mg (hold week prior to surgery), Proscar 5 mg (take), Flonase 50 mcg (take if needed), gabapentin 300 mg (take), ibuprofen 800 mg (hold week prior to surgery), Xyzal 5 mg (take if needed), lovastatin 40 mg (take), Prilosec 40 mg (take), oxybutynin 5 mg (take), Flomax 0.4 mg (take)

## 2024-12-05 NOTE — Clinical Note
On the Cardiology over-read of the EKG, there was a change from the prior make you.  I would like you to follow-up cardiology you back in do prior to surgery.  We will this help you to schedule this.
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
## 2024-12-06 ENCOUNTER — TELEPHONE (OUTPATIENT)
Dept: INTERNAL MEDICINE | Facility: CLINIC | Age: 69
End: 2024-12-06
Payer: MEDICARE

## 2024-12-06 NOTE — TELEPHONE ENCOUNTER
----- Message from Georgi sent at 12/6/2024  9:01 AM CST -----  Regarding: Pt advice  Contact: HARMEET SWEET [[id number]]  Name of Who is Calling: HARMEET SWEET [[id number]]      What is the request in detail: Would like to speak with staff in regards to Cardio referral and why it is needed. Please advise      Can the clinic reply by [facility]: no      What [facility] Back [facility]:  [phone]

## 2024-12-06 NOTE — TELEPHONE ENCOUNTER
Spoke with patient, reviewed Dr Chaparro's notes on EKG, pt is scheduled for Monday at [facility] and is aware of appt.

## 2024-12-08 PROBLEM — R94.31 NONSPECIFIC ABNORMAL ELECTROCARDIOGRAM (ECG) (EKG): Status: ACTIVE | Noted: 2024-12-08

## 2024-12-09 ENCOUNTER — OFFICE VISIT (OUTPATIENT)
Dept: CARDIOLOGY | Facility: CLINIC | Age: 69
End: 2024-12-09
Payer: MEDICARE

## 2024-12-09 VITALS
WEIGHT: 191.81 LBS | DIASTOLIC BLOOD PRESSURE: 67 MMHG | OXYGEN SATURATION: 96 % | SYSTOLIC BLOOD PRESSURE: 119 MMHG | HEART RATE: 65 BPM | BODY MASS INDEX: 26.85 KG/M2 | HEIGHT: 71 IN

## 2024-12-09 DIAGNOSIS — E78.5 HYPERLIPIDEMIA, UNSPECIFIED HYPERLIPIDEMIA TYPE: Chronic | ICD-10-CM

## 2024-12-09 DIAGNOSIS — R94.31 ABNORMAL EKG: ICD-10-CM

## 2024-12-09 DIAGNOSIS — R94.31 NONSPECIFIC ABNORMAL ELECTROCARDIOGRAM (ECG) (EKG): Primary | ICD-10-CM

## 2024-12-09 DIAGNOSIS — I70.0 AORTIC ATHEROSCLEROSIS: Chronic | ICD-10-CM

## 2024-12-09 PROCEDURE — 1160F RVW MEDS BY RX/DR IN RCRD: CPT | Mod: CPTII,S$GLB,, | Performed by: INTERNAL MEDICINE

## 2024-12-09 PROCEDURE — 99999 PR PBB SHADOW E&M-EST. PATIENT-LVL III: CPT | Mod: PBBFAC,,, | Performed by: INTERNAL MEDICINE

## 2024-12-09 PROCEDURE — 99204 OFFICE O/P NEW MOD 45 MIN: CPT | Mod: S$GLB,,, | Performed by: INTERNAL MEDICINE

## 2024-12-09 PROCEDURE — 1126F AMNT PAIN NOTED NONE PRSNT: CPT | Mod: CPTII,S$GLB,, | Performed by: INTERNAL MEDICINE

## 2024-12-09 PROCEDURE — 3074F SYST BP LT 130 MM HG: CPT | Mod: CPTII,S$GLB,, | Performed by: INTERNAL MEDICINE

## 2024-12-09 PROCEDURE — 3288F FALL RISK ASSESSMENT DOCD: CPT | Mod: CPTII,S$GLB,, | Performed by: INTERNAL MEDICINE

## 2024-12-09 PROCEDURE — 3008F BODY MASS INDEX DOCD: CPT | Mod: CPTII,S$GLB,, | Performed by: INTERNAL MEDICINE

## 2024-12-09 PROCEDURE — 1101F PT FALLS ASSESS-DOCD LE1/YR: CPT | Mod: CPTII,S$GLB,, | Performed by: INTERNAL MEDICINE

## 2024-12-09 PROCEDURE — 3044F HG A1C LEVEL LT 7.0%: CPT | Mod: CPTII,S$GLB,, | Performed by: INTERNAL MEDICINE

## 2024-12-09 PROCEDURE — 3078F DIAST BP <80 MM HG: CPT | Mod: CPTII,S$GLB,, | Performed by: INTERNAL MEDICINE

## 2024-12-09 PROCEDURE — 1159F MED LIST DOCD IN RCRD: CPT | Mod: CPTII,S$GLB,, | Performed by: INTERNAL MEDICINE

## 2024-12-09 NOTE — PROGRESS NOTES
Chart has been dictated using voice recognition software.  It is not been reviewed carefully for any transcriptional errors due to this technology.   Subjective:   Patient ID:  Casper Osborne is a 68 y.o. male who presents for evaluation of No chief complaint on file.      HPI:  Patient with hyperlipidemia and multiligament right knee injury.  Patient has no known cardiac disease but does have hyperlipidemia.  During preoperative evaluation, the patient was noted to have an abnormal electrocardiogram.  Review of the patient's electrocardiograms shows varying configuration due to different lead placements for his past 3 electrocardiograms over several years.  His preoperative evaluation showed an RCRI of 0.      Patient denies any chest discomfort on exertion or at rest. No change from prior visit in neurologic, respiratory, endocrine, GI, hematologic, or constitutional complaints. Has swelling in his feet but due to trauma.  Patient denies any palpitations, lightheadedness, or syncope.     Cardiac risk factors: hyperlipidemia, positive family history (Brother  in February), sedentary lifestyle    Past Medical History:   Diagnosis Date    GERD (gastroesophageal reflux disease)     Hyperlipidemia        Past Surgical History:   Procedure Laterality Date    ADENOIDECTOMY  1966    ARTHROSCOPY OF ANKLE WITH DEBRIDEMENT Left 2019    Procedure: ARTHROSCOPY, ANKLE, WITH DEBRIDEMENT;  Surgeon: Bay Melendez MD;  Location: [facility] OR;  Service: Orthopedics;  Laterality: Left;    EPIDURAL STEROID INJECTION N/A 2021    Procedure: INJECTION, STEROID, EPIDURAL L4/5;  Surgeon: Alvarado Reaves MD;  Location: [facility] PAIN MGT;  Service: Pain Management;  Laterality: N/A;    EPIDURAL STEROID INJECTION INTO LUMBAR SPINE N/A 2020    Procedure: Injection-steroid-epidural-lumbar--L4-5;  Surgeon: Ori Cali Jr., MD;  Location: [facility]IN [facility]GT;  Service: Pain Management;  Laterality: N/A;    FRACTURE SURGERY       left heel at 41yo    HEEL SPUR SURGERY      left heel - fell off a ladder and had to have this reconstructed    SPINE SURGERY      c7 fx - prior to this, he was getting dizzy spells - none since    TONSILLECTOMY      VASECTOMY         Social History     Tobacco Use    Smoking status: Never    Smokeless tobacco: Never   Substance Use Topics    Alcohol use: Not Currently    Drug use: No       Outpatient Medications Prior to Visit   Medication Sig Dispense Refill    ammonium lactate 12 % Crea Bid to dry elbow and knee and thick dry skin of thigh 140 g 3    aspirin (ECOTRIN) 81 MG EC tablet Take 81 mg by mouth once daily.      azelastine (ASTELIN) 137 mcg (0.1 %) nasal spray 1 spray (137 mcg total) by Nasal route 2 (two) times daily. 30 mL 0    cycloSPORINE (RESTASIS) 0.05 % ophthalmic emulsion Place 1 drop into both eyes 2 (two) times daily. 180 each 3    diclofenac sodium (VOLTAREN) 1 % Gel Apply 2 g topically daily as needed (pain foot and ankle). 450 g 3    finasteride (PROSCAR) 5 mg tablet Take 1 tablet (5 mg total) by mouth once daily. 30 tablet 12    fluticasone propionate (FLONASE) 50 mcg/actuation nasal spray 1 spray (50 mcg total) by Each Nostril route once daily. 16 g 3    gabapentin (NEURONTIN) 300 MG capsule Take 1 capsule (300 mg total) by mouth 3 (three) times daily. 90 capsule 1    ibuprofen (ADVIL,MOTRIN) 800 MG tablet Take 1 tablet (800 mg total) by mouth 3 (three) times daily as needed for Pain. 90 tablet 2    ipratropium (ATROVENT) 21 mcg (0.03 %) nasal spray PLACE TWO SPRAYS IN EACH NOSTRIL THREE TIMES DAILY 30 mL 4    levocetirizine (XYZAL) 5 MG tablet Take 1 tablet (5 mg total) by mouth every evening. 30 tablet 0    lovastatin (MEVACOR) 40 MG tablet TAKE ONE TABLET BY MOUTH AT BEDTIME 90 tablet 3    omeprazole (PRILOSEC) 40 MG capsule Take 1 capsule (40 mg total) by mouth once daily. 90 capsule 2    oxybutynin (DITROPAN) 5 MG Tab Take 1 tablet (5 mg total) by mouth 3 (three) times daily as  needed (for bladder spasms). 90 tablet 11    sodium chloride 0.9% 0.9 % irrigation Irrigate with 500 mLs as directed as needed (use  ml to irrigate the bladder prn blood clots or catheter not draining). (Patient not taking: Reported on 6/21/2024) 500 mL 1    tamsulosin (FLOMAX) 0.4 mg Cap Take 1 capsule (0.4 mg total) by mouth every evening. 90 capsule 3    varenicline (TYRVAYA) 0.03 mg/spray sprm 1 spray by Each Nostril route 2 (two) times a day. 25.2 mL 3     Facility-Administered Medications Prior to Visit   Medication Dose Route Frequency Provider Last Rate Last Admin    alprazolam ODT dissolvable tablet 0.5 mg  0.5 mg Oral Once PRN Ori Cali Jr., MD           Review of patient's allergies indicates:  No Known Allergies    Review of Systems   Constitutional: Negative for weight gain and weight loss.   Respiratory:  Negative for hemoptysis.    Hematologic/Lymphatic: Negative for bleeding problem. Does not bruise/bleed easily.   Musculoskeletal:  Positive for joint pain (right knee, left ankle).   Gastrointestinal:  Negative for hematochezia and hemorrhoids.   Genitourinary:  Negative for hematuria.   Neurological:  Positive for numbness (left lower leg). Negative for focal weakness and weakness.      Objective:   Physical Exam      Lab Results   Component Value Date     12/05/2024    K 4.5 12/05/2024    BUN 21 12/05/2024    CREATININE 1.1 12/05/2024    EGFRNORACEVR >60.0 12/05/2024     (H) 12/05/2024    HGBA1C 5.6 06/21/2024    CHOL 158 06/21/2024    TRIG 121 06/21/2024    HDL 35 (L) 06/21/2024    LDLCALC 98.8 06/21/2024    HGB 15.8 12/05/2024    HCT 47.8 12/05/2024    HCT 46 05/03/2022    WBC 6.41 12/05/2024     12/05/2024     ECG (today) showed normal sinus rhythm with a borderline left axis deviation.  The ECG was otherwise normal.  Assessment:     1. Nonspecific abnormal electrocardiogram (ECG) (EKG)    2. Hyperlipidemia, unspecified hyperlipidemia type    3. Aortic  atherosclerosis      Patient has no symptoms of cardiac ischemia, heart failure, or significant arrhythmias.  The patient is ECG is normal today in his prior ECGs abnormalities are most likely due to abnormal lead placement.After discussion with the patient, no further cardiovascular testing or additional treatment is needed at this time.     At this time, there is nothing further to add to this patient's care from a cardiovascular point of view. Unless the patient has further cardiovascular problems, there is no need for the patient to return for re-evaluation.  However, I would be happy to see the patient again if needed.       Plan:     Diagnoses and all orders for this visit:    Nonspecific abnormal electrocardiogram (ECG) (EKG)    Hyperlipidemia, unspecified hyperlipidemia type    Aortic atherosclerosis          Pankaj Verdugo MD  Consultative Cardiology

## 2024-12-09 NOTE — Clinical Note
Thank you for referring Casper Osborne for evaluation of abnormal electrocardiogram. Please see my note for details of this encounter. If you have any questions, please contact me.  Thank you again for the referral.

## 2024-12-11 ENCOUNTER — PATIENT MESSAGE (OUTPATIENT)
Dept: PREADMISSION TESTING | Facility: HOSPITAL | Age: 69
End: 2024-12-11
Payer: MEDICARE

## 2024-12-11 NOTE — ANESTHESIA PAT ROS NOTE
"                                                                                                             12/11/2024  Casper Osborne is a 68 y.o., male.      Pre-op Assessment    I have reviewed the Patient Summary Reports.       I have reviewed the Medications.     Review of Systems  Anesthesia Hx:  No problems with previous Anesthesia  Per Dr. Corrigan's note:  "Patient is adamant that he does not want a regional block.  He had a bad experience previously with a block by his account." History of prior surgery of interest to airway management or planning:  Previous anesthesia: General, Nerve Block 12/19/2019 Left ankle Arthroscopy w/ Debridement with general anesthesia.  Procedure performed at an [facility].     for 12/19/2019  Left ankle Arthroscopy w/ Debridement.  Procedure performed at an [facility].  Airway issues documented on chart review include mask, easy, GETA, videolaryngoscope used  , view on video-laryngoscopy Grade III      Denies Personal Hx of Anesthesia complications.                    Social:  Non-Smoker, No Alcohol Use       Hematology/Oncology:  Hematology Normal   Oncology Normal                                   EENT/Dental:   H/O T&A,  Post-nasal drip          Cardiovascular:  Exercise tolerance: good      Denies MI.  Denies CAD.       Denies Angina.     hyperlipidemia  Denies ANN.  ECG has been reviewed. Aortic atherosclerosis Patient not on beta blockers                          Pulmonary:  Pulmonary Normal   Denies COPD.  Denies Asthma.   Denies Shortness of breath.                  Renal/:   Denies Chronic Renal Disease.  BPH H/O Vasectomy,  Enlarged prostate with urinary retention,  Complex renal cyst,   H/O hematuria in 2022               Hepatic/GI:    Hiatal Hernia, GERD, well controlled Denies Liver Disease.   Not Taking GLP-1 Agonists            Musculoskeletal:  Arthritis   Rupture of anterior cruciate ligament of right knee,   Complex tear of medial meniscus of " right knee,  Complex tear of lateral meniscus of right knee,  H/O Arthroscopy of left ankle w/ Debridement, fracture surgery, heel spur surgery,  H/O C7 fracture,   S/P spine surgery,   Lumbar facet arthropathy,   Degeneration of intervertebral disc of lumbar region,  DDD (degenerative disc disease), lumbar,  S/P NIRALI's lumbar region per pain management,  Right rotator cuff tear,  Right tennis elbow,  Wrist pain,  Labral tear of shoulder,  Pain of right hand,  Medial epicondylitis of left elbow,  Arthritis of ankle, left         Spine Disorders: lumbar and cervical Disc disease, Chronic Pain and Degenerative disease           Neurological:  Neurology Normal   Denies CVA.    Denies Headaches. Denies Seizures.                                Endocrine:  Endocrine Normal Denies Diabetes. Denies Hypothyroidism.          Psych:  Psychiatric Normal                   Past Medical History:   Diagnosis Date    GERD (gastroesophageal reflux disease)     Hyperlipidemia      Past Surgical History:   Procedure Laterality Date    ADENOIDECTOMY  1966    ARTHROSCOPY OF ANKLE WITH DEBRIDEMENT Left 12/19/2019    Procedure: ARTHROSCOPY, ANKLE, WITH DEBRIDEMENT;  Surgeon: Bay Melendez MD;  Location: [facility] OR;  Service: Orthopedics;  Laterality: Left;    EPIDURAL STEROID INJECTION N/A 01/29/2021    Procedure: INJECTION, STEROID, EPIDURAL L4/5;  Surgeon: Alvarado Reaves MD;  Location: [facility] PAIN MGT;  Service: Pain Management;  Laterality: N/A;    EPIDURAL STEROID INJECTION INTO LUMBAR SPINE N/A 12/24/2020    Procedure: Injection-steroid-epidural-lumbar--L4-5;  Surgeon: Ori Cali Jr., MD;  Location: [facility] PAIN MGT;  Service: Pain Management;  Laterality: N/A;    FRACTURE SURGERY      left heel at 39yo    HEEL SPUR SURGERY      left heel - fell off a ladder and had to have this reconstructed    SPINE SURGERY      c7 fx - prior to this, he was getting dizzy spells - none since    TONSILLECTOMY      VASECTOMY         Anesthesia  Assessment: Preoperative EQUATION    Planned Procedure: Procedure(s) (LRB):  RECONSTRUCTION, KNEE, ACL, ARTHROSCOPIC (Right)  ARTHROSCOPY, KNEE, WITH MENISCECTOMY (Right)  Requested Anesthesia Type:General  Surgeon: MARJAN Corrigan MD  Service: Orthopedics  Known or anticipated Date of Surgery:12/20/2024    Surgeon notes: reviewed    Electronic QUestionnaire Assessment completed via nurse interview with patient.        Triage considerations:     The patient has no apparent active cardiac condition (No unstable coronary Syndrome such as severe unstable angina or recent [<1 month] myocardial infarction, decompensated CHF, severe valvular   disease or significant arrhythmia)    Previous anesthesia records:GETA, LMA General, Nerve block for post-op pain, Easy airway, Easy intubation, and No problems, Ramires, Bougie Intubation, Rapid Sequence Induction; Grade Grade III; Complicating Factors Anterior larynx, Poor neck/head extension.      [facility]   Subspecialty notes: Cardiology: General    Other important co-morbidities: GERD and HLD       EKG 10/9/2024: (done in Cardiology clinic)  ECG (today) showed normal sinus rhythm with a borderline left axis deviation. The ECG was otherwise normal.        EKG 12/5/2024:  Vent. Rate :  63 BPM     Atrial Rate :  63 BPM      P-R Int : 200 ms          QRS Dur :  88 ms       QT Int : 368 ms       P-R-T Axes :  46 -26  -7 degrees     QTcB Int : 376 ms   Normal sinus rhythm   Minimal voltage criteria for LVH, may be normal variant   Borderline Abnormal ECG   When compared with ECG of 16-Dec-2022 14:14,   The axis Shifted left   Non-specific change in ST segment in Inferior leads   T wave inversion now evident in Inferior leads   Nonspecific T wave abnormality no longer evident in Lateral leads   Confirmed by Nakita Nj ([id number]) on 12/5/2024 12:55:34 PM       Exercise Stress Echo 5/30/2017:  EKG Conclusions:   1. The EKG portion of this study  is negative for ischemia at a high workload, and peak heart rate of 144 bpm (91% of predicted).   2. Exercise capacity is average.   3. Blood pressure response to exercise was normal (Presenting BP: 141/88 Peak BP: 97/99).   4. The following arrhythmias were present: rare PACs & PVCs.   5. There were no symptoms of chest discomfort or significant dyspnea throughout the protocol.   6. The Duke treadmill score was 8 suggesting a low probability for future cardiovascular events.     CONCLUSIONS     1 - Normal left ventricular systolic function (EF 55-60%).     2 - No wall motion abnormalities.     3 - Normal left ventricular diastolic function.     4 - Normal right ventricular systolic function .   No evidence of stress induced myocardial ischemia.        Tests already available:  Results have been reviewed.             Instructions given. (See in Nurse's note) Preop medication instructions sent via portal message.     Optimization:  Anesthesia Preop Clinic Assessment Not Indicated    Medical Opinion Indicated: Yes, PCP       Sub-specialist consult indicated: Cardiology      Plan: Consultation:Patient's PCP for a statement of optimization              Patient  has previously scheduled Medical Appointment: 12/5/2024    Navigation: Tests Scheduled: PCP ordered labs, EKG             Consults scheduled: PCP referred [facility] for abnormal EKG.                        Patient is cleared/ optimized for surgery by PCP and Cardiology.       Ht: 5'11  Wt: 87 kg (191 lb)  BMI: 26.75

## 2024-12-12 ENCOUNTER — TELEPHONE (OUTPATIENT)
Dept: CARDIOLOGY | Facility: CLINIC | Age: 69
End: 2024-12-12
Payer: MEDICARE

## 2024-12-12 ENCOUNTER — OFFICE VISIT (OUTPATIENT)
Dept: SPORTS MEDICINE | Facility: CLINIC | Age: 69
End: 2024-12-12
Payer: MEDICARE

## 2024-12-12 VITALS
SYSTOLIC BLOOD PRESSURE: 170 MMHG | WEIGHT: 194.25 LBS | DIASTOLIC BLOOD PRESSURE: 90 MMHG | HEIGHT: 71 IN | BODY MASS INDEX: 27.19 KG/M2 | HEART RATE: 57 BPM

## 2024-12-12 DIAGNOSIS — I10 HYPERTENSION, UNSPECIFIED TYPE: Primary | ICD-10-CM

## 2024-12-12 DIAGNOSIS — S83.511D RUPTURE OF ANTERIOR CRUCIATE LIGAMENT OF RIGHT KNEE, SUBSEQUENT ENCOUNTER: Primary | ICD-10-CM

## 2024-12-12 LAB
OHS QRS DURATION: 88 MS
OHS QTC CALCULATION: 394 MS

## 2024-12-12 PROCEDURE — 99499 UNLISTED E&M SERVICE: CPT | Mod: S$GLB,,, | Performed by: PHYSICIAN ASSISTANT

## 2024-12-12 PROCEDURE — 99999 PR PBB SHADOW E&M-EST. PATIENT-LVL IV: CPT | Mod: PBBFAC,,, | Performed by: PHYSICIAN ASSISTANT

## 2024-12-12 RX ORDER — ONDANSETRON 4 MG/1
4 TABLET, ORALLY DISINTEGRATING ORAL EVERY 8 HOURS PRN
Qty: 30 TABLET | Refills: 0 | Status: SHIPPED | OUTPATIENT
Start: 2024-12-12

## 2024-12-12 RX ORDER — ASPIRIN 81 MG/1
81 TABLET ORAL 2 TIMES DAILY
Qty: 28 TABLET | Refills: 0 | Status: SHIPPED | OUTPATIENT
Start: 2024-12-12 | End: 2024-12-26

## 2024-12-12 RX ORDER — OXYCODONE HYDROCHLORIDE 5 MG/1
5-10 TABLET ORAL
Qty: 28 TABLET | Refills: 0 | Status: SHIPPED | OUTPATIENT
Start: 2024-12-12

## 2024-12-12 RX ORDER — SODIUM CHLORIDE 9 MG/ML
INJECTION, SOLUTION INTRAVENOUS CONTINUOUS
OUTPATIENT
Start: 2024-12-12

## 2024-12-12 NOTE — H&P
Casper Osborne  is here for a completion of his perioperative paperwork. he  Is scheduled to undergo Right knee arthroscopic ACL reconstruction with allograft, partial medial and lateral meniscectomy versus meniscus repair as indicated, chondroplasty on 12/20/24.  He is a healthy individual and does need clearance for this procedure.     Patient cleared per PCP and cards.    DOES NOT WANT REGIONAL BLOCK.    Risks, indications and benefits of the surgical procedure were discussed with the patient. All questions with regard to surgery, rehab, expected return to functional activities, activities of daily living and recreational endeavors were answered to his satisfaction.    Discussed COVID-19 with the patient, they are aware of our current policies and procedures, were given the option of delaying surgery, and they elect to proceed.    Patient was informed and understands the risks of surgery are greater for patients with a current condition or hx of heart disease, obesity, clotting disorders, recurrent infections, steroid use, current or past smoking, and factors such as sedentary lifestyle and noncompliance with medications, therapy or f/u. The degree of the increased risk is hard to estimate w/ any degree of precision.    Once no other questions were asked, a brief history and physical exam was then performed.    PAST MEDICAL HISTORY:   Past Medical History:   Diagnosis Date    GERD (gastroesophageal reflux disease)     Hyperlipidemia      PAST SURGICAL HISTORY:   Past Surgical History:   Procedure Laterality Date    ADENOIDECTOMY  1966    ARTHROSCOPY OF ANKLE WITH DEBRIDEMENT Left 12/19/2019    Procedure: ARTHROSCOPY, ANKLE, WITH DEBRIDEMENT;  Surgeon: Bay Melendez MD;  Location: [facility] OR;  Service: Orthopedics;  Laterality: Left;    EPIDURAL STEROID INJECTION N/A 01/29/2021    Procedure: INJECTION, STEROID, EPIDURAL L4/5;  Surgeon: Alvarado Reaves MD;  Location: [facility], [place], operated by [facility];  Service: Pain Management;   Laterality: N/A;    EPIDURAL STEROID INJECTION INTO LUMBAR SPINE N/A 12/24/2020    Procedure: Injection-steroid-epidural-lumbar--L4-5;  Surgeon: Ori Cali Jr., MD;  Location: [facility];  Service: Pain Management;  Laterality: N/A;    FRACTURE SURGERY      left heel at 39yo    HEEL SPUR SURGERY      left heel - fell off a ladder and had to have this reconstructed    SPINE SURGERY      c7 fx - prior to this, he was getting dizzy spells - none since    TONSILLECTOMY      VASECTOMY       FAMILY HISTORY:   Family History   Problem Relation Name Age of Onset    Diabetes Mother Stephanie Kimbrough     Cancer Father Merlin Gaspard         menostatic    Heart disease Daughter Adelina Osborne         enlarged heart    Hypothyroidism Daughter Adelina Osborne     Colon polyps Neg Hx      Prostate cancer Neg Hx      Colon cancer Neg Hx      Stroke Neg Hx      Hypertension Neg Hx      Hyperlipidemia Neg Hx       SOCIAL HISTORY:   Social History     Socioeconomic History    Marital status:      Spouse name: Nithya    Number of children: 1   Occupational History    Occupation: Retried   Tobacco Use    Smoking status: Never    Smokeless tobacco: Never   Substance and Sexual Activity    Alcohol use: Not Currently    Drug use: No    Sexual activity: Yes     Partners: Female     Birth control/protection: None     Comment: vasectomy     Social Drivers of Health     Financial Resource Strain: Low Risk  (12/8/2024)    Overall Financial Resource Strain (CARDIA)     Difficulty of Paying Living Expenses: Not very hard   Food Insecurity: No Food Insecurity (12/8/2024)    Hunger Vital Sign     Worried About Running Out of Food in the Last Year: Never true     Ran Out of Food in the Last Year: Never true   Transportation Needs: No Transportation Needs (12/15/2022)    PRAPARE - Transportation     Lack of Transportation (Medical): No     Lack of Transportation (Non-Medical): No   Physical Activity: Sufficiently Active (12/8/2024)     Exercise Vital Sign     Days of Exercise per Week: 3 days     Minutes of Exercise per Session: 90 min   Stress: No Stress Concern Present (12/8/2024)    [employer] [place] [employer] - Occupational Stress Questionnaire     Feeling of Stress : Not at all   Housing Stability: Low Risk  (12/15/2022)    Housing Stability Vital Sign     Unable to Pay for Housing in the Last Year: No     Number of Places Lived in the Last Year: 1     Unstable Housing in the Last Year: No       MEDICATIONS:   Current Outpatient Medications:     ammonium lactate 12 % Crea, Bid to dry elbow and knee and thick dry skin of thigh, Disp: 140 g, Rfl: 3    aspirin (ECOTRIN) 81 MG EC tablet, Take 81 mg by mouth once daily., Disp: , Rfl:     azelastine (ASTELIN) 137 mcg (0.1 %) nasal spray, 1 spray (137 mcg total) by Nasal route 2 (two) times daily., Disp: 30 mL, Rfl: 0    cycloSPORINE (RESTASIS) 0.05 % ophthalmic emulsion, Place 1 drop into both eyes 2 (two) times daily., Disp: 180 each, Rfl: 3    diclofenac sodium (VOLTAREN) 1 % Gel, Apply 2 g topically daily as needed (pain foot and ankle)., Disp: 450 g, Rfl: 3    fluticasone propionate (FLONASE) 50 mcg/actuation nasal spray, 1 spray (50 mcg total) by Each Nostril route once daily., Disp: 16 g, Rfl: 3    ibuprofen (ADVIL,MOTRIN) 800 MG tablet, Take 1 tablet (800 mg total) by mouth 3 (three) times daily as needed for Pain., Disp: 90 tablet, Rfl: 2    ipratropium (ATROVENT) 21 mcg (0.03 %) nasal spray, PLACE TWO SPRAYS IN EACH NOSTRIL THREE TIMES DAILY, Disp: 30 mL, Rfl: 4    lovastatin (MEVACOR) 40 MG tablet, TAKE ONE TABLET BY MOUTH AT BEDTIME, Disp: 90 tablet, Rfl: 3    tamsulosin (FLOMAX) 0.4 mg Cap, Take 1 capsule (0.4 mg total) by mouth every evening., Disp: 90 capsule, Rfl: 3    varenicline (TYRVAYA) 0.03 mg/spray sprm, 1 spray by Each Nostril route 2 (two) times a day., Disp: 25.2 mL, Rfl: 3    finasteride (PROSCAR) 5 mg tablet, Take 1 tablet (5 mg total) by mouth once daily.,  Disp: 30 tablet, Rfl: 12    gabapentin (NEURONTIN) 300 MG capsule, Take 1 capsule (300 mg total) by mouth 3 (three) times daily., Disp: 90 capsule, Rfl: 1    levocetirizine (XYZAL) 5 MG tablet, Take 1 tablet (5 mg total) by mouth every evening., Disp: 30 tablet, Rfl: 0    omeprazole (PRILOSEC) 40 MG capsule, Take 1 capsule (40 mg total) by mouth once daily. (Patient not taking: Reported on 12/12/2024), Disp: 90 capsule, Rfl: 2    oxybutynin (DITROPAN) 5 MG Tab, Take 1 tablet (5 mg total) by mouth 3 (three) times daily as needed (for bladder spasms)., Disp: 90 tablet, Rfl: 11    sodium chloride 0.9% 0.9 % irrigation, Irrigate with 500 mLs as directed as needed (use  ml to irrigate the bladder prn blood clots or catheter not draining). (Patient not taking: Reported on 6/21/2024), Disp: 500 mL, Rfl: 1  No current facility-administered medications for this visit.    Facility-Administered Medications Ordered in Other Visits:     alprazolam ODT dissolvable tablet 0.5 mg, 0.5 mg, Oral, Once PRN, Ori Cali Jr., MD  ALLERGIES: Review of patient's allergies indicates:  No Known Allergies    Review of Systems   Constitution: Negative. Negative for chills, fever and night sweats.   HENT: Negative for congestion and headaches.    Eyes: Negative for blurred vision, left vision loss and right vision loss.   Cardiovascular: Negative for chest pain and syncope.   Respiratory: Negative for cough and shortness of breath.    Endocrine: Negative for polydipsia, polyphagia and polyuria.   Hematologic/Lymphatic: Negative for bleeding problem. Does not bruise/bleed easily.   Skin: Negative for dry skin, itching and rash.   Musculoskeletal: Negative for falls and muscle weakness.   Gastrointestinal: Negative for abdominal pain and bowel incontinence.   Genitourinary: Negative for bladder incontinence and nocturia.   Neurological: Negative for disturbances in coordination, loss of balance and seizures.    Psychiatric/Behavioral: Negative for depression. The patient does not have insomnia.    Allergic/Immunologic: Negative for hives and persistent infections.     PHYSICAL EXAM:  GEN: A&Ox3, WD WN NAD  HEENT: WNL  CHEST: CTAB, no W/R/R  HEART: RRR, no M/R/G   ABD: Soft, NT ND, BS x4 QUADS  MS: Refer to previous note for detailed MS exam  NEURO: CN II-XII intact       The surgical consent was then reviewed with the patient, who agreed with all the contents of the consent form and it was signed.     PHYSICAL THERAPY:  He was also instructed regarding physical therapy and will begin on POD#1-3. He is doing physical therapy at [facility].    POST OP CARE: Instructions were reviewed including care of the wound and dressing after surgery and when he can shower.     PAIN MANAGEMENT: Casper Osborne was instructed regarding the Polar ice unit that will be in place after surgery and his postoperative pain medications.     MEDICATION:  Roxicodone 5 mg 1-2 q 4 hours PRN for pain  Zofran 4 mg q 8 hours PRN for nausea and vomiting.  Aspirin 81mg BID x 2 weeks for DVT prophylaxis starting on the evening after surgery.      Post op meds to be delivered bedside prior to discharge. Deliver to family if patient is in surgery at 5pm.     Patient was instructed to purchase and take Colace to counter possible GI side effects of taking opiates.     DVT prophylaxis was discussed with the patient today including risk factors for developing DVTs and history of DVTs. The patient was asked if any specific recommendations were given from the doctor/s that did pre-operative surgical clearance.      If the patient was previously taking 81mg baby aspirin, they were told to not take additional baby aspirin, using the above stated aspirin and to restart the 81mg aspirin daily after completion of the aspirin dose.      Patient was also told to buy over the counter Prilosec medication and take it once daily for GI protection  as long as they are taking NSAIDs or Aspirin.     The patient was told that narcotic pain medications may make them drowsy and instructions were given to not sign legal documents, drive or operate heavy machinery, cars, or equipment while under the influence of narcotic medications.     As there were no other questions to be asked, he was given my business card along with Dr. Corrigan's business card if he has any questions or concerns prior to surgery or in the postop period.

## 2024-12-12 NOTE — H&P (VIEW-ONLY)
Casper Osborne  is here for a completion of his perioperative paperwork. he  Is scheduled to undergo Right knee arthroscopic ACL reconstruction with allograft, partial medial and lateral meniscectomy versus meniscus repair as indicated, chondroplasty on 12/20/24.  He is a healthy individual and does need clearance for this procedure.     Patient cleared per PCP and cards.    DOES NOT WANT REGIONAL BLOCK.    Risks, indications and benefits of the surgical procedure were discussed with the patient. All questions with regard to surgery, rehab, expected return to functional activities, activities of daily living and recreational endeavors were answered to his satisfaction.    Discussed COVID-19 with the patient, they are aware of our current policies and procedures, were given the option of delaying surgery, and they elect to proceed.    Patient was informed and understands the risks of surgery are greater for patients with a current condition or hx of heart disease, obesity, clotting disorders, recurrent infections, steroid use, current or past smoking, and factors such as sedentary lifestyle and noncompliance with medications, therapy or f/u. The degree of the increased risk is hard to estimate w/ any degree of precision.    Once no other questions were asked, a brief history and physical exam was then performed.    PAST MEDICAL HISTORY:   Past Medical History:   Diagnosis Date    GERD (gastroesophageal reflux disease)     Hyperlipidemia      PAST SURGICAL HISTORY:   Past Surgical History:   Procedure Laterality Date    ADENOIDECTOMY  1966    ARTHROSCOPY OF ANKLE WITH DEBRIDEMENT Left 12/19/2019    Procedure: ARTHROSCOPY, ANKLE, WITH DEBRIDEMENT;  Surgeon: Bay Melendez MD;  Location: [facility] OR;  Service: Orthopedics;  Laterality: Left;    EPIDURAL STEROID INJECTION N/A 01/29/2021    Procedure: INJECTION, STEROID, EPIDURAL L4/5;  Surgeon: Alvarado Reaves MD;  Location: [facility], [place], operated by [facility];  Service: Pain Management;   Laterality: N/A;    EPIDURAL STEROID INJECTION INTO LUMBAR SPINE N/A 12/24/2020    Procedure: Injection-steroid-epidural-lumbar--L4-5;  Surgeon: Ori Cali Jr., MD;  Location: [facility];  Service: Pain Management;  Laterality: N/A;    FRACTURE SURGERY      left heel at 41yo    HEEL SPUR SURGERY      left heel - fell off a ladder and had to have this reconstructed    SPINE SURGERY      c7 fx - prior to this, he was getting dizzy spells - none since    TONSILLECTOMY      VASECTOMY       FAMILY HISTORY:   Family History   Problem Relation Name Age of Onset    Diabetes Mother Stephanie Kimbrough     Cancer Father Merlin Gaspard         menostatic    Heart disease Daughter Adelina Osborne         enlarged heart    Hypothyroidism Daughter Adelina Osborne     Colon polyps Neg Hx      Prostate cancer Neg Hx      Colon cancer Neg Hx      Stroke Neg Hx      Hypertension Neg Hx      Hyperlipidemia Neg Hx       SOCIAL HISTORY:   Social History     Socioeconomic History    Marital status:      Spouse name: Nithya    Number of children: 1   Occupational History    Occupation: Retried   Tobacco Use    Smoking status: Never    Smokeless tobacco: Never   Substance and Sexual Activity    Alcohol use: Not Currently    Drug use: No    Sexual activity: Yes     Partners: Female     Birth control/protection: None     Comment: vasectomy     Social Drivers of Health     Financial Resource Strain: Low Risk  (12/8/2024)    Overall Financial Resource Strain (CARDIA)     Difficulty of Paying Living Expenses: Not very hard   Food Insecurity: No Food Insecurity (12/8/2024)    Hunger Vital Sign     Worried About Running Out of Food in the Last Year: Never true     Ran Out of Food in the Last Year: Never true   Transportation Needs: No Transportation Needs (12/15/2022)    PRAPARE - Transportation     Lack of Transportation (Medical): No     Lack of Transportation (Non-Medical): No   Physical Activity: Sufficiently Active (12/8/2024)     Exercise Vital Sign     Days of Exercise per Week: 3 days     Minutes of Exercise per Session: 90 min   Stress: No Stress Concern Present (12/8/2024)    [place] [employer] - Occupational Stress Questionnaire     Feeling of Stress : Not at all   Housing Stability: Low Risk  (12/15/2022)    Housing Stability Vital Sign     Unable to Pay for Housing in the Last Year: No     Number of Places Lived in the Last Year: 1     Unstable Housing in the Last Year: No       MEDICATIONS:   Current Outpatient Medications:     ammonium lactate 12 % Crea, Bid to dry elbow and knee and thick dry skin of thigh, Disp: 140 g, Rfl: 3    aspirin (ECOTRIN) 81 MG EC tablet, Take 81 mg by mouth once daily., Disp: , Rfl:     azelastine (ASTELIN) 137 mcg (0.1 %) nasal spray, 1 spray (137 mcg total) by Nasal route 2 (two) times daily., Disp: 30 mL, Rfl: 0    cycloSPORINE (RESTASIS) 0.05 % ophthalmic emulsion, Place 1 drop into both eyes 2 (two) times daily., Disp: 180 each, Rfl: 3    diclofenac sodium (VOLTAREN) 1 % Gel, Apply 2 g topically daily as needed (pain foot and ankle)., Disp: 450 g, Rfl: 3    fluticasone propionate (FLONASE) 50 mcg/actuation nasal spray, 1 spray (50 mcg total) by Each Nostril route once daily., Disp: 16 g, Rfl: 3    ibuprofen (ADVIL,MOTRIN) 800 MG tablet, Take 1 tablet (800 mg total) by mouth 3 (three) times daily as needed for Pain., Disp: 90 tablet, Rfl: 2    ipratropium (ATROVENT) 21 mcg (0.03 %) nasal spray, PLACE TWO SPRAYS IN EACH NOSTRIL THREE TIMES DAILY, Disp: 30 mL, Rfl: 4    lovastatin (MEVACOR) 40 MG tablet, TAKE ONE TABLET BY MOUTH AT BEDTIME, Disp: 90 tablet, Rfl: 3    tamsulosin (FLOMAX) 0.4 mg Cap, Take 1 capsule (0.4 mg total) by mouth every evening., Disp: 90 capsule, Rfl: 3    varenicline (TYRVAYA) 0.03 mg/spray sprm, 1 spray by Each Nostril route 2 (two) times a day., Disp: 25.2 mL, Rfl: 3    finasteride (PROSCAR) 5 mg tablet, Take 1 tablet (5 mg total) by mouth once daily.,  Disp: 30 tablet, Rfl: 12    gabapentin (NEURONTIN) 300 MG capsule, Take 1 capsule (300 mg total) by mouth 3 (three) times daily., Disp: 90 capsule, Rfl: 1    levocetirizine (XYZAL) 5 MG tablet, Take 1 tablet (5 mg total) by mouth every evening., Disp: 30 tablet, Rfl: 0    omeprazole (PRILOSEC) 40 MG capsule, Take 1 capsule (40 mg total) by mouth once daily. (Patient not taking: Reported on 12/12/2024), Disp: 90 capsule, Rfl: 2    oxybutynin (DITROPAN) 5 MG Tab, Take 1 tablet (5 mg total) by mouth 3 (three) times daily as needed (for bladder spasms)., Disp: 90 tablet, Rfl: 11    sodium chloride 0.9% 0.9 % irrigation, Irrigate with 500 mLs as directed as needed (use  ml to irrigate the bladder prn blood clots or catheter not draining). (Patient not taking: Reported on 6/21/2024), Disp: 500 mL, Rfl: 1  No current facility-administered medications for this visit.    Facility-Administered Medications Ordered in Other Visits:     alprazolam ODT dissolvable tablet 0.5 mg, 0.5 mg, Oral, Once PRN, Ori Cali Jr., MD  ALLERGIES: Review of patient's allergies indicates:  No Known Allergies    Review of Systems   Constitution: Negative. Negative for chills, fever and night sweats.   HENT: Negative for congestion and headaches.    Eyes: Negative for blurred vision, left vision loss and right vision loss.   Cardiovascular: Negative for chest pain and syncope.   Respiratory: Negative for cough and shortness of breath.    Endocrine: Negative for polydipsia, polyphagia and polyuria.   Hematologic/Lymphatic: Negative for bleeding problem. Does not bruise/bleed easily.   Skin: Negative for dry skin, itching and rash.   Musculoskeletal: Negative for falls and muscle weakness.   Gastrointestinal: Negative for abdominal pain and bowel incontinence.   Genitourinary: Negative for bladder incontinence and nocturia.   Neurological: Negative for disturbances in coordination, loss of balance and seizures.    Psychiatric/Behavioral: Negative for depression. The patient does not have insomnia.    Allergic/Immunologic: Negative for hives and persistent infections.     PHYSICAL EXAM:  GEN: A&Ox3, WD WN NAD  HEENT: WNL  CHEST: CTAB, no W/R/R  HEART: RRR, no M/R/G   ABD: Soft, NT ND, BS x4 QUADS  MS: Refer to previous note for detailed MS exam  NEURO: CN II-XII intact       The surgical consent was then reviewed with the patient, who agreed with all the contents of the consent form and it was signed.     PHYSICAL THERAPY:  He was also instructed regarding physical therapy and will begin on POD#1-3. He is doing physical therapy at [facility].    POST OP CARE: Instructions were reviewed including care of the wound and dressing after surgery and when he can shower.     PAIN MANAGEMENT: Casper Osborne was instructed regarding the Polar ice unit that will be in place after surgery and his postoperative pain medications.     MEDICATION:  Roxicodone 5 mg 1-2 q 4 hours PRN for pain  Zofran 4 mg q 8 hours PRN for nausea and vomiting.  Aspirin 81mg BID x 2 weeks for DVT prophylaxis starting on the evening after surgery.      Post op meds to be delivered bedside prior to discharge. Deliver to family if patient is in surgery at 5pm.     Patient was instructed to purchase and take Colace to counter possible GI side effects of taking opiates.     DVT prophylaxis was discussed with the patient today including risk factors for developing DVTs and history of DVTs. The patient was asked if any specific recommendations were given from the doctor/s that did pre-operative surgical clearance.      If the patient was previously taking 81mg baby aspirin, they were told to not take additional baby aspirin, using the above stated aspirin and to restart the 81mg aspirin daily after completion of the aspirin dose.      Patient was also told to buy over the counter Prilosec medication and take it once daily for GI protection  as long as they are taking NSAIDs or Aspirin.     The patient was told that narcotic pain medications may make them drowsy and instructions were given to not sign legal documents, drive or operate heavy machinery, cars, or equipment while under the influence of narcotic medications.     As there were no other questions to be asked, he was given my business card along with Dr. Corrigan's business card if he has any questions or concerns prior to surgery or in the postop period.

## 2024-12-13 ENCOUNTER — TELEPHONE (OUTPATIENT)
Dept: SPORTS MEDICINE | Facility: CLINIC | Age: 69
End: 2024-12-13
Payer: MEDICARE

## 2024-12-13 NOTE — TELEPHONE ENCOUNTER
Tried to call the patient back but had to leave a voicemail. I let him know that Dr Corrigan is gone for the day but he is going to call the patient back on Monday. Told him he could call back with any questions.     ----- Message from Ruben sent at 12/13/2024  3:47 PM CST -----  Type:  Patient Returning Call    Who Called:pt   Who Left Message for Patient:  Does the patient know what this is regarding?:missed an call from  in regards to surgery   Would the patient rather a call back or a response via [facility]? Call   Best Call Back Number:[phone]  Additional Information:

## 2024-12-13 NOTE — TELEPHONE ENCOUNTER
I called the patient to discuss his upcoming surgery.  It sounds like he had some concerns that his preop appointment yesterday.  I left a voicemail also with his wife.  Unable to get in touch with him.  I would like to talk to him before we proceed with any surgery.  We need to make sure we are on the same page regarding the plan and expectations.

## 2024-12-16 ENCOUNTER — ANESTHESIA EVENT (OUTPATIENT)
Dept: SURGERY | Facility: HOSPITAL | Age: 69
End: 2024-12-16
Payer: MEDICARE

## 2024-12-19 ENCOUNTER — TELEPHONE (OUTPATIENT)
Dept: SPORTS MEDICINE | Facility: CLINIC | Age: 69
End: 2024-12-19
Payer: MEDICARE

## 2024-12-19 NOTE — TELEPHONE ENCOUNTER
Spoke c pt. Informed pt of 8am arrival time for 12/20/24 surgery at the [facility]. Reminded pt of NPO status. Pt expressed understanding & was thankful.

## 2024-12-20 ENCOUNTER — PATIENT MESSAGE (OUTPATIENT)
Dept: SPORTS MEDICINE | Facility: CLINIC | Age: 69
End: 2024-12-20

## 2024-12-20 ENCOUNTER — ANESTHESIA (OUTPATIENT)
Dept: SURGERY | Facility: HOSPITAL | Age: 69
End: 2024-12-20
Payer: MEDICARE

## 2024-12-20 ENCOUNTER — TELEPHONE (OUTPATIENT)
Dept: SPORTS MEDICINE | Facility: CLINIC | Age: 69
End: 2024-12-20
Payer: MEDICARE

## 2024-12-20 ENCOUNTER — HOSPITAL ENCOUNTER (OUTPATIENT)
Facility: HOSPITAL | Age: 69
Discharge: HOME | End: 2024-12-20
Attending: ORTHOPAEDIC SURGERY | Admitting: ORTHOPAEDIC SURGERY
Payer: MEDICARE

## 2024-12-20 VITALS
WEIGHT: 194 LBS | RESPIRATION RATE: 18 BRPM | DIASTOLIC BLOOD PRESSURE: 70 MMHG | BODY MASS INDEX: 27.16 KG/M2 | HEART RATE: 69 BPM | SYSTOLIC BLOOD PRESSURE: 122 MMHG | TEMPERATURE: 97 F | OXYGEN SATURATION: 100 % | HEIGHT: 71 IN

## 2024-12-20 DIAGNOSIS — S83.511D RUPTURE OF ANTERIOR CRUCIATE LIGAMENT OF RIGHT KNEE, SUBSEQUENT ENCOUNTER: Primary | ICD-10-CM

## 2024-12-20 PROBLEM — S83.271A COMPLEX TEAR OF LATERAL MENISCUS OF RIGHT KNEE AS CURRENT INJURY: Status: ACTIVE | Noted: 2024-12-20

## 2024-12-20 PROBLEM — S83.231A COMPLEX TEAR OF MEDIAL MENISCUS OF RIGHT KNEE AS CURRENT INJURY: Status: ACTIVE | Noted: 2024-12-20

## 2024-12-20 PROBLEM — S83.511A RUPTURE OF ANTERIOR CRUCIATE LIGAMENT OF RIGHT KNEE: Status: ACTIVE | Noted: 2024-12-20

## 2024-12-20 PROCEDURE — C1762 CONN TISS, HUMAN(INC FASCIA): HCPCS | Performed by: ORTHOPAEDIC SURGERY

## 2024-12-20 PROCEDURE — 25000003 PHARM REV CODE 250: Performed by: SURGERY

## 2024-12-20 PROCEDURE — 99900035 HC TECH TIME PER 15 MIN (STAT)

## 2024-12-20 PROCEDURE — 36000711: Performed by: ORTHOPAEDIC SURGERY

## 2024-12-20 PROCEDURE — 25000003 PHARM REV CODE 250: Performed by: NURSE ANESTHETIST, CERTIFIED REGISTERED

## 2024-12-20 PROCEDURE — 71000015 HC POSTOP RECOV 1ST HR: Performed by: ORTHOPAEDIC SURGERY

## 2024-12-20 PROCEDURE — 29888 ARTHRS AID ACL RPR/AGMNTJ: CPT | Mod: RT,,, | Performed by: ORTHOPAEDIC SURGERY

## 2024-12-20 PROCEDURE — 36000710: Performed by: ORTHOPAEDIC SURGERY

## 2024-12-20 PROCEDURE — 63600175 PHARM REV CODE 636 W HCPCS: Performed by: ORTHOPAEDIC SURGERY

## 2024-12-20 PROCEDURE — 71000033 HC RECOVERY, INTIAL HOUR: Performed by: ORTHOPAEDIC SURGERY

## 2024-12-20 PROCEDURE — 63600175 PHARM REV CODE 636 W HCPCS: Performed by: SURGERY

## 2024-12-20 PROCEDURE — 63600175 PHARM REV CODE 636 W HCPCS: Performed by: NURSE ANESTHETIST, CERTIFIED REGISTERED

## 2024-12-20 PROCEDURE — 27201423 OPTIME MED/SURG SUP & DEVICES STERILE SUPPLY: Performed by: ORTHOPAEDIC SURGERY

## 2024-12-20 PROCEDURE — C1713 ANCHOR/SCREW BN/BN,TIS/BN: HCPCS | Performed by: ORTHOPAEDIC SURGERY

## 2024-12-20 PROCEDURE — 63600175 PHARM REV CODE 636 W HCPCS: Performed by: PHYSICIAN ASSISTANT

## 2024-12-20 PROCEDURE — 37000009 HC ANESTHESIA EA ADD 15 MINS: Performed by: ORTHOPAEDIC SURGERY

## 2024-12-20 PROCEDURE — 37000008 HC ANESTHESIA 1ST 15 MINUTES: Performed by: ORTHOPAEDIC SURGERY

## 2024-12-20 PROCEDURE — 29880 ARTHRS KNE SRG MNISECTMY M&L: CPT | Mod: 51,RT,, | Performed by: ORTHOPAEDIC SURGERY

## 2024-12-20 PROCEDURE — 94761 N-INVAS EAR/PLS OXIMETRY MLT: CPT

## 2024-12-20 PROCEDURE — 27800903 OPTIME MED/SURG SUP & DEVICES OTHER IMPLANTS: Performed by: ORTHOPAEDIC SURGERY

## 2024-12-20 DEVICE — BTB HEMI W/SHAPED BONE BLOCKS: Type: IMPLANTABLE DEVICE | Site: KNEE | Status: FUNCTIONAL

## 2024-12-20 DEVICE — SCRW,CANN. INT.,W/DISP SHTH
Type: IMPLANTABLE DEVICE | Site: KNEE | Status: FUNCTIONAL
Brand: ARTHREX®

## 2024-12-20 DEVICE — SCREW, CANN. INT., FULL THREAD
Type: IMPLANTABLE DEVICE | Site: KNEE | Status: FUNCTIONAL
Brand: ARTHREX®

## 2024-12-20 DEVICE — IMPLSYS 2NDRY FIXATN BIOSWVLK 4.75X19.1
Type: IMPLANTABLE DEVICE | Site: KNEE | Status: FUNCTIONAL
Brand: ARTHREX®

## 2024-12-20 DEVICE — FIBER CORTICAL ENHANCE 2.5CC: Type: IMPLANTABLE DEVICE | Site: KNEE | Status: FUNCTIONAL

## 2024-12-20 RX ORDER — HALOPERIDOL 5 MG/ML
0.5 INJECTION INTRAMUSCULAR EVERY 10 MIN PRN
Status: DISCONTINUED | OUTPATIENT
Start: 2024-12-20 | End: 2024-12-20 | Stop reason: HOSPADM

## 2024-12-20 RX ORDER — MIDAZOLAM HYDROCHLORIDE 1 MG/ML
INJECTION INTRAMUSCULAR; INTRAVENOUS
Status: DISCONTINUED | OUTPATIENT
Start: 2024-12-20 | End: 2024-12-20

## 2024-12-20 RX ORDER — ACETAMINOPHEN 325 MG/1
650 TABLET ORAL EVERY 4 HOURS PRN
Status: CANCELLED | OUTPATIENT
Start: 2024-12-20

## 2024-12-20 RX ORDER — ROCURONIUM BROMIDE 10 MG/ML
INJECTION, SOLUTION INTRAVENOUS
Status: DISCONTINUED | OUTPATIENT
Start: 2024-12-20 | End: 2024-12-20

## 2024-12-20 RX ORDER — FAMOTIDINE 10 MG/ML
INJECTION INTRAVENOUS
Status: DISCONTINUED | OUTPATIENT
Start: 2024-12-20 | End: 2024-12-20

## 2024-12-20 RX ORDER — VANCOMYCIN HYDROCHLORIDE 1 G/20ML
INJECTION, POWDER, LYOPHILIZED, FOR SOLUTION INTRAVENOUS
Status: DISCONTINUED | OUTPATIENT
Start: 2024-12-20 | End: 2024-12-20 | Stop reason: HOSPADM

## 2024-12-20 RX ORDER — HYDROMORPHONE HYDROCHLORIDE 1 MG/ML
0.2 INJECTION, SOLUTION INTRAMUSCULAR; INTRAVENOUS; SUBCUTANEOUS EVERY 5 MIN PRN
Status: DISCONTINUED | OUTPATIENT
Start: 2024-12-20 | End: 2024-12-20 | Stop reason: HOSPADM

## 2024-12-20 RX ORDER — CEFAZOLIN 2 G/1
2 INJECTION, POWDER, FOR SOLUTION INTRAMUSCULAR; INTRAVENOUS
Status: DISCONTINUED | OUTPATIENT
Start: 2024-12-20 | End: 2024-12-20 | Stop reason: HOSPADM

## 2024-12-20 RX ORDER — SODIUM CHLORIDE 9 MG/ML
INJECTION, SOLUTION INTRAVENOUS CONTINUOUS
Status: DISCONTINUED | OUTPATIENT
Start: 2024-12-20 | End: 2024-12-20 | Stop reason: HOSPADM

## 2024-12-20 RX ORDER — METHOCARBAMOL 750 MG/1
750 TABLET, FILM COATED ORAL ONCE
Status: COMPLETED | OUTPATIENT
Start: 2024-12-20 | End: 2024-12-20

## 2024-12-20 RX ORDER — LIDOCAINE HYDROCHLORIDE 20 MG/ML
INJECTION INTRAVENOUS
Status: DISCONTINUED | OUTPATIENT
Start: 2024-12-20 | End: 2024-12-20

## 2024-12-20 RX ORDER — EPHEDRINE SULFATE 50 MG/ML
INJECTION, SOLUTION INTRAVENOUS
Status: DISCONTINUED | OUTPATIENT
Start: 2024-12-20 | End: 2024-12-20

## 2024-12-20 RX ORDER — KETAMINE HCL IN 0.9 % NACL 50 MG/5 ML
SYRINGE (ML) INTRAVENOUS
Status: DISCONTINUED | OUTPATIENT
Start: 2024-12-20 | End: 2024-12-20

## 2024-12-20 RX ORDER — ROPIVACAINE HYDROCHLORIDE 2 MG/ML
INJECTION, SOLUTION EPIDURAL; INFILTRATION; PERINEURAL
Status: COMPLETED | OUTPATIENT
Start: 2024-12-20 | End: 2024-12-20

## 2024-12-20 RX ORDER — ONDANSETRON HYDROCHLORIDE 2 MG/ML
INJECTION, SOLUTION INTRAVENOUS
Status: DISCONTINUED | OUTPATIENT
Start: 2024-12-20 | End: 2024-12-20

## 2024-12-20 RX ORDER — SODIUM CHLORIDE 0.9 % (FLUSH) 0.9 %
10 SYRINGE (ML) INJECTION
Status: DISCONTINUED | OUTPATIENT
Start: 2024-12-20 | End: 2024-12-20 | Stop reason: HOSPADM

## 2024-12-20 RX ORDER — ONDANSETRON HYDROCHLORIDE 2 MG/ML
4 INJECTION, SOLUTION INTRAVENOUS EVERY 12 HOURS PRN
Status: CANCELLED | OUTPATIENT
Start: 2024-12-20

## 2024-12-20 RX ORDER — LANOLIN ALCOHOL/MO/W.PET/CERES
1 CREAM (GRAM) TOPICAL
COMMUNITY

## 2024-12-20 RX ORDER — OXYCODONE HYDROCHLORIDE 5 MG/1
5 TABLET ORAL
Status: DISCONTINUED | OUTPATIENT
Start: 2024-12-20 | End: 2024-12-20 | Stop reason: HOSPADM

## 2024-12-20 RX ORDER — TRANEXAMIC ACID 100 MG/ML
INJECTION, SOLUTION INTRAVENOUS
Status: DISCONTINUED | OUTPATIENT
Start: 2024-12-20 | End: 2024-12-20

## 2024-12-20 RX ORDER — DEXAMETHASONE SODIUM PHOSPHATE 4 MG/ML
INJECTION, SOLUTION INTRA-ARTICULAR; INTRALESIONAL; INTRAMUSCULAR; INTRAVENOUS; SOFT TISSUE
Status: DISCONTINUED | OUTPATIENT
Start: 2024-12-20 | End: 2024-12-20

## 2024-12-20 RX ORDER — MORPHINE SULFATE 2 MG/ML
2 INJECTION, SOLUTION INTRAMUSCULAR; INTRAVENOUS EVERY 10 MIN PRN
Status: CANCELLED | OUTPATIENT
Start: 2024-12-20

## 2024-12-20 RX ORDER — NEOSTIGMINE METHYLSULFATE 1 MG/ML
INJECTION INTRAVENOUS
Status: DISCONTINUED | OUTPATIENT
Start: 2024-12-20 | End: 2024-12-20

## 2024-12-20 RX ORDER — FENTANYL CITRATE 50 UG/ML
INJECTION, SOLUTION INTRAMUSCULAR; INTRAVENOUS
Status: DISCONTINUED | OUTPATIENT
Start: 2024-12-20 | End: 2024-12-20

## 2024-12-20 RX ORDER — PHENYLEPHRINE HYDROCHLORIDE 10 MG/ML
INJECTION INTRAVENOUS
Status: DISCONTINUED | OUTPATIENT
Start: 2024-12-20 | End: 2024-12-20

## 2024-12-20 RX ORDER — PROPOFOL 10 MG/ML
VIAL (ML) INTRAVENOUS
Status: DISCONTINUED | OUTPATIENT
Start: 2024-12-20 | End: 2024-12-20

## 2024-12-20 RX ORDER — HYDROCODONE BITARTRATE AND ACETAMINOPHEN 5; 325 MG/1; MG/1
1 TABLET ORAL EVERY 4 HOURS PRN
Status: CANCELLED | OUTPATIENT
Start: 2024-12-20

## 2024-12-20 RX ADMIN — PHENYLEPHRINE HYDROCHLORIDE 100 MCG: 10 INJECTION INTRAVENOUS at 11:12

## 2024-12-20 RX ADMIN — DEXAMETHASONE SODIUM PHOSPHATE 8 MG: 4 INJECTION, SOLUTION INTRAMUSCULAR; INTRAVENOUS at 11:12

## 2024-12-20 RX ADMIN — ONDANSETRON 4 MG: 2 INJECTION INTRAMUSCULAR; INTRAVENOUS at 12:12

## 2024-12-20 RX ADMIN — GLYCOPYRROLATE 0.2 MG: 0.2 INJECTION, SOLUTION INTRAMUSCULAR; INTRAVENOUS at 11:12

## 2024-12-20 RX ADMIN — HYDROMORPHONE HYDROCHLORIDE 0.2 MG: 1 INJECTION, SOLUTION INTRAMUSCULAR; INTRAVENOUS; SUBCUTANEOUS at 02:12

## 2024-12-20 RX ADMIN — EPHEDRINE SULFATE 10 MG: 50 INJECTION INTRAVENOUS at 12:12

## 2024-12-20 RX ADMIN — GLYCOPYRROLATE 0.4 MG: 0.2 INJECTION, SOLUTION INTRAMUSCULAR; INTRAVENOUS at 01:12

## 2024-12-20 RX ADMIN — Medication 25 MG: at 11:12

## 2024-12-20 RX ADMIN — NEOSTIGMINE METHYLSULFATE 4 MG: 1 INJECTION INTRAVENOUS at 01:12

## 2024-12-20 RX ADMIN — OXYCODONE 5 MG: 5 TABLET ORAL at 02:12

## 2024-12-20 RX ADMIN — LIDOCAINE HYDROCHLORIDE 100 MG: 20 INJECTION INTRAVENOUS at 11:12

## 2024-12-20 RX ADMIN — CEFAZOLIN 2 G: 2 INJECTION, POWDER, FOR SOLUTION INTRAMUSCULAR; INTRAVENOUS at 11:12

## 2024-12-20 RX ADMIN — EPHEDRINE SULFATE 5 MG: 50 INJECTION INTRAVENOUS at 11:12

## 2024-12-20 RX ADMIN — METHOCARBAMOL 750 MG: 750 TABLET ORAL at 02:12

## 2024-12-20 RX ADMIN — PROPOFOL 200 MG: 10 INJECTION, EMULSION INTRAVENOUS at 11:12

## 2024-12-20 RX ADMIN — MIDAZOLAM HYDROCHLORIDE 2 MG: 1 INJECTION, SOLUTION INTRAMUSCULAR; INTRAVENOUS at 11:12

## 2024-12-20 RX ADMIN — FAMOTIDINE 20 MG: 10 INJECTION, SOLUTION INTRAVENOUS at 11:12

## 2024-12-20 RX ADMIN — SODIUM CHLORIDE: 9 INJECTION, SOLUTION INTRAVENOUS at 10:12

## 2024-12-20 RX ADMIN — ROCURONIUM BROMIDE 40 MG: 10 INJECTION, SOLUTION INTRAVENOUS at 11:12

## 2024-12-20 RX ADMIN — TRANEXAMIC ACID 1000 MG: 100 INJECTION INTRAVENOUS at 11:12

## 2024-12-20 RX ADMIN — FENTANYL CITRATE 100 MCG: 50 INJECTION, SOLUTION INTRAMUSCULAR; INTRAVENOUS at 11:12

## 2024-12-20 RX ADMIN — SODIUM CHLORIDE, SODIUM GLUCONATE, SODIUM ACETATE, POTASSIUM CHLORIDE, MAGNESIUM CHLORIDE, SODIUM PHOSPHATE, DIBASIC, AND POTASSIUM PHOSPHATE: .53; .5; .37; .037; .03; .012; .00082 INJECTION, SOLUTION INTRAVENOUS at 11:12

## 2024-12-20 NOTE — BRIEF OP NOTE
[facility] - Surgery ([facility])  Brief Operative Note    Surgery Date: 12/20/2024     Surgeons and Role:     * MARJAN Corrigan MD - Primary     * NANCY Aguilar MD - Resident - Assisting        Pre-op Diagnosis:  Rupture of anterior cruciate ligament of right knee, subsequent encounter [S83.511D]  Complex tear of medial meniscus of right knee as current injury, subsequent encounter [S83.231D]  Complex tear of lateral meniscus of right knee as current injury, subsequent encounter [S83.271D]    Post-op Diagnosis:  Post-Op Diagnosis Codes:     * Rupture of anterior cruciate ligament of right knee, subsequent encounter [S83.511D]     * Complex tear of medial meniscus of right knee as current injury, subsequent encounter [S83.231D]     * Complex tear of lateral meniscus of right knee as current injury, subsequent encounter [S83.271D]    Procedure(s) (LRB):  RECONSTRUCTION, KNEE, ACL, ARTHROSCOPIC (Right)  ARTHROSCOPY, KNEE, WITH MEDIAL AND LATERALMENISCECTOMY (Right)  SYNOVECTOMY, KNEE, LIMITED, ARTHROSCOPIC (Right)  ARTHROSCOPY, KNEE, WITH CHONDROPLASTY (Right)    Anesthesia: General    Operative Findings: right acl tear, right medial and lateral meniscus tears    Estimated Blood Loss: Minimal         Specimens:   Specimen (24h ago, onward)      None              Discharge Note    OUTCOME: Patient tolerated treatment/procedure well without complication and is now ready for discharge.    DISPOSITION: Home or Self Care    FINAL DIAGNOSIS:  Rupture of anterior cruciate ligament of right knee    FOLLOWUP: In clinic    DISCHARGE INSTRUCTIONS:    Discharge Procedure Orders   Diet general     Call MD for:  temperature >100.4     Call MD for:  persistent nausea and vomiting     Call MD for:  severe uncontrolled pain     Call MD for:  difficulty breathing, headache or visual disturbances     Call MD for:  redness, tenderness, or signs of infection (pain, swelling, redness, odor or green/yellow discharge around incision site)      Call MD for:  hives     Call MD for:  persistent dizziness or light-headedness     Call MD for:  extreme fatigue     No driving, operating heavy equipment or signing legal documents while taking pain medication

## 2024-12-20 NOTE — PLAN OF CARE
Pre-op complete. Waiting on anesthesia consent. Family at bedside. Bed locked in lowest position with call bell within reach.   Patient needs crutches.

## 2024-12-20 NOTE — TELEPHONE ENCOUNTER
I called the patient Tuesday evening to discuss the upcoming surgery today.  We spoke over the phone at that time.  I answered the patient's questions.  We discussed expectations.  The patient was concerned about potentially being toe-touch weight-bearing for up to 6 weeks.  I do think this would be a case where we debride the bucket-handle lateral meniscus given chronicity of the tear and his age.  I expect him to be weight-bearing after surgery.  I do think that would help with his contralateral ankle pain as well.  He does wish to proceed and does not want a delay.  He feels that he is maximize his nonoperative care and complaints of subjective instability in the knee.  On my clinical exam I think he has ACL functional deficiency with good stability otherwise.  We will plan to proceed with surgery today as previously discussed.

## 2024-12-20 NOTE — ANESTHESIA PREPROCEDURE EVALUATION
12/20/2024  Pre-operative evaluation for Procedure(s) (LRB):  RECONSTRUCTION, KNEE, ACL, ARTHROSCOPIC (Right)  ARTHROSCOPY, KNEE, WITH MENISCECTOMY (Right)    Casper Osborne is a 68 y.o. male GERD, HLD    Patient Active Problem List   Diagnosis    Hyperlipidemia    Lumbar facet arthropathy    Heel pain, chronic    Right tennis elbow    Wrist pain    Labral tear of shoulder    GERD (gastroesophageal reflux disease)    Right rotator cuff tear    Pain of right hand    Medial epicondylitis of left elbow    Enlarged prostate with urinary retention    Penile rash    Arthritis of ankle, left    Generalized muscle weakness    Balance problem    Decreased range of motion of left ankle    Antalgic gait    Cough    Viral illness    Complex renal cyst    DDD (degenerative disc disease), lumbar    Chronic midline low back pain without sciatica    Lumbar discogenic pain syndrome    Chronic pain    Decreased range of motion of right knee    Urinary retention    Hematuria, gross    Encounter for Talamantes catheter removal    Aortic atherosclerosis    Nonspecific abnormal electrocardiogram (ECG) (EKG)       Review of patient's allergies indicates:  No Known Allergies    Current Facility-Administered Medications on File Prior to Encounter   Medication Dose Route Frequency Provider Last Rate Last Admin    alprazolam ODT dissolvable tablet 0.5 mg  0.5 mg Oral Once PRN Ori Cali Jr., MD         Current Outpatient Medications on File Prior to Encounter   Medication Sig Dispense Refill    ammonium lactate 12 % Crea Bid to dry elbow and knee and thick dry skin of thigh 140 g 3    aspirin (ECOTRIN) 81 MG EC tablet Take 81 mg by mouth once daily.      cycloSPORINE (RESTASIS) 0.05 % ophthalmic emulsion Place 1 drop into both eyes 2 (two) times daily. 180 each 3    diclofenac sodium (VOLTAREN) 1 % Gel Apply 2 g topically daily as  needed (pain foot and ankle). 450 g 3    finasteride (PROSCAR) 5 mg tablet Take 1 tablet (5 mg total) by mouth once daily. 30 tablet 12    fluticasone propionate (FLONASE) 50 mcg/actuation nasal spray 1 spray (50 mcg total) by Each Nostril route once daily. 16 g 3    gabapentin (NEURONTIN) 300 MG capsule Take 1 capsule (300 mg total) by mouth 3 (three) times daily. 90 capsule 1    ibuprofen (ADVIL,MOTRIN) 800 MG tablet Take 1 tablet (800 mg total) by mouth 3 (three) times daily as needed for Pain. 90 tablet 2    ipratropium (ATROVENT) 21 mcg (0.03 %) nasal spray PLACE TWO SPRAYS IN EACH NOSTRIL THREE TIMES DAILY 30 mL 4    levocetirizine (XYZAL) 5 MG tablet Take 1 tablet (5 mg total) by mouth every evening. 30 tablet 0    lovastatin (MEVACOR) 40 MG tablet TAKE ONE TABLET BY MOUTH AT BEDTIME 90 tablet 3    omeprazole (PRILOSEC) 40 MG capsule Take 1 capsule (40 mg total) by mouth once daily. (Patient not taking: Reported on 12/12/2024) 90 capsule 2    oxybutynin (DITROPAN) 5 MG Tab Take 1 tablet (5 mg total) by mouth 3 (three) times daily as needed (for bladder spasms). 90 tablet 11    sodium chloride 0.9% 0.9 % irrigation Irrigate with 500 mLs as directed as needed (use  ml to irrigate the bladder prn blood clots or catheter not draining). (Patient not taking: Reported on 6/21/2024) 500 mL 1    tamsulosin (FLOMAX) 0.4 mg Cap Take 1 capsule (0.4 mg total) by mouth every evening. 90 capsule 3    varenicline (TYRVAYA) 0.03 mg/spray sprm 1 spray by Each Nostril route 2 (two) times a day. 25.2 mL 3       Past Surgical History:   Procedure Laterality Date    ADENOIDECTOMY  1966    ARTHROSCOPY OF ANKLE WITH DEBRIDEMENT Left 12/19/2019    Procedure: ARTHROSCOPY, ANKLE, WITH DEBRIDEMENT;  Surgeon: Bay Melendez MD;  Location: [facility];  Service: Orthopedics;  Laterality: Left;    EPIDURAL STEROID INJECTION N/A 01/29/2021    Procedure: INJECTION, STEROID, EPIDURAL L4/5;  Surgeon: Alvarado Reaves MD;  Location: [facility]  PAIN MGT;  Service: Pain Management;  Laterality: N/A;    EPIDURAL STEROID INJECTION INTO LUMBAR SPINE N/A 12/24/2020    Procedure: Injection-steroid-epidural-lumbar--L4-5;  Surgeon: Ori Cali Jr., MD;  Location: [facility] PAIN MGT;  Service: Pain Management;  Laterality: N/A;    FRACTURE SURGERY      left heel at 39yo    HEEL SPUR SURGERY      left heel - fell off a ladder and had to have this reconstructed    SPINE SURGERY      c7 fx - prior to this, he was getting dizzy spells - none since    TONSILLECTOMY      VASECTOMY         Social History     Socioeconomic History    Marital status:      Spouse name: Nithya    Number of children: 1   Occupational History    Occupation: Retried   Tobacco Use    Smoking status: Never    Smokeless tobacco: Never   Substance and Sexual Activity    Alcohol use: Not Currently    Drug use: No    Sexual activity: Yes     Partners: Female     Birth control/protection: None     Comment: vasectomy     Social Drivers of Health     Financial Resource Strain: Low Risk  (12/8/2024)    Overall Financial Resource Strain (CARDIA)     Difficulty of Paying Living Expenses: Not very hard   Food Insecurity: No Food Insecurity (12/8/2024)    Hunger Vital Sign     Worried About Running Out of Food in the Last Year: Never true     Ran Out of Food in the Last Year: Never true   Transportation Needs: No Transportation Needs (12/15/2022)    PRAPARE - Transportation     Lack of Transportation (Medical): No     Lack of Transportation (Non-Medical): No   Physical Activity: Sufficiently Active (12/8/2024)    Exercise Vital Sign     Days of Exercise per Week: 3 days     Minutes of Exercise per Session: 90 min   Stress: No Stress Concern Present (12/8/2024)    [employer] - Occupational Stress Questionnaire     Feeling of Stress : Not at all   Housing Stability: Low Risk  (12/15/2022)    Housing Stability Vital Sign     Unable to Pay for Housing in the Last Year: No      "Number of Places Lived in the Last Year: 1     Unstable Housing in the Last Year: No         CBC: No results for input(s): "WBC", "RBC", "HGB", "HCT", "PLT", "MCV", "MCH", "MCHC" in the last 72 hours.    CMP: No results for input(s): "NA", "K", "CL", "CO2", "BUN", "CREATININE", "GLU", "MG", "PHOS", "CALCIUM", "ALBUMIN", "PROT", "ALKPHOS", "ALT", "AST", "BILITOT" in the last 72 hours.    INR  No results for input(s): "PT", "INR", "PROTIME", "APTT" in the last 72 hours.        Diagnostic Studies:      EKD Echo:  No results found for this or any previous visit.       Pre-op Assessment    I have reviewed the Patient Summary Reports.     I have reviewed the Nursing Notes. I have reviewed the NPO Status.   I have reviewed the Medications.     Review of Systems  Hepatic/GI:     GERD                Musculoskeletal:  Arthritis                   Physical Exam  General: Well nourished and Cooperative    Airway:  Mallampati: II   Mouth Opening: Normal  TM Distance: Normal  Tongue: Normal  Neck ROM: Normal ROM    Chest/Lungs:  Clear to auscultation, Normal Respiratory Rate    Heart:  Rate: Normal  Rhythm: Regular Rhythm  Sounds: Normal        Anesthesia Plan  Type of Anesthesia, risks & benefits discussed:    Anesthesia Type: Gen ETT, Gen Supraglottic Airway  Intra-op Monitoring Plan: Standard ASA Monitors  Post Op Pain Control Plan: multimodal analgesia and IV/PO Opioids PRN  Induction:  IV  Airway Plan: Direct and Video, Post-Induction  Informed Consent: Informed consent signed with the Patient and all parties understand the risks and agree with anesthesia plan.  All questions answered.   ASA Score: 2    Ready For Surgery From Anesthesia Perspective.     .      "

## 2024-12-20 NOTE — DISCHARGE SUMMARY
[facility] - Surgery (Hospital)  Discharge Note  Short Stay    Procedure(s) (LRB):  RECONSTRUCTION, KNEE, ACL, ARTHROSCOPIC (Right)  ARTHROSCOPY, KNEE, WITH MEDIAL AND LATERALMENISCECTOMY (Right)  SYNOVECTOMY, KNEE, LIMITED, ARTHROSCOPIC (Right)  ARTHROSCOPY, KNEE, WITH CHONDROPLASTY (Right)      OUTCOME: Patient tolerated treatment/procedure well without complication and is now ready for discharge.    DISPOSITION: Home or Self Care    FINAL DIAGNOSIS:  Rupture of anterior cruciate ligament of right knee    FOLLOWUP: In clinic    DISCHARGE INSTRUCTIONS:    Discharge Procedure Orders   Diet general     Call MD for:  temperature >100.4     Call MD for:  persistent nausea and vomiting     Call MD for:  severe uncontrolled pain     Call MD for:  difficulty breathing, headache or visual disturbances     Call MD for:  redness, tenderness, or signs of infection (pain, swelling, redness, odor or green/yellow discharge around incision site)     Call MD for:  hives     Call MD for:  persistent dizziness or light-headedness     Call MD for:  extreme fatigue     No driving, operating heavy equipment or signing legal documents while taking pain medication             Medication List        CHANGE how you take these medications      aspirin 81 MG EC tablet  Commonly known as: ECOTRIN  Take 1 tablet (81 mg total) by mouth 2 (two) times a day. for 14 days  What changed: Another medication with the same name was removed. Continue taking this medication, and follow the directions you see here.            CONTINUE taking these medications      ammonium lactate 12 % Crea  Bid to dry elbow and knee and thick dry skin of thigh     azelastine 137 mcg (0.1 %) nasal spray  Commonly known as: ASTELIN  1 spray (137 mcg total) by Nasal route 2 (two) times daily.     cycloSPORINE 0.05 % ophthalmic emulsion  Commonly known as: RESTASIS  Place 1 drop into both eyes 2 (two) times daily.     ferrous sulfate Tab tablet  Commonly known as: FEOSOL      fluticasone propionate 50 mcg/actuation nasal spray  Commonly known as: FLONASE  1 spray (50 mcg total) by Each Nostril route once daily.     ibuprofen 800 MG tablet  Commonly known as: ADVIL,MOTRIN  Take 1 tablet (800 mg total) by mouth 3 (three) times daily as needed for Pain.     ipratropium 21 mcg (0.03 %) nasal spray  Commonly known as: ATROVENT  PLACE TWO SPRAYS IN EACH NOSTRIL THREE TIMES DAILY     lovastatin 40 MG tablet  Commonly known as: MEVACOR  TAKE ONE TABLET BY MOUTH AT BEDTIME     omeprazole 40 MG capsule  Commonly known as: PRILOSEC  Take 1 capsule (40 mg total) by mouth once daily.     ondansetron 4 MG Tbdl  Commonly known as: ZOFRAN-ODT  Dissolve 1 tablet (4 mg total) by mouth every 8 (eight) hours as needed (nausea).     oxybutynin 5 MG Tab  Commonly known as: DITROPAN  Take 1 tablet (5 mg total) by mouth 3 (three) times daily as needed (for bladder spasms).     oxyCODONE 5 MG immediate release tablet  Commonly known as: ROXICODONE  Take 1-2 tablets (5-10 mg total) by mouth every 4 to 6 hours as needed for Pain.     tamsulosin 0.4 mg Cap  Commonly known as: FLOMAX  Take 1 capsule (0.4 mg total) by mouth every evening.     TYRVAYA 0.03 mg/spray Sprm  Generic drug: varenicline  1 spray by Each Nostril route 2 (two) times a day.            STOP taking these medications      diclofenac sodium 1 % Gel  Commonly known as: VOLTAREN     finasteride 5 mg tablet  Commonly known as: PROSCAR     gabapentin 300 MG capsule  Commonly known as: NEURONTIN     levocetirizine 5 MG tablet  Commonly known as: XYZAL     sodium chloride 0.9% 0.9 % irrigation               TIME SPENT ON DISCHARGE: 10 minutes

## 2024-12-20 NOTE — ANESTHESIA POSTPROCEDURE EVALUATION
Anesthesia Post Evaluation    Patient: Casper Osborne    Procedure(s) Performed: Procedure(s) (LRB):  RECONSTRUCTION, KNEE, ACL, ARTHROSCOPIC (Right)  ARTHROSCOPY, KNEE, WITH MEDIAL AND LATERALMENISCECTOMY (Right)  SYNOVECTOMY, KNEE, LIMITED, ARTHROSCOPIC (Right)  ARTHROSCOPY, KNEE, WITH CHONDROPLASTY (Right)    Final Anesthesia Type: general      Patient location during evaluation: PACU  Patient participation: Yes- Able to Participate  Level of consciousness: awake and alert and oriented  Post-procedure vital signs: reviewed and stable  Pain management: adequate  Airway patency: patent  DAYAN mitigation strategies: Multimodal analgesia  PONV status at discharge: No PONV  Anesthetic complications: no      Cardiovascular status: blood pressure returned to baseline and hemodynamically stable  Respiratory status: unassisted, spontaneous ventilation and room air  Hydration status: euvolemic  Follow-up not needed.              Vitals Value Taken Time   /72 12/20/24 1447   Temp 36.3 °C (97.3 °F) 12/20/24 1401   Pulse 72 12/20/24 1454   Resp 19 12/20/24 1454   SpO2 95 % 12/20/24 1454   Vitals shown include unfiled device data.      Event Time   Out of Recovery 14:25:00         Pain/Wiliam Score: Pain Rating Prior to Med Admin: 6 (12/20/2024  2:54 PM)  Wiliam Score: 10 (12/20/2024  2:06 PM)

## 2024-12-20 NOTE — TRANSFER OF CARE
"Anesthesia Transfer of Care Note    Patient: Casper Osborne    Procedure(s) Performed: Procedure(s) (LRB):  RECONSTRUCTION, KNEE, ACL, ARTHROSCOPIC (Right)  ARTHROSCOPY, KNEE, WITH MEDIAL AND LATERALMENISCECTOMY (Right)  SYNOVECTOMY, KNEE, LIMITED, ARTHROSCOPIC (Right)  ARTHROSCOPY, KNEE, WITH CHONDROPLASTY (Right)    Patient location: [facility]    Anesthesia Type: general and regional    Transport from OR: Transported from OR on room air with adequate spontaneous ventilation. Transported from OR on 6-10 L/min O2 by face mask with adequate spontaneous ventilation    Post pain: adequate analgesia    Post assessment: no apparent anesthetic complications and tolerated procedure well    Post vital signs: stable    Level of consciousness: awake    Nausea/Vomiting: no nausea/vomiting    Complications: none    Transfer of care protocol was followed      Last vitals: Visit Vitals  BP (!) 143/82 (BP Location: Left arm, Patient Position: Lying)   Pulse 61   Temp 36.8 °C (98.2 °F) (Temporal)   Resp 16   Ht 5' 11" (1.803 m)   Wt 88 kg (194 lb)   SpO2 100%   BMI 27.06 kg/m²     "

## 2024-12-20 NOTE — OPERATIVE NOTE ADDENDUM
Certification of Assistant at Surgery       Surgery Date: 12/20/2024     Participating Surgeons:  Surgeons and Role:     * MARJAN Corrigan MD - Primary     * NANCY Aguilar MD - Resident - Assisting    Procedures:  Procedure(s) (LRB):  RECONSTRUCTION, KNEE, ACL, ARTHROSCOPIC (Right)  ARTHROSCOPY, KNEE, WITH MEDIAL AND LATERALMENISCECTOMY (Right)  SYNOVECTOMY, KNEE, LIMITED, ARTHROSCOPIC (Right)  ARTHROSCOPY, KNEE, WITH CHONDROPLASTY (Right)    Assistant Surgeon's Certification of Necessity:  I understand that section 1842 (b) (6) (d) of the Social Security Act generally prohibits Medicare Part B reasonable charge payment for the services of assistants at surgery in teaching hospitals when qualified residents are available to furnish such services. I certify that the services for which payment is claimed were medically necessary, and that no qualified resident was available to perform the services. I further understand that these services are subject to post-payment review by the Medicare carrier.      Luisa Valencia PA-C    12/20/2024  1:57 PM

## 2024-12-20 NOTE — PLAN OF CARE
"VSS.  Patient tolerating oral liquids without difficulty.   No apparent s&s of distress noted at this time, no complaints voiced at this time.   Discharge instructions reviewed with patient/family/friend with good verbal feedback received.   Post op medications bedside delivery, DME none needed has "wooden crutches".  Patient ready for discharge.   "

## 2024-12-20 NOTE — ANESTHESIA PROCEDURE NOTES
Intubation    Date/Time: 12/20/2024 11:22 AM    Performed by: Becky Barrera CRNA  Authorized by: Chelsea Teran MD    Intubation:     Induction:  Intravenous    Intubated:  Postinduction    Mask Ventilation:  Easy mask    Attempts:  1    Attempted By:  CRNA    Method of Intubation:  Video laryngoscopy    Blade:  Ramires 3    Laryngeal View Grade: Grade I - full view of cords      Difficult Airway Encountered?: No      Complications:  None    Airway Device:  Oral endotracheal tube    Airway Device Size:  7.5    Style/Cuff Inflation:  Cuffed    Inflation Amount (mL):  7    Tube secured:  23    Secured at:  The lips    Placement Verified By:  Capnometry    Complicating Factors:  None    Findings Post-Intubation:  BS equal bilateral

## 2024-12-23 ENCOUNTER — CLINICAL SUPPORT (OUTPATIENT)
Dept: REHABILITATION | Facility: HOSPITAL | Age: 69
End: 2024-12-23
Payer: MEDICARE

## 2024-12-23 DIAGNOSIS — M25.661 DECREASED ROM OF RIGHT KNEE: Primary | ICD-10-CM

## 2024-12-23 DIAGNOSIS — R26.89 ANTALGIC GAIT: ICD-10-CM

## 2024-12-23 DIAGNOSIS — R53.1 DECREASED STRENGTH, ENDURANCE, AND MOBILITY: ICD-10-CM

## 2024-12-23 DIAGNOSIS — R68.89 DECREASED STRENGTH, ENDURANCE, AND MOBILITY: ICD-10-CM

## 2024-12-23 DIAGNOSIS — S83.511D RUPTURE OF ANTERIOR CRUCIATE LIGAMENT OF RIGHT KNEE, SUBSEQUENT ENCOUNTER: ICD-10-CM

## 2024-12-23 DIAGNOSIS — Z74.09 DECREASED STRENGTH, ENDURANCE, AND MOBILITY: ICD-10-CM

## 2024-12-23 PROCEDURE — 97140 MANUAL THERAPY 1/> REGIONS: CPT | Mod: PN

## 2024-12-23 PROCEDURE — 97112 NEUROMUSCULAR REEDUCATION: CPT | Mod: PN

## 2024-12-23 PROCEDURE — 97161 PT EVAL LOW COMPLEX 20 MIN: CPT | Mod: PN

## 2024-12-23 NOTE — PLAN OF CARE
[facility]   Physical Therapy Initial Evaluation      Name: Casper Osborne  Clinic Number: [id number]    Therapy Diagnosis:   Encounter Diagnoses   Name Primary?    Rupture of anterior cruciate ligament of right knee, subsequent encounter     Decreased ROM of right knee Yes    Decreased strength, endurance, and mobility     Antalgic gait         Physician: Luisa Valencia*    Physician Orders: PT Eval and Treat   Medical Diagnosis from Referral: S83.511D (ICD-10-CM) - Rupture of anterior cruciate ligament of right knee, subsequent encounter   Evaluation Date: 12/23/2024  Authorization Period Expiration: 12/12/2025  Plan of Care Expiration: 3/23/25  Progress Note Due: 1/23/25  Date of Surgery: 12/20/24  Visit # / Visits authorized: 1/ 1   FOTO: 1/ 3    Precautions: Standard     Time In: 5:00p  Time Out: 6:00  Total Billable Time: 60 minutes    Procedures:  Procedure(s) (LRB):  RECONSTRUCTION, KNEE, ACL, ARTHROSCOPIC (Right)  ARTHROSCOPY, KNEE, WITH MEDIAL AND LATERALMENISCECTOMY (Right)  SYNOVECTOMY, KNEE, LIMITED, ARTHROSCOPIC (Right)  ARTHROSCOPY, KNEE, WITH CHONDROPLASTY (Right)    MD follow up 1/2/25    POSTOPERATIVE PLAN: Patient will weight bear as tolerates with the brace locked in extension. Range of motion may be full. Plan to follow an ACL autograft rehab protocol. Initial goals include maintenance of full knee extension, regaining flexion, swelling control, and quadriceps activation exercises. DVT prophylaxis with ASA 81 mg twice daily x 2 weeks.     Subjective     Date of onset: chronic knee pain    History of current condition - Casper reports: He has been having knee pain for years. He was having knee cavitations and was wanting to have his knee operated on. He does not have any pain pump in. He has been ambulating with rollator. He has a ramp or steps to get into his house. He was not walking with anything prior to this. He has just been having sponge baths and sitting  "on a shower chair.     Falls: none    Imaging: MRI prior to surgery per patient chart: "  Impression:     Bucket-handle tear of the lateral meniscus     Findings equivocal for medial meniscal tear.     Findings suggestive of remote injuries to the ACL, MCL, and lateral collateral ligament complexes as detailed above.     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     Insertional quadriceps tendinosis.        Electronically signed by:Coy Plasencia Jr"    Prior Therapy: PT for Right knee  Social History: 4 steps to enter home and a ramp into the home. single story home lives with their spouse  Occupation:   Prior Level of Function: independent and active with pain  Current Level of Function: ambulating with rollator    Pain:  Current 6/10, worst 8/10, best 2/10   Location: Right knee  Description: stabbing  Aggravating Factors: walking, putting weight,   Easing Factors: Oxycodone, icing    Patients goals: improve quality of life      Medical History:   Past Medical History:   Diagnosis Date    GERD (gastroesophageal reflux disease)     Hyperlipidemia        Surgical History:   Casper Osborne  has a past surgical history that includes Vasectomy; Tonsillectomy; Fracture surgery; Spine surgery; Heel spur surgery; Arthroscopy of ankle with debridement (Left, 12/19/2019); Epidural steroid injection into lumbar spine (N/A, 12/24/2020); Epidural steroid injection (N/A, 01/29/2021); Adenoidectomy (1966); Knee arthroscopy w/ ACL reconstruction (Right, 12/20/2024); Knee arthroscopy w/ meniscectomy (Right, 12/20/2024); synovectomy, knee, limited, arthroscopic (Right, 12/20/2024); and Arthroscopic chondroplasty of knee joint (Right, 12/20/2024).    Medications:   Casper has a current medication list which includes the following prescription(s): ammonium lactate, aspirin, azelastine, cyclosporine, ferrous sulfate, fluticasone propionate, ibuprofen, ipratropium, lovastatin, " "omeprazole, ondansetron, oxybutynin, oxycodone, tamsulosin, and tyrvaya, and the following Facility-Administered Medications: alprazolam odt.    Allergies:   Review of patient's allergies indicates:  No Known Allergies     Objective      Observation: ambulating with rollator with Tscope brace donned and locked in extension, Ace bandage dressing donned       Range of Motion: Knee   Left Right   Flexion: 140 56   Extension 4 deg hyperextension  Lacking 2 degrees of extension      Strength: Knee   Left Right   Quadriceps NT 3/5   Hamstrings NT NT       Strength: Hip   Left Right   Iliopsoas NT 3/5   Glute Med NT NT   Hip IR NT NT   Hip ER NT NT   Hip Ext NT NT   Ankle PF NT 4/5   Ankle DF NT 4/5     Joint Mobility: hypomobile patellofemoral mobilization  Palpation: Tender to palpation globally around knee joint  Sensation: intact to light touch    Edema:  Left: absent  Right: moderate    Gait: Dylon ambulated with rollator.  Level of Assistance: modified independent  Patient displays antalgic gait.   Balance:NT        Intake Outcome Measure for FOTO knee Survey    Therapist reviewed FOTO scores for Casper Osborne on 12/23/2024.   FOTO report - see Media section or FOTO account episode details.    Intake Score: 36%         Treatment     Total Treatment time (time-based codes) separate from Evaluation: 30 minutes     Casper received the treatments listed below:      therapeutic exercises to develop ROM, flexibility, and posture for 10 minutes including:  Heel prop x 5 minutes  Calf stretch with heel prop 30"x4  Patient education       manual therapy techniques: Joint mobilizations were applied to the: Right knee for 10 minutes, including:  Patellar mobilizations in all directions     neuromuscular re-education activities to improve: Coordination, Kinesthetic, Sense, Proprioception, and Posture for 10 minutes. The following activities were included:  Quad set 5"x20  Seated active assistive range of motion heel slides x " 20       Patient Education and Home Exercises     Education provided:   - Home exercise program  - Plan of Care  - Weight bearing and range of motion     Written Home Exercises Provided: Yes. Exercises were reviewed and Casper was able to demonstrate them prior to the end of the session.  Casper demonstrated good  understanding of the education provided. See EMR under Patient Instructions for exercises provided during therapy sessions.    Assessment     Casper is a 69 y.o. male referred to outpatient Physical Therapy with a medical diagnosis of S83.511D (ICD-10-CM) - Rupture of anterior cruciate ligament of right knee, subsequent encounter. S/p 3 days Right ACL reconstruction by Dr. Corrigan. Patient presents with T-scope brace donned and locked in extension ambulating with rollator. He displayed moderate edema of right lower extremity and decrease in range of motion. Patient would benefit from skilled PT improve quad activation, range of motion, strength, and mobility to improve quality of life.     Patient prognosis is Good.   Patient will benefit from skilled outpatient Physical Therapy to address the deficits stated above and in the chart below, provide patient /family education, and to maximize patientt's level of independence.     Plan of care discussed with patient: Yes  Patient's spiritual, cultural and educational needs considered and patient is agreeable to the plan of care and goals as stated below:     Anticipated Barriers for therapy: chronicity of knee pain     Medical Necessity is demonstrated by the following  History  Co-morbidities and personal factors that may impact the plan of care [] LOW: no personal factors / co-morbidities  [] MODERATE: 1-2 personal factors / co-morbidities  [x] HIGH: 3+ personal factors / co-morbidities    Moderate / High Support Documentation:   Co-morbidities affecting plan of care:   GERD (gastroesophageal reflux disease)   Hyperlipidemia       Personal Factors:   age  coping  style  social background  lifestyle  character     Examination  Body Structures and Functions, activity limitations and participation restrictions that may impact the plan of care [x] LOW: addressing 1-2 elements  [] MODERATE: 3+ elements  [] HIGH: 4+ elements (please support below)    Moderate / High Support Documentation: scheduling, transportation      Clinical Presentation [x] LOW: stable  [] MODERATE: Evolving  [] HIGH: Unstable     Decision Making/ Complexity Score: low       Goals:  Short Term Goals: 6 weeks   Patient will be independent with Home exercise program to supplement therapy progressing, not met   Patient will improve Right knee range of motion 0-90 degrees to improve gait and transfers progressing, not met  Patient will improve right lower extremity strength to 70% LSI of Left to improve ability to improve functional tasks progressing, not met  Patient will be able to ambulate without assistive device to improve mobility and return to prior level of function progressing, not met    Long Term Goals: 12 weeks   Patient will improve FOTO score to 71 % to improve self perceived ability to perform functional tasks progressing, not met  Patient will improve right lower extremity strength to 90% LSI of Left to improve ability to perform functional tasks progressing, not met  Patient will improve Right knee range of motion 0-140 deg to improve ability to perform functional tasks progressing, not met   Patient will be able to navigate stairs without difficulty to improve ability to navigate home environment progressing, not met  Plan     Plan of care Certification: 12/23/2024 to 3/23/25.    Outpatient Physical Therapy 1-2 times weekly for 12 weeks to include the following interventions: Electrical Stimulation NMES, Manual Therapy, Moist Heat/ Ice, Neuromuscular Re-ed, Patient Education, Therapeutic Activities, and Therapeutic Exercise.     Tegan Mera, PT ,DPT,OCS  12/23/2024          Physician's  Signature: _________________________________________ Date: ________________

## 2024-12-23 NOTE — OP NOTE
[facility]   OPERATIVE REPORT   ORTHOPAEDIC SURGERY   PROVIDER: DR. NANCY SANTIAGO    PATIENT INFORMATION   Casper Osborne 68 y.o. male 1955   MRN: [id number]   LOCATION: [facility]     DATE OF PROCEDURE: 12/20/2024     PREOPERATIVE DIAGNOSES:   Right knee complete ACL tear, chronic  Right knee medial and lateral meniscus tears  Right knee chondromalacia    POSTOPERATIVE DIAGNOSES:   Right knee complete ACL tear, chronic  Right knee medial and lateral meniscus tears  Right knee chondromalacia    PROCEDURES PERFORMED:   Right knee arthroscopic patellar tendon allograft ACL reconstruction (CPT 80742)  Right knee arthroscopic partial medial and lateral meniscectomies (CPT 64450)  Right knee arthroscopic chondroplasty    SURGEON: NANCY Santiago MD     ASSISTANTS:  Joel Aguilar MD - Resident  KAMILLA Huston     ANESTHESIA: General with adductor block and local anesthetic injection (0.5% Marcaine)     ESTIMATED BLOOD LOSS: 50 mL    IMPLANTS:   Implant Name Type Inv. Item Serial No.  Lot No. LRB No. Used Action   SYS SWIVELOCK ANCH 4.75X19.1MM - [id number]  SYS SWIVELOCK ANCH 4.75X19.1MM  [employer] [id number] Right 1 Implanted   drill tip guide pin, [id number]    [employer] [id number] Right 1 Implanted and Explanted   SYS SWIVELOCK ANCH 4.75X19.1MM - [id number]  SYS SWIVELOCK ANCH 4.75X19.1MM  [employer] [id number] Right 1 Implanted   [id number]  BTB PADMINI W/SHAPED BONE BLOCKS [id number] MUSCULOSKELETAL TRANSPLANT FND  Right 1 Implanted   SYS FASTFIX REV CURVED  - [id number]  SYS FASTFIX REV CURVED   [employer] [id number] Right 1 Wasted   SYS FASTFIX REV CURVED  - [id number]  SYS FASTFIX REV CURVED   [employer] [id number] Right 1 Wasted   SCREW SHTH LOIS INTFR 8X20MM - [id number]  SCREW SHTH LOIS INTFR 8X20MM  [employer] [id number] Right 1 Implanted   SCREW CANNULATED 9 X 20MM - [id number]  SCREW CANNULATED 9 X 20MM  [employer] [id number] Right 1 Implanted    [id number]  F[employer] C[employer]HANCE 2.5CC [id number] [id number]  Right 1 Implanted      FINDINGS:  Complete ACL rupture with absence of midsubstance ligament.  Complex tear posterior horn medial meniscus extending to the posterior body.  Free edge tearing of the lateral meniscus midbody.  Fairly diffuse grade 2-3 chondromalacia of the medial femoral condyle, grade 2 medial tibial plateau.  Grade 2 changes of the lateral femoral condyle and lateral tibial plateau.  Grade 3 changes of the central trochlea.  Grade 2 of the patella.  Intact PCL.    SPECIMENS: None.    COMPLICATIONS: None.     INTRAOPERATIVE COUNTS: Correct.     PROPHYLACTIC IV ANTIBIOTICS: Given per [facility] Protocol.    INDICATIONS FOR SURGERY: Mr. Osborne is a 68-year-old gentleman who sustained a significant injury to his right knee previously.  This included multiple ligaments and both menisci.  The patient has had healing of his collateral ligament pathology but has ongoing functional deficiency of the ACL with meniscus pathology.  The patient complains of both subjective instability and pain.  The patient has had extensive prior conservative treatment and wishes to proceed with surgery.  Full informed consent was obtained prior to proceeding.     DETAILS OF THE PROCEDURE: After permit was obtained for the above procedure and regional block was performed, the patient received prophylactic IV antibiotics in preop holding and was brought back to the operating room and placed under general anesthesia.      Examination under anesthesia of the right knee demonstrated: passive range of motion 0 to 130 degrees, Lachman grade 2B, negative posterior drawer, 1+ pivot shift, stable to varus and valgus stress at 0 and 30.     The operative knee and leg were prescrubbed, sterilely prepped and draped in routine fashion.      Verbal timeout was performed to confirm the patient's identity, correct operative site and planned operative procedure.     Attention was  turned towards the arthroscopic procedure. The anterolateral and anteromedial scope portals were made. The scope was introduced and diagnostic arthroscopy was performed.  Findings were documented as above.  Decision was made to proceed with partial meniscectomy medially given the patient's preoperative discussions with knee in findings intraoperatively.  Tissue quality was suboptimal and I felt that this would be higher risk for tissue nonhealing or re-tear in this case.  The arthroscopic biter and shaver devices were utilized to perform partial meniscectomy and removal the torn meniscus.  This was both a horizontal cleavage and vertical longitudinal tear pattern.  Approximately 60-70% of the posterior horn required resection.  The remainder of the meniscus was preserved.  Laterally, a shaver was used to perform free edge debridement of the meniscus tearing.  Debridement was carried back to a stable margin.  Chondroplasty was performed over the medial femoral condyle, medial tibial plateau, lateral femoral condyle, lateral tibial plateau, trochlea and patella.  All free edge torn articular cartilage was debrided back to a stable margin.    Decision was made to proceed with the ACL reconstruction.  On the back table of the MTF allograft was prepared with the plan femoral bone plug trimmed to 17 mm length.  The tibial bone plug was left at its resting length.  The patellar tendon measured 45 mm.  Passing sutures were placed.    The remaining ACL fibers were debrided and the intercondylar notch was prepared for ACL reconstruction.  A notchplasty was performed with a bur. Using an outside-in technique, creation of the femoral tunnel was completed with the Arthrex flip cutter guide set at between 105 and 110 degrees. Proper tunnel position was referenced from the back wall and the distal articular cartilage margin. A 10 mm tunnel was created with the Flipcutter which was brought all the way out the lateral distal femoral  cortex to maximize tunnel length. The tibial tunnel was prepared with proper positioning referenced off the native fibers, PCL and the posterior margin of the anterior horn lateral meniscus. A low profile 10 mm reamer was used. No tunnel blowout was confirmed for both. The graft was then passed in standard fashion, retrograde. Following passage, an 8 x 20 mm metal interference screw was placed for femoral fixation. After cycling the knee, it was brought into slight flexion and a 9 x 20 mm metal interference screw was placed for excellent fixation.  A Swivelock anchor was placed on the tibial side for backup fixation.    The scope was reintroduced and excellent placement and fixation of the ACL graft was confirmed to include no notch impingement with full extension. This concluded the procedure.     All wounds were thoroughly irrigated.  The wounds were closed in layers.      Soft muscle compartments and a 2+ DP pulse was confirmed at the conclusion of the case. Sterile dressings were applied. A long leg hinged knee brace was placed, set from 10 degrees of hyperextension to 90 degrees and locked in full extension at the conclusion. A [facility] cold therapy unit was also applied.     The patient was extubated and taken to the PACU in stable condition.     POSTOPERATIVE PLAN: Patient will weight bear as tolerates with the brace locked in extension. Range of motion may be full. Plan to follow an ACL autograft rehab protocol. Initial goals include maintenance of full knee extension, regaining flexion, swelling control, and quadriceps activation exercises. DVT prophylaxis with ASA 81 mg twice daily x 2 weeks.

## 2024-12-26 ENCOUNTER — CLINICAL SUPPORT (OUTPATIENT)
Dept: REHABILITATION | Facility: HOSPITAL | Age: 69
End: 2024-12-26
Payer: MEDICARE

## 2024-12-26 DIAGNOSIS — Z74.09 DECREASED STRENGTH, ENDURANCE, AND MOBILITY: ICD-10-CM

## 2024-12-26 DIAGNOSIS — R53.1 DECREASED STRENGTH, ENDURANCE, AND MOBILITY: ICD-10-CM

## 2024-12-26 DIAGNOSIS — M25.661 DECREASED ROM OF RIGHT KNEE: Primary | ICD-10-CM

## 2024-12-26 DIAGNOSIS — R68.89 DECREASED STRENGTH, ENDURANCE, AND MOBILITY: ICD-10-CM

## 2024-12-26 PROCEDURE — 97140 MANUAL THERAPY 1/> REGIONS: CPT | Mod: PN

## 2024-12-26 PROCEDURE — 97112 NEUROMUSCULAR REEDUCATION: CPT | Mod: PN

## 2024-12-26 PROCEDURE — 97110 THERAPEUTIC EXERCISES: CPT | Mod: PN

## 2024-12-26 NOTE — PROGRESS NOTES
[facility]   Physical Therapy Treatment Note      Name: Casper Osborne  Clinic Number: [id number]    Therapy Diagnosis:   Encounter Diagnoses   Name Primary?    Decreased ROM of right knee Yes    Decreased strength, endurance, and mobility      Physician: Liusa Valencia*    Visit Date: 12/26/2024       Physician Orders: PT Eval and Treat   Medical Diagnosis from Referral: S83.511D (ICD-10-CM) - Rupture of anterior cruciate ligament of right knee, subsequent encounter   Evaluation Date: 12/23/2024  Authorization Period Expiration: 12/12/2025  Plan of Care Expiration: 3/23/25  Progress Note Due: 1/23/25  Date of Surgery: 12/20/24  Visit # / Visits authorized: 1/ 1   FOTO: 1/ 3     Precautions: Standard      Time In: 2:00p  Time Out: 3:12p  Total Billable Time: 71 minutes     Procedures:  Procedure(s) (LRB):  RECONSTRUCTION, KNEE, ACL, ARTHROSCOPIC (Right)  ARTHROSCOPY, KNEE, WITH MEDIAL AND LATERALMENISCECTOMY (Right)  SYNOVECTOMY, KNEE, LIMITED, ARTHROSCOPIC (Right)  ARTHROSCOPY, KNEE, WITH CHONDROPLASTY (Right)     MD follow up 1/2/25     POSTOPERATIVE PLAN: Patient will weight bear as tolerates with the brace locked in extension. Range of motion may be full. Plan to follow an ACL autograft rehab protocol. Initial goals include maintenance of full knee extension, regaining flexion, swelling control, and quadriceps activation exercises. DVT prophylaxis with ASA 81 mg twice daily x 2 weeks.     PTA Visit #: 0/5       Subjective     Patient reports: He is using the cane today because the rollator is too much of a pain getting into and out of his car. He has been sleeping without his brace because he finds the brace to be very uncomfortable. He is taking Tylenol and no longer taking pain medication.   He was compliant with home exercise program.  Response to previous treatment: 1st treatment  Functional change: ongoing    Pain: 0/10  Location: Right knee    Objective      Objective  "Measures updated at progress report unless specified.     POD 6    0-0-85    Treatment     Casper received the treatments listed below:      therapeutic exercises to develop strength, ROM, flexibility, and posture for 15 minutes including:  Heel prop x 5 minutes at beginning and end of session   Calf stretch with heel prop 30"x4  Patient education     manual therapy techniques: Joint mobilizations were applied to the: Right knee for 15 minutes, including:  Patellar mobilizations in all directions  Gentle knee extension mobilizations     neuromuscular re-education activities to improve: Balance, Coordination, Proprioception, and Posture for 42 minutes. The following activities were included:  Quad set 5"x20   Quad set with NMES (10 sec on; 10 sec off, 2 sec ramp, 70Hz, 400 wavelength) 5 minutes with towel under knee; 5 minutes with towel under ankle  Seated active assistive range of motion heel slides x 30     Patient Education and Home Exercises       Education provided:   - Home exercise program  - Plan of Care  - Weight bearing and range of motion     Written Home Exercises Provided: Yes. Exercises were reviewed and Casper was able to demonstrate them prior to the end of the session.  Casper demonstrated good  understanding of the education provided. See Electronic Medical Record under Patient Instructions for exercises provided during therapy sessions    Assessment     Casper is a 69 y.o. male referred to outpatient Physical Therapy with a medical diagnosis of S83.511D (ICD-10-CM) - Rupture of anterior cruciate ligament of right knee, subsequent encounter. S/p 6 days Right ACL reconstruction by Dr. Corrigan. He was seen today for his first follow up visit since initial evaluation with T-scope donned and ambulating with single point cane. NMES utilized to improve quad activation with good muscle activation noted. His range of motion improved with knee extension and flexion today. Minor drainage noted on bandages at port " sites in superior knee.     Casper Is progressing well towards his goals.   Patient prognosis is Good.     Patient will continue to benefit from skilled outpatient physical therapy to address the deficits listed in the problem list box on initial evaluation, provide pt/family education and to maximize pt's level of independence in the home and community environment.     Patient's spiritual, cultural and educational needs considered and pt agreeable to plan of care and goals.     Anticipated barriers to physical therapy: chronicity of knee pain    Goals:   Short Term Goals: 6 weeks   Patient will be independent with Home exercise program to supplement therapy progressing, not met   Patient will improve Right knee range of motion 0-90 degrees to improve gait and transfers progressing, not met  Patient will improve right lower extremity strength to 70% LSI of Left to improve ability to improve functional tasks progressing, not met  Patient will be able to ambulate without assistive device to improve mobility and return to prior level of function progressing, not met     Long Term Goals: 12 weeks   Patient will improve FOTO score to 71 % to improve self perceived ability to perform functional tasks progressing, not met  Patient will improve right lower extremity strength to 90% LSI of Left to improve ability to perform functional tasks progressing, not met  Patient will improve Right knee range of motion 0-140 deg to improve ability to perform functional tasks progressing, not met   Patient will be able to navigate stairs without difficulty to improve ability to navigate home environment progressing, not met    Plan     Improve quad activation and range of motion.     Patient will weight bear as tolerates with the brace locked in extension. Range of motion may be full. Plan to follow an ACL autograft rehab protocol. Initial goals include maintenance of full knee extension, regaining flexion, swelling control, and  quadriceps activation exercises. DVT prophylaxis with ASA 81 mg twice daily x 2 weeks.     Tegan Mera, PT ,DPT,OCS  12/26/2024

## 2024-12-26 NOTE — PROGRESS NOTES
"[facility]   Physical Therapy Treatment Note      Name: Casper Osborne  Clinic Number: [id number]    Therapy Diagnosis:   Encounter Diagnoses   Name Primary?    Decreased ROM of right knee Yes    Decreased strength, endurance, and mobility      Physician: Luisa Valencia*    Visit Date: 12/26/2024    [copy and paste header from tarikal here including time in/out and billable time]    PTA Visit #: ***/5       Subjective     Patient reports: ***.  He {Actions; was/was not:50612} compliant with home exercise program.  Response to previous treatment: ***  Functional change: ***    Pain: {0-10:21307::"0"}/10  Location: {RIGHT/LEFT/BILATERAL:45072} {LOCATION ON BODY:74603}     Objective      Objective Measures updated at progress report unless specified.     Treatment     Casper received the treatments listed below:      therapeutic exercises to develop {AMB PT PROGRESS OBJECTIVE:75432} for *** minutes including:  Heel prop x 5 minutes  Calf stretch with heel prop 30"x4  Patient education     manual therapy techniques: {AMB PT PROGRESS MANUAL THERAPY:65909} were applied to the: *** for *** minutes, including:  ***    neuromuscular re-education activities to improve: {AMB PT PROGRESS NEURO RE-ED:19374} for *** minutes. The following activities were included:  Quad set 5"x20  Seated active assistive range of motion heel slides x 20     therapeutic activities to improve functional performance for ***  minutes, including:  ***    Patient Education and Home Exercises       Education provided:   - Home exercise program  - Plan of Care  - Weight bearing and range of motion     Written Home Exercises Provided: Yes. Exercises were reviewed and Casper was able to demonstrate them prior to the end of the session.  Casper demonstrated good  understanding of the education provided. See Electronic Medical Record under Patient Instructions for exercises provided during therapy sessions    Assessment     Casper " is a 69 y.o. male referred to outpatient Physical Therapy with a medical diagnosis of S83.511D (ICD-10-CM) - Rupture of anterior cruciate ligament of right knee, subsequent encounter. S/p 3 days Right ACL reconstruction by Dr. Corrigan. Patient presents with T-scope brace donned and locked in extension ambulating with rollator. He displayed moderate edema of right lower extremity and decrease in range of motion. Patient would benefit from skilled PT improve quad activation, range of motion, strength, and mobility to improve quality of life.       Casper Is progressing well towards his goals.   Patient prognosis is Good.     Patient will continue to benefit from skilled outpatient physical therapy to address the deficits listed in the problem list box on initial evaluation, provide pt/family education and to maximize pt's level of independence in the home and community environment.     Patient's spiritual, cultural and educational needs considered and pt agreeable to plan of care and goals.     Anticipated barriers to physical therapy: chronicity of knee pain    Goals:   Short Term Goals: 6 weeks   Patient will be independent with Home exercise program to supplement therapy progressing, not met   Patient will improve Right knee range of motion 0-90 degrees to improve gait and transfers progressing, not met  Patient will improve right lower extremity strength to 70% LSI of Left to improve ability to improve functional tasks progressing, not met  Patient will be able to ambulate without assistive device to improve mobility and return to prior level of function progressing, not met     Long Term Goals: 12 weeks   Patient will improve FOTO score to 71 % to improve self perceived ability to perform functional tasks progressing, not met  Patient will improve right lower extremity strength to 90% LSI of Left to improve ability to perform functional tasks progressing, not met  Patient will improve Right knee range of motion  0-140 deg to improve ability to perform functional tasks progressing, not met   Patient will be able to navigate stairs without difficulty to improve ability to navigate home environment progressing, not met    Plan     ***    Joe Fields, PT, DPT

## 2024-12-29 NOTE — PROGRESS NOTES
[facility]   Physical Therapy Treatment Note      Name: Casper Osborne  Clinic Number: [id number]    Therapy Diagnosis:   Encounter Diagnoses   Name Primary?    Decreased ROM of right knee Yes    Decreased strength, endurance, and mobility        Physician: Luisa Valencia*    Visit Date: 12/30/2024       Physician Orders: PT Eval and Treat   Medical Diagnosis from Referral: S83.511D (ICD-10-CM) - Rupture of anterior cruciate ligament of right knee, subsequent encounter   Evaluation Date: 12/23/2024  Authorization Period Expiration: 12/12/2025  Plan of Care Expiration: 3/23/25  Progress Note Due: 1/23/25  Date of Surgery: 12/20/24  Visit # / Visits authorized: 2/12 +eval  FOTO: 1/ 3     Precautions: Standard      Time In: 4:00p  Time Out: 5:00p  Total Billable Time: 60 minutes     Procedures:  Procedure(s) (LRB):  RECONSTRUCTION, KNEE, ACL, ARTHROSCOPIC (Right)  ARTHROSCOPY, KNEE, WITH MEDIAL AND LATERALMENISCECTOMY (Right)  SYNOVECTOMY, KNEE, LIMITED, ARTHROSCOPIC (Right)  ARTHROSCOPY, KNEE, WITH CHONDROPLASTY (Right)     MD follow up 1/2/25     POSTOPERATIVE PLAN: Patient will weight bear as tolerates with the brace locked in extension. Range of motion may be full. Plan to follow an ACL autograft rehab protocol. Initial goals include maintenance of full knee extension, regaining flexion, swelling control, and quadriceps activation exercises. DVT prophylaxis with ASA 81 mg twice daily x 2 weeks.     PTA Visit #: 0/5       Subjective     Patient reports: He has been feeling a lot better. He still has been sleeping without the brace and with a pillow and has been sleeping well. He drove himself to therapy today because his wife had to run errands.   He was compliant with home exercise program.  Response to previous treatment: good  Functional change: ongoing    Pain: 0/10  Location: Right knee    Objective      Objective Measures updated at progress report unless specified.     POD  "10    0-0-90    Treatment     Casper received the treatments listed below:      therapeutic exercises to develop strength, ROM, flexibility, and posture for 8 minutes including:  Heel prop x 5 minutes at beginning and end of session   Calf stretch with heel prop 30"x4  Patient education     manual therapy techniques: Joint mobilizations were applied to the: Right knee for 15 minutes, including:  Patellar mobilizations in all directions  Gentle knee extension mobilizations     neuromuscular re-education activities to improve: Balance, Coordination, Proprioception, and Posture for 37 minutes. The following activities were included:  Quad set with NMES (10 sec on; 10 sec off, 2 sec ramp, 70Hz, 400 wavelength) 5 minutes with towel under knee; 5 minutes with towel under ankle  Seated active assistive range of motion heel slides x 30   Straight leg raise with brace donned 3x10  Side lying hip abduction with brace donned 3x10  Prone hip extension with braced donned 3x10     Patient Education and Home Exercises       Education provided:   - Home exercise program  - Plan of Care  - Weight bearing and range of motion     Written Home Exercises Provided: Yes. Exercises were reviewed and Casper was able to demonstrate them prior to the end of the session.  Casper demonstrated good  understanding of the education provided. See Electronic Medical Record under Patient Instructions for exercises provided during therapy sessions    Assessment     Casper is a 69 y.o. male referred to outpatient Physical Therapy with a medical diagnosis of S83.511D (ICD-10-CM) - Rupture of anterior cruciate ligament of right knee, subsequent encounter. S/p 10 days Right ACL reconstruction by Dr. Corrigan. He arrived to today's treatment session with knee immobilizer brace and ambulating with single point cane. PT educated patient on returning to t-scope brace until his follow up with the referring provider. PT progressed strengthening today with straight " leg raise, side lying hip abduction, and prone hip extension.     Casper Is progressing well towards his goals.   Patient prognosis is Good.     Patient will continue to benefit from skilled outpatient physical therapy to address the deficits listed in the problem list box on initial evaluation, provide pt/family education and to maximize pt's level of independence in the home and community environment.     Patient's spiritual, cultural and educational needs considered and pt agreeable to plan of care and goals.     Anticipated barriers to physical therapy: chronicity of knee pain    Goals:   Short Term Goals: 6 weeks   Patient will be independent with Home exercise program to supplement therapy progressing, not met   Patient will improve Right knee range of motion 0-90 degrees to improve gait and transfers progressing, not met  Patient will improve right lower extremity strength to 70% LSI of Left to improve ability to improve functional tasks progressing, not met  Patient will be able to ambulate without assistive device to improve mobility and return to prior level of function progressing, not met     Long Term Goals: 12 weeks   Patient will improve FOTO score to 71 % to improve self perceived ability to perform functional tasks progressing, not met  Patient will improve right lower extremity strength to 90% LSI of Left to improve ability to perform functional tasks progressing, not met  Patient will improve Right knee range of motion 0-140 deg to improve ability to perform functional tasks progressing, not met   Patient will be able to navigate stairs without difficulty to improve ability to navigate home environment progressing, not met    Plan     Improve quad activation and range of motion.     Patient will weight bear as tolerates with the brace locked in extension. Range of motion may be full. Plan to follow an ACL autograft rehab protocol. Initial goals include maintenance of full knee extension, regaining  flexion, swelling control, and quadriceps activation exercises. DVT prophylaxis with ASA 81 mg twice daily x 2 weeks.     Tegan Mera, PT ,DPT,OCS  12/30/2024

## 2024-12-30 ENCOUNTER — CLINICAL SUPPORT (OUTPATIENT)
Dept: REHABILITATION | Facility: HOSPITAL | Age: 69
End: 2024-12-30
Payer: MEDICARE

## 2024-12-30 DIAGNOSIS — R68.89 DECREASED STRENGTH, ENDURANCE, AND MOBILITY: ICD-10-CM

## 2024-12-30 DIAGNOSIS — M25.661 DECREASED ROM OF RIGHT KNEE: Primary | ICD-10-CM

## 2024-12-30 DIAGNOSIS — Z74.09 DECREASED STRENGTH, ENDURANCE, AND MOBILITY: ICD-10-CM

## 2024-12-30 DIAGNOSIS — R53.1 DECREASED STRENGTH, ENDURANCE, AND MOBILITY: ICD-10-CM

## 2024-12-30 PROCEDURE — 97110 THERAPEUTIC EXERCISES: CPT | Mod: PN

## 2024-12-30 PROCEDURE — 97112 NEUROMUSCULAR REEDUCATION: CPT | Mod: PN

## 2024-12-30 PROCEDURE — 97140 MANUAL THERAPY 1/> REGIONS: CPT | Mod: PN

## 2025-01-02 ENCOUNTER — OFFICE VISIT (OUTPATIENT)
Dept: SPORTS MEDICINE | Facility: CLINIC | Age: 70
End: 2025-01-02
Payer: MEDICARE

## 2025-01-02 ENCOUNTER — HOSPITAL ENCOUNTER (OUTPATIENT)
Dept: RADIOLOGY | Facility: HOSPITAL | Age: 70
Discharge: HOME | End: 2025-01-02
Attending: PHYSICIAN ASSISTANT
Payer: MEDICARE

## 2025-01-02 ENCOUNTER — CLINICAL SUPPORT (OUTPATIENT)
Dept: REHABILITATION | Facility: HOSPITAL | Age: 70
End: 2025-01-02
Payer: MEDICARE

## 2025-01-02 VITALS
WEIGHT: 195.19 LBS | SYSTOLIC BLOOD PRESSURE: 133 MMHG | BODY MASS INDEX: 27.23 KG/M2 | DIASTOLIC BLOOD PRESSURE: 85 MMHG | HEART RATE: 78 BPM

## 2025-01-02 DIAGNOSIS — S83.511D RUPTURE OF ANTERIOR CRUCIATE LIGAMENT OF RIGHT KNEE, SUBSEQUENT ENCOUNTER: Primary | ICD-10-CM

## 2025-01-02 DIAGNOSIS — R53.1 DECREASED STRENGTH, ENDURANCE, AND MOBILITY: ICD-10-CM

## 2025-01-02 DIAGNOSIS — M25.661 DECREASED ROM OF RIGHT KNEE: Primary | ICD-10-CM

## 2025-01-02 DIAGNOSIS — Z74.09 DECREASED STRENGTH, ENDURANCE, AND MOBILITY: ICD-10-CM

## 2025-01-02 DIAGNOSIS — S83.511D RUPTURE OF ANTERIOR CRUCIATE LIGAMENT OF RIGHT KNEE, SUBSEQUENT ENCOUNTER: ICD-10-CM

## 2025-01-02 DIAGNOSIS — Z98.890 S/P ACL RECONSTRUCTION: ICD-10-CM

## 2025-01-02 DIAGNOSIS — R68.89 DECREASED STRENGTH, ENDURANCE, AND MOBILITY: ICD-10-CM

## 2025-01-02 PROCEDURE — 1125F AMNT PAIN NOTED PAIN PRSNT: CPT | Mod: CPTII,S$GLB,, | Performed by: PHYSICIAN ASSISTANT

## 2025-01-02 PROCEDURE — 73560 X-RAY EXAM OF KNEE 1 OR 2: CPT | Mod: TC,RT

## 2025-01-02 PROCEDURE — 1159F MED LIST DOCD IN RCRD: CPT | Mod: CPTII,S$GLB,, | Performed by: PHYSICIAN ASSISTANT

## 2025-01-02 PROCEDURE — 1101F PT FALLS ASSESS-DOCD LE1/YR: CPT | Mod: CPTII,S$GLB,, | Performed by: PHYSICIAN ASSISTANT

## 2025-01-02 PROCEDURE — 97110 THERAPEUTIC EXERCISES: CPT | Mod: PN

## 2025-01-02 PROCEDURE — 97112 NEUROMUSCULAR REEDUCATION: CPT | Mod: PN

## 2025-01-02 PROCEDURE — 1160F RVW MEDS BY RX/DR IN RCRD: CPT | Mod: CPTII,S$GLB,, | Performed by: PHYSICIAN ASSISTANT

## 2025-01-02 PROCEDURE — 99999 PR PBB SHADOW E&M-EST. PATIENT-LVL III: CPT | Mod: PBBFAC,,, | Performed by: PHYSICIAN ASSISTANT

## 2025-01-02 PROCEDURE — 3079F DIAST BP 80-89 MM HG: CPT | Mod: CPTII,S$GLB,, | Performed by: PHYSICIAN ASSISTANT

## 2025-01-02 PROCEDURE — 99024 POSTOP FOLLOW-UP VISIT: CPT | Mod: S$GLB,,, | Performed by: PHYSICIAN ASSISTANT

## 2025-01-02 PROCEDURE — 3075F SYST BP GE 130 - 139MM HG: CPT | Mod: CPTII,S$GLB,, | Performed by: PHYSICIAN ASSISTANT

## 2025-01-02 PROCEDURE — 73560 X-RAY EXAM OF KNEE 1 OR 2: CPT | Mod: 26,RT,, | Performed by: RADIOLOGY

## 2025-01-02 PROCEDURE — 3288F FALL RISK ASSESSMENT DOCD: CPT | Mod: CPTII,S$GLB,, | Performed by: PHYSICIAN ASSISTANT

## 2025-01-02 NOTE — PROGRESS NOTES
[facility]   Physical Therapy Treatment Note      Name: Casper Osborne  Clinic Number: [id number]    Therapy Diagnosis:   Encounter Diagnoses   Name Primary?    Decreased ROM of right knee Yes    Decreased strength, endurance, and mobility      Physician: Luisa Valencia    Visit Date: 1/2/2025    Physician Orders: PT Eval and Treat   Medical Diagnosis from Referral: S83.511D (ICD-10-CM) - Rupture of anterior cruciate ligament of right knee, subsequent encounter   Evaluation Date: 12/23/2024  Authorization Period Expiration: 12/12/2025  Plan of Care Expiration: 3/23/25  Progress Note Due: 1/23/25  Date of Surgery: 12/20/24  Visit # / Visits authorized: 2/12 +eval  FOTO: 1/ 3     Precautions: Standard      Time In: 3:05 PM  Time Out: 4:10 PM  Total Billable Time: 40 minutes (2NMR, 1TE)     Procedures:  Procedure(s) (LRB):  RECONSTRUCTION, KNEE, ACL, ARTHROSCOPIC (Right)  ARTHROSCOPY, KNEE, WITH MEDIAL AND LATERALMENISCECTOMY (Right)  SYNOVECTOMY, KNEE, LIMITED, ARTHROSCOPIC (Right)  ARTHROSCOPY, KNEE, WITH CHONDROPLASTY (Right)     MD follow up 1/2/25     POSTOPERATIVE PLAN: Patient will weight bear as tolerates with the brace locked in extension. Range of motion may be full. Plan to follow an ACL autograft rehab protocol. Initial goals include maintenance of full knee extension, regaining flexion, swelling control, and quadriceps activation exercises. DVT prophylaxis with ASA 81 mg twice daily x 2 weeks.     PTA Visit #: 0/5     Subjective     Patient reports: takes brace off to drive, got stitches remove today, good report from MD today and to only focus on knee extension and strength  He was compliant with home exercise program.  Response to previous treatment: good  Functional change: ongoing    Pain: 0/10  Location: Right knee    Objective      Objective Measures updated at progress report unless specified.     DOS: 12/20/25, right ACL repair  POD: 13    Right knee AAROM:  "1-0-94    Treatment     Casper received the treatments listed below:      therapeutic exercises to develop strength, ROM, flexibility, and posture for 15 minutes including:  Heel prop x 5 minutes at beginning and end of session   Calf stretch with heel prop 30"x4  Seated hamstring/calf stretch with overpressure: 5x20'' holds    manual therapy techniques: Joint mobilizations were applied to the: Right knee for 10 minutes, including:  Patellar mobilizations in all directions focusing on superior  Gentle knee extension mobilizations, grade II-III    neuromuscular re-education activities to improve: Balance, Coordination, Proprioception, and Posture for 30 minutes. The following activities were included:  Quad set with NMES (10 sec on; 10 sec off, 2 sec ramp, 70Hz, 400 wavelength) 5 minutes with towel under knee; 5 minutes with towel under ankle  Seated active assistive range of motion heel slides x 30   Straight leg raise with brace donned 2x15 (hip flexor fatigue)  Sidelying hip abduction with brace donned 2x15 2#  Prone hip extension with braced donned 3x10 2#    Patient Education and Home Exercises       Education provided:   - Home exercise program  - Plan of Care  - Weight bearing and range of motion     Written Home Exercises Provided: Yes. Exercises were reviewed and Casper was able to demonstrate them prior to the end of the session.  Casper demonstrated good  understanding of the education provided. See Electronic Medical Record under Patient Instructions for exercises provided during therapy sessions    Assessment     Casper is a 69 y.o. male referred to outpatient Physical Therapy with a medical diagnosis of S83.511D (ICD-10-CM) - Rupture of anterior cruciate ligament of right knee, subsequent encounter. S/p 13 days Right ACL reconstruction by Dr. Corrigan. Using SPC with brace donned locked in extension, takes brace off to drive. Pain well controlled and low irritability level. Focused more on knee extension that " goal for slight hyperextension. Quad strength returning well, but extensor lag still present.     Casper Is progressing well towards his goals.   Patient prognosis is Good.     Patient will continue to benefit from skilled outpatient physical therapy to address the deficits listed in the problem list box on initial evaluation, provide pt/family education and to maximize pt's level of independence in the home and community environment.     Patient's spiritual, cultural and educational needs considered and pt agreeable to plan of care and goals.     Anticipated barriers to physical therapy: chronicity of knee pain    Goals:   Short Term Goals: 6 weeks   Patient will be independent with Home exercise program to supplement therapy progressing, not met   Patient will improve Right knee range of motion 0-90 degrees to improve gait and transfers progressing, not met  Patient will improve right lower extremity strength to 70% LSI of Left to improve ability to improve functional tasks progressing, not met  Patient will be able to ambulate without assistive device to improve mobility and return to prior level of function progressing, not met     Long Term Goals: 12 weeks   Patient will improve FOTO score to 71 % to improve self perceived ability to perform functional tasks progressing, not met  Patient will improve right lower extremity strength to 90% LSI of Left to improve ability to perform functional tasks progressing, not met  Patient will improve Right knee range of motion 0-140 deg to improve ability to perform functional tasks progressing, not met   Patient will be able to navigate stairs without difficulty to improve ability to navigate home environment progressing, not met    Plan     Improve quad activation and range of motion.     Patient will weight bear as tolerates with the brace locked in extension. Range of motion may be full. Plan to follow an ACL autograft rehab protocol. Initial goals include maintenance of  full knee extension, regaining flexion, swelling control, and quadriceps activation exercises. DVT prophylaxis with ASA 81 mg twice daily x 2 weeks.     Joe Fields, PT, DPT ,DPT,OCS  01/02/2025

## 2025-01-02 NOTE — PROGRESS NOTES
S:Casper Osborne presents for post-operative evaluation.     DATE OF PROCEDURE: 12/20/2024      PROCEDURES PERFORMED:   Right knee arthroscopic patellar tendon allograft ACL reconstruction (CPT 09388)  Right knee arthroscopic partial medial and lateral meniscectomies (CPT 27350)  Right knee arthroscopic chondroplasty    Casper Osborne reports to be doing well 2wk s/p the above mentioned procedure. Denies fevers, chills, night sweats, chest pain, difficulty breathing, calf pain or tenderness. Going to PT 3[facility]Week at the [facility]. Seeing good progress daily. Pain levels are improving. Has d/c'd pain medication.     O: The incisions are healing well.  No signs of infection.  Sutures were removed. No significant pain or unusual tenderness. aaROM knee 0-90. Quad weak but firing. 2+ effusion.    Imaging:  Plain radiographs of the right knee demonstrate post operative ACL back up screws in place and intact without complications.    A/P: Doing great. Incisions healing well. Ok to shower with incisions uncovered. No submerging at this point. Images were reviewed with the patient. Bedside aspiration. Continue PT working on quad strength and flexion. Plan to follow the rehab plan as previously outlined. RTC in 4 weeks.     Aspiration Procedure  A time out was performed, including verification of patient ID, procedure, site and side, availability of information and equipment, review of safety issues, and agreement with consent, the procedure site was marked.    After time out was performed, the patient was prepped aseptically with povidone-iodine swabsticks. 30cc's of serosanguineous joint fluid were aspirated from the Superolateral  aspect of the right Knee Joint using an 22g x 1.5 needle in sterile fashion without complication. Bandage was applied.     Casper Osborne was reminded to rest with RICE for 48 hours post injection and to call the clinic immediately for any adverse side effects as explained in clinic  today.       POSTOPERATIVE PLAN: Patient will weight bear as tolerates with the brace locked in extension. Range of motion may be full. Plan to follow an ACL autograft rehab protocol. Initial goals include maintenance of full knee extension, regaining flexion, swelling control, and quadriceps activation exercises. DVT prophylaxis with ASA 81 mg twice daily x 2 weeks.

## 2025-01-06 ENCOUNTER — CLINICAL SUPPORT (OUTPATIENT)
Dept: REHABILITATION | Facility: HOSPITAL | Age: 70
End: 2025-01-06
Payer: MEDICARE

## 2025-01-06 DIAGNOSIS — R68.89 DECREASED STRENGTH, ENDURANCE, AND MOBILITY: ICD-10-CM

## 2025-01-06 DIAGNOSIS — Z74.09 DECREASED STRENGTH, ENDURANCE, AND MOBILITY: ICD-10-CM

## 2025-01-06 DIAGNOSIS — R53.1 DECREASED STRENGTH, ENDURANCE, AND MOBILITY: ICD-10-CM

## 2025-01-06 DIAGNOSIS — M25.661 DECREASED ROM OF RIGHT KNEE: Primary | ICD-10-CM

## 2025-01-06 PROCEDURE — 97140 MANUAL THERAPY 1/> REGIONS: CPT | Mod: PN

## 2025-01-06 PROCEDURE — 97014 ELECTRIC STIMULATION THERAPY: CPT | Mod: PN

## 2025-01-06 PROCEDURE — 97112 NEUROMUSCULAR REEDUCATION: CPT | Mod: PN

## 2025-01-06 NOTE — PROGRESS NOTES
[facility]   Physical Therapy Treatment Note      Name: Casper Osborne  Clinic Number: [id number]    Therapy Diagnosis:   No diagnosis found.    Physician: Luisa Valencia*    Visit Date: 1/6/2025    Physician Orders: PT Eval and Treat   Medical Diagnosis from Referral: S83.511D (ICD-10-CM) - Rupture of anterior cruciate ligament of right knee, subsequent encounter   Evaluation Date: 12/23/2024  Authorization Period Expiration: 12/12/2025  Plan of Care Expiration: 3/23/25  Progress Note Due: 1/23/25  Date of Surgery: 12/20/24  Visit # / Visits authorized: 2/12 +eval  FOTO: 1/ 3     Precautions: Standard      Time In: *** PM  Time Out: *** PM  Total Billable Time: *** minutes (1MT, ***)     Procedures:  Procedure(s) (LRB):  RECONSTRUCTION, KNEE, ACL, ARTHROSCOPIC (Right)  ARTHROSCOPY, KNEE, WITH MEDIAL AND LATERALMENISCECTOMY (Right)  SYNOVECTOMY, KNEE, LIMITED, ARTHROSCOPIC (Right)  ARTHROSCOPY, KNEE, WITH CHONDROPLASTY (Right)     MD follow up 1/30/25     POSTOPERATIVE PLAN: Patient will weight bear as tolerates with the brace locked in extension. Range of motion may be full. Plan to follow an ACL autograft rehab protocol. Initial goals include maintenance of full knee extension, regaining flexion, swelling control, and quadriceps activation exercises. DVT prophylaxis with ASA 81 mg twice daily x 2 weeks.     PTA Visit #: 0/5     Subjective     Patient reports:*** takes brace off to drive, got stitches remove today, good report from MD today, and to focus on knee extension and strength  He was compliant with home exercise program.  Response to previous treatment: good  Functional change: ongoing    Pain: 0/10  Location: Right knee    Objective      Objective Measures updated at progress report unless specified.     DOS: 12/20/25, right ACL repair  POD: 13 ***    Right knee AAROM: 1-0-94    Treatment     Casper received the treatments listed below:      therapeutic exercises to  "develop strength, ROM, flexibility, and posture for *** minutes including:  Heel prop x 5 minutes at beginning and end of session   Calf stretch with heel prop 30"x4  Seated hamstring/calf stretch with overpressure: 5x20'' holds    manual therapy techniques: Joint mobilizations were applied to the: Right knee for *** minutes, including:  Patellar mobilizations in all directions  Gentle knee extension mobilizations     neuromuscular re-education activities to improve: Balance, Coordination, Proprioception, and Posture for *** minutes. The following activities were included:  Quad set with NMES (10 sec on; 10 sec off, 2 sec ramp, 70Hz, 400 wavelength) 5 minutes with towel under knee; 5 minutes with towel under ankle  Seated active assistive range of motion heel slides x 30   Straight leg raise with brace donned 2x15 (hip flexor fatigue)  Side lying hip abduction with brace donned 2x15 2#  Prone hip extension with braced donned 3x10 2#    Patient Education and Home Exercises       Education provided:   - Home exercise program  - Plan of Care  - Weight bearing and range of motion     Written Home Exercises Provided: Yes. Exercises were reviewed and Casper was able to demonstrate them prior to the end of the session.  Casper demonstrated good  understanding of the education provided. See Electronic Medical Record under Patient Instructions for exercises provided during therapy sessions    Assessment     J***cahrley is a 69 y.o. male referred to outpatient Physical Therapy with a medical diagnosis of S83.511D (ICD-10-CM) - Rupture of anterior cruciate ligament of right knee, subsequent encounter. S/p *** days Right ACL reconstruction by Dr. Corrigan. He arrived to today's treatment session with knee immobilizer brace and ambulating with single point cane. PT educated patient on returning to t-scope brace until his follow up with the referring provider. PT progressed strengthening today with straight leg raise, side lying hip " abduction, and prone hip extension.     Casper Is progressing well towards his goals.   Patient prognosis is Good.     Patient will continue to benefit from skilled outpatient physical therapy to address the deficits listed in the problem list box on initial evaluation, provide pt/family education and to maximize pt's level of independence in the home and community environment.     Patient's spiritual, cultural and educational needs considered and pt agreeable to plan of care and goals.     Anticipated barriers to physical therapy: chronicity of knee pain    Goals:   Short Term Goals: 6 weeks   Patient will be independent with Home exercise program to supplement therapy progressing, not met   Patient will improve Right knee range of motion 0-90 degrees to improve gait and transfers progressing, not met  Patient will improve right lower extremity strength to 70% LSI of Left to improve ability to improve functional tasks progressing, not met  Patient will be able to ambulate without assistive device to improve mobility and return to prior level of function progressing, not met     Long Term Goals: 12 weeks   Patient will improve FOTO score to 71 % to improve self perceived ability to perform functional tasks progressing, not met  Patient will improve right lower extremity strength to 90% LSI of Left to improve ability to perform functional tasks progressing, not met  Patient will improve Right knee range of motion 0-140 deg to improve ability to perform functional tasks progressing, not met   Patient will be able to navigate stairs without difficulty to improve ability to navigate home environment progressing, not met    Plan     Improve quad activation and range of motion.     Patient will weight bear as tolerates with the brace locked in extension. Range of motion may be full. Plan to follow an ACL autograft rehab protocol. Initial goals include maintenance of full knee extension, regaining flexion, swelling control,  and quadriceps activation exercises. DVT prophylaxis with ASA 81 mg twice daily x 2 weeks.     Tegan Mera, PT ,DPT,OCS  01/06/2025

## 2025-01-06 NOTE — PROGRESS NOTES
[facility]   Physical Therapy Treatment Note      Name: Casper Osborne  Clinic Number: [id number]    Therapy Diagnosis:   Encounter Diagnoses   Name Primary?    Decreased ROM of right knee Yes    Decreased strength, endurance, and mobility        Physician: Luisa Valencia    Visit Date: 1/6/2025    Physician Orders: PT Eval and Treat   Medical Diagnosis from Referral: S83.511D (ICD-10-CM) - Rupture of anterior cruciate ligament of right knee, subsequent encounter   Evaluation Date: 12/23/2024  Authorization Period Expiration: 12/12/2025  Plan of Care Expiration: 3/23/25  Progress Note Due: 1/23/25  Date of Surgery: 12/20/24  Visit # / Visits authorized: 2/12 +eval  FOTO: 1/ 3     Precautions: Standard      Time In: 12:55 PM  Time Out: 2:00 PM  Total Billable Time: 65 minutes (1MT, 1 NMR, 1 )     Procedures:  Procedure(s) (LRB):  RECONSTRUCTION, KNEE, ACL, ARTHROSCOPIC (Right)  ARTHROSCOPY, KNEE, WITH MEDIAL AND LATERALMENISCECTOMY (Right)  SYNOVECTOMY, KNEE, LIMITED, ARTHROSCOPIC (Right)  ARTHROSCOPY, KNEE, WITH CHONDROPLASTY (Right)     MD follow up 1/30/25     POSTOPERATIVE PLAN: Patient will weight bear as tolerates with the brace locked in extension. Range of motion may be full. Plan to follow an ACL autograft rehab protocol. Initial goals include maintenance of full knee extension, regaining flexion, swelling control, and quadriceps activation exercises. DVT prophylaxis with ASA 81 mg twice daily x 2 weeks.     PTA Visit #: 0/5     Subjective     Patient reports:The knee is getting better and better everyday. He saw the PA and she did take some fluid out of his knee  He was compliant with home exercise program.  Response to previous treatment: good  Functional change: ongoing    Pain: 0/10  Location: Right knee    Objective      Objective Measures updated at progress report unless specified.     DOS: 12/20/25, right ACL repair  POD: 17    Right knee AAROM:  "1-0-105    Treatment     Casper received the treatments listed below:      therapeutic exercises to develop strength, ROM, flexibility, and posture for 20 minutes including:  Heel prop x 5 minutes at beginning and end of session   Calf stretch with heel prop 30"x4  Seated hamstring/calf stretch with overpressure: 5x30'' holds    manual therapy techniques: Joint mobilizations were applied to the: Right knee for 8 minutes, including:  Patellar mobilizations in all directions  Gentle knee extension mobilizations     neuromuscular re-education activities to improve: Balance, Coordination, Proprioception, and Posture for 37 minutes. The following activities were included:  Quad set with NMES (10 sec on; 10 sec off, 2 sec ramp, 70Hz, 400 wavelength) 5 minutes with towel under knee; 5 minutes with towel under ankle  Straight leg raise with brace donned 2x15   Side lying hip abduction with brace donned 2x15 2#  Prone hip extension with braced donned 3x10 2#  Seated active assistive range of motion heel slides x 30     Patient Education and Home Exercises       Education provided:   - Home exercise program  - Plan of Care  - Weight bearing and range of motion     Written Home Exercises Provided: Yes. Exercises were reviewed and Casper was able to demonstrate them prior to the end of the session.  Casper demonstrated good  understanding of the education provided. See Electronic Medical Record under Patient Instructions for exercises provided during therapy sessions    Assessment     Casper is a 69 y.o. male referred to outpatient Physical Therapy with a medical diagnosis of S83.511D (ICD-10-CM) - Rupture of anterior cruciate ligament of right knee, subsequent encounter. S/p 17 days Right ACL reconstruction by Dr. Corrigan. He arrived to today's session with T-scope brace donned and ambulating without assistive device. He continues to have good quad activation with NMES. Patient also continues to slowly improve with passive range of " motion knee flexion.     Casper Is progressing well towards his goals.   Patient prognosis is Good.     Patient will continue to benefit from skilled outpatient physical therapy to address the deficits listed in the problem list box on initial evaluation, provide pt/family education and to maximize pt's level of independence in the home and community environment.     Patient's spiritual, cultural and educational needs considered and pt agreeable to plan of care and goals.     Anticipated barriers to physical therapy: chronicity of knee pain    Goals:   Short Term Goals: 6 weeks   Patient will be independent with Home exercise program to supplement therapy progressing, not met   Patient will improve Right knee range of motion 0-90 degrees to improve gait and transfers progressing, not met  Patient will improve right lower extremity strength to 70% LSI of Left to improve ability to improve functional tasks progressing, not met  Patient will be able to ambulate without assistive device to improve mobility and return to prior level of function progressing, not met     Long Term Goals: 12 weeks   Patient will improve FOTO score to 71 % to improve self perceived ability to perform functional tasks progressing, not met  Patient will improve right lower extremity strength to 90% LSI of Left to improve ability to perform functional tasks progressing, not met  Patient will improve Right knee range of motion 0-140 deg to improve ability to perform functional tasks progressing, not met   Patient will be able to navigate stairs without difficulty to improve ability to navigate home environment progressing, not met    Plan     Improve quad activation and range of motion.     Patient will weight bear as tolerates with the brace locked in extension. Range of motion may be full. Plan to follow an ACL autograft rehab protocol. Initial goals include maintenance of full knee extension, regaining flexion, swelling control, and quadriceps  activation exercises. DVT prophylaxis with ASA 81 mg twice daily x 2 weeks.     Tegan Mera, PT ,DPT,OCS  01/06/2025

## 2025-01-09 ENCOUNTER — CLINICAL SUPPORT (OUTPATIENT)
Dept: REHABILITATION | Facility: HOSPITAL | Age: 70
End: 2025-01-09
Payer: MEDICARE

## 2025-01-09 DIAGNOSIS — R53.1 DECREASED STRENGTH, ENDURANCE, AND MOBILITY: ICD-10-CM

## 2025-01-09 DIAGNOSIS — Z74.09 DECREASED STRENGTH, ENDURANCE, AND MOBILITY: ICD-10-CM

## 2025-01-09 DIAGNOSIS — R68.89 DECREASED STRENGTH, ENDURANCE, AND MOBILITY: ICD-10-CM

## 2025-01-09 DIAGNOSIS — M25.661 DECREASED ROM OF RIGHT KNEE: Primary | ICD-10-CM

## 2025-01-09 PROCEDURE — 97112 NEUROMUSCULAR REEDUCATION: CPT | Mod: PN

## 2025-01-09 NOTE — PROGRESS NOTES
[facility]   Physical Therapy Treatment Note      Name: Casper Osborne  Clinic Number: [id number]    Therapy Diagnosis:   Encounter Diagnoses   Name Primary?    Decreased ROM of right knee Yes    Decreased strength, endurance, and mobility        Physician: Luisa Valencia    Visit Date: 1/9/2025    Physician Orders: PT Eval and Treat   Medical Diagnosis from Referral: S83.511D (ICD-10-CM) - Rupture of anterior cruciate ligament of right knee, subsequent encounter   Evaluation Date: 12/23/2024  Authorization Period Expiration: 12/12/2025  Plan of Care Expiration: 3/23/25  Progress Note Due: 1/23/25  Date of Surgery: 12/20/24  Visit # / Visits authorized: 3/12 +eval  FOTO: 1/3     Precautions: Standard      Time In: 9:05 AM  Time Out: 10:00 AM  Total Billable Time: 25 minutes (2NMR)     Procedures:  Procedure(s) (LRB):  RECONSTRUCTION, KNEE, ACL, ARTHROSCOPIC (Right)  ARTHROSCOPY, KNEE, WITH MEDIAL AND LATERALMENISCECTOMY (Right)  SYNOVECTOMY, KNEE, LIMITED, ARTHROSCOPIC (Right)  ARTHROSCOPY, KNEE, WITH CHONDROPLASTY (Right)     MD follow up 1/30/25     POSTOPERATIVE PLAN: Patient will weight bear as tolerates with the brace locked in extension. Range of motion may be full. Plan to follow an ACL autograft rehab protocol. Initial goals include maintenance of full knee extension, regaining flexion, swelling control, and quadriceps activation exercises. DVT prophylaxis with ASA 81 mg twice daily x 2 weeks.     PTA Visit #: 0/5     Subjective     Patient reports: knee is feeling good and reports still wearing brace at when walking  He was compliant with home exercise program.  Response to previous treatment: good  Functional change: ongoing    Pain: 0/10  Location: Right knee    Objective      Objective Measures updated at progress report unless specified.     DOS: 12/20/25, right ACL repair  POD: 17    Right knee AAROM: 1-0-105 degrees    Treatment     Casper received the treatments  "listed below:      therapeutic exercises to develop strength, ROM, flexibility, and posture for 20 minutes including:  Heel prop x 5 minutes with 3# weight at beginning and end of session   Calf stretch with heel prop 30"x4  Seated hamstring/calf stretch with overpressure: 5x30'' holds    manual therapy techniques: Joint mobilizations were applied to the: Right knee for 10 minutes, including:  Patellar mobilizations in all directions  Gentle knee extension mobilizations     neuromuscular re-education activities to improve: Balance, Coordination, Proprioception, and Posture for 25 minutes. The following activities were included:  Quad set with NMES (10 sec on; 10 sec off, 2 sec ramp, 70Hz, 400 wavelength) 5 minutes with towel under knee; 5 minutes with towel under ankle  Straight leg raise with brace donned 2x15   Side lying hip abduction with brace donned 2x15 2#  Prone hip extension with braced donned 3x10 2#  Seated active assistive range of motion heel slides x 30     Patient Education and Home Exercises       Education provided:   - Home exercise program  - Plan of Care  - Weight bearing and range of motion     Written Home Exercises Provided: Yes. Exercises were reviewed and Casper was able to demonstrate them prior to the end of the session.  Casper demonstrated good  understanding of the education provided. See Electronic Medical Record under Patient Instructions for exercises provided during therapy sessions    Assessment     Casper is a 69 y.o. male referred to outpatient Physical Therapy with a medical diagnosis of S83.511D (ICD-10-CM) - Rupture of anterior cruciate ligament of right knee, subsequent encounter. S/p 17 days Right ACL reconstruction by Dr. Corrigan.   Improving quad strength, good pain control, and slight joint effusion still present. Focusing on knee hyperextension and still in protection phase.     Casper Is progressing well towards his goals.   Patient prognosis is Good.     Patient will " continue to benefit from skilled outpatient physical therapy to address the deficits listed in the problem list box on initial evaluation, provide pt/family education and to maximize pt's level of independence in the home and community environment.     Patient's spiritual, cultural and educational needs considered and pt agreeable to plan of care and goals.     Anticipated barriers to physical therapy: chronicity of knee pain    Goals:   Short Term Goals: 6 weeks   Patient will be independent with Home exercise program to supplement therapy progressing, not met   Patient will improve Right knee range of motion 0-90 degrees to improve gait and transfers progressing, not met  Patient will improve right lower extremity strength to 70% LSI of Left to improve ability to improve functional tasks progressing, not met  Patient will be able to ambulate without assistive device to improve mobility and return to prior level of function progressing, not met     Long Term Goals: 12 weeks   Patient will improve FOTO score to 71 % to improve self perceived ability to perform functional tasks progressing, not met  Patient will improve right lower extremity strength to 90% LSI of Left to improve ability to perform functional tasks progressing, not met  Patient will improve Right knee range of motion 0-140 deg to improve ability to perform functional tasks progressing, not met   Patient will be able to navigate stairs without difficulty to improve ability to navigate home environment progressing, not met    Plan     Improve quad activation and range of motion.     Patient will weight bear as tolerates with the brace locked in extension. Range of motion may be full. Plan to follow an ACL autograft rehab protocol. Initial goals include maintenance of full knee extension, regaining flexion, swelling control, and quadriceps activation exercises. DVT prophylaxis with ASA 81 mg twice daily x 2 weeks.     Joe Fields, PT, DPT  ,DPT,OCS  01/09/2025

## 2025-01-14 ENCOUNTER — CLINICAL SUPPORT (OUTPATIENT)
Dept: REHABILITATION | Facility: HOSPITAL | Age: 70
End: 2025-01-14
Payer: MEDICARE

## 2025-01-14 DIAGNOSIS — R68.89 DECREASED STRENGTH, ENDURANCE, AND MOBILITY: ICD-10-CM

## 2025-01-14 DIAGNOSIS — Z74.09 DECREASED STRENGTH, ENDURANCE, AND MOBILITY: ICD-10-CM

## 2025-01-14 DIAGNOSIS — R53.1 DECREASED STRENGTH, ENDURANCE, AND MOBILITY: ICD-10-CM

## 2025-01-14 DIAGNOSIS — M25.661 DECREASED ROM OF RIGHT KNEE: Primary | ICD-10-CM

## 2025-01-14 PROCEDURE — 97014 ELECTRIC STIMULATION THERAPY: CPT | Mod: PN

## 2025-01-14 PROCEDURE — 97112 NEUROMUSCULAR REEDUCATION: CPT | Mod: PN

## 2025-01-14 NOTE — PROGRESS NOTES
[facility]   Physical Therapy Treatment Note      Name: Casper Osborne  Clinic Number: [id number]    Therapy Diagnosis:   Encounter Diagnoses   Name Primary?    Decreased ROM of right knee Yes    Decreased strength, endurance, and mobility          Physician: Luisa Valencia*    Visit Date: 1/14/2025    Physician Orders: PT Eval and Treat   Medical Diagnosis from Referral: S83.511D (ICD-10-CM) - Rupture of anterior cruciate ligament of right knee, subsequent encounter   Evaluation Date: 12/23/2024  Authorization Period Expiration: 12/12/2025  Plan of Care Expiration: 3/23/25  Progress Note Due: 1/23/25  Date of Surgery: 12/20/24  Visit # / Visits authorized: 4/12 +eval  FOTO: 1/ 3     Precautions: Standard      Time In: 3:04 PM  Time Out: 4:04 PM  Total Billable Time: 30 minutes (2 NMR, 1ESTIM )     Procedures:  Procedure(s) (LRB):  RECONSTRUCTION, KNEE, ACL, ARTHROSCOPIC (Right)  ARTHROSCOPY, KNEE, WITH MEDIAL AND LATERALMENISCECTOMY (Right)  SYNOVECTOMY, KNEE, LIMITED, ARTHROSCOPIC (Right)  ARTHROSCOPY, KNEE, WITH CHONDROPLASTY (Right)     MD follow up 1/30/25     POSTOPERATIVE PLAN: Patient will weight bear as tolerates with the brace locked in extension. Range of motion may be full. Plan to follow an ACL autograft rehab protocol. Initial goals include maintenance of full knee extension, regaining flexion, swelling control, and quadriceps activation exercises. DVT prophylaxis with ASA 81 mg twice daily x 2 weeks.     PTA Visit #: 0/5     Subjective     Patient reports: He is doing okay with some pain in his knee. He came in without his brace today because he knows he takes some of the exercises out of the brace.   He was compliant with home exercise program.  Response to previous treatment: good  Functional change: ongoing    Pain: 3/10  Location: Right knee    Objective      Objective Measures updated at progress report unless specified.     DOS: 12/20/25, right ACL repair  POD:  "25    Right knee AAROM: 1-0-120 degrees    Treatment     Casper received the treatments listed below:      therapeutic exercises to develop strength, ROM, flexibility, and posture for 15 minutes including:  Heel prop x 5 minutes with 3# weight at beginning and end of session   Calf stretch with heel prop 30"x4  Seated hamstring/calf stretch with overpressure: 5x30'' holds    manual therapy techniques: Joint mobilizations were applied to the: Right knee for 8 minutes, including:  Patellar mobilizations in all directions  Gentle knee extension mobilizations     neuromuscular re-education activities to improve: Balance, Coordination, Proprioception, and Posture for 37 minutes. The following activities were included:  Quad set with NMES (10 sec on; 10 sec off, 2 sec ramp, 70Hz, 400 wavelength) 5 minutes with towel under knee; 5 minutes with towel under ankle  Straight leg raise with brace donned 3x10  Side lying hip abduction with brace donned 3x10 2#  Prone hip extension with braced donned 3x10 2#  Seated active assistive range of motion heel slides x 30     Patient Education and Home Exercises       Education provided:   - Home exercise program  - Plan of Care  - Weight bearing and range of motion     Written Home Exercises Provided: Yes. Exercises were reviewed and Casper was able to demonstrate them prior to the end of the session.  Casper demonstrated good  understanding of the education provided. See Electronic Medical Record under Patient Instructions for exercises provided during therapy sessions    Assessment     Casper is a 69 y.o. male referred to outpatient Physical Therapy with a medical diagnosis of S83.511D (ICD-10-CM) - Rupture of anterior cruciate ligament of right knee, subsequent encounter. S/p 25 days Right ACL reconstruction by Dr. Corrigan. He arrived to today's session without T-scope brace. He reported to have the brace in the car but took it off for therapy. PT educated patient on continuing to wear " the brace when walking. He continues to display good quad activation and no extensor lag with straight leg raise. Significant improvement in range of motion noted today. PT to consider adding standing strengthening and balance at next visit.     Casper Is progressing well towards his goals.   Patient prognosis is Good.     Patient will continue to benefit from skilled outpatient physical therapy to address the deficits listed in the problem list box on initial evaluation, provide pt/family education and to maximize pt's level of independence in the home and community environment.     Patient's spiritual, cultural and educational needs considered and pt agreeable to plan of care and goals.     Anticipated barriers to physical therapy: chronicity of knee pain    Goals:   Short Term Goals: 6 weeks   Patient will be independent with Home exercise program to supplement therapy progressing, not met   Patient will improve Right knee range of motion 0-90 degrees to improve gait and transfers progressing, not met  Patient will improve right lower extremity strength to 70% LSI of Left to improve ability to improve functional tasks progressing, not met  Patient will be able to ambulate without assistive device to improve mobility and return to prior level of function progressing, not met     Long Term Goals: 12 weeks   Patient will improve FOTO score to 71 % to improve self perceived ability to perform functional tasks progressing, not met  Patient will improve right lower extremity strength to 90% LSI of Left to improve ability to perform functional tasks progressing, not met  Patient will improve Right knee range of motion 0-140 deg to improve ability to perform functional tasks progressing, not met   Patient will be able to navigate stairs without difficulty to improve ability to navigate home environment progressing, not met    Plan     Improve quad activation and range of motion.     Patient will weight bear as tolerates  with the brace locked in extension. Range of motion may be full. Plan to follow an ACL autograft rehab protocol. Initial goals include maintenance of full knee extension, regaining flexion, swelling control, and quadriceps activation exercises. DVT prophylaxis with ASA 81 mg twice daily x 2 weeks.     Tegan Mera, PT ,DPT,OCS  01/14/2025

## 2025-01-16 ENCOUNTER — CLINICAL SUPPORT (OUTPATIENT)
Dept: REHABILITATION | Facility: HOSPITAL | Age: 70
End: 2025-01-16
Payer: MEDICARE

## 2025-01-16 DIAGNOSIS — Z74.09 DECREASED STRENGTH, ENDURANCE, AND MOBILITY: ICD-10-CM

## 2025-01-16 DIAGNOSIS — M25.661 DECREASED ROM OF RIGHT KNEE: Primary | ICD-10-CM

## 2025-01-16 DIAGNOSIS — R68.89 DECREASED STRENGTH, ENDURANCE, AND MOBILITY: ICD-10-CM

## 2025-01-16 DIAGNOSIS — R53.1 DECREASED STRENGTH, ENDURANCE, AND MOBILITY: ICD-10-CM

## 2025-01-16 PROCEDURE — 97014 ELECTRIC STIMULATION THERAPY: CPT | Mod: PN

## 2025-01-16 PROCEDURE — 97112 NEUROMUSCULAR REEDUCATION: CPT | Mod: PN

## 2025-01-16 NOTE — PROGRESS NOTES
[facility]   Physical Therapy Treatment Note      Name: Casper Osborne  Clinic Number: [id number]    Therapy Diagnosis:   Encounter Diagnoses   Name Primary?    Decreased ROM of right knee Yes    Decreased strength, endurance, and mobility      Physician: Luisa Valencia*    Visit Date: 1/16/2025    Physician Orders: PT Eval and Treat   Medical Diagnosis from Referral: S83.511D (ICD-10-CM) - Rupture of anterior cruciate ligament of right knee, subsequent encounter   Evaluation Date: 12/23/2024  Authorization Period Expiration: 12/12/2025  Plan of Care Expiration: 3/23/25  Progress Note Due: 1/23/25  Date of Surgery: 12/20/24  Visit # / Visits authorized: 4/12 +eval  FOTO: 1/ 3     Precautions: Standard      Time In: 11:05 AM  Time Out: 12:00 PM  Total Billable Time: 40 minutes (3NMR, 1ESTIM )     Procedures:  Procedure(s) (LRB):  RECONSTRUCTION, KNEE, ACL, ARTHROSCOPIC (Right)  ARTHROSCOPY, KNEE, WITH MEDIAL AND LATERALMENISCECTOMY (Right)  SYNOVECTOMY, KNEE, LIMITED, ARTHROSCOPIC (Right)  ARTHROSCOPY, KNEE, WITH CHONDROPLASTY (Right)     MD follow up 1/30/25     POSTOPERATIVE PLAN: Patient will weight bear as tolerates with the brace locked in extension. Range of motion may be full. Plan to follow an ACL autograft rehab protocol. Initial goals include maintenance of full knee extension, regaining flexion, swelling control, and quadriceps activation exercises. DVT prophylaxis with ASA 81 mg twice daily x 2 weeks.     PTA Visit #: 0/5     Subjective     Patient reports: He is doing okay with some pain in his knee. He came in without his brace today because he knows he takes some of the exercises out of the brace.   He was compliant with home exercise program.  Response to previous treatment: good  Functional change: ongoing    Pain: 3/10  Location: Right knee    Objective      Objective Measures updated at progress report unless specified.     DOS: 12/20/25, right ACL repair  POD:  "27    Right knee AAROM: 1-0-120 degrees    Treatment     Casper received the treatments listed below:      therapeutic exercises to develop strength, ROM, flexibility, and posture for 15 minutes including:  Heel prop x 5 minutes with 3# weight at beginning and end of session   Calf stretch with heel prop 30"x4  Seated hamstring/calf stretch with overpressure: 5x30'' holds    manual therapy techniques: Joint mobilizations were applied to the: Right knee for 00 minutes, including:  Patellar mobilizations in all directions  Gentle knee extension mobilizations     neuromuscular re-education activities to improve: Balance, Coordination, Proprioception, and Posture for 40 minutes. The following activities were included:  Quad set with NMES (10 sec on; 10 sec off, 2 sec ramp, 70Hz, 400 wavelength) 5 minutes with towel under knee; 5 minutes with towel under ankle  Straight leg raise with brace doffed 3x10  Side lying hip abduction with brace donned 3x10 2#  Prone hip extension with braced donned 3x10 2#  Seated active assistive range of motion heel slides x 30     Patient Education and Home Exercises       Education provided:   - Home exercise program  - Plan of Care  - Weight bearing and range of motion     Written Home Exercises Provided: Yes. Exercises were reviewed and Casper was able to demonstrate them prior to the end of the session.  Casper demonstrated good  understanding of the education provided. See Electronic Medical Record under Patient Instructions for exercises provided during therapy sessions    Assessment     Casper is a 69 y.o. male referred to outpatient Physical Therapy with a medical diagnosis of S83.511D (ICD-10-CM) - Rupture of anterior cruciate ligament of right knee, subsequent encounter. S/p 25 days Right ACL reconstruction by Dr. Corrigan. He arrived to today's session without T-scope brace. He reported to have the brace in the car but took it off for therapy. PT educated patient on continuing to wear " the brace when walking. Progressing well in range of motion and quad strength, will continue NMES as slight extensor lag still present combines with lack of knee extension, and very low knee irritability since surgery.    Casper Is progressing well towards his goals.   Patient prognosis is Good.     Patient will continue to benefit from skilled outpatient physical therapy to address the deficits listed in the problem list box on initial evaluation, provide pt/family education and to maximize pt's level of independence in the home and community environment.     Patient's spiritual, cultural and educational needs considered and pt agreeable to plan of care and goals.     Anticipated barriers to physical therapy: chronicity of knee pain    Goals:   Short Term Goals: 6 weeks   Patient will be independent with Home exercise program to supplement therapy progressing, not met   Patient will improve Right knee range of motion 0-90 degrees to improve gait and transfers progressing, not met  Patient will improve right lower extremity strength to 70% LSI of Left to improve ability to improve functional tasks progressing, not met  Patient will be able to ambulate without assistive device to improve mobility and return to prior level of function progressing, not met     Long Term Goals: 12 weeks   Patient will improve FOTO score to 71 % to improve self perceived ability to perform functional tasks progressing, not met  Patient will improve right lower extremity strength to 90% LSI of Left to improve ability to perform functional tasks progressing, not met  Patient will improve Right knee range of motion 0-140 deg to improve ability to perform functional tasks progressing, not met   Patient will be able to navigate stairs without difficulty to improve ability to navigate home environment progressing, not met    Plan     Improve quad activation and range of motion.     Patient will weight bear as tolerates with the brace locked in  extension. Range of motion may be full. Plan to follow an ACL autograft rehab protocol. Initial goals include maintenance of full knee extension, regaining flexion, swelling control, and quadriceps activation exercises. DVT prophylaxis with ASA 81 mg twice daily x 2 weeks.     Joe Fields, PT, DPT ,DPT,OCS  01/16/2025

## 2025-01-20 ENCOUNTER — CLINICAL SUPPORT (OUTPATIENT)
Dept: REHABILITATION | Facility: HOSPITAL | Age: 70
End: 2025-01-20
Payer: MEDICARE

## 2025-01-20 DIAGNOSIS — Z74.09 DECREASED STRENGTH, ENDURANCE, AND MOBILITY: ICD-10-CM

## 2025-01-20 DIAGNOSIS — M25.661 DECREASED ROM OF RIGHT KNEE: Primary | ICD-10-CM

## 2025-01-20 DIAGNOSIS — R53.1 DECREASED STRENGTH, ENDURANCE, AND MOBILITY: ICD-10-CM

## 2025-01-20 DIAGNOSIS — N40.1 BPH WITH URINARY OBSTRUCTION: ICD-10-CM

## 2025-01-20 DIAGNOSIS — N13.8 BPH WITH URINARY OBSTRUCTION: ICD-10-CM

## 2025-01-20 DIAGNOSIS — R68.89 DECREASED STRENGTH, ENDURANCE, AND MOBILITY: ICD-10-CM

## 2025-01-20 PROCEDURE — 97014 ELECTRIC STIMULATION THERAPY: CPT | Mod: PN

## 2025-01-20 PROCEDURE — 97530 THERAPEUTIC ACTIVITIES: CPT | Mod: PN

## 2025-01-20 PROCEDURE — 97112 NEUROMUSCULAR REEDUCATION: CPT | Mod: PN

## 2025-01-20 NOTE — TELEPHONE ENCOUNTER
No care due was identified.  [facility] Embedded Care Due Messages. Reference number: [id number].   1/20/2025 9:47:07 AM CST

## 2025-01-20 NOTE — PROGRESS NOTES
[facility]   Physical Therapy Treatment Note      Name: Casper Osborne  Clinic Number: [id number]    Therapy Diagnosis:   Encounter Diagnoses   Name Primary?    Decreased ROM of right knee Yes    Decreased strength, endurance, and mobility            Physician: Luisa Valencia*    Visit Date: 1/20/2025    Physician Orders: PT Eval and Treat   Medical Diagnosis from Referral: S83.511D (ICD-10-CM) - Rupture of anterior cruciate ligament of right knee, subsequent encounter   Evaluation Date: 12/23/2024  Authorization Period Expiration: 12/12/2025  Plan of Care Expiration: 3/23/25  Progress Note Due: 1/23/25  Date of Surgery: 12/20/24  Visit # / Visits authorized: 6/12 +eval  FOTO: 1/ 3     Precautions: Standard      Time In: 1:48 PM  Time Out: 2:45 PM  Total Billable Time: 30 minutes (1 NMR, 1ESTIM, 1TA)     Procedures:  Procedure(s) (LRB):  RECONSTRUCTION, KNEE, ACL, ARTHROSCOPIC (Right)  ARTHROSCOPY, KNEE, WITH MEDIAL AND LATERALMENISCECTOMY (Right)  SYNOVECTOMY, KNEE, LIMITED, ARTHROSCOPIC (Right)  ARTHROSCOPY, KNEE, WITH CHONDROPLASTY (Right)     MD follow up 1/30/25     POSTOPERATIVE PLAN: Patient will weight bear as tolerates with the brace locked in extension. Range of motion may be full. Plan to follow an ACL autograft rehab protocol. Initial goals include maintenance of full knee extension, regaining flexion, swelling control, and quadriceps activation exercises. DVT prophylaxis with ASA 81 mg twice daily x 2 weeks.     PTA Visit #: 0/5     Subjective     Patient reports: He is doing good. The knee has been feeling really good.   He was compliant with home exercise program.  Response to previous treatment: good  Functional change: ongoing    Pain: 3/10  Location: Right knee    Objective      Objective Measures updated at progress report unless specified.     DOS: 12/20/25, right ACL repair  POW: (1/17-1/24)    Right knee AAROM: 1-0-120 degrees    Treatment     Casper received  "the treatments listed below:      therapeutic exercises to develop strength, ROM, flexibility, and posture for 5 minutes including:  Heel prop x 5 minutes with 3# weight at beginning     manual therapy techniques: Joint mobilizations were applied to the: Right knee for 00 minutes, including:  Patellar mobilizations in all directions  Gentle knee extension mobilizations     neuromuscular re-education activities to improve: Balance, Coordination, Proprioception, and Posture for 42 minutes. The following activities were included:  Quad set with NMES (10 sec on; 10 sec off, 2 sec ramp, 70Hz, 400 wavelength) 5 minutes with towel under knee; 5 minutes with towel under ankle  Straight leg raise with brace doffed 3x10  Side lying hip abduction with brace doffed 3x10 3#  Prone hip extension with braced doffed 3x10 3#  Seated active assistive range of motion heel slides x 30   Tandem stance 30"x3 ea    therapeutic activities to improve functional performance for 10  minutes, including:  Step ups 4" 3x10  Right (increase next visit)  Standing heel raises 3x10       Patient Education and Home Exercises       Education provided:   - Home exercise program  - Plan of Care  - Weight bearing and range of motion     Written Home Exercises Provided: Yes. Exercises were reviewed and Casper was able to demonstrate them prior to the end of the session.  Casper demonstrated good  understanding of the education provided. See Electronic Medical Record under Patient Instructions for exercises provided during therapy sessions    Assessment     Casper is a 69 y.o. male referred to outpatient Physical Therapy with a medical diagnosis of S83.511D (ICD-10-CM) - Rupture of anterior cruciate ligament of right knee, subsequent encounter. S/p 4 weeks Right ACL reconstruction by Dr. Corrigan. He requires minor verbal cues for decrease in extensor lag with straight leg raise but is able to correct with cues. PT progressed standing exercises with step ups " and standing balance. Patient denied increase in pain. PT to consider progressing step up height at next visit.     Casper Is progressing well towards his goals.   Patient prognosis is Good.     Patient will continue to benefit from skilled outpatient physical therapy to address the deficits listed in the problem list box on initial evaluation, provide pt/family education and to maximize pt's level of independence in the home and community environment.     Patient's spiritual, cultural and educational needs considered and pt agreeable to plan of care and goals.     Anticipated barriers to physical therapy: chronicity of knee pain    Goals:   Short Term Goals: 6 weeks   Patient will be independent with Home exercise program to supplement therapy progressing, not met   Patient will improve Right knee range of motion 0-90 degrees to improve gait and transfers progressing, not met  Patient will improve right lower extremity strength to 70% LSI of Left to improve ability to improve functional tasks progressing, not met  Patient will be able to ambulate without assistive device to improve mobility and return to prior level of function progressing, not met     Long Term Goals: 12 weeks   Patient will improve FOTO score to 71 % to improve self perceived ability to perform functional tasks progressing, not met  Patient will improve right lower extremity strength to 90% LSI of Left to improve ability to perform functional tasks progressing, not met  Patient will improve Right knee range of motion 0-140 deg to improve ability to perform functional tasks progressing, not met   Patient will be able to navigate stairs without difficulty to improve ability to navigate home environment progressing, not met    Plan     Improve quad activation and range of motion.     Patient will weight bear as tolerates with the brace locked in extension. Range of motion may be full. Plan to follow an ACL autograft rehab protocol. Initial goals  include maintenance of full knee extension, regaining flexion, swelling control, and quadriceps activation exercises. DVT prophylaxis with ASA 81 mg twice daily x 2 weeks.     Tegan Mera, PT ,DPT,OCS  01/20/2025             Topical Retinoid counseling:  Patient advised to apply a pea-sized amount only at bedtime and wait 30 minutes after washing their face before applying.  If too drying, patient may add a non-comedogenic moisturizer. The patient verbalized understanding of the proper use and possible adverse effects of retinoids.  All of the patient's questions and concerns were addressed.

## 2025-01-21 RX ORDER — TAMSULOSIN HYDROCHLORIDE 0.4 MG/1
1 CAPSULE ORAL NIGHTLY
Qty: 90 CAPSULE | Refills: 3 | Status: SHIPPED | OUTPATIENT
Start: 2025-01-21

## 2025-01-21 NOTE — TELEPHONE ENCOUNTER
Refill Decision Note   Casper Osborne  is requesting a refill authorization.  Brief Assessment and Rationale for Refill:  Approve     Medication Therapy Plan:         Comments:     Note composed:9:39 AM 01/21/2025

## 2025-01-23 NOTE — PROGRESS NOTES
S:Casper Osborne presents for post-operative evaluation.     DATE OF PROCEDURE: 12/20/2024   PROCEDURES PERFORMED:   Right knee arthroscopic patellar tendon allograft ACL reconstruction (CPT 01117)  Right knee arthroscopic partial medial and lateral meniscectomies (CPT 24557)  Right knee arthroscopic chondroplasty    Casper Osborne reports to be doing well 5 weeks 6 days s/p the above mentioned procedure. Going to PT at the [facility] location.  States the knee feels great.  He is quite encouraged.  Better than the knee has felt in 3 years.  He feels that I have saved his life with the result of the surgery.  He is very thankful.    O:  Exam of the right knee shows well-healed incisions.  Trace residual effusion.  Quad otherwise is activating quite well.  Able to demonstrate a strong straight leg raise.  Full active and passive extension and flexion compared to the contralateral side.  Negative Lachman.  No joint line tenderness    A/P: Arthroscopic pictures were reviewed with the patient.  Clinically the patient looks quite good.  May progress with the rehab protocol.  Discontinue brace.  No running, jumping, cutting/pivoting, climbing ladders.  Nothing too strenuous.  No running progression program until at least 16 weeks postop given the allograft.  He looks great.  I will see him back in 6-7 weeks.

## 2025-01-25 ENCOUNTER — CLINICAL SUPPORT (OUTPATIENT)
Dept: REHABILITATION | Facility: HOSPITAL | Age: 70
End: 2025-01-25
Payer: MEDICARE

## 2025-01-25 DIAGNOSIS — M25.661 DECREASED ROM OF RIGHT KNEE: Primary | ICD-10-CM

## 2025-01-25 DIAGNOSIS — Z74.09 DECREASED STRENGTH, ENDURANCE, AND MOBILITY: ICD-10-CM

## 2025-01-25 DIAGNOSIS — R68.89 DECREASED STRENGTH, ENDURANCE, AND MOBILITY: ICD-10-CM

## 2025-01-25 DIAGNOSIS — R53.1 DECREASED STRENGTH, ENDURANCE, AND MOBILITY: ICD-10-CM

## 2025-01-25 PROCEDURE — 97530 THERAPEUTIC ACTIVITIES: CPT | Mod: PN

## 2025-01-25 PROCEDURE — 97110 THERAPEUTIC EXERCISES: CPT | Mod: PN

## 2025-01-25 PROCEDURE — 97014 ELECTRIC STIMULATION THERAPY: CPT | Mod: PN

## 2025-01-25 PROCEDURE — 97112 NEUROMUSCULAR REEDUCATION: CPT | Mod: PN

## 2025-01-25 NOTE — PROGRESS NOTES
[facility]   Physical Therapy Treatment Note      Name: Casper Osborne  Clinic Number: [id number]    Therapy Diagnosis:   Encounter Diagnoses   Name Primary?    Decreased ROM of right knee Yes    Decreased strength, endurance, and mobility      Physician: Luisa Valencia    Visit Date: 1/25/2025    Physician Orders: PT Eval and Treat   Medical Diagnosis from Referral: S83.511D (ICD-10-CM) - Rupture of anterior cruciate ligament of right knee, subsequent encounter   Evaluation Date: 12/23/2024  Authorization Period Expiration: 12/12/2025  Plan of Care Expiration: 3/23/25  Progress Note Due: 1/23/25  Date of Surgery: 12/20/24  Visit # / Visits authorized: 9/12 +eval  FOTO: 1/ 3     Precautions: Standard      Time In: 10:05 AM  Time Out: 11:00 AM  Total Billable Time: 55 minutes (2NMR, 1TE, 1TA, 1ESTIM)     POSTOPERATIVE DIAGNOSES:   Right knee complete ACL tear, chronic  Right knee medial and lateral meniscus tears  Right knee chondromalacia     Procedures:  Procedure(s) (LRB):  RECONSTRUCTION, KNEE, ACL, ARTHROSCOPIC (Right)  ARTHROSCOPY, KNEE, WITH MEDIAL AND LATERALMENISCECTOMY (Right)  SYNOVECTOMY, KNEE, LIMITED, ARTHROSCOPIC (Right)  ARTHROSCOPY, KNEE, WITH CHONDROPLASTY (Right)     MD follow up 1/30/25     POSTOPERATIVE PLAN: Patient will weight bear as tolerates with the brace locked in extension. Range of motion may be full. Plan to follow an ACL autograft rehab protocol. Initial goals include maintenance of full knee extension, regaining flexion, swelling control, and quadriceps activation exercises. DVT prophylaxis with ASA 81 mg twice daily x 2 weeks.     PTA Visit #: 0/5     Subjective     Patient reports: no new complaints, some knee stiffness  He was compliant with home exercise program.  Response to previous treatment: good  Functional change: ongoing    Pain: 1/10  Location: Right knee    Objective      Objective Measures updated at progress report unless specified.  "    DOS: 12/20/25, right ACL repair  POW: (1/17-1/24) Week 6    1/25/25  Right knee AAROM: 1-0-136 degrees    Treatment     Casper received the treatments listed below:      therapeutic exercises to develop strength, ROM, flexibility, and posture for 15 minutes including:  Bike: next  Heel prop x 5 minutes with 3# weight at beginning   Supine active assistive range of motion heel slides x 30     Standing marching: 2x15 right  Calf fitter stretch: 3x30'' holds double leg   Standing heel raises 2x15 double leg     manual therapy techniques: Joint mobilizations were applied to the: Right knee for 00 minutes, including:  Patellar mobilizations in all directions  Gentle knee extension mobilizations     neuromuscular re-education activities to improve: Balance, Coordination, Proprioception, and Posture for 30 minutes. The following activities were included:  Quad set with NMES (10 sec on; 10 sec off, 2 sec ramp, 70Hz, 400 wavelength) 5 minutes with towel under knee; 5 minutes with towel under ankle  Straight leg raise with brace doffed with NMES (10 sec on; 10 sec off, 2 sec ramp, 70Hz, 400 wavelength) for 5 minutes (no lag present)  Side lying hip abduction with brace doffed 3x10 3#  Prone hip extension with braced doffed 3x10 3#  Tandem stance 30"x3 ea    therapeutic activities to improve functional performance for 10 minutes, including:  Mini squats (0-60 degrees): 2x15 double leg   Step ups 6" 3x10, right    Patient Education and Home Exercises       Education provided:   - Home exercise program  - Plan of Care  - Weight bearing and range of motion     Written Home Exercises Provided: Yes. Exercises were reviewed and Casper was able to demonstrate them prior to the end of the session.  Casper demonstrated good  understanding of the education provided. See Electronic Medical Record under Patient Instructions for exercises provided during therapy sessions    Assessment     Casper is a 69 y.o. male referred to outpatient " Physical Therapy with a medical diagnosis of S83.511D (ICD-10-CM) - Rupture of anterior cruciate ligament of right knee, subsequent encounter. S/p 4 weeks Right ACL reconstruction by Dr. Corrigan.   No extensor lag today, opted to hold on adding on straight leg raise resistance due to signs of right hip joint irritation, and knee flexion improving each session. Advanced standing activities with no increase in pain, and he was told to continue brace use unlocked to 60 degrees.     Casper Is progressing well towards his goals.   Patient prognosis is Good.     Patient will continue to benefit from skilled outpatient physical therapy to address the deficits listed in the problem list box on initial evaluation, provide pt/family education and to maximize pt's level of independence in the home and community environment.     Patient's spiritual, cultural and educational needs considered and pt agreeable to plan of care and goals.     Anticipated barriers to physical therapy: chronicity of knee pain    Goals:   Short Term Goals: 6 weeks   Patient will be independent with Home exercise program to supplement therapy progressing, not met   Patient will improve Right knee range of motion 0-90 degrees to improve gait and transfers progressing, not met  Patient will improve right lower extremity strength to 70% LSI of Left to improve ability to improve functional tasks progressing, not met  Patient will be able to ambulate without assistive device to improve mobility and return to prior level of function progressing, not met     Long Term Goals: 12 weeks   Patient will improve FOTO score to 71 % to improve self perceived ability to perform functional tasks progressing, not met  Patient will improve right lower extremity strength to 90% LSI of Left to improve ability to perform functional tasks progressing, not met  Patient will improve Right knee range of motion 0-140 deg to improve ability to perform functional tasks progressing,  not met   Patient will be able to navigate stairs without difficulty to improve ability to navigate home environment progressing, not met    Plan     Improve quad activation and range of motion.     Patient will weight bear as tolerates with the brace locked in extension. Range of motion may be full. Plan to follow an ACL autograft rehab protocol. Initial goals include maintenance of full knee extension, regaining flexion, swelling control, and quadriceps activation exercises. DVT prophylaxis with ASA 81 mg twice daily x 2 weeks.     Joe Fields, PT, DPT ,DPT,OCS  01/25/2025

## 2025-01-28 ENCOUNTER — CLINICAL SUPPORT (OUTPATIENT)
Dept: REHABILITATION | Facility: HOSPITAL | Age: 70
End: 2025-01-28
Payer: MEDICARE

## 2025-01-28 DIAGNOSIS — Z74.09 DECREASED STRENGTH, ENDURANCE, AND MOBILITY: ICD-10-CM

## 2025-01-28 DIAGNOSIS — R68.89 DECREASED STRENGTH, ENDURANCE, AND MOBILITY: ICD-10-CM

## 2025-01-28 DIAGNOSIS — R53.1 DECREASED STRENGTH, ENDURANCE, AND MOBILITY: ICD-10-CM

## 2025-01-28 DIAGNOSIS — M25.661 DECREASED ROM OF RIGHT KNEE: Primary | ICD-10-CM

## 2025-01-28 PROCEDURE — 97014 ELECTRIC STIMULATION THERAPY: CPT | Mod: PN

## 2025-01-28 PROCEDURE — 97110 THERAPEUTIC EXERCISES: CPT | Mod: PN

## 2025-01-28 PROCEDURE — 97530 THERAPEUTIC ACTIVITIES: CPT | Mod: PN

## 2025-01-28 PROCEDURE — 97112 NEUROMUSCULAR REEDUCATION: CPT | Mod: PN

## 2025-01-28 NOTE — PROGRESS NOTES
"[facility]   Physical Therapy Treatment Note      Name: Casper Osborne  Clinic Number: [id number]    Therapy Diagnosis:   Encounter Diagnoses   Name Primary?    Decreased ROM of right knee Yes    Decreased strength, endurance, and mobility      Physician: Luisa Valencia    Visit Date: 1/28/2025    Physician Orders: PT Eval and Treat   Medical Diagnosis from Referral: S83.511D (ICD-10-CM) - Rupture of anterior cruciate ligament of right knee, subsequent encounter   Evaluation Date: 12/23/2024  Authorization Period Expiration: 12/12/2025  Plan of Care Expiration: 3/23/25  Progress Note Due: 1/23/25  Date of Surgery: 12/20/24  Visit # / Visits authorized: 10/12 +eval  FOTO: 1/3     Precautions: Standard      Time In: 11:05 AM  Time Out: 12:00 AM  Total Billable Time: 55 minutes (2NMR, 1TE, 1TA, 1ESTIM)    PROCEDURES PERFORMED:   Right knee arthroscopic patellar tendon allograft ACL reconstruction (CPT 88583)  Right knee arthroscopic partial medial and lateral meniscectomies (CPT 75308)  Right knee arthroscopic chondroplasty    POSTOPERATIVE DIAGNOSES:   Right knee complete ACL tear, chronic  Right knee medial and lateral meniscus tears  Right knee chondromalacia     MD follow up 1/30/25     POSTOPERATIVE PLAN: Patient will weight bear as tolerates with the brace locked in extension. Range of motion may be full. Plan to follow an ACL autograft rehab protocol. Initial goals include maintenance of full knee extension, regaining flexion, swelling control, and quadriceps activation exercises. DVT prophylaxis with ASA 81 mg twice daily x 2 weeks.     PTA Visit #: 0/5     Subjective     Patient reports: knee is doing well, has some questions about co-pays and unhappy about "different copay" he is paying recently.   He was compliant with home exercise program.  Response to previous treatment: good  Functional change: ongoing    Pain: 1/10  Location: Right knee    Objective      Objective " "Measures updated at progress report unless specified.     DOS: 12/20/25, right ACL repair  POW: (1/17-1/24) Week 6    1/28/25  Right knee AAROM: 1-0-136 degrees    Treatment     Casper received the treatments listed below:      therapeutic exercises to develop strength, ROM, flexibility, and posture for 15 minutes including:  Bike: next  Heel prop x 5 minutes with 3# weight at beginning  Supine active assistive range of motion heel slides x 30    Standing marching: 2x15 right  Calf fitter stretch: 3x30'' holds double leg  Standing heel raises 2x15 double leg    manual therapy techniques: Joint mobilizations were applied to the: Right knee for 00 minutes, including:  Patellar mobilizations in all directions  Knee extension mobilizations, grade III-IV    neuromuscular re-education activities to improve: Balance, Coordination, Proprioception, and Posture for 30 minutes. The following activities were included:  Straight leg raise with brace doffed with NMES (10 sec on; 10 sec off, 2 sec ramp, 70Hz, 400 wavelength) for 5 minutes (no lag present)  Side lying hip abduction with brace doffed 3x10 3#  Prone hip extension with braced doffed 3x10 3#  Tandem stance 30"x3 ea  Stork standing: next    therapeutic activities to improve functional performance for 10 minutes, including:  Mini squats (0-60 degrees): 2x15 double leg   Step ups 6" 3x10, right    Patient Education and Home Exercises       Education provided:   - Home exercise program  - Plan of Care  - Weight bearing and range of motion     Written Home Exercises Provided: Yes. Exercises were reviewed and Casper was able to demonstrate them prior to the end of the session.  Casper demonstrated good  understanding of the education provided. See Electronic Medical Record under Patient Instructions for exercises provided during therapy sessions    Assessment     Casper is a 69 y.o. male referred to outpatient Physical Therapy with a medical diagnosis of S83.511D (ICD-10-CM) - " Rupture of anterior cruciate ligament of right knee, subsequent encounter. S/p 4 weeks Right ACL reconstruction by Dr. Corrigan.   He was given instructions to contact the billing department for his questions, past referral copays appear to be the same, however will needs continued PT for right knee despite good progress right now. Improving knee hyperextension, no extensor lag present with straight leg raise, and quad strength returning. Sees MD tomorrow, PT will message MD about financial issues.     Casper Is progressing well towards his goals.   Patient prognosis is Good.     Patient will continue to benefit from skilled outpatient physical therapy to address the deficits listed in the problem list box on initial evaluation, provide pt/family education and to maximize pt's level of independence in the home and community environment.     Patient's spiritual, cultural and educational needs considered and pt agreeable to plan of care and goals.     Anticipated barriers to physical therapy: chronicity of knee pain    Goals:   Short Term Goals: 6 weeks   Patient will be independent with Home exercise program to supplement therapy progressing, not met   Patient will improve Right knee range of motion 0-90 degrees to improve gait and transfers progressing, not met  Patient will improve right lower extremity strength to 70% LSI of Left to improve ability to improve functional tasks progressing, not met  Patient will be able to ambulate without assistive device to improve mobility and return to prior level of function progressing, not met     Long Term Goals: 12 weeks   Patient will improve FOTO score to 71 % to improve self perceived ability to perform functional tasks progressing, not met  Patient will improve right lower extremity strength to 90% LSI of Left to improve ability to perform functional tasks progressing, not met  Patient will improve Right knee range of motion 0-140 deg to improve ability to perform  functional tasks progressing, not met   Patient will be able to navigate stairs without difficulty to improve ability to navigate home environment progressing, not met    Plan     Improve quad activation and range of motion.     Patient will weight bear as tolerates with the brace locked in extension. Range of motion may be full. Plan to follow an ACL autograft rehab protocol. Initial goals include maintenance of full knee extension, regaining flexion, swelling control, and quadriceps activation exercises. DVT prophylaxis with ASA 81 mg twice daily x 2 weeks.     Joe Fields, PT, DPT ,DPT,OCS  02/01/2025

## 2025-01-30 ENCOUNTER — OFFICE VISIT (OUTPATIENT)
Dept: SPORTS MEDICINE | Facility: CLINIC | Age: 70
End: 2025-01-30
Payer: MEDICARE

## 2025-01-30 VITALS
BODY MASS INDEX: 27.5 KG/M2 | WEIGHT: 196.44 LBS | HEIGHT: 71 IN | DIASTOLIC BLOOD PRESSURE: 83 MMHG | SYSTOLIC BLOOD PRESSURE: 133 MMHG | HEART RATE: 69 BPM

## 2025-01-30 DIAGNOSIS — Z98.890 S/P ACL RECONSTRUCTION: Primary | ICD-10-CM

## 2025-01-30 DIAGNOSIS — Z00.00 ENCOUNTER FOR MEDICARE ANNUAL WELLNESS EXAM: ICD-10-CM

## 2025-01-30 PROCEDURE — 99999 PR PBB SHADOW E&M-EST. PATIENT-LVL III: CPT | Mod: PBBFAC,,, | Performed by: ORTHOPAEDIC SURGERY

## 2025-02-06 ENCOUNTER — CLINICAL SUPPORT (OUTPATIENT)
Dept: REHABILITATION | Facility: HOSPITAL | Age: 70
End: 2025-02-06
Payer: MEDICARE

## 2025-02-06 DIAGNOSIS — R53.1 DECREASED STRENGTH, ENDURANCE, AND MOBILITY: ICD-10-CM

## 2025-02-06 DIAGNOSIS — M25.661 DECREASED ROM OF RIGHT KNEE: Primary | ICD-10-CM

## 2025-02-06 DIAGNOSIS — Z74.09 DECREASED STRENGTH, ENDURANCE, AND MOBILITY: ICD-10-CM

## 2025-02-06 DIAGNOSIS — R68.89 DECREASED STRENGTH, ENDURANCE, AND MOBILITY: ICD-10-CM

## 2025-02-06 PROCEDURE — 97530 THERAPEUTIC ACTIVITIES: CPT | Mod: PN

## 2025-02-06 PROCEDURE — 97112 NEUROMUSCULAR REEDUCATION: CPT | Mod: PN

## 2025-02-06 NOTE — PROGRESS NOTES
[facility]   Physical Therapy Treatment Note      Name: Casper Osborne  Clinic Number: [id number]    Therapy Diagnosis:   Encounter Diagnoses   Name Primary?    Decreased ROM of right knee Yes    Decreased strength, endurance, and mobility      Physician: Luisa Valencia    Visit Date: 2/6/2025    Physician Orders: PT Eval and Treat   Medical Diagnosis from Referral: S83.511D (ICD-10-CM) - Rupture of anterior cruciate ligament of right knee, subsequent encounter   Evaluation Date: 12/23/2024  Authorization Period Expiration: 12/12/2025  Plan of Care Expiration: 3/23/25  Progress Note Due: 1/23/25  Date of Surgery: 12/20/24  Visit # / Visits authorized: 11/12 +eval  FOTO: 1/3 - Next     Precautions: Standard      Time In: 09:05 AM  Time Out: 10:00 AM  Total Billable Time: 30 minutes (1NMR, 1TA)    PROCEDURES PERFORMED:   Right knee arthroscopic patellar tendon allograft ACL reconstruction (CPT 06039)  Right knee arthroscopic partial medial and lateral meniscectomies (CPT 18635)  Right knee arthroscopic chondroplasty    POSTOPERATIVE DIAGNOSES:   Right knee complete ACL tear, chronic  Right knee medial and lateral meniscus tears  Right knee chondromalacia     MD follow up: 3/24/25     POSTOPERATIVE PLAN: Patient will weight bear as tolerates with the brace locked in extension. Range of motion may be full. Plan to follow an ACL autograft rehab protocol. Initial goals include maintenance of full knee extension, regaining flexion, swelling control, and quadriceps activation exercises. DVT prophylaxis with ASA 81 mg twice daily x 2 weeks.     PTA Visit #: 0/5     Subjective     Patient reports: knee feels great, saw MD the the other day with good report, and doing exercises at home. Got discharged from brace and doesn't have to wear sleeve. Reports sleeve gives him stability. Would like to go down to 1x a week and keep Thursdays around 10 or 11:00.  He was compliant with home exercise  "program.  Response to previous treatment: good  Functional change: ongoing    Pain: 1/10  Location: Right knee    Objective      Objective Measures updated at progress report unless specified.     DOS: 12/20/25, right ACL repair  POW: (1/17-1/24) Week 6    1/28/25  Right knee AAROM: 1-0-136 degrees    Treatment     Casper received the treatments listed below:      therapeutic exercises to develop strength, ROM, flexibility, and posture for 25 minutes including:  Bike: 6', L2  Heel prop x 5 minutes with 3# weight at beginning    Standing marching: 2x15 right  Calf fitter stretch: 3x30'' holds double leg  Standing heel raises 3x15 double leg at ledge    manual therapy techniques: Joint mobilizations were applied to the: Right knee for 00 minutes, including:  Patellar mobilizations in all directions  Knee extension mobilizations, grade III-IV    neuromuscular re-education activities to improve: Balance, Coordination, Proprioception, and Posture for 15 minutes. The following activities were included:  Straight leg raise: 3x15 3#  Stork standing: 4x15'' holds right  Standing hip abduction: 2x15 Bilateral, red theraband     therapeutic activities to improve functional performance for 15 minutes, including:  Air squats (0-90 degrees): 3x10 double leg   Step ups 6" 3x15, right  Leg press (0-90 degrees): 2x20 double leg, 6 plates    Patient Education and Home Exercises       Education provided:   - Home exercise program  - Plan of Care  - Weight bearing and range of motion     Written Home Exercises Provided: Yes. Exercises were reviewed and Casper was able to demonstrate them prior to the end of the session.  Casper demonstrated good  understanding of the education provided. See Electronic Medical Record under Patient Instructions for exercises provided during therapy sessions    Assessment     Casper is a 69 y.o. male referred to outpatient Physical Therapy with a medical diagnosis of S83.511D (ICD-10-CM) - Rupture of anterior " cruciate ligament of right knee, subsequent encounter. S/p 4 weeks Right ACL reconstruction by Dr. Corrigan.   He has been transitioned to 1x a week per patient request due to copay price, MD notified and he is progressing per protocol at this time.  Patient was educated that he will have to take a bigger role in therapy with independent exercise, knowing his precautions, and progressing weekly per therapist and MD instruction - pt verbalized understanding. Did well today with weight bearing progression, can progress isometric quad strengthening, no lag present today, and no pain.     Casper Is progressing well towards his goals.   Patient prognosis is Good.     Patient will continue to benefit from skilled outpatient physical therapy to address the deficits listed in the problem list box on initial evaluation, provide pt/family education and to maximize pt's level of independence in the home and community environment.     Patient's spiritual, cultural and educational needs considered and pt agreeable to plan of care and goals.     Anticipated barriers to physical therapy: chronicity of knee pain    Goals:   Short Term Goals: 6 weeks   Patient will be independent with Home exercise program to supplement therapy progressing, not met   Patient will improve Right knee range of motion 0-90 degrees to improve gait and transfers progressing, not met  Patient will improve right lower extremity strength to 70% LSI of Left to improve ability to improve functional tasks progressing, not met  Patient will be able to ambulate without assistive device to improve mobility and return to prior level of function progressing, not met     Long Term Goals: 12 weeks   Patient will improve FOTO score to 71 % to improve self perceived ability to perform functional tasks progressing, not met  Patient will improve right lower extremity strength to 90% LSI of Left to improve ability to perform functional tasks progressing, not met  Patient will  improve Right knee range of motion 0-140 deg to improve ability to perform functional tasks progressing, not met   Patient will be able to navigate stairs without difficulty to improve ability to navigate home environment progressing, not met    Plan     Improve quad activation and range of motion.     Patient will weight bear as tolerates with the brace locked in extension. Range of motion may be full. Plan to follow an ACL autograft rehab protocol. Initial goals include maintenance of full knee extension, regaining flexion, swelling control, and quadriceps activation exercises. DVT prophylaxis with ASA 81 mg twice daily x 2 weeks.     Joe Fields, PT, DPT ,DPT,OCS  02/12/2025

## 2025-02-13 ENCOUNTER — CLINICAL SUPPORT (OUTPATIENT)
Dept: REHABILITATION | Facility: HOSPITAL | Age: 70
End: 2025-02-13
Payer: MEDICARE

## 2025-02-13 DIAGNOSIS — Z74.09 DECREASED STRENGTH, ENDURANCE, AND MOBILITY: ICD-10-CM

## 2025-02-13 DIAGNOSIS — M25.661 DECREASED ROM OF RIGHT KNEE: Primary | ICD-10-CM

## 2025-02-13 DIAGNOSIS — R68.89 DECREASED STRENGTH, ENDURANCE, AND MOBILITY: ICD-10-CM

## 2025-02-13 DIAGNOSIS — R53.1 DECREASED STRENGTH, ENDURANCE, AND MOBILITY: ICD-10-CM

## 2025-02-13 PROCEDURE — 97112 NEUROMUSCULAR REEDUCATION: CPT | Mod: PN

## 2025-02-13 PROCEDURE — 97530 THERAPEUTIC ACTIVITIES: CPT | Mod: PN

## 2025-02-13 NOTE — PROGRESS NOTES
[facility]   Physical Therapy Treatment Note      Name: Casper Osborne  Clinic Number: [id number]    Therapy Diagnosis:   Encounter Diagnoses   Name Primary?    Decreased ROM of right knee Yes    Decreased strength, endurance, and mobility        Physician: Luisa Valencia    Visit Date: 2/13/2025    Physician Orders: PT Eval and Treat   Medical Diagnosis from Referral: S83.511D (ICD-10-CM) - Rupture of anterior cruciate ligament of right knee, subsequent encounter   Evaluation Date: 12/23/2024  Authorization Period Expiration: 12/12/2025  Plan of Care Expiration: 3/23/25  Progress Note Due: 1/23/25  Date of Surgery: 12/20/24  Visit # / Visits authorized: 10/20 +eval  FOTO: 1/3 - Next     Precautions: Standard      Time In: 0955 AM  Time Out: 1100 AM  Total Billable Time: 30 minutes (1NMR, 1TA)    PROCEDURES PERFORMED:   Right knee arthroscopic patellar tendon allograft ACL reconstruction (CPT 43469)  Right knee arthroscopic partial medial and lateral meniscectomies (CPT 89069)  Right knee arthroscopic chondroplasty    POSTOPERATIVE DIAGNOSES:   Right knee complete ACL tear, chronic  Right knee medial and lateral meniscus tears  Right knee chondromalacia     MD follow up: 3/24/25     POSTOPERATIVE PLAN: Patient will weight bear as tolerates with the brace locked in extension. Range of motion may be full. Plan to follow an ACL autograft rehab protocol. Initial goals include maintenance of full knee extension, regaining flexion, swelling control, and quadriceps activation exercises. DVT prophylaxis with ASA 81 mg twice daily x 2 weeks.     PTA Visit #: 0/5     Subjective     Patient reports:He has been feeling good without much to complain about with the knee. The knee is getting better and better each day. The doctor told him that he needs to be careful because the graft is at his weakest point.   He was compliant with home exercise program.  Response to previous treatment:  "good  Functional change: ongoing    Pain: 1/10  Location: Right knee    Objective      Objective Measures updated at progress report unless specified.     DOS: 12/20/25, right ACL repair  POW: (2/7-2/14) Week 8    2/13/25  Right knee AAROM: 3-0-136 degrees      All below testing performed in seated position. Gait belt used for quadriceps testing.    Lower extremity strength with [employer] handheld dynamometer Right  (Kgf) Left  (Kgf) Pain/dysfunction with movement   (approx 4 sec hold w/ max contraction)   Quadriceps 23.8 kgf 22.7 kgf 104% LSI   Hamstrings 14.5 kgf 20.3 kgf  71% LSI       FOTO knee Survey    Therapist reviewed FOTO scores for Casper Osborne on 2/13/2025.   FOTO documents entered into [employer] - see Media section.    status Score: 64%       Treatment     Casper received the treatments listed below:      therapeutic exercises to develop strength, ROM, flexibility, and posture for 15 minutes including:  Bike: 8', L3    Standing heel raises 3x15 double leg at ledge    NT:  Heel prop x 5 minutes with 3# weight at beginning NT  Standing marching: 2x15 right NT  Calf fitter stretch: 3x30'' holds double leg NT    manual therapy techniques: Joint mobilizations were applied to the: Right knee for 00 minutes, including:  Patellar mobilizations in all directions  Knee extension mobilizations, grade III-IV    neuromuscular re-education activities to improve: Balance, Coordination, Proprioception, and Posture for 25 minutes. The following activities were included:  Upright Straight leg raise: 3x10 3#  Single leg stance 30"x4 Right   Standing hip abduction: 3x15 Bilateral, red theraband     therapeutic activities to improve functional performance for 20 minutes, including:  Air squats (0-90 degrees): 3x10 double leg   Step ups 6" 3x15, right  Leg press (0-90 degrees): 2x20 double leg, 6 plates  reassessment    Patient Education and Home Exercises       Education provided:   - Home exercise program  - Plan of Care  - " Weight bearing and range of motion     Written Home Exercises Provided: Yes. Exercises were reviewed and Casper was able to demonstrate them prior to the end of the session.  Casper demonstrated good  understanding of the education provided. See Electronic Medical Record under Patient Instructions for exercises provided during therapy sessions    Assessment     Casper is a 69 y.o. male referred to outpatient Physical Therapy with a medical diagnosis of S83.511D (ICD-10-CM) - Rupture of anterior cruciate ligament of right knee, subsequent encounter. S/p 4 weeks Right ACL reconstruction by Dr. Corrigan.   Patient was reassessed today and displayed improvement with quad strength with microfet testing but continues to have decrease in Right hamstring strength as compared to Left. PT progressed straight leg raise with performing in upright posture with increase in difficulty reported by patient. He required minor cues to decrease extensor lag. He was able to perform step ups without reports of pain. He would continue to benefit from strengthening and neuromuscular control of right lower extremity.     Casper Is progressing well towards his goals.   Patient prognosis is Good.     Patient will continue to benefit from skilled outpatient physical therapy to address the deficits listed in the problem list box on initial evaluation, provide pt/family education and to maximize pt's level of independence in the home and community environment.     Patient's spiritual, cultural and educational needs considered and pt agreeable to plan of care and goals.     Anticipated barriers to physical therapy: chronicity of knee pain    Goals:   Short Term Goals: 6 weeks   Patient will be independent with Home exercise program to supplement therapy met   Patient will improve Right knee range of motion 0-90 degrees to improve gait and transfers met  Patient will improve right lower extremity strength to 70% LSI of Left to improve ability to improve  functional tasks met  Patient will be able to ambulate without assistive device to improve mobility and return to prior level of function met     Long Term Goals: 12 weeks   Patient will improve FOTO score to 71 % to improve self perceived ability to perform functional tasks progressing, not met  Patient will improve right lower extremity strength to 90% LSI of Left to improve ability to perform functional tasks progressing, not met  Patient will improve Right knee range of motion 0-140 deg to improve ability to perform functional tasks progressing, not met   Patient will be able to navigate stairs without difficulty to improve ability to navigate home environment progressing, not met    Plan     Improve quad activation and range of motion.     Patient will weight bear as tolerates with the brace locked in extension. Range of motion may be full. Plan to follow an ACL autograft rehab protocol. Initial goals include maintenance of full knee extension, regaining flexion, swelling control, and quadriceps activation exercises. DVT prophylaxis with ASA 81 mg twice daily x 2 weeks.      Tegan Mera, PT ,DPT,OCS  02/13/2025

## 2025-02-20 ENCOUNTER — CLINICAL SUPPORT (OUTPATIENT)
Dept: REHABILITATION | Facility: HOSPITAL | Age: 70
End: 2025-02-20
Payer: MEDICARE

## 2025-02-20 DIAGNOSIS — R68.89 DECREASED STRENGTH, ENDURANCE, AND MOBILITY: ICD-10-CM

## 2025-02-20 DIAGNOSIS — M25.661 DECREASED ROM OF RIGHT KNEE: Primary | ICD-10-CM

## 2025-02-20 DIAGNOSIS — Z74.09 DECREASED STRENGTH, ENDURANCE, AND MOBILITY: ICD-10-CM

## 2025-02-20 DIAGNOSIS — R53.1 DECREASED STRENGTH, ENDURANCE, AND MOBILITY: ICD-10-CM

## 2025-02-20 PROCEDURE — 97530 THERAPEUTIC ACTIVITIES: CPT | Mod: PN

## 2025-02-20 NOTE — PROGRESS NOTES
[facility]   Physical Therapy Treatment Note      Name: Casper Osborne  Clinic Number: [id number]    Therapy Diagnosis:   Encounter Diagnoses   Name Primary?    Decreased ROM of right knee Yes    Decreased strength, endurance, and mobility          Physician: Luisa Valencia    Visit Date: 2/20/2025    Physician Orders: PT Eval and Treat   Medical Diagnosis from Referral: S83.511D (ICD-10-CM) - Rupture of anterior cruciate ligament of right knee, subsequent encounter   Evaluation Date: 12/23/2024  Authorization Period Expiration: 12/12/2025  Plan of Care Expiration: 3/23/25  Progress Note Due: 3/13/25  Date of Surgery: 12/20/24  Visit # / Visits authorized: 10/20 +eval  FOTO: 2/3 - (administered 2/13/25)     Precautions: Standard      Time In: 1000 AM  Time Out: 1058 AM  Total Billable Time: 30 minutes (2TA)    PROCEDURES PERFORMED:   Right knee arthroscopic patellar tendon allograft ACL reconstruction (CPT 87281)  Right knee arthroscopic partial medial and lateral meniscectomies (CPT 94670)  Right knee arthroscopic chondroplasty    POSTOPERATIVE DIAGNOSES:   Right knee complete ACL tear, chronic  Right knee medial and lateral meniscus tears  Right knee chondromalacia     MD follow up: 3/24/25     POSTOPERATIVE PLAN: Patient will weight bear as tolerates with the brace locked in extension. Range of motion may be full. Plan to follow an ACL autograft rehab protocol. Initial goals include maintenance of full knee extension, regaining flexion, swelling control, and quadriceps activation exercises. DVT prophylaxis with ASA 81 mg twice daily x 2 weeks.     PTA Visit #: 0/5     Subjective     Patient reports:His knee continues to feel great  He was compliant with home exercise program.  Response to previous treatment: good  Functional change: ongoing    Pain: 0/10  Location: Right knee    Objective      Objective Measures updated at progress report unless specified.     DOS: 12/20/25,  "right ACL repair  POW: (2/14-2/21) Week 9    2/13/25  Right knee AAROM: 3-0-136 degrees      All below testing performed in seated position. Gait belt used for quadriceps testing.    Lower extremity strength with [employer] handheld dynamometer Right  (Kgf) Left  (Kgf) Pain/dysfunction with movement   (approx 4 sec hold w/ max contraction)   Quadriceps 23.8 kgf 22.7 kgf 104% LSI   Hamstrings 14.5 kgf 20.3 kgf  71% LSI       FOTO knee Survey    Therapist reviewed FOTO scores for Casper Jaimepard on 2/20/2025.   FOTO documents entered into [employer] - see Media section.    status Score: 64%       Treatment     Casper received the treatments listed below:      therapeutic exercises to develop strength, ROM, flexibility, and posture for 12 minutes including:  Bike: 8', L3    Standing heel raises 3x15 double leg at ledge    NT:  Heel prop x 5 minutes with 3# weight at beginning NT  Standing marching: 2x15 right NT  Calf fitter stretch: 3x30'' holds double leg NT    manual therapy techniques: Joint mobilizations were applied to the: Right knee for 00 minutes, including:  Patellar mobilizations in all directions  Knee extension mobilizations, grade III-IV    neuromuscular re-education activities to improve: Balance, Coordination, Proprioception, and Posture for 15 minutes. The following activities were included:  Upright Straight leg raise: 3x10 3#  Single leg stance 30"x4 Right   Standing hip abduction: 3x15 Bilateral, red theraband     therapeutic activities to improve functional performance for 31 minutes, including:  Air squats (0-90 degrees): 3x10 double leg   Step ups 6" 3x15, right  Leg press (0-90 degrees): 2x20 double leg, 6 plates  Single leg sit to stand 3x10 from raised mat  Lateral heel tap 4" 2x10       Patient Education and Home Exercises       Education provided:   - Home exercise program      Written Home Exercises Provided: Yes. Exercises were reviewed and Casper was able to demonstrate them prior to the end of the " session.  Casper demonstrated good  understanding of the education provided. See Electronic Medical Record under Patient Instructions for exercises provided during therapy sessions    Assessment     Casper is a 69 y.o. male referred to outpatient Physical Therapy with a medical diagnosis of S83.511D (ICD-10-CM) - Rupture of anterior cruciate ligament of right knee, subsequent encounter. S/p 9 weeks Right ACL reconstruction by Dr. Corrigan.   PT made progression with single leg sit to stand as well as lateral heel taps for improvements in functional strength.He required moderate verbal and visual cues to decrease anterior knee translation with lateral heel taps. He was able to perform exercises without reports of increase in pain.        Casper Is progressing well towards his goals.   Patient prognosis is Good.     Patient will continue to benefit from skilled outpatient physical therapy to address the deficits listed in the problem list box on initial evaluation, provide pt/family education and to maximize pt's level of independence in the home and community environment.     Patient's spiritual, cultural and educational needs considered and pt agreeable to plan of care and goals.     Anticipated barriers to physical therapy: chronicity of knee pain    Goals:   Short Term Goals: 6 weeks   Patient will be independent with Home exercise program to supplement therapy met   Patient will improve Right knee range of motion 0-90 degrees to improve gait and transfers met  Patient will improve right lower extremity strength to 70% LSI of Left to improve ability to improve functional tasks met  Patient will be able to ambulate without assistive device to improve mobility and return to prior level of function met     Long Term Goals: 12 weeks   Patient will improve FOTO score to 71 % to improve self perceived ability to perform functional tasks progressing, not met  Patient will improve right lower extremity strength to 90% LSI of  Left to improve ability to perform functional tasks progressing, not met  Patient will improve Right knee range of motion 0-140 deg to improve ability to perform functional tasks progressing, not met   Patient will be able to navigate stairs without difficulty to improve ability to navigate home environment progressing, not met    Plan     Improve quad activation and range of motion.     Patient will weight bear as tolerates with the brace locked in extension. Range of motion may be full. Plan to follow an ACL autograft rehab protocol. Initial goals include maintenance of full knee extension, regaining flexion, swelling control, and quadriceps activation exercises. DVT prophylaxis with ASA 81 mg twice daily x 2 weeks.      Tegan Mera, PT ,DPT,OCS  02/20/2025

## 2025-02-27 ENCOUNTER — CLINICAL SUPPORT (OUTPATIENT)
Dept: REHABILITATION | Facility: HOSPITAL | Age: 70
End: 2025-02-27
Payer: MEDICARE

## 2025-02-27 DIAGNOSIS — R53.1 DECREASED STRENGTH, ENDURANCE, AND MOBILITY: ICD-10-CM

## 2025-02-27 DIAGNOSIS — R68.89 DECREASED STRENGTH, ENDURANCE, AND MOBILITY: ICD-10-CM

## 2025-02-27 DIAGNOSIS — M25.661 DECREASED ROM OF RIGHT KNEE: Primary | ICD-10-CM

## 2025-02-27 DIAGNOSIS — Z74.09 DECREASED STRENGTH, ENDURANCE, AND MOBILITY: ICD-10-CM

## 2025-02-27 PROCEDURE — 97530 THERAPEUTIC ACTIVITIES: CPT | Mod: PN

## 2025-02-27 NOTE — PROGRESS NOTES
[facility]   Physical Therapy Treatment Note      Name: Casper Osborne  Clinic Number: [id number]    Therapy Diagnosis:   Encounter Diagnoses   Name Primary?    Decreased ROM of right knee Yes    Decreased strength, endurance, and mobility      Physician: Luisa Valencia    Visit Date: 2/27/2025    Physician Orders: PT Eval and Treat   Medical Diagnosis from Referral: S83.511D (ICD-10-CM) - Rupture of anterior cruciate ligament of right knee, subsequent encounter   Evaluation Date: 12/23/2024  Authorization Period Expiration: 12/12/2025  Plan of Care Expiration: 3/23/25  Progress Note Due: 3/13/25  Date of Surgery: 12/20/24  Visit # / Visits authorized: 14/20 +eval  FOTO: 2/3 - (administered 2/13/25)     Precautions: Standard      Time In: 1006 AM  Time Out: 1100 AM  Total Billable Time: 30 minutes    PROCEDURES PERFORMED:   Right knee arthroscopic patellar tendon allograft ACL reconstruction (CPT 22054)  Right knee arthroscopic partial medial and lateral meniscectomies (CPT 21530)  Right knee arthroscopic chondroplasty    POSTOPERATIVE DIAGNOSES:   Right knee complete ACL tear, chronic  Right knee medial and lateral meniscus tears  Right knee chondromalacia     MD follow up: 3/24/25     POSTOPERATIVE PLAN: Patient will weight bear as tolerates with the brace locked in extension. Range of motion may be full. Plan to follow an ACL autograft rehab protocol. Initial goals include maintenance of full knee extension, regaining flexion, swelling control, and quadriceps activation exercises. DVT prophylaxis with ASA 81 mg twice daily x 2 weeks.     PTA Visit #: 0/5     Subjective     Patient reports: knee feels great, no feeling of buckling and feels like knee is straighter overall. Feels very thankful he got the surgery.  He was compliant with home exercise program.  Response to previous treatment: good  Functional change: ongoing    Pain: 0/10  Location: Right knee    Objective   "    Objective Measures updated at progress report unless specified.     DOS: 12/20/25, right ACL repair  POW: (2/14-2/21) Week 10 and 6 days on 2/27/25 2/13/25  Right knee AAROM: 3-0-136 degrees      All below testing performed in seated position. Gait belt used for quadriceps testing.    Lower extremity strength with [employer] handheld dynamometer Right  (Kgf) Left  (Kgf) Pain/dysfunction with movement   (approx 4 sec hold w/ max contraction)   Quadriceps 23.8 kgf 22.7 kgf 104% LSI   Hamstrings 14.5 kgf 20.3 kgf  71% LSI       FOTO knee Survey    Therapist reviewed FOTO scores for Casper BROWN Dylon on 2/27/2025.   FOTO documents entered into [employer] - see Media section.    status Score: 64%     Treatment     Casper received the treatments listed below:      therapeutic exercises to develop strength, ROM, flexibility, and posture for 12 minutes including:  Bike: 8', L3  Heel prop x 5 minutes with 5# weight at beginning  Standing heel raises 3x15 double leg at ledge    manual therapy techniques: Joint mobilizations were applied to the: Right knee for 00 minutes, including:  Patellar mobilizations in all directions  Knee extension mobilizations, grade III-IV    neuromuscular re-education activities to improve: Balance, Coordination, Proprioception, and Posture for 15 minutes. The following activities were included:  Upright Straight leg raise: 3x10 3#  Single leg stance 30"x4 Right   Standing hip abduction: 3x15 Bilateral, red theraband     therapeutic activities to improve functional performance for 27 minutes, including:  Air squats (0-90 degrees): 3x10 double leg   Step ups 6" 3x15, right  Leg press (0-90 degrees): 2x20 double leg, 6 plates  Single leg sit to stand 3x10 from raised mat  Lateral heel tap 4" 2x10     Patient Education and Home Exercises       Education provided:   - Home exercise program      Written Home Exercises Provided: Yes. Exercises were reviewed and Casper was able to demonstrate them prior to the " end of the session.  Casper demonstrated good  understanding of the education provided. See Electronic Medical Record under Patient Instructions for exercises provided during therapy sessions    Assessment     Casper is a 69 y.o. male referred to outpatient Physical Therapy with a medical diagnosis of S83.511D (ICD-10-CM) - Rupture of anterior cruciate ligament of right knee, subsequent encounter. S/p 9 weeks Right ACL reconstruction by Dr. Corrigan.   Good form with lateral step downs, no extensor lag with resistance, no pain throughout, and still working on improving right knee hyperextension. Seeing pt 1x a week. Doing well with single leg activities.     Casper Is progressing well towards his goals.   Patient prognosis is Good.     Patient will continue to benefit from skilled outpatient physical therapy to address the deficits listed in the problem list box on initial evaluation, provide pt/family education and to maximize pt's level of independence in the home and community environment.     Patient's spiritual, cultural and educational needs considered and pt agreeable to plan of care and goals.     Anticipated barriers to physical therapy: chronicity of knee pain    Goals:   Short Term Goals: 6 weeks   Patient will be independent with Home exercise program to supplement therapy met   Patient will improve Right knee range of motion 0-90 degrees to improve gait and transfers met  Patient will improve right lower extremity strength to 70% LSI of Left to improve ability to improve functional tasks met  Patient will be able to ambulate without assistive device to improve mobility and return to prior level of function met     Long Term Goals: 12 weeks   Patient will improve FOTO score to 71 % to improve self perceived ability to perform functional tasks progressing, not met  Patient will improve right lower extremity strength to 90% LSI of Left to improve ability to perform functional tasks progressing, not met  Patient  will improve Right knee range of motion 0-140 deg to improve ability to perform functional tasks progressing, not met   Patient will be able to navigate stairs without difficulty to improve ability to navigate home environment progressing, not met    Plan     Improve quad activation and range of motion.     Patient will weight bear as tolerates with the brace locked in extension. Range of motion may be full. Plan to follow an ACL autograft rehab protocol. Initial goals include maintenance of full knee extension, regaining flexion, swelling control, and quadriceps activation exercises. DVT prophylaxis with ASA 81 mg twice daily x 2 weeks.      Joe Fields, PT, DPT ,DPT,OCS  02/27/2025    i

## 2025-03-13 ENCOUNTER — CLINICAL SUPPORT (OUTPATIENT)
Dept: REHABILITATION | Facility: HOSPITAL | Age: 70
End: 2025-03-13
Payer: MEDICARE

## 2025-03-13 DIAGNOSIS — Z74.09 DECREASED STRENGTH, ENDURANCE, AND MOBILITY: ICD-10-CM

## 2025-03-13 DIAGNOSIS — M25.661 DECREASED ROM OF RIGHT KNEE: Primary | ICD-10-CM

## 2025-03-13 DIAGNOSIS — R68.89 DECREASED STRENGTH, ENDURANCE, AND MOBILITY: ICD-10-CM

## 2025-03-13 DIAGNOSIS — R53.1 DECREASED STRENGTH, ENDURANCE, AND MOBILITY: ICD-10-CM

## 2025-03-13 PROCEDURE — 97530 THERAPEUTIC ACTIVITIES: CPT | Mod: PN

## 2025-03-13 NOTE — PROGRESS NOTES
[facility]   Physical Therapy Treatment Note      Name: Casper Osborne  Clinic Number: [id number]    Therapy Diagnosis:   Encounter Diagnoses   Name Primary?    Decreased ROM of right knee Yes    Decreased strength, endurance, and mobility        Physician: Luisa Valencia    Visit Date: 3/13/2025    Physician Orders: PT Eval and Treat   Medical Diagnosis from Referral: S83.511D (ICD-10-CM) - Rupture of anterior cruciate ligament of right knee, subsequent encounter   Evaluation Date: 12/23/2024  Authorization Period Expiration: 12/12/2025  Plan of Care Expiration: 3/23/25  Progress Note Due: 3/13/25  Date of Surgery: 12/20/24  Visit # / Visits authorized: 15/20 +eval  FOTO: 2/3 - (administered 2/13/25)     Precautions: Standard      Time In: 1005 AM  Time Out: 1100 AM  Total Billable Time: 30 minutes    PROCEDURES PERFORMED:   Right knee arthroscopic patellar tendon allograft ACL reconstruction (CPT 82175)  Right knee arthroscopic partial medial and lateral meniscectomies (CPT 98266)  Right knee arthroscopic chondroplasty    POSTOPERATIVE DIAGNOSES:   Right knee complete ACL tear, chronic  Right knee medial and lateral meniscus tears  Right knee chondromalacia     MD follow up: 3/24/25     POSTOPERATIVE PLAN: Patient will weight bear as tolerates with the brace locked in extension. Range of motion may be full. Plan to follow an ACL autograft rehab protocol. Initial goals include maintenance of full knee extension, regaining flexion, swelling control, and quadriceps activation exercises. DVT prophylaxis with ASA 81 mg twice daily x 2 weeks.     PTA Visit #: 0/5     Subjective     Patient reports: cut the grass the other day, knee feels good, left ankle is very sore from activity.   He was compliant with home exercise program.  Response to previous treatment: good  Functional change: ongoing    Pain: 0/10  Location: Right knee    Objective      Objective Measures updated at progress  "report unless specified.     DOS: 12/20/25, right ACL repair  POW: (2/14-2/21) Week 12 and 6 days on 3/13/25    2/13/25  Right knee AAROM: 3-0-136 degrees    All below testing performed in seated position. Gait belt used for quadriceps testing.  Lower extremity strength with [employer] handheld dynamometer Right  (Kgf) Left  (Kgf) Pain/dysfunction with movement   (approx 4 sec hold w/ max contraction)   Quadriceps 23.8 kgf 22.7 kgf 104% LSI   Hamstrings 14.5 kgf 20.3 kgf  71% LSI       FOTO knee Survey    Therapist reviewed FOTO scores for Casper Jaimepard on 3/13/2025.   FOTO documents entered into [employer] - see Media section.    status Score: 64%     Treatment     Casper received the treatments listed below:      therapeutic exercises to develop strength, ROM, flexibility, and posture for 08 minutes including:  Bike: 8', L3  Heel prop x 5 minutes with 5# weight at beginning - NT  Standing heel raises 3x15 double leg at ledge - NT    manual therapy techniques: Joint mobilizations were applied to the: Right knee for 00 minutes, including:  Patellar mobilizations in all directions  Knee extension mobilizations, grade III-IV    neuromuscular re-education activities to improve: Balance, Coordination, Proprioception, and Posture for 15 minutes. The following activities were included:  Upright Straight leg raise: 3x10 3#  Single leg stance 30"x4 Right   Standing hip abduction: 3x15 Bilateral, red theraband     therapeutic activities to improve functional performance for 27 minutes, including:  Air squats (0-90 degrees): 3x10 double leg   Step ups 6" 3x15, right  Leg press (0-90 degrees): 2x20 double leg, 7.5 plates   Single leg (0-90 degree): 2x10 RLE 4.5 plates  Single leg sit to stand 3x10 from raised mat  Lateral step down: 4" 2x10     Patient Education and Home Exercises       Education provided:   - Home exercise program      Written Home Exercises Provided: Yes. Exercises were reviewed and Casper was able to demonstrate " them prior to the end of the session.  Casper demonstrated good  understanding of the education provided. See Electronic Medical Record under Patient Instructions for exercises provided during therapy sessions    Assessment     Casepr is a 69 y.o. male referred to outpatient Physical Therapy with a medical diagnosis of S83.511D (ICD-10-CM) - Rupture of anterior cruciate ligament of right knee, subsequent encounter. S/p 9 weeks Right ACL reconstruction by Dr. Corrigan.   Continues to do well with single leg activities, cues for hip hinging and better body mechanics, and can progress quad strength despite greater strength than non-surgical knee. Pt 1x a week. Can perform exercises readying patient for plyometrics, goal is not for patient to perform plyometrics however.     Casper Is progressing well towards his goals.   Patient prognosis is Good.     Patient will continue to benefit from skilled outpatient physical therapy to address the deficits listed in the problem list box on initial evaluation, provide pt/family education and to maximize pt's level of independence in the home and community environment.     Patient's spiritual, cultural and educational needs considered and pt agreeable to plan of care and goals.     Anticipated barriers to physical therapy: chronicity of knee pain    Goals:   Short Term Goals: 6 weeks   Patient will be independent with Home exercise program to supplement therapy met   Patient will improve Right knee range of motion 0-90 degrees to improve gait and transfers met  Patient will improve right lower extremity strength to 70% LSI of Left to improve ability to improve functional tasks met  Patient will be able to ambulate without assistive device to improve mobility and return to prior level of function met     Long Term Goals: 12 weeks   Patient will improve FOTO score to 71 % to improve self perceived ability to perform functional tasks progressing, not met  Patient will improve right lower  extremity strength to 90% LSI of Left to improve ability to perform functional tasks progressing, not met  Patient will improve Right knee range of motion 0-140 deg to improve ability to perform functional tasks progressing, not met   Patient will be able to navigate stairs without difficulty to improve ability to navigate home environment progressing, not met    Plan     Improve quad activation and range of motion.     Patient will weight bear as tolerates with the brace locked in extension. Range of motion may be full. Plan to follow an ACL autograft rehab protocol. Initial goals include maintenance of full knee extension, regaining flexion, swelling control, and quadriceps activation exercises. DVT prophylaxis with ASA 81 mg twice daily x 2 weeks.      Joe Fields, PT, DPT ,DPT,OCS  03/17/2025    i

## 2025-03-20 ENCOUNTER — CLINICAL SUPPORT (OUTPATIENT)
Dept: REHABILITATION | Facility: HOSPITAL | Age: 70
End: 2025-03-20
Payer: MEDICARE

## 2025-03-20 DIAGNOSIS — Z74.09 DECREASED STRENGTH, ENDURANCE, AND MOBILITY: ICD-10-CM

## 2025-03-20 DIAGNOSIS — M25.661 DECREASED ROM OF RIGHT KNEE: Primary | ICD-10-CM

## 2025-03-20 DIAGNOSIS — R53.1 DECREASED STRENGTH, ENDURANCE, AND MOBILITY: ICD-10-CM

## 2025-03-20 DIAGNOSIS — R68.89 DECREASED STRENGTH, ENDURANCE, AND MOBILITY: ICD-10-CM

## 2025-03-20 PROCEDURE — 97530 THERAPEUTIC ACTIVITIES: CPT | Mod: PN

## 2025-03-24 ENCOUNTER — PATIENT MESSAGE (OUTPATIENT)
Dept: INTERNAL MEDICINE | Facility: CLINIC | Age: 70
End: 2025-03-24
Payer: MEDICARE

## 2025-03-24 NOTE — PROGRESS NOTES
"CC: Right knee post-op follow up     DATE OF PROCEDURE: 12/20/2024   PROCEDURES PERFORMED:   Right knee arthroscopic patellar tendon allograft ACL reconstruction (CPT 69899)  Right knee arthroscopic partial medial and lateral meniscectomies (CPT 05155)  Right knee arthroscopic chondroplasty    Casper Osborne presents today for follow up appointment of his right knee. Patient is now 3 months 5 days status post above procedure. Continues PT at  [facility] with Joe .  He states the knee feels great. "You saved my life".  Stable.  No complaints.  No pain.  At baseline does not run or play sports.    Prior Hx 1/30/2025:   Casper Osborne reports to be doing well 5 weeks 6 days s/p the above mentioned procedure. Going to PT at the [facility] location.  States the knee feels great.  He is quite encouraged.  Better than the knee has felt in 3 years.  He feels that I have saved his life with the result of the surgery.  He is very thankful.    REVIEW OF SYSTEMS:   Constitution: Negative. Negative for chills, fever and night sweats.    Hematologic/Lymphatic: Negative for bleeding problem. Does not bruise/bleed easily.   Skin: Negative for dry skin, itching and rash.   Musculoskeletal: Negative for falls.  Negative for Right knee pain and muscle weakness.     All other review of symptoms were reviewed and found to be noncontributory.     PAST MEDICAL HISTORY:   Past Medical History:   Diagnosis Date    GERD (gastroesophageal reflux disease)     Hyperlipidemia      PAST SURGICAL HISTORY:   Past Surgical History:   Procedure Laterality Date    ADENOIDECTOMY  1966    ARTHROSCOPIC CHONDROPLASTY OF KNEE JOINT Right 12/20/2024    Procedure: ARTHROSCOPY, KNEE, WITH CHONDROPLASTY;  Surgeon: MARJAN Corrigan MD;  Location: [facility];  Service: Orthopedics;  Laterality: Right;    ARTHROSCOPY OF ANKLE WITH DEBRIDEMENT Left 12/19/2019    Procedure: ARTHROSCOPY, ANKLE, WITH DEBRIDEMENT;  Surgeon: Bay Melendez MD;  " Location: [facility] OR;  Service: Orthopedics;  Laterality: Left;    EPIDURAL STEROID INJECTION N/A 01/29/2021    Procedure: INJECTION, STEROID, EPIDURAL L4/5;  Surgeon: Alvarado Reaves MD;  Location: [facility] PAIN MGT;  Service: Pain Management;  Laterality: N/A;    EPIDURAL STEROID INJECTION INTO LUMBAR SPINE N/A 12/24/2020    Procedure: Injection-steroid-epidural-lumbar--L4-5;  Surgeon: Ori Cali Jr., MD;  Location: [facility] PAIN MGT;  Service: Pain Management;  Laterality: N/A;    FRACTURE SURGERY      left heel at 41yo    HEEL SPUR SURGERY      left heel - fell off a ladder and had to have this reconstructed    KNEE ARTHROSCOPY W/ ACL RECONSTRUCTION Right 12/20/2024    Procedure: RECONSTRUCTION, KNEE, ACL, ARTHROSCOPIC;  Surgeon: MARJAN Corrigan MD;  Location: [facility] OR;  Service: Orthopedics;  Laterality: Right;  General - NO REGIONAL BLOCK    KNEE ARTHROSCOPY W/ MENISCECTOMY Right 12/20/2024    Procedure: ARTHROSCOPY, KNEE, WITH MEDIAL AND LATERALMENISCECTOMY;  Surgeon: MARJAN Corrigan MD;  Location: [facility] OR;  Service: Orthopedics;  Laterality: Right;  General - NO REGIONAL BLOCK    SPINE SURGERY      c7 fx - prior to this, he was getting dizzy spells - none since    SYNOVECTOMY, KNEE, LIMITED, ARTHROSCOPIC Right 12/20/2024    Procedure: SYNOVECTOMY, KNEE, LIMITED, ARTHROSCOPIC;  Surgeon: MARJAN Corrigan MD;  Location: [facility] OR;  Service: Orthopedics;  Laterality: Right;    TONSILLECTOMY      VASECTOMY       FAMILY HISTORY:   Family History   Problem Relation Name Age of Onset    Diabetes Mother Stephanie Kimbrough     Cancer Father Merlin Gaspard         menostatic    Heart disease Daughter Adelina Osborne         enlarged heart    Hypothyroidism Daughter Adelina Osborne     Colon polyps Neg Hx      Prostate cancer Neg Hx      Colon cancer Neg Hx      Stroke Neg Hx      Hypertension Neg Hx      Hyperlipidemia Neg Hx       SOCIAL HISTORY:   Social History[1]    MEDICATIONS:   Current  "Medications[2]    ALLERGIES:   Review of patient's allergies indicates:  No Known Allergies     PHYSICAL EXAMINATION:  /77 (Patient Position: Sitting)   Pulse 69   Ht 5' 11" (1.803 m)   Wt 88.6 kg (195 lb 5.2 oz)   BMI 27.24 kg/m²     General: Well-developed well-nourished 69 y.o. malein no acute distress   Cardiovascular: Regular rhythm by palpation of distal pulse, normal color and temperature, no concerning varicosities on symptomatic side   Lungs: No labored breathing or wheezing appreciated   Neuro: Alert and oriented ×3   Psychiatric: well oriented to person, place and time, demonstrates normal mood and affect   Skin: No rashes, lesions or ulcers, normal temperature, turgor, and texture on involved extremity    Ortho/SPM Exam  Exam of the right knee again shows well-healed incisions.  No significant effusion.  No significant swelling.  Quad otherwise is activating quite well.  Improved strength and bulk.  Intact and strong straight leg raise.  Full active and passive extension and flexion compared to the contralateral side.  Negative Lachman.  No joint line tenderness    IMAGING:  None    ASSESSMENT:      ICD-10-CM ICD-9-CM   1. Weakness of right quadriceps muscle  M62.81 728.87   2. S/P ACL reconstruction  Z98.890 V45.89       PLAN:     Clinically the patient looks good.  Continue PT for ongoing quad and functional strengthening.  No plans for a running progression program due to baseline limitations in that regard as it relates to his feet.  Also no sports.  The goal will PT with to optimize his strength for day-to-day activity.  Discussed graft incorporation biology and considerations in that regard.  I will see him back in 3 months.  Doing great.      Procedures          [1]   Social History  Socioeconomic History    Marital status:      Spouse name: Nithya    Number of children: 1   Occupational History    Occupation: Retried   Tobacco Use    Smoking status: Never    Smokeless tobacco: Never "   Substance and Sexual Activity    Alcohol use: Not Currently    Drug use: No    Sexual activity: Yes     Partners: Female     Birth control/protection: None     Comment: vasectomy     Social Drivers of Health     Financial Resource Strain: Low Risk  (12/8/2024)    Overall Financial Resource Strain (CARDIA)     Difficulty of Paying Living Expenses: Not very hard   Food Insecurity: No Food Insecurity (12/8/2024)    Hunger Vital Sign     Worried About Running Out of Food in the Last Year: Never true     Ran Out of Food in the Last Year: Never true   Transportation Needs: No Transportation Needs (12/15/2022)    PRAPARE - Transportation     Lack of Transportation (Medical): No     Lack of Transportation (Non-Medical): No   Physical Activity: Sufficiently Active (12/8/2024)    Exercise Vital Sign     Days of Exercise per Week: 3 days     Minutes of Exercise per Session: 90 min   Stress: No Stress Concern Present (12/8/2024)    [employer] - Occupational Stress Questionnaire     Feeling of Stress : Not at all   Housing Stability: Low Risk  (12/15/2022)    Housing Stability Vital Sign     Unable to Pay for Housing in the Last Year: No     Number of Places Lived in the Last Year: 1     Unstable Housing in the Last Year: No   [2]   Current Outpatient Medications:     ammonium lactate 12 % Crea, Bid to dry elbow and knee and thick dry skin of thigh, Disp: 140 g, Rfl: 3    azelastine (ASTELIN) 137 mcg (0.1 %) nasal spray, 1 spray (137 mcg total) by Nasal route 2 (two) times daily., Disp: 30 mL, Rfl: 0    cycloSPORINE (RESTASIS) 0.05 % ophthalmic emulsion, Place 1 drop into both eyes 2 (two) times daily., Disp: 180 each, Rfl: 3    ferrous sulfate (FEOSOL) Tab tablet, Take 1 tablet by mouth daily with breakfast., Disp: , Rfl:     fluticasone propionate (FLONASE) 50 mcg/actuation nasal spray, 1 spray (50 mcg total) by Each Nostril route once daily., Disp: 16 g, Rfl: 3    ibuprofen (ADVIL,MOTRIN) 800  MG tablet, Take 1 tablet (800 mg total) by mouth 3 (three) times daily as needed for Pain., Disp: 90 tablet, Rfl: 2    ipratropium (ATROVENT) 21 mcg (0.03 %) nasal spray, PLACE TWO SPRAYS IN EACH NOSTRIL THREE TIMES DAILY, Disp: 30 mL, Rfl: 4    lovastatin (MEVACOR) 40 MG tablet, TAKE ONE TABLET BY MOUTH AT BEDTIME, Disp: 90 tablet, Rfl: 3    omeprazole (PRILOSEC) 40 MG capsule, Take 1 capsule (40 mg total) by mouth once daily., Disp: 90 capsule, Rfl: 2    tamsulosin (FLOMAX) 0.4 mg Cap, Take 1 capsule (0.4 mg total) by mouth every evening., Disp: 90 capsule, Rfl: 3    varenicline (TYRVAYA) 0.03 mg/spray sprm, 1 spray by Each Nostril route 2 (two) times a day., Disp: 25.2 mL, Rfl: 3    aspirin (ECOTRIN) 81 MG EC tablet, Take 1 tablet (81 mg total) by mouth 2 (two) times a day. for 14 days, Disp: 28 tablet, Rfl: 0    ondansetron (ZOFRAN-ODT) 4 MG TbDL, Dissolve 1 tablet (4 mg total) by mouth every 8 (eight) hours as needed (nausea). (Patient not taking: Reported on 3/25/2025), Disp: 30 tablet, Rfl: 0    oxybutynin (DITROPAN) 5 MG Tab, Take 1 tablet (5 mg total) by mouth 3 (three) times daily as needed (for bladder spasms)., Disp: 90 tablet, Rfl: 11    oxyCODONE (ROXICODONE) 5 MG immediate release tablet, Take 1-2 tablets (5-10 mg total) by mouth every 4 to 6 hours as needed for Pain. (Patient not taking: Reported on 3/25/2025), Disp: 28 tablet, Rfl: 0  No current facility-administered medications for this visit.    Facility-Administered Medications Ordered in Other Visits:     alprazolam ODT dissolvable tablet 0.5 mg, 0.5 mg, Oral, Once PRN, Ori Cali Jr., MD

## 2025-03-25 ENCOUNTER — OFFICE VISIT (OUTPATIENT)
Dept: SPORTS MEDICINE | Facility: CLINIC | Age: 70
End: 2025-03-25
Payer: MEDICARE

## 2025-03-25 VITALS
HEART RATE: 69 BPM | BODY MASS INDEX: 27.34 KG/M2 | WEIGHT: 195.31 LBS | HEIGHT: 71 IN | SYSTOLIC BLOOD PRESSURE: 126 MMHG | DIASTOLIC BLOOD PRESSURE: 77 MMHG

## 2025-03-25 DIAGNOSIS — M62.81 WEAKNESS OF RIGHT QUADRICEPS MUSCLE: Primary | ICD-10-CM

## 2025-03-25 DIAGNOSIS — Z98.890 S/P ACL RECONSTRUCTION: ICD-10-CM

## 2025-03-25 PROCEDURE — 3074F SYST BP LT 130 MM HG: CPT | Mod: CPTII,S$GLB,, | Performed by: ORTHOPAEDIC SURGERY

## 2025-03-25 PROCEDURE — 3008F BODY MASS INDEX DOCD: CPT | Mod: CPTII,S$GLB,, | Performed by: ORTHOPAEDIC SURGERY

## 2025-03-25 PROCEDURE — 3288F FALL RISK ASSESSMENT DOCD: CPT | Mod: CPTII,S$GLB,, | Performed by: ORTHOPAEDIC SURGERY

## 2025-03-25 PROCEDURE — 99213 OFFICE O/P EST LOW 20 MIN: CPT | Mod: S$GLB,,, | Performed by: ORTHOPAEDIC SURGERY

## 2025-03-25 PROCEDURE — 1126F AMNT PAIN NOTED NONE PRSNT: CPT | Mod: CPTII,S$GLB,, | Performed by: ORTHOPAEDIC SURGERY

## 2025-03-25 PROCEDURE — 99999 PR PBB SHADOW E&M-EST. PATIENT-LVL III: CPT | Mod: PBBFAC,,, | Performed by: ORTHOPAEDIC SURGERY

## 2025-03-25 PROCEDURE — 3078F DIAST BP <80 MM HG: CPT | Mod: CPTII,S$GLB,, | Performed by: ORTHOPAEDIC SURGERY

## 2025-03-25 PROCEDURE — 1101F PT FALLS ASSESS-DOCD LE1/YR: CPT | Mod: CPTII,S$GLB,, | Performed by: ORTHOPAEDIC SURGERY

## 2025-03-25 PROCEDURE — 1159F MED LIST DOCD IN RCRD: CPT | Mod: CPTII,S$GLB,, | Performed by: ORTHOPAEDIC SURGERY

## 2025-03-25 NOTE — PROGRESS NOTES
[facility]   Physical Therapy Treatment   And   Updated Plan of Care       Name: Casper Osborne  Clinic Number: [id number]    Therapy Diagnosis:   Encounter Diagnoses   Name Primary?    Decreased ROM of right knee Yes    Decreased strength, endurance, and mobility        Physician: Luisa Valencia*    Visit Date: 3/20/2025    Physician Orders: PT Eval and Treat   Medical Diagnosis from Referral: S83.511D (ICD-10-CM) - Rupture of anterior cruciate ligament of right knee, subsequent encounter   Evaluation Date: 12/23/2024  Authorization Period Expiration: 12/12/2025  Plan of Care Expiration: 3/23/25, 5/16/25 (8 week update)  Progress Note Due: 3/13/25  Date of Surgery: 12/20/24  Visit # / Visits authorized: 16/20 +eval  FOTO: 2/3 - (administered 2/13/25)     Precautions: Standard      Time In: 1000 AM  Time Out: 1100 AM  Total Billable Time: 30 minutes    PROCEDURES PERFORMED:   Right knee arthroscopic patellar tendon allograft ACL reconstruction (CPT 67529)  Right knee arthroscopic partial medial and lateral meniscectomies (CPT 20160)  Right knee arthroscopic chondroplasty    POSTOPERATIVE DIAGNOSES:   Right knee complete ACL tear, chronic  Right knee medial and lateral meniscus tears  Right knee chondromalacia     MD follow up: 3/24/25     POSTOPERATIVE PLAN: Patient will weight bear as tolerates with the brace locked in extension. Range of motion may be full. Plan to follow an ACL autograft rehab protocol. Initial goals include maintenance of full knee extension, regaining flexion, swelling control, and quadriceps activation exercises. DVT prophylaxis with ASA 81 mg twice daily x 2 weeks.     PTA Visit #: 0/5     Subjective     Patient reports: knee and ankle feels great, no complaints. Unsure if his knee is getting straight over time.  He was compliant with home exercise program.  Response to previous treatment: good  Functional change: ongoing    Pain: 0/10  Location: Right  "knee    Objective      Objective Measures updated at progress report unless specified.     DOS: 12/20/25, right ACL repair  POW: (2/14-2/21) Week 12 and 6 days on 3/13/25    3/20/25  Right knee AAROM: 3-0-139 degrees  All below testing performed in seated position. Gait belt used for quadriceps testing.  Lower extremity strength with [employer]ET handheld dynamometer Right  (Kgf) Left  (Kgf) Pain/dysfunction with movement   (approx 4 sec hold w/ max contraction)   Quadriceps 25.4 kgf 23.0 kgf 104% LSI   Hamstrings 15.9 kgf 20.9 kgf  76% LSI       FOTO knee Survey    Therapist reviewed FOTO scores for Casper Osborne on 3/20/2025.   FOTO documents entered into [employer] - see Media section.    status Score: 64%     Treatment     Casper received the treatments listed below:      therapeutic exercises to develop strength, ROM, flexibility, and posture for 15 minutes including:  Bike: 8', L3  Heel prop x 5 minutes with 5# weight at beginning   Standing heel raises 3x15 double leg at ledge    manual therapy techniques: Joint mobilizations were applied to the: Right knee for 00 minutes, including:  Patellar mobilizations in all directions  Knee extension mobilizations, grade III-IV    neuromuscular re-education activities to improve: Balance, Coordination, Proprioception, and Posture for 15 minutes. The following activities were included:  Upright Straight leg raise: 3x10 3#  Single leg stance 30"x4 Right   Standing hip abduction: 3x15 Bilateral, red theraband     therapeutic activities to improve functional performance for 30 minutes, including:  Reassessment   Air squats (0-90 degrees): 3x10 double leg   Step ups 6" 3x15, right  Leg press (0-90 degrees): 2x20 double leg, 7.5 plates   Single leg (0-90 degree): 2x10 RLE 4.5 plates  Single leg sit to stand 3x10 from raised mat  Lateral step down: 4" 2x10     Patient Education and Home Exercises       Education provided:   - Home exercise program      Written Home Exercises Provided: " Yes. Exercises were reviewed and Casper was able to demonstrate them prior to the end of the session.  Casper demonstrated good  understanding of the education provided. See Electronic Medical Record under Patient Instructions for exercises provided during therapy sessions    Assessment     Casper is a 69 y.o. male referred to outpatient Physical Therapy with a medical diagnosis of S83.511D (ICD-10-CM) - Rupture of anterior cruciate ligament of right knee, subsequent encounter. S/p 9 weeks Right ACL reconstruction by Dr. Corrigan.   Steadily progressing in quad strength, greater quad strength than contralateral leg though weakness present with lateral leg step down (will perform future step down test), and no functional limitations per pt report. Doing very well, no pain, and knee extension improving into hyperextension. Pt is attending therapy 1x a week currently due to functional limitations. May have to miss next weeks appointment, wife is haivng shoulder surgery and schedule may be very variable from now on, he will let us know on next appointment time confirmation.    Casper Is progressing well towards his goals.   Patient prognosis is Good.     Patient will continue to benefit from skilled outpatient physical therapy to address the deficits listed in the problem list box on initial evaluation, provide pt/family education and to maximize pt's level of independence in the home and community environment.     Patient's spiritual, cultural and educational needs considered and pt agreeable to plan of care and goals.     Anticipated barriers to physical therapy: chronicity of knee pain    Goals:   Short Term Goals: 6 weeks   Patient will be independent with Home exercise program to supplement therapy met   Patient will improve Right knee range of motion 0-90 degrees to improve gait and transfers met  Patient will improve right lower extremity strength to 70% LSI of Left to improve ability to improve functional tasks  met  Patient will be able to ambulate without assistive device to improve mobility and return to prior level of function met     Long Term Goals: 12 weeks   Patient will improve FOTO score to 71 % to improve self perceived ability to perform functional tasks progressing, not met  Patient will improve right lower extremity strength to 90% LSI of Left to improve ability to perform functional tasks met  Patient will improve Right knee range of motion 0-140 deg to improve ability to perform functional tasks progressing, not met   Patient will be able to navigate stairs without difficulty to improve ability to navigate home environment progressing, not met    Plan     Improve quad activation and range of motion.     Patient will weight bear as tolerates with the brace locked in extension. Range of motion may be full. Plan to follow an ACL autograft rehab protocol. Initial goals include maintenance of full knee extension, regaining flexion, swelling control, and quadriceps activation exercises. DVT prophylaxis with ASA 81 mg twice daily x 2 weeks.      Joe Fields, PT, DPT ,DPT,OCS  03/25/2025

## 2025-04-03 ENCOUNTER — CLINICAL SUPPORT (OUTPATIENT)
Dept: REHABILITATION | Facility: HOSPITAL | Age: 70
End: 2025-04-03
Payer: MEDICARE

## 2025-04-03 DIAGNOSIS — M25.661 DECREASED ROM OF RIGHT KNEE: Primary | ICD-10-CM

## 2025-04-03 DIAGNOSIS — Z74.09 DECREASED STRENGTH, ENDURANCE, AND MOBILITY: ICD-10-CM

## 2025-04-03 DIAGNOSIS — R53.1 DECREASED STRENGTH, ENDURANCE, AND MOBILITY: ICD-10-CM

## 2025-04-03 DIAGNOSIS — R68.89 DECREASED STRENGTH, ENDURANCE, AND MOBILITY: ICD-10-CM

## 2025-04-03 PROCEDURE — 97530 THERAPEUTIC ACTIVITIES: CPT | Mod: PN

## 2025-04-03 PROCEDURE — 97110 THERAPEUTIC EXERCISES: CPT | Mod: PN

## 2025-04-03 NOTE — PROGRESS NOTES
[facility]   Physical Therapy Treatment   And   Updated Plan of Care       Name: Casper Osborne  Clinic Number: [id number]    Therapy Diagnosis:   Encounter Diagnoses   Name Primary?    Decreased ROM of right knee Yes    Decreased strength, endurance, and mobility        Physician: Luisa Valencia*    Visit Date: 4/3/2025    Physician Orders: PT Eval and Treat   Medical Diagnosis from Referral: S83.511D (ICD-10-CM) - Rupture of anterior cruciate ligament of right knee, subsequent encounter   Evaluation Date: 12/23/2024  Authorization Period Expiration: 12/12/2025  Plan of Care Expiration: 3/23/25, 5/16/25 (8 week update)  Progress Note Due: 4/20/25  Date of Surgery: 12/20/24  Visit # / Visits authorized: 16/20 +eval  FOTO: 2/3 - (administered 2/13/25)     Precautions: Standard      Time In: 1000 AM  Time Out: 1100 AM  Total Billable Time: 60 minutes    PROCEDURES PERFORMED:   Right knee arthroscopic patellar tendon allograft ACL reconstruction (CPT 29392)  Right knee arthroscopic partial medial and lateral meniscectomies (CPT 58859)  Right knee arthroscopic chondroplasty    POSTOPERATIVE DIAGNOSES:   Right knee complete ACL tear, chronic  Right knee medial and lateral meniscus tears  Right knee chondromalacia     MD follow up: 3/24/25     POSTOPERATIVE PLAN: Patient will weight bear as tolerates with the brace locked in extension. Range of motion may be full. Plan to follow an ACL autograft rehab protocol. Initial goals include maintenance of full knee extension, regaining flexion, swelling control, and quadriceps activation exercises. DVT prophylaxis with ASA 81 mg twice daily x 2 weeks.     PTA Visit #: 0/5     Subjective     Patient reports: The knee is doing great. He cut his dad's grass yesterday without any issues. He is just not getting great sleep right now due to his Wife's most recent reverse total shoulder.   He was compliant with home exercise program.  Response to  "previous treatment: good  Functional change: ongoing    Pain: 0/10  Location: Right knee    Objective      Objective Measures updated at progress report unless specified.     DOS: 12/20/25, right ACL repair  Week 12 and 6 days on 3/13/25    3/20/25  Right knee AAROM: 3-0-139 degrees       FOTO knee Survey    Therapist reviewed FOTO scores for Casper Osborne on 4/3/2025.   FOTO documents entered into [email or web address] - see Media section.    status Score: 59%       Treatment     Casper received the treatments listed below:      therapeutic exercises to develop strength, ROM, flexibility, and posture for 10 minutes including:  Bike: 8', L3  Standing heel raises 3x15 double leg at ledge    manual therapy techniques: Joint mobilizations were applied to the: Right knee for 00 minutes, including:  Patellar mobilizations in all directions  Knee extension mobilizations, grade III-IV    neuromuscular re-education activities to improve: Balance, Coordination, Proprioception, and Posture for 00 minutes. The following activities were included:      Standing hip abduction: 3x15 Bilateral, red theraband     therapeutic activities to improve functional performance for 50 minutes, including:      Single leg stance 30"x4 Right +Airex  Air squats (0-90 degrees): 3x10 double leg     Leg press (0-90 degrees): 2x20 double leg, 8 plates   Single leg (0-90 degree): 2x20 RLE 5 plates  Single leg sit to stand 2x10 from chair +Airex  Lateral step down: 4" 3x10   Cybex hamstring curl 3 plates Right only 2x20   Cybex leg extension 2x10 15lb Right only    NT:  Step ups 6" 3x15, right    Patient Education and Home Exercises       Education provided:   - Home exercise program      Written Home Exercises Provided: Yes. Exercises were reviewed and Casper was able to demonstrate them prior to the end of the session.  Casper demonstrated good  understanding of the education provided. See Electronic Medical Record under Patient Instructions for exercises provided " during therapy sessions    Assessment     Casper is a 69 y.o. male referred to outpatient Physical Therapy with a medical diagnosis of S83.511D (ICD-10-CM) - Rupture of anterior cruciate ligament of right knee, subsequent encounter. S/p 9 weeks Right ACL reconstruction by Dr. Corrigan.   Patient arrived to today's session with increase in fatigue due to lack of sleep with his wife's recent surgery. PT continued with functional strengthening of right lower extremity. He was challenged with single leg knee extension with muscle fasciculations noted. PT progressed balance with addition of Airex pad with single leg. Appropriate amount of challenge noted by patient.He also required verbal for proper body mechanics with lateral heel taps to decrease anterior knee translation. Patient was appropriately fatigued at end of treatment session. He would continue to benefit from skilled PT to address functional strength deficits and return to prior level of function.       Casper Is progressing well towards his goals.   Patient prognosis is Good.     Patient will continue to benefit from skilled outpatient physical therapy to address the deficits listed in the problem list box on initial evaluation, provide pt/family education and to maximize pt's level of independence in the home and community environment.     Patient's spiritual, cultural and educational needs considered and pt agreeable to plan of care and goals.     Anticipated barriers to physical therapy: chronicity of knee pain    Goals:   Short Term Goals: 6 weeks   Patient will be independent with Home exercise program to supplement therapy met   Patient will improve Right knee range of motion 0-90 degrees to improve gait and transfers met  Patient will improve right lower extremity strength to 70% LSI of Left to improve ability to improve functional tasks met  Patient will be able to ambulate without assistive device to improve mobility and return to prior level of  function met     Long Term Goals: 12 weeks   Patient will improve FOTO score to 71 % to improve self perceived ability to perform functional tasks progressing, not met  Patient will improve right lower extremity strength to 90% LSI of Left to improve ability to perform functional tasks met  Patient will improve Right knee range of motion 0-140 deg to improve ability to perform functional tasks met   Patient will be able to navigate stairs without difficulty to improve ability to navigate home environment  met    Plan     Improve quad activation and range of motion.     Patient will weight bear as tolerates with the brace locked in extension. Range of motion may be full. Plan to follow an ACL autograft rehab protocol. Initial goals include maintenance of full knee extension, regaining flexion, swelling control, and quadriceps activation exercises. DVT prophylaxis with ASA 81 mg twice daily x 2 weeks.      Tegan Mera, PT ,DPT,OCS  04/03/2025

## 2025-04-08 NOTE — PROGRESS NOTES
Pt s/p ankle surgery today.  Intraop pt developed hiccups after induction and easy LMA placement.  Witnessed clear fluid in a small amount in LMA.  I was concerned with the possibility of aspiration of stomach contents so LMA was removed and he was intubated via RSI by CRNA.  Posterior pharynx was suctioned with minimal secretions present.  Pt did well intraop after intubation.  Airway was anterior and required Ramires and intubating wand which went uneventful.  Post op course uneventful.  No pain at surgical site upon discharge.     Subjective:      Patient ID: Juvenal Arellano is a 17 y.o. male.    Vitals:  weight is 70.1 kg (154 lb 10.4 oz). His oral temperature is 97.6 °F (36.4 °C). His blood pressure is 114/54 (abnormal) and his pulse is 76. His respiration is 18 and oxygen saturation is 98%.     Chief Complaint: Danitza Rahman is a 17 y.o. male who presents today with a chief complaint of cough, body aches, and sore throat that has been going on since yesterday with worsening of symptoms last night. He has not taken anything for his symptoms. Sick contacts at school.     Cough  This is a new problem. The current episode started in the past 7 days. The problem has been unchanged. The problem occurs constantly. Associated symptoms include nasal congestion, a sore throat and wheezing. Pertinent negatives include no chest pain, chills, ear congestion, ear pain, fever, headaches, heartburn, hemoptysis, myalgias, rhinorrhea, shortness of breath, sweats or weight loss. Nothing aggravates the symptoms. He has tried nothing for the symptoms. The treatment provided no relief. His past medical history is significant for environmental allergies. There is no history of asthma, bronchiectasis, bronchitis, COPD, emphysema or pneumonia.       Constitution: Negative for chills and fever.   HENT:  Positive for sore throat. Negative for ear pain.    Cardiovascular:  Negative for chest pain.   Respiratory:  Positive for cough and wheezing. Negative for bloody sputum and shortness of breath.    Gastrointestinal:  Negative for heartburn.   Musculoskeletal:  Negative for muscle ache.   Allergic/Immunologic: Positive for environmental allergies.   Neurological:  Negative for headaches.      Objective:     Physical Exam   Constitutional: He is oriented to person, place, and time. He appears well-developed. He is cooperative.  Non-toxic appearance. He does not appear ill. No distress.   HENT:   Head: Normocephalic and atraumatic.   Ears:   Right Ear: Hearing, tympanic  membrane, external ear and ear canal normal.   Left Ear: Hearing, tympanic membrane, external ear and ear canal normal.   Nose: Mucosal edema and rhinorrhea present. No nasal deformity. No epistaxis. Right sinus exhibits no maxillary sinus tenderness and no frontal sinus tenderness. Left sinus exhibits no maxillary sinus tenderness and no frontal sinus tenderness.   Mouth/Throat: Uvula is midline and mucous membranes are normal. No trismus in the jaw. Normal dentition. No uvula swelling. Posterior oropharyngeal erythema present. No oropharyngeal exudate or posterior oropharyngeal edema. Tonsils are 1+ on the right. Tonsils are 1+ on the left. No tonsillar exudate.   Eyes: Conjunctivae and lids are normal. No scleral icterus.   Neck: Trachea normal and phonation normal. Neck supple. No edema present. No erythema present. No neck rigidity present.   Cardiovascular: Normal rate, regular rhythm, normal heart sounds and normal pulses.   Pulmonary/Chest: Effort normal. No tachypnea. No respiratory distress. He has no decreased breath sounds. He has wheezes in the left upper field. He has no rhonchi.   Abdominal: Normal appearance.   Musculoskeletal: Normal range of motion.         General: No deformity. Normal range of motion.   Neurological: He is alert and oriented to person, place, and time. He exhibits normal muscle tone. Coordination normal.   Skin: Skin is warm, dry, intact, not diaphoretic and not pale.   Psychiatric: His speech is normal and behavior is normal. Judgment and thought content normal.   Nursing note and vitals reviewed.    Results for orders placed or performed in visit on 04/08/25   SARS Coronavirus 2 Antigen, POCT Manual Read    Collection Time: 04/08/25 10:23 AM   Result Value Ref Range    SARS Coronavirus 2 Antigen Negative Negative, Presumptive Negative     Acceptable Yes    POCT Influenza A/B MOLECULAR    Collection Time: 04/08/25 10:50 AM   Result Value Ref Range    POC Molecular  Influenza A Ag Negative Negative    POC Molecular Influenza B Ag Negative Negative     Acceptable Yes      Assessment:     1. Viral URI    2. Cough, unspecified type    3. Body aches    4. Wheezing        Plan:       Viral URI  -     albuterol (PROVENTIL/VENTOLIN HFA) 90 mcg/actuation inhaler; Inhale 2 puffs into the lungs every 6 (six) hours as needed for Wheezing or Shortness of Breath. Rescue  Dispense: 18 g; Refill: 0  -     predniSONE (DELTASONE) 20 MG tablet; Take 2 tablets (40 mg total) by mouth once daily. for 5 days  Dispense: 10 tablet; Refill: 0  -     brompheniramine-pseudoeph-DM (BROMFED DM) 2-30-10 mg/5 mL Syrp; Take 10 mLs by mouth every 4 to 6 hours as needed (cough/congestion).  Dispense: 120 mL; Refill: 0    Cough, unspecified type  -     SARS Coronavirus 2 Antigen, POCT Manual Read  -     brompheniramine-pseudoeph-DM (BROMFED DM) 2-30-10 mg/5 mL Syrp; Take 10 mLs by mouth every 4 to 6 hours as needed (cough/congestion).  Dispense: 120 mL; Refill: 0    Body aches  -     POCT Influenza A/B MOLECULAR    Wheezing  -     albuterol (PROVENTIL/VENTOLIN HFA) 90 mcg/actuation inhaler; Inhale 2 puffs into the lungs every 6 (six) hours as needed for Wheezing or Shortness of Breath. Rescue  Dispense: 18 g; Refill: 0  -     predniSONE (DELTASONE) 20 MG tablet; Take 2 tablets (40 mg total) by mouth once daily. for 5 days  Dispense: 10 tablet; Refill: 0      Patient Instructions   - You must understand that you have received an Urgent Care treatment only and that you may be released before all of your medical problems are known or treated.   - You, the patient, will arrange for follow up care as instructed.   - If your condition worsens or fails to improve we recommend that you receive another evaluation at the ER immediately or contact your PCP to discuss your concerns.   - You can call [phone] or [phone] to help schedule an appointment with the appropriate provider.    Drink plenty  of fluids   Get lots of rest  Tylenol or ibuprofen for pain/fever  Mucinex DM for daytime cough  Prescription cough syrup for night time cough. This medication will make you drowsy. Do not drink alcohol or  Operate machinery while taking this medicine.   Saline nasal rinses to irrigate sinus cavities  Warm salt water gargles for sore throat

## 2025-04-10 ENCOUNTER — CLINICAL SUPPORT (OUTPATIENT)
Dept: REHABILITATION | Facility: HOSPITAL | Age: 70
End: 2025-04-10
Payer: MEDICARE

## 2025-04-10 DIAGNOSIS — R53.1 DECREASED STRENGTH, ENDURANCE, AND MOBILITY: ICD-10-CM

## 2025-04-10 DIAGNOSIS — Z74.09 DECREASED STRENGTH, ENDURANCE, AND MOBILITY: ICD-10-CM

## 2025-04-10 DIAGNOSIS — R68.89 DECREASED STRENGTH, ENDURANCE, AND MOBILITY: ICD-10-CM

## 2025-04-10 DIAGNOSIS — M25.661 DECREASED ROM OF RIGHT KNEE: Primary | ICD-10-CM

## 2025-04-10 PROCEDURE — 97530 THERAPEUTIC ACTIVITIES: CPT | Mod: PN

## 2025-04-16 NOTE — PROGRESS NOTES
[facility]   Physical Therapy Treatment Note     Name: Casper Osborne  Clinic Number: [id number]    Therapy Diagnosis:   Encounter Diagnoses   Name Primary?    Decreased ROM of right knee Yes    Decreased strength, endurance, and mobility      Physician: Luisa Valencia    Visit Date: 4/10/2025    Physician Orders: PT Eval and Treat   Medical Diagnosis from Referral: S83.511D (ICD-10-CM) - Rupture of anterior cruciate ligament of right knee, subsequent encounter   Evaluation Date: 12/23/2024  Authorization Period Expiration: 12/12/2025  Plan of Care Expiration: 3/23/25, 5/16/25  Progress Note Due: 4/20/25  Date of Surgery: 12/20/24  Visit # / Visits authorized: 16/20 +eval  FOTO: 2/3 - (administered 2/13/25)     Precautions: Standard      Time In: 1005 AM  Time Out: 1100 AM  Total Billable Time: 25 minutes    PROCEDURES PERFORMED:   Right knee arthroscopic patellar tendon allograft ACL reconstruction (CPT 85998)  Right knee arthroscopic partial medial and lateral meniscectomies (CPT 93693)  Right knee arthroscopic chondroplasty    POSTOPERATIVE DIAGNOSES:   Right knee complete ACL tear, chronic  Right knee medial and lateral meniscus tears  Right knee chondromalacia     MD follow up: 3/24/25     POSTOPERATIVE PLAN: Patient will weight bear as tolerates with the brace locked in extension. Range of motion may be full. Plan to follow an ACL autograft rehab protocol. Initial goals include maintenance of full knee extension, regaining flexion, swelling control, and quadriceps activation exercises. DVT prophylaxis with ASA 81 mg twice daily x 2 weeks.     PTA Visit #: 0/5     Subjective     Patient reports: The knee is doing great. He cut his dad's grass yesterday without any issues. 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 He has no complaints, no buckling of knee, and would like to continue with therapy for a bit longer.   He was compliant  "with home exercise program.  Response to previous treatment: good  Functional change: ongoing    Pain: 0/10  Location: Right knee    Objective      Objective Measures updated at progress report unless specified.     DOS: 12/20/25, right ACL repair  Week 12 and 6 days on 3/13/25      Right knee AAROM: 3-0-139 degrees  All below testing performed in seated position. Gait belt used for quadriceps testing.  Lower extremity strength with TimePoints[employer] handheld dynamometer Right  (Kgf) Left  (Kgf) Pain/dysfunction with movement   (approx 4 sec hold w/ max contraction)   Quadriceps 25.4 kgf 23.0 kgf 104% LSI   Hamstrings 15.9 kgf 20.9 kgf  76% LSI       FOTO knee Survey    Therapist reviewed FOTO scores for Casper Osborne on 4/10/2025.   FOTO documents entered into [employer] - see Media section.    status Score: 59%       Treatment     Casper received the treatments listed below:      therapeutic exercises to develop strength, ROM, flexibility, and posture for 10 minutes including:  Bike: 8', L3  Standing heel raises 3x15 double leg at ledge    manual therapy techniques: Joint mobilizations were applied to the: Right knee for 00 minutes, including:  Patellar mobilizations in all directions  Knee extension mobilizations, grade III-IV    neuromuscular re-education activities to improve: Balance, Coordination, Proprioception, and Posture for 00 minutes. The following activities were included:    therapeutic activities to improve functional performance for 45 minutes, including:  Standing hip abduction: 3x15 Bilateral, red theraband   Single leg stance 30"x4 Right +Airex  Air squats (0-90 degrees): 3x10 double leg   Leg press (0-90 degrees): 2x20 double leg, 8 plates   Single leg (0-90 degree): 2x20 RLE 5 plates  Single leg sit to stand 2x10 from chair +Airex  Lateral step down: 4" 2x10   Cybex hamstring curl 3 plates Right only 2x20 DL  Cybex leg extension 2x10 15lb Right only  Step ups 6" 3x15, right    Patient Education and Home " Exercises       Education provided:   - Home exercise program    Written Home Exercises Provided: Yes. Exercises were reviewed and Casper was able to demonstrate them prior to the end of the session.  Casper demonstrated good  understanding of the education provided. See Electronic Medical Record under Patient Instructions for exercises provided during therapy sessions    Assessment     Casper is a 69 y.o. male referred to outpatient Physical Therapy with a medical diagnosis of S83.511D (ICD-10-CM) - Rupture of anterior cruciate ligament of right knee, subsequent encounter. S/p 9 weeks Right ACL reconstruction by Dr. Corrigan.   Pt progressing well, will discharge before running progression program per MD note, and working on optimizing day to day functional strength. No presence of knee inflammation after recent single leg activities, continue body weight single leg strengthening. Still struggles with single leg sit to stand without cushion to raise seat.      Casper Is progressing well towards his goals.   Patient prognosis is Good.     Patient will continue to benefit from skilled outpatient physical therapy to address the deficits listed in the problem list box on initial evaluation, provide pt/family education and to maximize pt's level of independence in the home and community environment.     Patient's spiritual, cultural and educational needs considered and pt agreeable to plan of care and goals.     Anticipated barriers to physical therapy: chronicity of knee pain    Goals:   Short Term Goals: 6 weeks   Patient will be independent with Home exercise program to supplement therapy met   Patient will improve Right knee range of motion 0-90 degrees to improve gait and transfers met  Patient will improve right lower extremity strength to 70% LSI of Left to improve ability to improve functional tasks met  Patient will be able to ambulate without assistive device to improve mobility and return to prior level of function  met     Long Term Goals: 12 weeks   Patient will improve FOTO score to 71 % to improve self perceived ability to perform functional tasks progressing, not met  Patient will improve right lower extremity strength to 90% LSI of Left to improve ability to perform functional tasks met  Patient will improve Right knee range of motion 0-140 deg to improve ability to perform functional tasks met   Patient will be able to navigate stairs without difficulty to improve ability to navigate home environment  met    Plan     Improve quad activation and range of motion.     Patient will weight bear as tolerates with the brace locked in extension. Range of motion may be full. Plan to follow an ACL autograft rehab protocol. Initial goals include maintenance of full knee extension, regaining flexion, swelling control, and quadriceps activation exercises. DVT prophylaxis with ASA 81 mg twice daily x 2 weeks.      oJe Fields, PT, DPT ,DPT,OCS  04/16/2025

## 2025-04-17 ENCOUNTER — CLINICAL SUPPORT (OUTPATIENT)
Dept: REHABILITATION | Facility: HOSPITAL | Age: 70
End: 2025-04-17
Payer: MEDICARE

## 2025-04-17 DIAGNOSIS — R53.1 DECREASED STRENGTH, ENDURANCE, AND MOBILITY: ICD-10-CM

## 2025-04-17 DIAGNOSIS — Z74.09 DECREASED STRENGTH, ENDURANCE, AND MOBILITY: ICD-10-CM

## 2025-04-17 DIAGNOSIS — R68.89 DECREASED STRENGTH, ENDURANCE, AND MOBILITY: ICD-10-CM

## 2025-04-17 DIAGNOSIS — M25.661 DECREASED ROM OF RIGHT KNEE: Primary | ICD-10-CM

## 2025-04-17 PROCEDURE — 97530 THERAPEUTIC ACTIVITIES: CPT | Mod: PN

## 2025-04-23 NOTE — PROGRESS NOTES
[facility]   Physical Therapy Treatment Note     Name: Casper Osborne  Clinic Number: [id number]    Therapy Diagnosis:   Encounter Diagnoses   Name Primary?    Decreased ROM of right knee Yes    Decreased strength, endurance, and mobility      Physician: Luisa Valencia    Visit Date: 4/17/2025    Physician Orders: PT Eval and Treat   Medical Diagnosis from Referral: S83.511D (ICD-10-CM) - Rupture of anterior cruciate ligament of right knee, subsequent encounter   Evaluation Date: 12/23/2024  Authorization Period Expiration: 12/12/2025  Plan of Care Expiration: 3/23/25, 5/16/25  Progress Note Due: 4/20/25  Date of Surgery: 12/20/24  Visit # / Visits authorized: 16/20 +eval  FOTO: 2/3 - (administered 2/13/25)     Precautions: Standard      Time In: 1005 AM  Time Out: 1100 AM  Total Billable Time: 25 minutes    PROCEDURES PERFORMED:   Right knee arthroscopic patellar tendon allograft ACL reconstruction (CPT 34052)  Right knee arthroscopic partial medial and lateral meniscectomies (CPT 41990)  Right knee arthroscopic chondroplasty    POSTOPERATIVE DIAGNOSES:   Right knee complete ACL tear, chronic  Right knee medial and lateral meniscus tears  Right knee chondromalacia     MD follow up: 3/24/25     POSTOPERATIVE PLAN: Patient will weight bear as tolerates with the brace locked in extension. Range of motion may be full. Plan to follow an ACL autograft rehab protocol. Initial goals include maintenance of full knee extension, regaining flexion, swelling control, and quadriceps activation exercises. DVT prophylaxis with ASA 81 mg twice daily x 2 weeks.     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   He was compliant with home exercise program.  Response to previous treatment: good  Functional change: ongoing    Pain: 0/10  Location: Right knee    Objective      Objective Measures updated at  "progress report unless specified.     DOS: 12/20/25, right ACL repair  Week 12 and 6 days on 3/13/25      Right knee AAROM: 3-0-139 degrees  All below testing performed in seated position. Gait belt used for quadriceps testing.  Lower extremity strength with [email or web address] handheld dynamometer Right  (Kgf) Left  (Kgf) Pain/dysfunction with movement   (approx 4 sec hold w/ max contraction)   Quadriceps 25.4 kgf 23.0 kgf 104% LSI   Hamstrings 15.9 kgf 20.9 kgf  76% LSI       FOTO knee Survey    Therapist reviewed FOTO scores for Casper Osborne on 4/17/2025.   FOTO documents entered into [employer] - see Media section.    status Score: 59%       Treatment     Casper received the treatments listed below:      therapeutic exercises to develop strength, ROM, flexibility, and posture for 10 minutes including:  Bike: 8', L3  Standing heel raises 3x15 double leg at ledge    manual therapy techniques: Joint mobilizations were applied to the: Right knee for 00 minutes, including:  Patellar mobilizations in all directions  Knee extension mobilizations, grade III-IV    neuromuscular re-education activities to improve: Balance, Coordination, Proprioception, and Posture for 00 minutes. The following activities were included:  None     therapeutic activities to improve functional performance for 45 minutes, including:  Standing hip abduction: 3x15 Bilateral, red theraband   Single leg stance 30"x4 Right +Airex  Leg press (0-90 degrees): 2x20 double leg, 8 plates   Single leg (0-90 degree): 2x20 RLE 5 plates  Single leg sit to stand 2x15 from chair +Airex  Lateral step down: 6" 3x10   Cybex hamstring curl 4 plates Right only 2x20 DL  Cybex leg extension 4x10 15lb Right only  Step ups 6" 3x15, right    Patient Education and Home Exercises       Education provided:   - Home exercise program    Written Home Exercises Provided: Yes. Exercises were reviewed and Casper was able to demonstrate them prior to the end of the session.  Casper demonstrated " good  understanding of the education provided. See Electronic Medical Record under Patient Instructions for exercises provided during therapy sessions    Assessment     Casper is a 69 y.o. male referred to outpatient Physical Therapy with a medical diagnosis of S83.511D (ICD-10-CM) - Rupture of anterior cruciate ligament of right knee, subsequent encounter. S/p 9 weeks Right ACL reconstruction by Dr. Corrigan.   Pt progressing well, will discharge before running progression program per MD note, and working on optimizing day to day functional strength.Quad strength gradually improving, working on improving single leg STS strength from standard chair that is slowly getting better. Can progress more single leg balance and endurance activities.   Casper Is progressing well towards his goals.   Patient prognosis is Good.     Patient will continue to benefit from skilled outpatient physical therapy to address the deficits listed in the problem list box on initial evaluation, provide pt/family education and to maximize pt's level of independence in the home and community environment.     Patient's spiritual, cultural and educational needs considered and pt agreeable to plan of care and goals.     Anticipated barriers to physical therapy: chronicity of knee pain    Goals:   Short Term Goals: 6 weeks   Patient will be independent with Home exercise program to supplement therapy met   Patient will improve Right knee range of motion 0-90 degrees to improve gait and transfers met  Patient will improve right lower extremity strength to 70% LSI of Left to improve ability to improve functional tasks met  Patient will be able to ambulate without assistive device to improve mobility and return to prior level of function met     Long Term Goals: 12 weeks   Patient will improve FOTO score to 71 % to improve self perceived ability to perform functional tasks progressing, not met  Patient will improve right lower extremity strength to 90%  LSI of Left to improve ability to perform functional tasks met  Patient will improve Right knee range of motion 0-140 deg to improve ability to perform functional tasks met   Patient will be able to navigate stairs without difficulty to improve ability to navigate home environment  met    Plan     Improve quad activation and range of motion.     Patient will weight bear as tolerates with the brace locked in extension. Range of motion may be full. Plan to follow an ACL autograft rehab protocol. Initial goals include maintenance of full knee extension, regaining flexion, swelling control, and quadriceps activation exercises. DVT prophylaxis with ASA 81 mg twice daily x 2 weeks.      Joe Fields, PT, DPT ,DPT,OCS  04/23/2025

## 2025-04-24 ENCOUNTER — CLINICAL SUPPORT (OUTPATIENT)
Dept: REHABILITATION | Facility: HOSPITAL | Age: 70
End: 2025-04-24
Payer: MEDICARE

## 2025-04-24 DIAGNOSIS — Z74.09 DECREASED STRENGTH, ENDURANCE, AND MOBILITY: ICD-10-CM

## 2025-04-24 DIAGNOSIS — R53.1 DECREASED STRENGTH, ENDURANCE, AND MOBILITY: ICD-10-CM

## 2025-04-24 DIAGNOSIS — M25.661 DECREASED ROM OF RIGHT KNEE: Primary | ICD-10-CM

## 2025-04-24 DIAGNOSIS — R68.89 DECREASED STRENGTH, ENDURANCE, AND MOBILITY: ICD-10-CM

## 2025-04-24 PROCEDURE — 97112 NEUROMUSCULAR REEDUCATION: CPT | Mod: PN

## 2025-04-24 PROCEDURE — 97530 THERAPEUTIC ACTIVITIES: CPT | Mod: PN

## 2025-04-24 NOTE — PROGRESS NOTES
[facility]   Physical Therapy Treatment Note     Name: Casper Osborne  Clinic Number: [id number]    Therapy Diagnosis:   Encounter Diagnoses   Name Primary?    Decreased ROM of right knee Yes    Decreased strength, endurance, and mobility      Physician: Luisa Valencia    Visit Date: 4/24/2025    Physician Orders: PT Eval and Treat   Medical Diagnosis from Referral: S83.511D (ICD-10-CM) - Rupture of anterior cruciate ligament of right knee, subsequent encounter   Evaluation Date: 12/23/2024  Authorization Period Expiration: 12/12/2025  Plan of Care Expiration: 3/23/25, 5/16/25  Progress Note Due: 4/20/25  Date of Surgery: 12/20/24  Visit # / Visits authorized: 18/20 +eval  FOTO: 3/3 - (administered 4/24/25)     Precautions: Standard      Time In: 1005 AM  Time Out: 1100 AM  Total Billable Time: 25 minutes    PROCEDURES PERFORMED:   Right knee arthroscopic patellar tendon allograft ACL reconstruction (CPT 56069)  Right knee arthroscopic partial medial and lateral meniscectomies (CPT 59165)  Right knee arthroscopic chondroplasty    POSTOPERATIVE DIAGNOSES:   Right knee complete ACL tear, chronic  Right knee medial and lateral meniscus tears  Right knee chondromalacia     MD follow up: 3/24/25     POSTOPERATIVE PLAN: Patient will weight bear as tolerates with the brace locked in extension. Range of motion may be full. Plan to follow an ACL autograft rehab protocol. Initial goals include maintenance of full knee extension, regaining flexion, swelling control, and quadriceps activation exercises. DVT prophylaxis with ASA 81 mg twice daily x 2 weeks.     PTA Visit #: 0/5     Subjective     Patient reports: very slight soreness where incisions are, some knee cap pressure with stepping activities, and reports some anxiety when stepping from steps to unlevel surfaces.   He was compliant with home exercise program.  Response to previous treatment: good  Functional change: ongoing    Pain:  "0/10  Location: Right knee    Objective      Objective Measures updated at progress report unless specified.     DOS: 12/20/25, right ACL repair  Week 14 and 3 days on 3/13/25    Right knee AAROM: 3-0-139 degrees  All below testing performed in seated position. Gait belt used for quadriceps testing.  Lower extremity strength with [employer] handheld dynamometer Right  (Kgf) Left  (Kgf) Pain/dysfunction with movement   (approx 4 sec hold w/ max contraction)   Quadriceps 25.4 kgf 23.0 kgf 104% LSI   Hamstrings 15.9 kgf 20.9 kgf  76% LSI       FOTO knee Survey    Therapist reviewed FOTO scores for Casper Osborne on 4/24/2025.   FOTO documents entered into [employer] - see Media section.    status Score: 70%       Treatment     Casper received the treatments listed below:      therapeutic exercises to develop strength, ROM, flexibility, and posture for 10 minutes including:  Bike: 8', L3  Standing heel raises 3x15 double leg at ledge  FOTO done today    manual therapy techniques: Joint mobilizations were applied to the: Right knee for 00 minutes, including:  Patellar mobilizations in all directions  Knee extension mobilizations, grade III-IV    neuromuscular re-education activities to improve: Balance, Coordination, Proprioception, and Posture for 00 minutes. The following activities were included:  None     therapeutic activities to improve functional performance for 45 minutes, including:  Standing hip abduction: 3x15 Bilateral, red theraband   Single leg stance 30"x4 Right +Airex  Leg press (0-90 degrees): 2x20 double leg, 8 plates   Single leg (0-90 degree): 2x20 RLE 5 plates  Single leg sit to stand 2x15 from chair +Airex - NT  Lateral step down: 6" 3x10   Cybex hamstring curl 5 plates Right only 2x15 DL  Cybex leg extension 4x10 15lb Right only  Small range single leg RDL 0#: 2x10, cues for neutral spine  Step ups 12" from airex 3x10, right    Patient Education and Home Exercises       Education provided:   - Home " exercise program    Written Home Exercises Provided: Yes. Exercises were reviewed and Casper was able to demonstrate them prior to the end of the session.  Casper demonstrated good  understanding of the education provided. See Electronic Medical Record under Patient Instructions for exercises provided during therapy sessions    Assessment     Casper is a 69 y.o. male referred to outpatient Physical Therapy with a medical diagnosis of S83.511D (ICD-10-CM) - Rupture of anterior cruciate ligament of right knee, subsequent encounter. S/p 9 weeks Right ACL reconstruction by Dr. Corrigan.   Progressing single leg quad strength, moving up in resistance and incorporating more balance activities. Nearly reached FOTO goal of 71%. Nearing discharge overall.   Casper Is progressing well towards his goals.   Patient prognosis is Good.     Patient will continue to benefit from skilled outpatient physical therapy to address the deficits listed in the problem list box on initial evaluation, provide pt/family education and to maximize pt's level of independence in the home and community environment.     Patient's spiritual, cultural and educational needs considered and pt agreeable to plan of care and goals.     Anticipated barriers to physical therapy: chronicity of knee pain    Goals:   Short Term Goals: 6 weeks   Patient will be independent with Home exercise program to supplement therapy met   Patient will improve Right knee range of motion 0-90 degrees to improve gait and transfers met  Patient will improve right lower extremity strength to 70% LSI of Left to improve ability to improve functional tasks met  Patient will be able to ambulate without assistive device to improve mobility and return to prior level of function met     Long Term Goals: 12 weeks   Patient will improve FOTO score to 71 % to improve self perceived ability to perform functional tasks progressing, not met  Patient will improve right lower extremity strength to  90% LSI of Left to improve ability to perform functional tasks met  Patient will improve Right knee range of motion 0-140 deg to improve ability to perform functional tasks met   Patient will be able to navigate stairs without difficulty to improve ability to navigate home environment  met    Plan     Improve quad activation and range of motion.     Patient will weight bear as tolerates with the brace locked in extension. Range of motion may be full. Plan to follow an ACL autograft rehab protocol. Initial goals include maintenance of full knee extension, regaining flexion, swelling control, and quadriceps activation exercises. DVT prophylaxis with ASA 81 mg twice daily x 2 weeks.      Joe Fields, PT, DPT ,DPT,OCS  04/28/2025

## 2025-05-01 ENCOUNTER — CLINICAL SUPPORT (OUTPATIENT)
Dept: REHABILITATION | Facility: HOSPITAL | Age: 70
End: 2025-05-01
Payer: MEDICARE

## 2025-05-01 DIAGNOSIS — Z74.09 DECREASED STRENGTH, ENDURANCE, AND MOBILITY: ICD-10-CM

## 2025-05-01 DIAGNOSIS — M25.661 DECREASED ROM OF RIGHT KNEE: Primary | ICD-10-CM

## 2025-05-01 DIAGNOSIS — R53.1 DECREASED STRENGTH, ENDURANCE, AND MOBILITY: ICD-10-CM

## 2025-05-01 DIAGNOSIS — R68.89 DECREASED STRENGTH, ENDURANCE, AND MOBILITY: ICD-10-CM

## 2025-05-01 PROCEDURE — 97110 THERAPEUTIC EXERCISES: CPT | Mod: PN

## 2025-05-01 PROCEDURE — 97530 THERAPEUTIC ACTIVITIES: CPT | Mod: PN

## 2025-05-01 NOTE — PROGRESS NOTES
[facility]   Physical Therapy Treatment Note     Name: Casper Osborne  Clinic Number: [id number]    Therapy Diagnosis:   Encounter Diagnoses   Name Primary?    Decreased ROM of right knee Yes    Decreased strength, endurance, and mobility      Physician: Luisa Valencia    Visit Date: 5/1/2025    Physician Orders: PT Eval and Treat   Medical Diagnosis from Referral: S83.511D (ICD-10-CM) - Rupture of anterior cruciate ligament of right knee, subsequent encounter   Evaluation Date: 12/23/2024  Authorization Period Expiration: 12/12/2025  Plan of Care Expiration: 3/23/25, 5/16/25  Progress Note Due: 4/20/25  Date of Surgery: 12/20/24  Visit # / Visits authorized: 19/20 +eval  FOTO: 3/3 - (administered 4/24/25)     Precautions: Standard      Time In: 1005 AM  Time Out: 1100 AM  Total Billable Time: 25 minutes    PROCEDURES PERFORMED:   Right knee arthroscopic patellar tendon allograft ACL reconstruction (CPT 50668)  Right knee arthroscopic partial medial and lateral meniscectomies (CPT 33178)  Right knee arthroscopic chondroplasty    POSTOPERATIVE DIAGNOSES:   Right knee complete ACL tear, chronic  Right knee medial and lateral meniscus tears  Right knee chondromalacia     MD follow up: 3/24/25     POSTOPERATIVE PLAN: Patient will weight bear as tolerates with the brace locked in extension. Range of motion may be full. Plan to follow an ACL autograft rehab protocol. Initial goals include maintenance of full knee extension, regaining flexion, swelling control, and quadriceps activation exercises. DVT prophylaxis with ASA 81 mg twice daily x 2 weeks.     PTA Visit #: 0/5     Subjective     Patient reports: no issues, knee feels good, would like to do a few more sessions.   He was compliant with home exercise program.  Response to previous treatment: good  Functional change: ongoing    Pain: 0/10  Location: Right knee    Objective      Objective Measures updated at progress report unless  "specified.     DOS: 12/20/25, right ACL repair  Week 14 and 3 days on 3/13/25    Right knee AAROM: 3-0-139 degrees  All below testing performed in seated position. Gait belt used for quadriceps testing.  Lower extremity strength with [employer] handheld dynamometer Right  (Kgf) Left  (Kgf) Pain/dysfunction with movement   (approx 4 sec hold w/ max contraction)   Quadriceps 25.4 kgf 23.0 kgf 104% LSI   Hamstrings 15.9 kgf 20.9 kgf  76% LSI       FOTO knee Survey    Therapist reviewed FOTO scores for Casper Osborne on 5/1/2025.   FOTO documents entered into [employer] - see Media section.    status Score: 70%     Treatment     Casper received the treatments listed below:      therapeutic exercises to develop strength, ROM, flexibility, and posture for 10 minutes including:  Bike: 8', L3  Standing heel raises 3x15 double leg at ledge    manual therapy techniques: Joint mobilizations were applied to the: Right knee for 00 minutes, including:  Patellar mobilizations in all directions  Knee extension mobilizations, grade III-IV    neuromuscular re-education activities to improve: Balance, Coordination, Proprioception, and Posture for 00 minutes. The following activities were included:  None     therapeutic activities to improve functional performance for 45 minutes, including:  Standing hip abduction: 3x15 Bilateral, red theraband (green next)  Single leg stance 30"x4 Right +Airex  Leg press (0-90 degrees): 2x15 double leg, 9 plates   Single leg (0-90 degree): 2x20 RLE 5 plates  Single leg sit to stand 2x15 from chair +Airex  Lateral step down: 6" 3x12   Cybex hamstring curl 5 plates Right only 2x15 DL  Cybex leg extension 3x10 30lb Right only  Small range single leg RDL 0#: 2x10, cues for neutral spine - NT  Step ups 12" from airex 3x10, right    Patient Education and Home Exercises       Education provided:   - Home exercise program    Written Home Exercises Provided: Yes. Exercises were reviewed and Casper was able to " demonstrate them prior to the end of the session.  Casper demonstrated good  understanding of the education provided. See Electronic Medical Record under Patient Instructions for exercises provided during therapy sessions    Assessment     Casper is a 69 y.o. male referred to outpatient Physical Therapy with a medical diagnosis of S83.511D (ICD-10-CM) - Rupture of anterior cruciate ligament of right knee, subsequent encounter. S/p 9 weeks Right ACL reconstruction by Dr. Corrigan.   Improving quad strength and single leg STS (can possibly remove airex next visit), and working on top end quad strength. Scheduled another 4 weeks and nearing d/c. Seekris FAULKNER in 1.5 months.   Casper Is progressing well towards his goals.   Patient prognosis is Good.     Patient will continue to benefit from skilled outpatient physical therapy to address the deficits listed in the problem list box on initial evaluation, provide pt/family education and to maximize pt's level of independence in the home and community environment.     Patient's spiritual, cultural and educational needs considered and pt agreeable to plan of care and goals.     Anticipated barriers to physical therapy: chronicity of knee pain    Goals:   Short Term Goals: 6 weeks   Patient will be independent with Home exercise program to supplement therapy met   Patient will improve Right knee range of motion 0-90 degrees to improve gait and transfers met  Patient will improve right lower extremity strength to 70% LSI of Left to improve ability to improve functional tasks met  Patient will be able to ambulate without assistive device to improve mobility and return to prior level of function met     Long Term Goals: 12 weeks   Patient will improve FOTO score to 71 % to improve self perceived ability to perform functional tasks progressing, not met  Patient will improve right lower extremity strength to 90% LSI of Left to improve ability to perform functional tasks met  Patient will  improve Right knee range of motion 0-140 deg to improve ability to perform functional tasks met   Patient will be able to navigate stairs without difficulty to improve ability to navigate home environment  met    Plan     Improve quad activation and range of motion.     Patient will weight bear as tolerates with the brace locked in extension. Range of motion may be full. Plan to follow an ACL autograft rehab protocol. Initial goals include maintenance of full knee extension, regaining flexion, swelling control, and quadriceps activation exercises. DVT prophylaxis with ASA 81 mg twice daily x 2 weeks.      Joe Fields, PT, DPT ,DPT,OCS  05/01/2025

## 2025-05-13 ENCOUNTER — OFFICE VISIT (OUTPATIENT)
Dept: INTERNAL MEDICINE | Facility: CLINIC | Age: 70
End: 2025-05-13
Payer: MEDICARE

## 2025-05-13 VITALS
OXYGEN SATURATION: 96 % | WEIGHT: 195.88 LBS | HEART RATE: 65 BPM | DIASTOLIC BLOOD PRESSURE: 66 MMHG | TEMPERATURE: 99 F | HEIGHT: 71 IN | BODY MASS INDEX: 27.42 KG/M2 | SYSTOLIC BLOOD PRESSURE: 136 MMHG | RESPIRATION RATE: 14 BRPM

## 2025-05-13 DIAGNOSIS — K21.9 GASTROESOPHAGEAL REFLUX DISEASE, UNSPECIFIED WHETHER ESOPHAGITIS PRESENT: Chronic | ICD-10-CM

## 2025-05-13 DIAGNOSIS — J30.9 ALLERGIC RHINITIS, UNSPECIFIED SEASONALITY, UNSPECIFIED TRIGGER: Primary | ICD-10-CM

## 2025-05-13 DIAGNOSIS — R33.8 ENLARGED PROSTATE WITH URINARY RETENTION: ICD-10-CM

## 2025-05-13 DIAGNOSIS — N40.1 ENLARGED PROSTATE WITH URINARY RETENTION: ICD-10-CM

## 2025-05-13 DIAGNOSIS — E78.5 HYPERLIPIDEMIA, UNSPECIFIED HYPERLIPIDEMIA TYPE: Chronic | ICD-10-CM

## 2025-05-13 DIAGNOSIS — I70.0 AORTIC ATHEROSCLEROSIS: Chronic | ICD-10-CM

## 2025-05-13 PROCEDURE — 1125F AMNT PAIN NOTED PAIN PRSNT: CPT | Mod: CPTII,S$GLB,,

## 2025-05-13 PROCEDURE — 3075F SYST BP GE 130 - 139MM HG: CPT | Mod: CPTII,S$GLB,,

## 2025-05-13 PROCEDURE — 99999 PR PBB SHADOW E&M-EST. PATIENT-LVL V: CPT | Mod: PBBFAC,,,

## 2025-05-13 PROCEDURE — 99214 OFFICE O/P EST MOD 30 MIN: CPT | Mod: S$GLB,,,

## 2025-05-13 PROCEDURE — 1159F MED LIST DOCD IN RCRD: CPT | Mod: CPTII,S$GLB,,

## 2025-05-13 PROCEDURE — 1160F RVW MEDS BY RX/DR IN RCRD: CPT | Mod: CPTII,S$GLB,,

## 2025-05-13 PROCEDURE — 1101F PT FALLS ASSESS-DOCD LE1/YR: CPT | Mod: CPTII,S$GLB,,

## 2025-05-13 PROCEDURE — 3078F DIAST BP <80 MM HG: CPT | Mod: CPTII,S$GLB,,

## 2025-05-13 PROCEDURE — 3008F BODY MASS INDEX DOCD: CPT | Mod: CPTII,S$GLB,,

## 2025-05-13 PROCEDURE — 3288F FALL RISK ASSESSMENT DOCD: CPT | Mod: CPTII,S$GLB,,

## 2025-05-13 RX ORDER — FLUTICASONE PROPIONATE 50 MCG
1 SPRAY, SUSPENSION (ML) NASAL DAILY
Qty: 16 G | Refills: 3 | Status: SHIPPED | OUTPATIENT
Start: 2025-05-13

## 2025-05-13 RX ORDER — LEVOCETIRIZINE DIHYDROCHLORIDE 5 MG/1
5 TABLET, FILM COATED ORAL NIGHTLY
Qty: 30 TABLET | Refills: 11 | Status: SHIPPED | OUTPATIENT
Start: 2025-05-13

## 2025-05-13 NOTE — PROGRESS NOTES
Subjective:       Patient ID: Casper Osborne is a 69 y.o. male.    Chief Complaint: Runny Nose, Sore Throat    Oral Pain     Sore Throat         History of Present Illness    CHIEF COMPLAINT:  Mr. Osborne presents today with sore throat with runny nose.    HISTORY OF PRESENT ILLNESS:  He reports tongue pain that began after a dental procedure on Thursday, involving tooth grinding and temporary crown placement. The pain is located on the underside of the tongue and is most severe in the morning, making it difficult to speak.     On Saturday night, he developed sore throat, with symptoms peaking Sunday morning including difficulty swallowing, runny nose, nasal congestion, and post nasal drip. Symptoms are most severe in the morning upon awakening. He also experiences post nasal drip and itchy eyes. He denies fever, nausea, vomiting, or known sick contacts.    MEDICAL HISTORY:  He has a history of hyperlipidemia, aortic atherosclerosis, GERD, and enlarged prostate.    MEDICATIONS:  He takes lovastatin for hyperlipidemia, omeprazole as needed for GERD symptoms, and daily Flomax for enlarged prostate. Previously took daily aspirin 81 mg since age 45, which was discontinued by cardiology in December following knee surgery.      ROS:  Constitutional: -fevers  Eyes: +eye itchiness  ENT: +nasal congestion, +rhinorrhea, +sore throat, +difficulty swallowing, +tongue pain, +post nasal drip, +nasal discharge, +ear pressure  Gastrointestinal: -nausea, -vomiting    Objective:      Physical Exam  Vitals reviewed.   Constitutional:       General: He is not in acute distress.     Appearance: Normal appearance. He is well-developed. He is not ill-appearing, toxic-appearing or diaphoretic.   HENT:      Head: Normocephalic and atraumatic.      Right Ear: Tympanic membrane and ear canal normal.      Left Ear: Tympanic membrane, ear canal and external ear normal.      Nose: Congestion and rhinorrhea present. Rhinorrhea is clear.       Right Sinus: No maxillary sinus tenderness or frontal sinus tenderness.      Left Sinus: No maxillary sinus tenderness or frontal sinus tenderness.      Mouth/Throat:      Mouth: Mucous membranes are moist.      Tongue: No lesions.      Pharynx: Uvula midline. Postnasal drip present. No pharyngeal swelling, oropharyngeal exudate, posterior oropharyngeal erythema or uvula swelling.   Eyes:      General: No scleral icterus.     Conjunctiva/sclera: Conjunctivae normal.      Pupils: Pupils are equal, round, and reactive to light.   Cardiovascular:      Rate and Rhythm: Normal rate and regular rhythm.      Pulses: Normal pulses.      Heart sounds: Normal heart sounds.   Pulmonary:      Effort: Pulmonary effort is normal.      Breath sounds: Normal breath sounds.   Musculoskeletal:      Cervical back: Normal range of motion and neck supple.   Skin:     General: Skin is warm and dry.      Capillary Refill: Capillary refill takes less than 2 seconds.   Neurological:      Mental Status: He is alert and oriented to person, place, and time. Mental status is at baseline.      GCS: GCS eye subscore is 4. GCS verbal subscore is 5. GCS motor subscore is 6.   Psychiatric:         Behavior: Behavior is cooperative.           Physical Exam            Assessment:       1. Allergic rhinitis, unspecified seasonality, unspecified trigger  - levocetirizine (XYZAL) 5 MG tablet; Take 1 tablet (5 mg total) by mouth every evening.  Dispense: 30 tablet; Refill: 11  - fluticasone propionate (FLONASE) 50 mcg/actuation nasal spray; 1 spray (50 mcg total) by Each Nostril route once daily.  Dispense: 16 g; Refill: 3    2. Hyperlipidemia, unspecified hyperlipidemia type    3. Aortic atherosclerosis    4. Gastroesophageal reflux disease, unspecified whether esophagitis present    5. Enlarged prostate with urinary retention      Plan:         Assessment & Plan    PLAN SUMMARY:  - Prescribed Flonase: 1 spray into each nostril every morning  -  Prescribed Xyzal 5 mg: take at nighttime daily  - Continue Tylenol or Ibuprofen as needed for discomfort  - Use throat lozenges as needed  - Perform nasal saline rinse as tolerated  - Use cool mist humidifier in bedroom at night  - Gargle with warm salt water several times daily  - Drink hot tea with honey or hot water with lemon  - Avoid Neosynephrine use for more than 3 consecutive days  - Contact dentist if tongue soreness persists  - Follow up if symptoms worsen or do not improve    ALLERGIC RHINITIS:  - Diagnosed exacerbation of allergic rhinitis due to symptoms and physical findings, likely caused by elevated pollen count this year.  - Prescribed Flonase (1 spray into each nostril every morning) and Xyzal 5 mg (at nighttime daily) until symptoms completely resolve.  - Recommend nasal saline rinse as tolerated to clear mucus from sinuses and using a cool mist humidifier in bedroom at night.  - Instructed to avoid touching face and maintain good hand hygiene to reduce allergen exposure.  - Cautioned against Neosynephrine use for more than 3 consecutive days due to risk of rebound congestion.  - Mr. Osborne reports sore throat (worse in morning) likely due to post nasal drip.  - Examination revealed postnasal drip and erythematous, irritated pharynx.  - For symptom relief: gargle with warm salt water several times daily, drink hot tea with honey or hot water with lemon to soothe irritation, and use throat lozenges as needed.  - May continue Tylenol or Ibuprofen as needed for discomfort.      AORTIC ATHEROSCLEROSIS:  Continue lovastatin as prescribed.    GERD:  Continue omeprazole as prescribed.    BPH:  Continue Flomax as prescribed.      GENERAL RECOMMENDATIONS:  - Advised to contact dentist if tongue soreness persists.  -May use warm salt-water gargles for additional relief of tongue discomfort.     FOLLOW-UP:  - Instructed to follow up if symptoms worsen or do not improve.              This note was generated  with the assistance of ambient listening technology. Verbal consent was obtained by the patient and accompanying visitor(s) for the recording of patient appointment to facilitate this note. I attest to having reviewed and edited the generated note for accuracy, though some syntax or spelling errors may persist. Please contact the author of this note for any clarification.

## 2025-05-15 ENCOUNTER — CLINICAL SUPPORT (OUTPATIENT)
Dept: REHABILITATION | Facility: HOSPITAL | Age: 70
End: 2025-05-15
Payer: MEDICARE

## 2025-05-15 DIAGNOSIS — M25.661 DECREASED ROM OF RIGHT KNEE: Primary | ICD-10-CM

## 2025-05-15 DIAGNOSIS — R53.1 DECREASED STRENGTH, ENDURANCE, AND MOBILITY: ICD-10-CM

## 2025-05-15 DIAGNOSIS — Z74.09 DECREASED STRENGTH, ENDURANCE, AND MOBILITY: ICD-10-CM

## 2025-05-15 DIAGNOSIS — R68.89 DECREASED STRENGTH, ENDURANCE, AND MOBILITY: ICD-10-CM

## 2025-05-15 PROCEDURE — 97530 THERAPEUTIC ACTIVITIES: CPT | Mod: PN

## 2025-05-15 NOTE — PROGRESS NOTES
[facility]   Physical Therapy Treatment Note     Name: Casper Osborne  Clinic Number: [id number]    Therapy Diagnosis:   Encounter Diagnoses   Name Primary?    Decreased ROM of right knee Yes    Decreased strength, endurance, and mobility      Physician: Luisa Valencia    Visit Date: 5/15/2025    Physician Orders: PT Eval and Treat   Medical Diagnosis from Referral: S83.511D (ICD-10-CM) - Rupture of anterior cruciate ligament of right knee, subsequent encounter   Evaluation Date: 12/23/2024  Authorization Period Expiration: 12/12/2025  Plan of Care Expiration: 3/23/25, 5/16/25  Progress Note Due: 4/20/25  Date of Surgery: 12/20/24  Visit # / Visits authorized: 19/20 +eval  FOTO: 3/3 - (administered 4/24/25)     Precautions: Standard      Time In: 0958 AM  Time Out: 1054 AM  Total Billable Time: 30 minutes    PROCEDURES PERFORMED:   Right knee arthroscopic patellar tendon allograft ACL reconstruction (CPT 89642)  Right knee arthroscopic partial medial and lateral meniscectomies (CPT 61051)  Right knee arthroscopic chondroplasty    POSTOPERATIVE DIAGNOSES:   Right knee complete ACL tear, chronic  Right knee medial and lateral meniscus tears  Right knee chondromalacia     MD follow up: 6/24/25     POSTOPERATIVE PLAN: Patient will weight bear as tolerates with the brace locked in extension. Range of motion may be full. Plan to follow an ACL autograft rehab protocol. Initial goals include maintenance of full knee extension, regaining flexion, swelling control, and quadriceps activation exercises. DVT prophylaxis with ASA 81 mg twice daily x 2 weeks.     PTA Visit #: 0/5     Subjective     Patient reports: He has been dealing with a respiratory issue so he just has a hard time catching his breath. The knee has been feeling great. He has not yet tried to ride his bike at   He was compliant with home exercise program.  Response to previous treatment: good  Functional change:  "ongoing    Pain: 0/10  Location: Right knee    Objective      Objective Measures updated at progress report unless specified.     DOS: 12/20/25, right ACL repair    Right knee AAROM: 3-0-139 degrees  All below testing performed in seated position. Gait belt used for quadriceps testing.  Lower extremity strength with [employer] handheld dynamometer Right  (Kgf) Left  (Kgf) Pain/dysfunction with movement   (approx 4 sec hold w/ max contraction)   Quadriceps 25.4 kgf 23.0 kgf 104% LSI   Hamstrings 15.9 kgf 20.9 kgf  76% LSI       FOTO knee Survey    Therapist reviewed FOTO scores for Casper Osborne on 5/15/2025.   FOTO documents entered into [employer] - see Media section.    status Score: 70%     Treatment     Casper received the treatments listed below:      therapeutic exercises to develop strength, ROM, flexibility, and posture for 12 minutes including:  Bike: 8', L3  Standing heel raises 3x15 double leg at ledge  +Single leg heel raise 3x10 Right     therapeutic activities to improve functional performance for 44 minutes, including:  Standing hip abduction: 3x15 Bilateral, green theraband   Single leg stance 30"x4 Right +Airex  Leg press (0-90 degrees): 3x15 double leg, 9 plates   Single leg (0-90 degree): 3x15 RLE 5 plates  Single leg sit to stand 2x15 from chair   Lateral step down: 6" 3x12   Cybex hamstring curl 5 plates Right only 3x15   Cybex leg extension 2x15 30lb Right only  Small range single leg RDL 0#: 2x10, cues for neutral spine -   Step ups 12" from airex 3x10, right    Patient Education and Home Exercises       Education provided:   - Home exercise program    Written Home Exercises Provided: Yes. Exercises were reviewed and Casper was able to demonstrate them prior to the end of the session.  Casper demonstrated good  understanding of the education provided. See Electronic Medical Record under Patient Instructions for exercises provided during therapy sessions    Assessment     Casper is a 69 y.o. male " referred to outpatient Physical Therapy with a medical diagnosis of S83.511D (ICD-10-CM) - Rupture of anterior cruciate ligament of right knee, subsequent encounter. S/p 9 weeks Right ACL reconstruction by Dr. Corrigan.   Patient arrived to today's session with some minor fatigue due to a recent respiratory infection. He was able to progress with single leg heel raise as well as with performing single leg sit to stand without Airex pad today. He was challenged during today's session and required periodic rest breaks. Patient is progressing well towards discharge.   Casper Is progressing well towards his goals.   Patient prognosis is Good.     Patient will continue to benefit from skilled outpatient physical therapy to address the deficits listed in the problem list box on initial evaluation, provide pt/family education and to maximize pt's level of independence in the home and community environment.     Patient's spiritual, cultural and educational needs considered and pt agreeable to plan of care and goals.     Anticipated barriers to physical therapy: chronicity of knee pain    Goals:   Short Term Goals: 6 weeks   Patient will be independent with Home exercise program to supplement therapy met   Patient will improve Right knee range of motion 0-90 degrees to improve gait and transfers met  Patient will improve right lower extremity strength to 70% LSI of Left to improve ability to improve functional tasks met  Patient will be able to ambulate without assistive device to improve mobility and return to prior level of function met     Long Term Goals: 12 weeks   Patient will improve FOTO score to 71 % to improve self perceived ability to perform functional tasks progressing, not met  Patient will improve right lower extremity strength to 90% LSI of Left to improve ability to perform functional tasks met  Patient will improve Right knee range of motion 0-140 deg to improve ability to perform functional tasks met    Patient will be able to navigate stairs without difficulty to improve ability to navigate home environment  met    Plan     Improve quad activation and range of motion.     Patient will weight bear as tolerates with the brace locked in extension. Range of motion may be full. Plan to follow an ACL autograft rehab protocol. Initial goals include maintenance of full knee extension, regaining flexion, swelling control, and quadriceps activation exercises. DVT prophylaxis with ASA 81 mg twice daily x 2 weeks.      Tegan Mera, PT ,DPT,OCS  05/15/2025

## 2025-05-22 ENCOUNTER — CLINICAL SUPPORT (OUTPATIENT)
Dept: REHABILITATION | Facility: HOSPITAL | Age: 70
End: 2025-05-22
Payer: MEDICARE

## 2025-05-22 DIAGNOSIS — R68.89 DECREASED STRENGTH, ENDURANCE, AND MOBILITY: ICD-10-CM

## 2025-05-22 DIAGNOSIS — M25.661 DECREASED ROM OF RIGHT KNEE: Primary | ICD-10-CM

## 2025-05-22 DIAGNOSIS — R53.1 DECREASED STRENGTH, ENDURANCE, AND MOBILITY: ICD-10-CM

## 2025-05-22 DIAGNOSIS — Z74.09 DECREASED STRENGTH, ENDURANCE, AND MOBILITY: ICD-10-CM

## 2025-05-22 PROCEDURE — 97530 THERAPEUTIC ACTIVITIES: CPT | Mod: PN

## 2025-05-22 PROCEDURE — 97110 THERAPEUTIC EXERCISES: CPT | Mod: PN

## 2025-05-22 NOTE — PROGRESS NOTES
[facility]   Physical Therapy Treatment   And  Update Plan of Care     Name: Casper Osborne  Clinic Number: [id number]    Therapy Diagnosis:   Encounter Diagnoses   Name Primary?    Decreased ROM of right knee Yes    Decreased strength, endurance, and mobility      Physician: Luisa Valencia*    Visit Date: 5/22/2025    Physician Orders: PT Eval and Treat   Medical Diagnosis from Referral: S83.511D (ICD-10-CM) - Rupture of anterior cruciate ligament of right knee, subsequent encounter   Evaluation Date: 12/23/2024  Authorization Period Expiration: 12/12/2025  Plan of Care Expiration: 3/23/25, 5/16/25, 6/27/28  Progress Note Due: 4/20/25  Date of Surgery: 12/20/24  Visit # / Visits authorized: 23/20 +eval  FOTO: 3/3 - (administered 4/24/25)     Precautions: Standard      Time In: 1:05 PM  Time Out: 2:00 PM  Total Billable Time: 55 minutes    PROCEDURES PERFORMED:   Right knee arthroscopic patellar tendon allograft ACL reconstruction (CPT 76326)  Right knee arthroscopic partial medial and lateral meniscectomies (CPT 70646)  Right knee arthroscopic chondroplasty    POSTOPERATIVE DIAGNOSES:   Right knee complete ACL tear, chronic  Right knee medial and lateral meniscus tears  Right knee chondromalacia     MD follow up: 6/24/25     POSTOPERATIVE PLAN: Patient will weight bear as tolerates with the brace locked in extension. Range of motion may be full. Plan to follow an ACL autograft rehab protocol. Initial goals include maintenance of full knee extension, regaining flexion, swelling control, and quadriceps activation exercises. DVT prophylaxis with ASA 81 mg twice daily x 2 weeks.     PTA Visit #: 0/5     Subjective     Patient reports: Fixed up his bike and will start riding it more. Feels great, no knee pain and doing everything he wants to do.   He was compliant with home exercise program.  Response to previous treatment: good  Functional change: ongoing    Pain: 0/10  Location:  "Right knee    Objective      Objective Measures updated at progress report unless specified.     DOS: 12/20/25, right ACL repair    Right knee AAROM: 3-0-138 degrees  All below testing performed in seated position. Gait belt used for quadriceps testing.  Lower extremity strength with [employer] handheld dynamometer Right  (Kgf) Left  (Kgf) Pain/dysfunction with movement   (approx 4 sec hold w/ max contraction)   Quadriceps 32.8 kgf 27.2 kgf 120% LSI   Hamstrings 22.2 kgf 22.1 kgf  100% LSI       FOTO knee Survey    Therapist reviewed FOTO scores for Casper Osborne on 5/22/2025.   FOTO documents entered into [employer] - see Media section.    status Score: 70%     Treatment     Casper received the treatments listed below:      therapeutic exercises to develop strength, ROM, flexibility, and posture for 15 minutes including:  Bike: 8', L3  Standing heel raises 3x15 double leg at ledge  Single leg heel raise 3x12 Right     therapeutic activities to improve functional performance for 40 minutes, including:  Tandem walking on foam: 4 laps  Single leg stance 30"x4 Right +Airex  Standing hip abduction: 3x15 Bilateral, green theraband - NT  Leg press (0-90 degrees): 3x15 double leg, 9 plates - NT   Single leg (0-90 degree): 3x15 RLE 5 plates - NT  Single leg sit to stand 2x15 from chair  - NT  Lateral step down: 6" 3x12   Cybex hamstring curl 5 plates Right only 3x15   Cybex leg extension 3x10 30lb Right only  Small range single leg RDL 0#: 2x10, cues for neutral spine - NT  Step ups 12" from airex 3x15, right  Reassessment     Patient Education and Home Exercises       Education provided:   - Home exercise program    Written Home Exercises Provided: Yes. Exercises were reviewed and Casper was able to demonstrate them prior to the end of the session.  Casper demonstrated good  understanding of the education provided. See Electronic Medical Record under Patient Instructions for exercises provided during therapy sessions    Assessment "     Casper is a 69 y.o. male referred to outpatient Physical Therapy with a medical diagnosis of S83.511D (ICD-10-CM) - Rupture of anterior cruciate ligament of right knee, subsequent encounter. S/p 9 weeks Right ACL reconstruction by Dr. Corrigan.   Pt progressing very well, met all short and long term goals. UPOC today for discharge next week. He is having no pain, full knee range of motion but can improve on knee hyperextension, and quad strength stronger than uninvolved leg, and with overall strength appropriate for age.   Casper Is progressing well towards his goals.   Patient prognosis is Good.     Patient will continue to benefit from skilled outpatient physical therapy to address the deficits listed in the problem list box on initial evaluation, provide pt/family education and to maximize pt's level of independence in the home and community environment.     Patient's spiritual, cultural and educational needs considered and pt agreeable to plan of care and goals.     Anticipated barriers to physical therapy: chronicity of knee pain    Goals:   Short Term Goals: 6 weeks   Patient will be independent with Home exercise program to supplement therapy met   Patient will improve Right knee range of motion 0-90 degrees to improve gait and transfers met  Patient will improve right lower extremity strength to 70% LSI of Left to improve ability to improve functional tasks met  Patient will be able to ambulate without assistive device to improve mobility and return to prior level of function met     Long Term Goals: 12 weeks   Patient will improve FOTO score to 71 % to improve self perceived ability to perform functional tasks  met  Patient will improve right lower extremity strength to 90% LSI of Left to improve ability to perform functional tasks met  Patient will improve Right knee range of motion 0-140 deg to improve ability to perform functional tasks met   Patient will be able to navigate stairs without difficulty to  improve ability to navigate home environment  met    Plan     D/c next visit.    Patient will weight bear as tolerates with the brace locked in extension. Range of motion may be full. Plan to follow an ACL autograft rehab protocol. Initial goals include maintenance of full knee extension, regaining flexion, swelling control, and quadriceps activation exercises. DVT prophylaxis with ASA 81 mg twice daily x 2 weeks.      Joe Fields, PT, DPT ,DPT,OCS  05/22/2025                   I have reviewed and confirmed nurses' notes for patient's medications, allergies, medical history, and surgical history.

## 2025-05-29 ENCOUNTER — CLINICAL SUPPORT (OUTPATIENT)
Dept: REHABILITATION | Facility: HOSPITAL | Age: 70
End: 2025-05-29
Payer: MEDICARE

## 2025-05-29 DIAGNOSIS — M25.661 DECREASED ROM OF RIGHT KNEE: Primary | ICD-10-CM

## 2025-05-29 DIAGNOSIS — R68.89 DECREASED STRENGTH, ENDURANCE, AND MOBILITY: ICD-10-CM

## 2025-05-29 DIAGNOSIS — R53.1 DECREASED STRENGTH, ENDURANCE, AND MOBILITY: ICD-10-CM

## 2025-05-29 DIAGNOSIS — Z74.09 DECREASED STRENGTH, ENDURANCE, AND MOBILITY: ICD-10-CM

## 2025-05-29 PROCEDURE — 97530 THERAPEUTIC ACTIVITIES: CPT | Mod: PN

## 2025-05-29 NOTE — PROGRESS NOTES
[facility]   Physical Therapy Treatment   And  Discharge Note     Name: Casper Osborne  Clinic Number: [id number]    Therapy Diagnosis:   Encounter Diagnoses   Name Primary?    Decreased ROM of right knee Yes    Decreased strength, endurance, and mobility      Physician: Luisa Valencia*    Visit Date: 5/29/2025    Physician Orders: PT Eval and Treat   Medical Diagnosis from Referral: S83.511D (ICD-10-CM) - Rupture of anterior cruciate ligament of right knee, subsequent encounter   Evaluation Date: 12/23/2024  Authorization Period Expiration: 12/12/2025  Plan of Care Expiration: 3/23/25, 5/16/25, 6/27/28  Progress Note Due: 4/20/25  Date of Surgery: 12/20/24  Visit # / Visits authorized: 24/20 +eval  FOTO: 4/3 - (administered 5/29/25) - Met goal     Precautions: Standard      Time In: 1:05 PM  Time Out: 1:45 PM  Total Billable Time: 40 minutes (3TA)    PROCEDURES PERFORMED:   Right knee arthroscopic patellar tendon allograft ACL reconstruction (CPT 26179)  Right knee arthroscopic partial medial and lateral meniscectomies (CPT 45987)  Right knee arthroscopic chondroplasty    POSTOPERATIVE DIAGNOSES:   Right knee complete ACL tear, chronic  Right knee medial and lateral meniscus tears  Right knee chondromalacia     MD follow up: 6/24/25     POSTOPERATIVE PLAN: Patient will weight bear as tolerates with the brace locked in extension. Range of motion may be full. Plan to follow an ACL autograft rehab protocol. Initial goals include maintenance of full knee extension, regaining flexion, swelling control, and quadriceps activation exercises. DVT prophylaxis with ASA 81 mg twice daily x 2 weeks.     PTA Visit #: 0/5     Subjective     Patient reports: Rode his bike a bit, needs to get better with balance, but otherwise feeling great and doing everything he wants to do. No knee pains.   He was compliant with home exercise program.  Response to previous treatment: good  Functional change:  "ongoing    Pain: 0/10  Location: Right knee    Objective      Objective Measures updated at progress report unless specified.     DOS: 12/20/25, right ACL repair    Right knee AAROM: 3-0-138 degrees  All below testing performed in seated position. Gait belt used for quadriceps testing.  Lower extremity strength with [employer] handheld dynamometer Right  (Kgf) Left  (Kgf) Pain/dysfunction with movement   (approx 4 sec hold w/ max contraction)   Quadriceps 32.8 kgf 27.2 kgf 120% LSI   Hamstrings 22.2 kgf 22.1 kgf  100% LSI      Treatment     Casper received the treatments listed below:      therapeutic exercises to develop strength, ROM, flexibility, and posture for 00 minutes including:  Bike: 8', L3  Standing heel raises 3x15 double leg at ledge  Single leg heel raise 3x12 Right     therapeutic activities to improve functional performance for 40 minutes, including:  Reassessment  FOTO  Education   Lateral step down test for 6 inch box: 2 trials    Not today:  Tandem walking on foam: 4 laps  Single leg stance 30"x4 Right +Airex  Standing hip abduction: 3x15 Bilateral, green theraband - NT  Leg press (0-90 degrees): 3x15 double leg, 9 plates - NT   Single leg (0-90 degree): 3x15 RLE 5 plates - NT  Single leg sit to stand 2x15 from chair  - NT  Lateral step down: 6" 3x12   Cybex hamstring curl 5 plates Right only 3x15   Cybex leg extension 3x10 30lb Right only  Small range single leg RDL 0#: 2x10, cues for neutral spine - NT  Step ups 12" from airex 3x15, right    Patient Education and Home Exercises       Education provided:   - Home exercise program    Written Home Exercises Provided: Yes. Exercises were reviewed and Casper was able to demonstrate them prior to the end of the session.  Casper demonstrated good  understanding of the education provided. See Electronic Medical Record under Patient Instructions for exercises provided during therapy sessions    Assessment     Casper is a 69 y.o. male referred to outpatient " Physical Therapy with a medical diagnosis of S83.511D (ICD-10-CM) - Rupture of anterior cruciate ligament of right knee, subsequent encounter. S/p 9 weeks Right ACL reconstruction by Dr. Corrigan.   Pt met all short and long term goals, no knee pain for all activities, some balance deficits that were seen with 5x lateral step down test that was only related to balance and not strength, and appropriate for discharge today. He will let us know if he has any issues or questions. Pt d/c'ed from PT.    Casper Is progressing well towards his goals.   Patient prognosis is Good.     Patient will continue to benefit from skilled outpatient physical therapy to address the deficits listed in the problem list box on initial evaluation, provide pt/family education and to maximize pt's level of independence in the home and community environment.     Patient's spiritual, cultural and educational needs considered and pt agreeable to plan of care and goals.     Anticipated barriers to physical therapy: chronicity of knee pain    Goals:   Short Term Goals: 6 weeks   Patient will be independent with Home exercise program to supplement therapy met   Patient will improve Right knee range of motion 0-90 degrees to improve gait and transfers met  Patient will improve right lower extremity strength to 70% LSI of Left to improve ability to improve functional tasks met  Patient will be able to ambulate without assistive device to improve mobility and return to prior level of function met     Long Term Goals: 12 weeks   Patient will improve FOTO score to 71 % to improve self perceived ability to perform functional tasks  met  Patient will improve right lower extremity strength to 90% LSI of Left to improve ability to perform functional tasks met  Patient will improve Right knee range of motion 0-140 deg to improve ability to perform functional tasks met   Patient will be able to navigate stairs without difficulty to improve ability to navigate  home environment  met    Plan     Pt discharged from PT.    Patient will weight bear as tolerates with the brace locked in extension. Range of motion may be full. Plan to follow an ACL autograft rehab protocol. Initial goals include maintenance of full knee extension, regaining flexion, swelling control, and quadriceps activation exercises. DVT prophylaxis with ASA 81 mg twice daily x 2 weeks.      Joe Fields, PT, DPT ,DPT,OCS  05/29/2025

## 2025-06-02 ENCOUNTER — OFFICE VISIT (OUTPATIENT)
Dept: INTERNAL MEDICINE | Facility: CLINIC | Age: 70
End: 2025-06-02
Payer: MEDICARE

## 2025-06-02 VITALS
TEMPERATURE: 97 F | DIASTOLIC BLOOD PRESSURE: 70 MMHG | RESPIRATION RATE: 18 BRPM | SYSTOLIC BLOOD PRESSURE: 122 MMHG | HEART RATE: 66 BPM | HEIGHT: 71 IN | OXYGEN SATURATION: 98 % | BODY MASS INDEX: 26.85 KG/M2 | WEIGHT: 191.81 LBS

## 2025-06-02 DIAGNOSIS — B96.89 BACTERIAL SINUSITIS: Primary | ICD-10-CM

## 2025-06-02 DIAGNOSIS — J32.9 BACTERIAL SINUSITIS: Primary | ICD-10-CM

## 2025-06-02 DIAGNOSIS — R05.2 SUBACUTE COUGH: ICD-10-CM

## 2025-06-02 PROCEDURE — 1160F RVW MEDS BY RX/DR IN RCRD: CPT | Mod: CPTII,S$GLB,,

## 2025-06-02 PROCEDURE — 1101F PT FALLS ASSESS-DOCD LE1/YR: CPT | Mod: CPTII,S$GLB,,

## 2025-06-02 PROCEDURE — 3008F BODY MASS INDEX DOCD: CPT | Mod: CPTII,S$GLB,,

## 2025-06-02 PROCEDURE — 1126F AMNT PAIN NOTED NONE PRSNT: CPT | Mod: CPTII,S$GLB,,

## 2025-06-02 PROCEDURE — 3078F DIAST BP <80 MM HG: CPT | Mod: CPTII,S$GLB,,

## 2025-06-02 PROCEDURE — 99999 PR PBB SHADOW E&M-EST. PATIENT-LVL IV: CPT | Mod: PBBFAC,,,

## 2025-06-02 PROCEDURE — 99214 OFFICE O/P EST MOD 30 MIN: CPT | Mod: S$GLB,,,

## 2025-06-02 PROCEDURE — 1159F MED LIST DOCD IN RCRD: CPT | Mod: CPTII,S$GLB,,

## 2025-06-02 PROCEDURE — 3288F FALL RISK ASSESSMENT DOCD: CPT | Mod: CPTII,S$GLB,,

## 2025-06-02 PROCEDURE — 3074F SYST BP LT 130 MM HG: CPT | Mod: CPTII,S$GLB,,

## 2025-06-02 RX ORDER — PROMETHAZINE HYDROCHLORIDE AND DEXTROMETHORPHAN HYDROBROMIDE 6.25; 15 MG/5ML; MG/5ML
5 SYRUP ORAL NIGHTLY PRN
Qty: 118 ML | Refills: 0 | Status: SHIPPED | OUTPATIENT
Start: 2025-06-02 | End: 2025-06-26

## 2025-06-02 RX ORDER — AMOXICILLIN AND CLAVULANATE POTASSIUM 875; 125 MG/1; MG/1
1 TABLET, FILM COATED ORAL EVERY 12 HOURS
Qty: 14 TABLET | Refills: 0 | Status: SHIPPED | OUTPATIENT
Start: 2025-06-02 | End: 2025-06-09

## 2025-06-20 NOTE — PROGRESS NOTES
"CC: Right knee post-op follow up     DATE OF PROCEDURE: 12/20/2024   PROCEDURES PERFORMED:   Right knee arthroscopic patellar tendon allograft ACL reconstruction (CPT 48075)  Right knee arthroscopic partial medial and lateral meniscectomies (CPT 68902)  Right knee arthroscopic chondroplasty    Casper Osborne presents today for follow up appointment of his right knee. Patient is now 6 months status post above procedure. Discharged from PT 05/29/25.  He is very pleased.  States the knee feels much better compared to preop.  Stable.  No complaints of significance.    Prior Hx 3/25/2025:   Casper Osborne presents today for follow up appointment of his right knee. Patient is now 3 months 5 days status post above procedure. Continues PT at [facility] with Joe.  He states the knee feels great. "You saved my life".  Stable.  No complaints.  No pain.  At baseline does not run or play sports.    Prior Hx 1/30/2025:   Casper Osborne reports to be doing well 5 weeks 6 days s/p the above mentioned procedure. Going to PT at the [facility] location.  States the knee feels great.  He is quite encouraged.  Better than the knee has felt in 3 years.  He feels that I have saved his life with the result of the surgery.  He is very thankful.    REVIEW OF SYSTEMS:   Constitution: Negative. Negative for chills, fever and night sweats.    Hematologic/Lymphatic: Negative for bleeding problem. Does not bruise/bleed easily.   Skin: Negative for dry skin, itching and rash.   Musculoskeletal: Negative for falls.  Negative for Right knee pain and muscle weakness.     All other review of symptoms were reviewed and found to be noncontributory.     PAST MEDICAL HISTORY:   Past Medical History:   Diagnosis Date    GERD (gastroesophageal reflux disease)     Hyperlipidemia      PAST SURGICAL HISTORY:   Past Surgical History:   Procedure Laterality Date    ADENOIDECTOMY  1966    ARTHROSCOPIC CHONDROPLASTY OF KNEE JOINT Right 12/20/2024 "    Procedure: ARTHROSCOPY, KNEE, WITH CHONDROPLASTY;  Surgeon: MARJAN Corrigan MD;  Location: [facility] OR;  Service: Orthopedics;  Laterality: Right;    ARTHROSCOPY OF ANKLE WITH DEBRIDEMENT Left 12/19/2019    Procedure: ARTHROSCOPY, ANKLE, WITH DEBRIDEMENT;  Surgeon: Bay Melendez MD;  Location: [facility] OR;  Service: Orthopedics;  Laterality: Left;    EPIDURAL STEROID INJECTION N/A 01/29/2021    Procedure: INJECTION, STEROID, EPIDURAL L4/5;  Surgeon: Alvarado Reaves MD;  Location: [facility] PAIN [facility]GT;  Service: Pain Management;  Laterality: N/A;    EPIDURAL STEROID INJECTION INTO LUMBAR SPINE N/A 12/24/2020    Procedure: Injection-steroid-epidural-lumbar--L4-5;  Surgeon: Ori Cali Jr., MD;  Location: [facility];  Service: Pain Management;  Laterality: N/A;    FRACTURE SURGERY      left heel at 39yo    HEEL SPUR SURGERY      left heel - fell off a ladder and had to have this reconstructed    KNEE ARTHROSCOPY W/ ACL RECONSTRUCTION Right 12/20/2024    Procedure: RECONSTRUCTION, KNEE, ACL, ARTHROSCOPIC;  Surgeon: MARJAN Corrigan MD;  Location: [facility] OR;  Service: Orthopedics;  Laterality: Right;  General - NO REGIONAL BLOCK    KNEE ARTHROSCOPY W/ MENISCECTOMY Right 12/20/2024    Procedure: ARTHROSCOPY, KNEE, WITH MEDIAL AND LATERALMENISCECTOMY;  Surgeon: MARJAN Corrigan MD;  Location: [facility] OR;  Service: Orthopedics;  Laterality: Right;  General - NO REGIONAL BLOCK    SPINE SURGERY      c7 fx - prior to this, he was getting dizzy spells - none since    SYNOVECTOMY, KNEE, LIMITED, ARTHROSCOPIC Right 12/20/2024    Procedure: SYNOVECTOMY, KNEE, LIMITED, ARTHROSCOPIC;  Surgeon: MARJAN Corrigan MD;  Location: [facility] OR;  Service: Orthopedics;  Laterality: Right;    TONSILLECTOMY      VASECTOMY       FAMILY HISTORY:   Family History   Problem Relation Name Age of Onset    Diabetes Mother Stephanie Kimbrough     Cancer Father Merlin Gaspard         menostatic    Heart disease Daughter Adelina Osborne          "enlarged heart    Hypothyroidism Daughter Adelina Osborne     Colon polyps Neg Hx      Prostate cancer Neg Hx      Colon cancer Neg Hx      Stroke Neg Hx      Hypertension Neg Hx      Hyperlipidemia Neg Hx       SOCIAL HISTORY:   Social History[1]    MEDICATIONS:   Current Medications[2]    ALLERGIES:   Review of patient's allergies indicates:  No Known Allergies     PHYSICAL EXAMINATION:  /79 (BP Location: Right arm, Patient Position: Sitting)   Pulse 67   Ht 5' 11" (1.803 m)   Wt 88.5 kg (195 lb 1.7 oz)   BMI 27.21 kg/m²     General: Well-developed well-nourished 69 y.o. malein no acute distress   Cardiovascular: Regular rhythm by palpation of distal pulse, normal color and temperature, no concerning varicosities on symptomatic side   Lungs: No labored breathing or wheezing appreciated   Neuro: Alert and oriented ×3   Psychiatric: well oriented to person, place and time, demonstrates normal mood and affect   Skin: No rashes, lesions or ulcers, normal temperature, turgor, and texture on involved extremity    Ortho/SPM Exam  Exam of the right knee again shows well-healed incisions.  No significant effusion.  No significant swelling.  Quad otherwise is activating quite well.  Improved strength and bulk.  Intact and strong straight leg raise.  Full active and passive extension and flexion compared to the contralateral side.  Negative Lachman.  Negative pivot shift.  No joint line tenderness    IMAGING:  None    ASSESSMENT:      ICD-10-CM ICD-9-CM   1. Weakness of right quadriceps muscle  M62.81 728.87     PLAN:     Clinically doing quite well.  Not cleared for any sports or cutting/pivoting activity but if he does not do those things at baseline.  Back to his desired baseline activity.  He has recovered quite nicely.  He is very pleased.  I will see him back as needed.    Procedures            [1]   Social History  Socioeconomic History    Marital status:      Spouse name: Nithya    Number of children: 1 "   Occupational History    Occupation: Retried   Tobacco Use    Smoking status: Never    Smokeless tobacco: Never   Substance and Sexual Activity    Alcohol use: Not Currently    Drug use: No    Sexual activity: Yes     Partners: Female     Birth control/protection: None     Comment: vasectomy     Social Drivers of Health     Financial Resource Strain: Low Risk  (12/8/2024)    Overall Financial Resource Strain (CARDIA)     Difficulty of Paying Living Expenses: Not very hard   Food Insecurity: No Food Insecurity (12/8/2024)    Hunger Vital Sign     Worried About Running Out of Food in the Last Year: Never true     Ran Out of Food in the Last Year: Never true   Transportation Needs: No Transportation Needs (12/15/2022)    PRAPARE - Transportation     Lack of Transportation (Medical): No     Lack of Transportation (Non-Medical): No   Physical Activity: Sufficiently Active (12/8/2024)    Exercise Vital Sign     Days of Exercise per Week: 3 days     Minutes of Exercise per Session: 90 min   Stress: No Stress Concern Present (12/8/2024)    [employer] - Occupational Stress Questionnaire     Feeling of Stress : Not at all   Housing Stability: Low Risk  (12/15/2022)    Housing Stability Vital Sign     Unable to Pay for Housing in the Last Year: No     Number of Places Lived in the Last Year: 1     Unstable Housing in the Last Year: No   [2]   Current Outpatient Medications:     ammonium lactate 12 % Crea, Bid to dry elbow and knee and thick dry skin of thigh, Disp: 140 g, Rfl: 3    azelastine (ASTELIN) 137 mcg (0.1 %) nasal spray, 1 spray (137 mcg total) by Nasal route 2 (two) times daily., Disp: 30 mL, Rfl: 0    ferrous sulfate (FEOSOL) Tab tablet, Take 1 tablet by mouth daily with breakfast., Disp: , Rfl:     fluticasone propionate (FLONASE) 50 mcg/actuation nasal spray, 1 spray (50 mcg total) by Each Nostril route once daily., Disp: 16 g, Rfl: 3    ibuprofen (ADVIL,MOTRIN) 800 MG tablet, Take  1 tablet (800 mg total) by mouth 3 (three) times daily as needed for Pain., Disp: 90 tablet, Rfl: 2    ipratropium (ATROVENT) 21 mcg (0.03 %) nasal spray, PLACE TWO SPRAYS IN EACH NOSTRIL THREE TIMES DAILY, Disp: 30 mL, Rfl: 4    levocetirizine (XYZAL) 5 MG tablet, Take 1 tablet (5 mg total) by mouth every evening., Disp: 30 tablet, Rfl: 11    lovastatin (MEVACOR) 40 MG tablet, TAKE ONE TABLET BY MOUTH AT BEDTIME, Disp: 90 tablet, Rfl: 3    omeprazole (PRILOSEC) 40 MG capsule, Take 1 capsule (40 mg total) by mouth once daily., Disp: 90 capsule, Rfl: 2    ondansetron (ZOFRAN-ODT) 4 MG TbDL, Dissolve 1 tablet (4 mg total) by mouth every 8 (eight) hours as needed (nausea)., Disp: 30 tablet, Rfl: 0    oxyCODONE (ROXICODONE) 5 MG immediate release tablet, Take 1-2 tablets (5-10 mg total) by mouth every 4 to 6 hours as needed for Pain., Disp: 28 tablet, Rfl: 0    promethazine-dextromethorphan (PROMETHAZINE-DM) 6.25-15 mg/5 mL Syrp, Take 5 mLs by mouth nightly as needed., Disp: 118 mL, Rfl: 0    tamsulosin (FLOMAX) 0.4 mg Cap, Take 1 capsule (0.4 mg total) by mouth every evening., Disp: 90 capsule, Rfl: 3    varenicline (TYRVAYA) 0.03 mg/spray sprm, 1 spray by Each Nostril route 2 (two) times a day., Disp: 25.2 mL, Rfl: 3    aspirin (ECOTRIN) 81 MG EC tablet, Take 1 tablet (81 mg total) by mouth 2 (two) times a day. for 14 days, Disp: 28 tablet, Rfl: 0    cycloSPORINE (RESTASIS) 0.05 % ophthalmic emulsion, Place 1 drop into both eyes 2 (two) times daily., Disp: 180 each, Rfl: 3    oxybutynin (DITROPAN) 5 MG Tab, Take 1 tablet (5 mg total) by mouth 3 (three) times daily as needed (for bladder spasms)., Disp: 90 tablet, Rfl: 11  No current facility-administered medications for this visit.    Facility-Administered Medications Ordered in Other Visits:     alprazolam ODT dissolvable tablet 0.5 mg, 0.5 mg, Oral, Once PRN, Ori Cali Jr., MD

## 2025-06-24 ENCOUNTER — OFFICE VISIT (OUTPATIENT)
Dept: SPORTS MEDICINE | Facility: CLINIC | Age: 70
End: 2025-06-24
Payer: MEDICARE

## 2025-06-24 VITALS
DIASTOLIC BLOOD PRESSURE: 79 MMHG | HEIGHT: 71 IN | HEART RATE: 67 BPM | WEIGHT: 195.13 LBS | BODY MASS INDEX: 27.32 KG/M2 | SYSTOLIC BLOOD PRESSURE: 133 MMHG

## 2025-06-24 DIAGNOSIS — M62.81 WEAKNESS OF RIGHT QUADRICEPS MUSCLE: Primary | ICD-10-CM

## 2025-06-24 PROCEDURE — 99999 PR PBB SHADOW E&M-EST. PATIENT-LVL III: CPT | Mod: PBBFAC,,, | Performed by: ORTHOPAEDIC SURGERY

## 2025-06-24 PROCEDURE — 3078F DIAST BP <80 MM HG: CPT | Mod: CPTII,S$GLB,, | Performed by: ORTHOPAEDIC SURGERY

## 2025-06-24 PROCEDURE — 3008F BODY MASS INDEX DOCD: CPT | Mod: CPTII,S$GLB,, | Performed by: ORTHOPAEDIC SURGERY

## 2025-06-24 PROCEDURE — 1101F PT FALLS ASSESS-DOCD LE1/YR: CPT | Mod: CPTII,S$GLB,, | Performed by: ORTHOPAEDIC SURGERY

## 2025-06-24 PROCEDURE — 1159F MED LIST DOCD IN RCRD: CPT | Mod: CPTII,S$GLB,, | Performed by: ORTHOPAEDIC SURGERY

## 2025-06-24 PROCEDURE — 3075F SYST BP GE 130 - 139MM HG: CPT | Mod: CPTII,S$GLB,, | Performed by: ORTHOPAEDIC SURGERY

## 2025-06-24 PROCEDURE — 99213 OFFICE O/P EST LOW 20 MIN: CPT | Mod: S$GLB,,, | Performed by: ORTHOPAEDIC SURGERY

## 2025-06-24 PROCEDURE — 3288F FALL RISK ASSESSMENT DOCD: CPT | Mod: CPTII,S$GLB,, | Performed by: ORTHOPAEDIC SURGERY

## 2025-06-24 PROCEDURE — 1126F AMNT PAIN NOTED NONE PRSNT: CPT | Mod: CPTII,S$GLB,, | Performed by: ORTHOPAEDIC SURGERY

## (undated) DEVICE — SUT VICRYL PLUS 2-0 CT1 18

## (undated) DEVICE — SYRINGE 0.9% NACL 10MIL PREFIL

## (undated) DEVICE — SUT TAPE FIBERLOOP 1.3MM

## (undated) DEVICE — SUT MONOCRYL 3-0 PS-1

## (undated) DEVICE — STOCKINET TUBULAR 1 PLY 6X60IN

## (undated) DEVICE — ELECTRODE REM PLYHSV RETURN 9

## (undated) DEVICE — YANKAUER OPEN TIP W/O VENT

## (undated) DEVICE — SUT ETHILON 3-0 PS2 18 BLK

## (undated) DEVICE — CONTAINER SPECIMEN STRL 4OZ

## (undated) DEVICE — PAD CAST SPECIALIST STRL 6

## (undated) DEVICE — DRAPE TOP 53X102IN

## (undated) DEVICE — SYR 30CC LUER LOCK

## (undated) DEVICE — BANDAGE MATRIX HK LOOP 6IN 5YD

## (undated) DEVICE — SUT MCRYL PLUS 4-0 PS2 27IN

## (undated) DEVICE — DRAPE STERI U-SHAPED 47X51IN

## (undated) DEVICE — SUT FIBERLINK TAPE BLU WHT 1.3

## (undated) DEVICE — GAUZE SPONGE 4X4 12PLY

## (undated) DEVICE — SEE MEDLINE ITEM 152515

## (undated) DEVICE — SPONGE LAP 18X18 PREWASHED

## (undated) DEVICE — DRAPE ARTHSCP T ORTHOMAX POUCH

## (undated) DEVICE — SEE MEDLINE ITEM 157150

## (undated) DEVICE — SUT VICRYL+ 1 CT1 18IN

## (undated) DEVICE — SEE MEDLINE ITEM 146298

## (undated) DEVICE — PAD COLD THERAPY KNEE WRAP ON

## (undated) DEVICE — DRAPE THREE-QTR REINF 53X77IN

## (undated) DEVICE — SUT FIBERWIRE 2 50IN BLUE

## (undated) DEVICE — CLOSURE SKIN STERI STRIP 1/2X4

## (undated) DEVICE — Device

## (undated) DEVICE — KNIFE ACL GRAFT 10MM

## (undated) DEVICE — KIT IRR SUCTION HND PIECE

## (undated) DEVICE — TRAY FOLEY 16FR INFECTION CONT

## (undated) DEVICE — SUT SUTURETAPE X 1.3MM 40IN

## (undated) DEVICE — DRESSING XEROFORM FOIL PK 1X8

## (undated) DEVICE — SYS CLSR DERMABOND PRINEO 22CM

## (undated) DEVICE — GAUZE SPONGE 4'X4 12 PLY

## (undated) DEVICE — SUT VICRYL 3-0 27 SH

## (undated) DEVICE — SEE MEDLINE ITEM 157117

## (undated) DEVICE — BLADE ACL FINE 9.5X25.5X.4MM

## (undated) DEVICE — SUT VICRYL 0 27 CT-2

## (undated) DEVICE — COVER BACK TABLE 72X21

## (undated) DEVICE — PROBE ARTHSCP EDGE ENERGY 50

## (undated) DEVICE — SOL NACL IRR 3000ML

## (undated) DEVICE — BLADE SURG #15 CARBON STEEL

## (undated) DEVICE — KIT ACL DISP W/O SAW BLADE

## (undated) DEVICE — UNDERGLOVES BIOGEL PI SIZE 8.5

## (undated) DEVICE — FAST-FIX 360 REVERSED CURVE                                    MENISCAL REPAIR SYSTEM
Type: IMPLANTABLE DEVICE | Site: KNEE | Status: NON-FUNCTIONAL
Brand: FAST-FIX

## (undated) DEVICE — DRAPE INCISE IOBAN 2 23X17IN

## (undated) DEVICE — TOURNIQUET SB QC DP 24X4IN

## (undated) DEVICE — DRESSING XEROFORM 1X8IN

## (undated) DEVICE — COVER CAMERA OPERATING ROOM

## (undated) DEVICE — NDL HYPO STD REG BVL 18GX1.5IN

## (undated) DEVICE — SYR IRRIGATION BULB STER 60ML

## (undated) DEVICE — PAD ELECTRODE STER 1.5X3

## (undated) DEVICE — SUT ETHILON 3/0 18IN PS-1

## (undated) DEVICE — TRAY ARTHROSCOPY 2/CS

## (undated) DEVICE — DRESSING AQUACEL AG ADV 3.5X6

## (undated) DEVICE — KIT ARTHROSCOPY ANKLE CUSTOM

## (undated) DEVICE — BLADE SHAVER LANZA 4.2X13CM

## (undated) DEVICE — TOURNIQUET SB QC DP 34X4IN

## (undated) DEVICE — PAD CAST SPECIALIST STRL 4

## (undated) DEVICE — NDL SPINAL 18GX3.5 SPINOCAN

## (undated) DEVICE — BRACE KNEE T SCOPE PREMIER

## (undated) DEVICE — PAD ABD 8X10 STERILE

## (undated) DEVICE — PAD ABDOMINAL STERILE 8X10IN

## (undated) DEVICE — TUBE SET INFLOW/OUTFLOW

## (undated) DEVICE — BANDAGE ESMARK 6X12

## (undated) DEVICE — DRESSING LEUKOPLAST FLEX 1X3IN

## (undated) DEVICE — COVER MAYO STND XL 30X57IN

## (undated) DEVICE — APPLICATOR CHLORAPREP ORN 26ML

## (undated) DEVICE — PUSHER CUTTER KNOT FAST FX STR

## (undated) DEVICE — SUT ETHICON 3-0 BLK MONO PS

## (undated) DEVICE — COVER PROXIMA MAYO STAND

## (undated) DEVICE — SUT TAPE TIGERLOOP 1.3MM

## (undated) DEVICE — BOWL STERILE LARGE 32OZ

## (undated) DEVICE — BENZOIN TINCTURE CAPSULET

## (undated) DEVICE — BLADE SURG CARBON STEEL #10

## (undated) DEVICE — SOL 9P NACL IRR PIC IL

## (undated) DEVICE — SPONGE COTTON TRAY 4X4IN

## (undated) DEVICE — SEE MEDLINE ITEM 153151

## (undated) DEVICE — SEE MEDLINE ITEM 152530

## (undated) DEVICE — TAPE SILK 3IN

## (undated) DEVICE — DRESSING XEROFORM NONADH 1X8IN

## (undated) DEVICE — SPONGE GAUZE 16PLY 4X4

## (undated) DEVICE — BLADE VORTEX SHAVER 4.5MMX13CM

## (undated) DEVICE — PENCIL ROCKER SWITCH 10FT CORD

## (undated) DEVICE — DRILL TIP GUIDE PIN 3.5 MM X 311 MM
Type: IMPLANTABLE DEVICE | Site: KNEE | Status: NON-FUNCTIONAL
Brand: ARTHREX®
Removed: 2024-12-20

## (undated) DEVICE — SOL NACL 0.9% INJ 500ML BG

## (undated) DEVICE — HOOD T-5 TEAR AWAY STERILE

## (undated) DEVICE — DRAPE PLASTIC U 60X72

## (undated) DEVICE — KIT ASSISTARM ANKLE CUSTOM

## (undated) DEVICE — WRAP KNEE ACCU THERM GEL PACK

## (undated) DEVICE — SINGLE USE PATIENT INFLOW TUBING SET

## (undated) DEVICE — DRAPE STERI INSTRUMENT 1018

## (undated) DEVICE — NDL 18GA X1 1/2 REG BEVEL

## (undated) DEVICE — SUT 2-0 VICRYL / SH (J417)

## (undated) DEVICE — GLOVE BIOGEL ORTHOPEDIC 7.5

## (undated) DEVICE — NDL 22GA X1 1/2 REG BEVEL

## (undated) DEVICE — DRILL FLIPCUTTER III

## (undated) DEVICE — DRESSING AQUACEL AG FOAM 4X4

## (undated) DEVICE — STRAP ANKLE DISTRACTOR FOOT

## (undated) DEVICE — SOL IRR NACL .9% 3000ML

## (undated) DEVICE — CONTAINER SPECIMEN OR STER 4OZ

## (undated) DEVICE — BNDG COFLEX FOAM LF2 ST 6X5YD

## (undated) DEVICE — GOWN ECLIPSE REINF LV4 XLNG XL

## (undated) DEVICE — SUT FIBERWIRE LOOP STRAIGHT

## (undated) DEVICE — SET DECANTER MEDICHOICE

## (undated) DEVICE — BANDAGE DERMACEA STRETCH 4X1IN

## (undated) DEVICE — GLOVE BIOGEL SKINSENSE PI 8.0